# Patient Record
Sex: MALE | Race: WHITE | NOT HISPANIC OR LATINO | Employment: FULL TIME | URBAN - METROPOLITAN AREA
[De-identification: names, ages, dates, MRNs, and addresses within clinical notes are randomized per-mention and may not be internally consistent; named-entity substitution may affect disease eponyms.]

---

## 2017-01-18 LAB
EXT ALBUMIN: 3.7
EXT ALKALINE PHOSPHATASE: 63
EXT ALT: 26
EXT AST: 13
EXT BASOPHIL%: 0.7
EXT BILIRUBIN TOTAL: 0.66
EXT BUN: 21
EXT CALCIUM: 9
EXT CHLORIDE: 104
EXT CHOLESTEROL: 193
EXT CO2: 28
EXT CREATININE UA: 193
EXT CREATININE: 1.36 MG/DL
EXT EOSINOPHIL%: 3.8
EXT GGT: 53
EXT GLUCOSE: 151
EXT HDL: 36
EXT HEMATOCRIT: 44.2
EXT HEMOGLOBIN: 15.2
EXT LDL CHOLESTEROL: 131
EXT LYMPH%: 25.2
EXT MAGNESIUM: 1.5
EXT MONOCYTES%: 9.4
EXT PHOSPHORUS: 2.65
EXT PLATELETS: 153
EXT POTASSIUM: 4.5
EXT PROT/CREAT RATIO UR: 0.3
EXT PROTEIN TOTAL: 7
EXT PROTEIN UA: 66
EXT SEGS%: 60.9
EXT SODIUM: 140 MMOL/L
EXT TACROLIMUS LVL: 2.6
EXT TRIGLYCERIDES: 128
EXT WBC: 4.5

## 2017-01-20 DIAGNOSIS — Z94.4 LIVER REPLACED BY TRANSPLANT: ICD-10-CM

## 2017-01-20 RX ORDER — TACROLIMUS 1 MG/1
CAPSULE ORAL
Qty: 180 CAPSULE | Refills: 11 | Status: SHIPPED | OUTPATIENT
Start: 2017-01-20 | End: 2018-01-03 | Stop reason: SDUPTHER

## 2017-02-03 ENCOUNTER — TELEPHONE (OUTPATIENT)
Dept: TRANSPLANT | Facility: CLINIC | Age: 62
End: 2017-02-03

## 2017-02-03 DIAGNOSIS — Z94.4 LIVER REPLACED BY TRANSPLANT: Primary | ICD-10-CM

## 2017-02-03 NOTE — TELEPHONE ENCOUNTER
Pt called concerned about low prograf level. Pt spouse reports he has not missed any doses. Will repeat on Monday 2/6/17

## 2017-02-03 NOTE — TELEPHONE ENCOUNTER
----- Message from Eusebio Sheldon sent at 2/3/2017  8:26 AM CST -----  Contact: Kristyn(wife)  Have concerns regarding pt's prograpf levels please return call at

## 2017-02-06 LAB — EXT TACROLIMUS LVL: 7.6

## 2017-02-08 ENCOUNTER — TELEPHONE (OUTPATIENT)
Dept: TRANSPLANT | Facility: CLINIC | Age: 62
End: 2017-02-08

## 2017-02-08 NOTE — TELEPHONE ENCOUNTER
----- Message from Shaniqua Matias MD sent at 2/4/2017  5:30 PM CST -----  Repeat pg level - please let patient know.

## 2017-02-09 ENCOUNTER — TELEPHONE (OUTPATIENT)
Dept: TRANSPLANT | Facility: CLINIC | Age: 62
End: 2017-02-09

## 2017-02-09 NOTE — TELEPHONE ENCOUNTER
----- Message from Briana Carbajal sent at 2/9/2017 10:30 AM CST -----  Contact: Kristyn/wife  Returning call from yesterday,vanessa call back  [phone]

## 2017-02-10 ENCOUNTER — PATIENT MESSAGE (OUTPATIENT)
Dept: ADMINISTRATIVE | Facility: OTHER | Age: 62
End: 2017-02-10

## 2017-02-17 ENCOUNTER — PATIENT MESSAGE (OUTPATIENT)
Dept: TRANSPLANT | Facility: CLINIC | Age: 62
End: 2017-02-17

## 2017-02-27 ENCOUNTER — TELEPHONE (OUTPATIENT)
Dept: TRANSPLANT | Facility: CLINIC | Age: 62
End: 2017-02-27

## 2017-02-27 NOTE — TELEPHONE ENCOUNTER
----- Message from Shaniqua Matias MD sent at 2/24/2017  4:20 PM CST -----  The Labs are stable - please let patient know.

## 2017-03-01 ENCOUNTER — PATIENT MESSAGE (OUTPATIENT)
Dept: TRANSPLANT | Facility: CLINIC | Age: 62
End: 2017-03-01

## 2017-03-03 ENCOUNTER — TELEPHONE (OUTPATIENT)
Dept: TRANSPLANT | Facility: CLINIC | Age: 62
End: 2017-03-03

## 2017-03-03 ENCOUNTER — HOSPITAL ENCOUNTER (OUTPATIENT)
Dept: RADIOLOGY | Facility: HOSPITAL | Age: 62
Discharge: HOME | End: 2017-03-03
Attending: INTERNAL MEDICINE
Payer: COMMERCIAL

## 2017-03-03 DIAGNOSIS — Z94.4 LIVER REPLACED BY TRANSPLANT: ICD-10-CM

## 2017-03-03 LAB
CREAT SERPL-MCNC: 1.2 MG/DL (ref 0.5–1.4)
SAMPLE: NORMAL

## 2017-03-03 PROCEDURE — 74177 CT ABD & PELVIS W/CONTRAST: CPT | Mod: TC

## 2017-03-03 PROCEDURE — 74177 CT ABD & PELVIS W/CONTRAST: CPT | Mod: 26,,, | Performed by: RADIOLOGY

## 2017-03-03 PROCEDURE — 25500020 PHARM REV CODE 255: Performed by: INTERNAL MEDICINE

## 2017-03-03 PROCEDURE — 71260 CT THORAX DX C+: CPT | Mod: 26,,, | Performed by: RADIOLOGY

## 2017-03-03 PROCEDURE — 71260 CT THORAX DX C+: CPT | Mod: TC

## 2017-03-03 RX ADMIN — IOHEXOL 100 ML: 350 INJECTION, SOLUTION INTRAVENOUS at 02:03

## 2017-03-03 NOTE — TELEPHONE ENCOUNTER
----- Message from Shaniqua Matias MD sent at 3/3/2017  3:34 PM CST -----  Scan - no obvious HCC. Repeat ct chest with next abdo ct - please let patient know.

## 2017-03-06 ENCOUNTER — TELEPHONE (OUTPATIENT)
Dept: TRANSPLANT | Facility: CLINIC | Age: 62
End: 2017-03-06

## 2017-03-06 NOTE — TELEPHONE ENCOUNTER
----- Message from Ranjith Linton sent at 3/6/2017  1:04 PM CST -----  Contact: pt wife  ..Jordan Lopez Jr. calling to get lab results,   [phone]- wife   [phone]- pt

## 2017-05-23 ENCOUNTER — PATIENT MESSAGE (OUTPATIENT)
Dept: TRANSPLANT | Facility: CLINIC | Age: 62
End: 2017-05-23

## 2017-05-29 LAB
EXT ALBUMIN: 3.6
EXT ALKALINE PHOSPHATASE: 61
EXT ALT: 25
EXT AST: 14
EXT BASOPHIL%: 0.9
EXT BILIRUBIN TOTAL: 0.62
EXT BUN: 19
EXT CALCIUM: 9.1
EXT CHLORIDE: 104
EXT CHOLESTEROL: 182
EXT CO2: 28
EXT CREATININE: 1.24 MG/DL
EXT EOSINOPHIL%: 3.2
EXT GFR MDRD NON AF AMER: 63
EXT GGT: 47
EXT GLUCOSE: 169
EXT HDL: 36
EXT HEMATOCRIT: 42.5
EXT HEMOGLOBIN: 14.5
EXT LDL CHOLESTEROL: 124
EXT LYMPH%: 28.1
EXT MAGNESIUM: 1.9
EXT MONOCYTES%: 8.7
EXT PHOSPHORUS: 2.47
EXT PLATELETS: 135
EXT POTASSIUM: 4.5
EXT PROT/CREAT RATIO UR: 0.2
EXT PROTEIN TOTAL: 6.7
EXT PROTEIN UA: 40
EXT SEGS%: 59.1
EXT SODIUM: 139 MMOL/L
EXT TACROLIMUS LVL: 4.2
EXT TRIGLYCERIDES: 108
EXT WBC: 3.5
HBA1C MFR BLD: 7.4 %

## 2017-05-30 ENCOUNTER — PATIENT MESSAGE (OUTPATIENT)
Dept: TRANSPLANT | Facility: CLINIC | Age: 62
End: 2017-05-30

## 2017-06-06 ENCOUNTER — TELEPHONE (OUTPATIENT)
Dept: TRANSPLANT | Facility: CLINIC | Age: 62
End: 2017-06-06

## 2017-06-06 NOTE — TELEPHONE ENCOUNTER
----- Message from Shaniqua Matias MD sent at 6/5/2017  9:53 PM CDT -----  The Labs are stable - please let patient know.

## 2017-06-16 ENCOUNTER — TELEPHONE (OUTPATIENT)
Dept: TRANSPLANT | Facility: CLINIC | Age: 62
End: 2017-06-16

## 2017-06-16 NOTE — TELEPHONE ENCOUNTER
----- Message from Shaniqua Matias MD sent at 6/13/2017  4:14 PM CDT -----  The Labs are stable - please let patient know.

## 2017-06-29 ENCOUNTER — TELEPHONE (OUTPATIENT)
Dept: TRANSPLANT | Facility: CLINIC | Age: 62
End: 2017-06-29

## 2017-06-29 NOTE — TELEPHONE ENCOUNTER
----- Message from Eusebio Sheldon sent at 6/29/2017  3:08 PM CDT -----  Contact: luis with Dr gabi cosby office   Calling to see if pt can be started on metformin and also needing some meds for cholesterol please call

## 2017-07-08 ENCOUNTER — NURSE TRIAGE (OUTPATIENT)
Dept: ADMINISTRATIVE | Facility: CLINIC | Age: 62
End: 2017-07-08

## 2017-07-09 ENCOUNTER — PATIENT MESSAGE (OUTPATIENT)
Dept: TRANSPLANT | Facility: CLINIC | Age: 62
End: 2017-07-09

## 2017-07-09 NOTE — TELEPHONE ENCOUNTER
Reason for Disposition   Transplant patient (e.g., kidney, liver, lung, heart)    Protocols used: ST FEVER-A-AH    Kidney/Liver Transplant 6/29/2012/ BPA 4    Had a temp of 101.9 and took tylenol. Had nausea yesterday. Patient has been having chills off and on. Advised patient to go to the ED for evaluation, patient verbalized understanding.

## 2017-07-10 ENCOUNTER — PATIENT MESSAGE (OUTPATIENT)
Dept: TRANSPLANT | Facility: CLINIC | Age: 62
End: 2017-07-10

## 2017-07-12 ENCOUNTER — PATIENT MESSAGE (OUTPATIENT)
Dept: TRANSPLANT | Facility: CLINIC | Age: 62
End: 2017-07-12

## 2017-07-13 ENCOUNTER — PATIENT MESSAGE (OUTPATIENT)
Dept: TRANSPLANT | Facility: CLINIC | Age: 62
End: 2017-07-13

## 2017-09-06 ENCOUNTER — PATIENT MESSAGE (OUTPATIENT)
Dept: TRANSPLANT | Facility: CLINIC | Age: 62
End: 2017-09-06

## 2017-09-07 ENCOUNTER — PATIENT MESSAGE (OUTPATIENT)
Dept: TRANSPLANT | Facility: CLINIC | Age: 62
End: 2017-09-07

## 2017-09-07 LAB
EXT AFP: 2.3
EXT ALBUMIN: 3.7
EXT ALKALINE PHOSPHATASE: 64
EXT ALT: 25
EXT AST: 11
EXT BASOPHIL%: 0.7
EXT BILIRUBIN TOTAL: 0.68
EXT BUN: 19
EXT CALCIUM: 9.4
EXT CHLORIDE: 106
EXT CHOLESTEROL: 177
EXT CO2: 25
EXT CREATININE UA: 123
EXT CREATININE: 1.12 MG/DL
EXT EOSINOPHIL%: 3.2
EXT GLUCOSE: 113
EXT HDL: 40
EXT HEMATOCRIT: 45.5
EXT HEMOGLOBIN: 15.2
EXT LDL CHOLESTEROL: 112
EXT LYMPH%: 26.8
EXT MAGNESIUM: 1.9
EXT MONOCYTES%: 7.6
EXT PHOSPHORUS: 2.85
EXT PLATELETS: 144
EXT POTASSIUM: 4.2
EXT PROTEIN TOTAL: 7.1
EXT PROTEIN UA: 37
EXT SEGS%: 61.7
EXT SODIUM: 139 MMOL/L
EXT TACROLIMUS LVL: 5
EXT TRIGLYCERIDES: 123
EXT WBC: 4.3
HBA1C MFR BLD: 7.1 %

## 2017-09-11 DIAGNOSIS — R60.9 EDEMA, UNSPECIFIED TYPE: ICD-10-CM

## 2017-09-11 RX ORDER — FUROSEMIDE 20 MG/1
20 TABLET ORAL DAILY
Qty: 30 TABLET | Refills: 11 | Status: ON HOLD | OUTPATIENT
Start: 2017-09-11 | End: 2022-08-10 | Stop reason: SDUPTHER

## 2017-09-18 ENCOUNTER — TELEPHONE (OUTPATIENT)
Dept: TRANSPLANT | Facility: CLINIC | Age: 62
End: 2017-09-18

## 2017-09-18 NOTE — TELEPHONE ENCOUNTER
----- Message from Shaniqua Matias MD sent at 9/17/2017 11:58 AM CDT -----  The Labs are stable - please let patient know.

## 2017-10-03 ENCOUNTER — OFFICE VISIT (OUTPATIENT)
Dept: TRANSPLANT | Facility: CLINIC | Age: 62
End: 2017-10-03
Payer: COMMERCIAL

## 2017-10-03 VITALS
HEART RATE: 76 BPM | BODY MASS INDEX: 42.66 KG/M2 | WEIGHT: 315 LBS | SYSTOLIC BLOOD PRESSURE: 181 MMHG | TEMPERATURE: 97 F | DIASTOLIC BLOOD PRESSURE: 80 MMHG | HEIGHT: 72 IN | RESPIRATION RATE: 16 BRPM | OXYGEN SATURATION: 97 %

## 2017-10-03 DIAGNOSIS — Z29.89 NEED FOR PROPHYLACTIC IMMUNOTHERAPY: Chronic | ICD-10-CM

## 2017-10-03 DIAGNOSIS — Z94.4 LIVER REPLACED BY TRANSPLANT: Primary | Chronic | ICD-10-CM

## 2017-10-03 PROCEDURE — 99213 OFFICE O/P EST LOW 20 MIN: CPT | Mod: S$GLB,,, | Performed by: NURSE PRACTITIONER

## 2017-10-03 PROCEDURE — 99999 PR PBB SHADOW E&M-EST. PATIENT-LVL III: CPT | Mod: PBBFAC,,, | Performed by: NURSE PRACTITIONER

## 2017-10-03 RX ORDER — LANOLIN ALCOHOL/MO/W.PET/CERES
500 CREAM (GRAM) TOPICAL 2 TIMES DAILY
COMMUNITY
End: 2023-05-30

## 2017-10-03 NOTE — LETTER
October 3, 2017        Stephen Schumacher  [address] [facility] [place] [address]  Phone: [phone]  Fax: [phone]             [facility]  [address]  [facility] [address]  Phone: [phone]   Patient: Jordan Lopez Jr.   MR Number: [id number]   YOB: 1955   Date of Visit: 10/3/2017       Dear Dr. Stephen Schumacher    Thank you for referring Jordan Lopez to me for evaluation. Attached you will find relevant portions of my assessment and plan of care.    If you have questions, please do not hesitate to call me. I look forward to following Jordan Lopez along with you.    Sincerely,    Jeana Ley NP    Enclosure    If you would like to receive this communication electronically, please contact [email or web address] or [phone] to request [employer] access.    [employer] Link is a tool which provides read-only access to select patient information with whom you have a relationship. Its easy to use and provides real time access to review your patients record including encounter summaries, notes, results, and demographic information.    If you feel you have received this communication in error or would no longer like to receive these types of communications, please e-mail [email or web address]

## 2017-10-03 NOTE — PROGRESS NOTES
Transplant Hepatology  Liver Transplant Recipient Follow-up    Transplant Date: 2012 (Kidney), 2012 (Liver)  UNOS Native Liver Dx: Other, Specify - Chronic Kidney Insufficiency, STEVENS w/ HCC    Jordan is here for follow up of his liver transplant.    ORGAN: LIVER  Whole or Partial: whole liver  Donor Type:  - brain death  [facility] High Risk: no  Donor CMV Status: positive  Donor HCV Status: negative  Donor HBcAb: negative  Biliary Anastomosis:   Arterial Anatomy:   IVC reconstruction:   Portal vein status:     He has had the following complications since transplant: chronic renal insufficiency. The noted complications is well controlled.    Subjective:     Interval History: Jordan was last seen on 2016. Currently, he is doing well. Current complaints include none.  He has good allograft function, maintained on tacrolimus 3/3 and Cellcept 750/750. AFP has been normal and imaging is w/o focal mass lesions. No c/o jaundice, dark urine, abdominal distension, edema.     Since his last visit, DM is much better controlled, he is now on medication. A1c has improved from 9 to 7  His PCP would also like to start him on statin    Review of Systems   Constitutional: Negative for activity change, appetite change, chills, diaphoresis, fatigue, fever and unexpected weight change.   HENT: Negative for facial swelling.    Respiratory: Negative for cough, chest tightness and shortness of breath.    Cardiovascular: Negative for chest pain, palpitations and leg swelling.   Gastrointestinal: Negative for abdominal distention, abdominal pain, blood in stool, constipation, diarrhea, nausea and vomiting.   Musculoskeletal: Negative.  Negative for neck pain and neck stiffness.   Skin: Negative for color change, rash and wound.   Neurological: Negative for dizziness, tremors, weakness and light-headedness.   Hematological: Negative for adenopathy. Does not bruise/bleed easily.   Psychiatric/Behavioral: Negative for agitation  and decreased concentration. The patient is not nervous/anxious.        Objective:     Physical Exam   Constitutional: He is oriented to person, place, and time. He appears well-developed and well-nourished. No distress.   HENT:   Head: Normocephalic and atraumatic.   Eyes: Conjunctivae are normal. Pupils are equal, round, and reactive to light. No scleral icterus.   Neck: Normal range of motion. Neck supple.   Cardiovascular: Normal rate.    Pulmonary/Chest: Effort normal.   Abdominal: He exhibits no distension.   Musculoskeletal: Normal range of motion.   Neurological: He is alert and oriented to person, place, and time. No cranial nerve deficit. He exhibits normal muscle tone. Coordination normal.   No asterixis   Skin: Skin is warm and dry. No rash noted. He is not diaphoretic. No erythema.   Psychiatric: He has a normal mood and affect. His behavior is normal. Judgment and thought content normal.     Lab Results   Component Value Date    BILITOT 0.6 07/03/2014    AST 13 07/03/2014    ALT 14 07/03/2014    ALKPHOS 70 07/03/2014    CREATININE 1.3 07/03/2014    ALBUMIN 3.5 07/03/2014     Lab Results   Component Value Date    WBC 3.47 (L) 07/03/2014    HGB 14.0 07/03/2014    HCT 43.2 07/03/2014     (L) 07/03/2014     Lab Results   Component Value Date    TACROLIMUS 9.1 07/03/2014       Assessment/Plan:     1. Liver replaced by transplant    2. Need for prophylactic immunotherapy        S/p OLT 2/2 STEVENS/ HCC : good allograft function  -continue with post liver transplant per protocol  -HCC surveillance  Prophylactic immunotherapy: no changes  Maintenance:  -Instructed to f/u with PCP for regular health maintenance care including cancer screenings and BMD  -Reviewed need to avoid sun exposure with use of sunblock, hats, long sleeves related to increased risk of skin cancers  -Recommend f/u with Dermatology for annual skin checks    RTC 1 year    THAI Robledo Patient Status  Functional  Status: 100% - Normal, no complaints, no evidence of disease  Physical Capacity: No Limitations

## 2017-10-10 RX ORDER — MYCOPHENOLATE MOFETIL 250 MG/1
CAPSULE ORAL
Qty: 180 CAPSULE | Refills: 11 | Status: SHIPPED | OUTPATIENT
Start: 2017-10-10 | End: 2018-10-12 | Stop reason: SDUPTHER

## 2017-11-09 ENCOUNTER — PATIENT MESSAGE (OUTPATIENT)
Dept: TRANSPLANT | Facility: CLINIC | Age: 62
End: 2017-11-09

## 2017-12-11 LAB
EXT AFP: 1.8
EXT ALBUMIN: 3.6
EXT ALKALINE PHOSPHATASE: 75
EXT ALT: 27
EXT AST: 12
EXT BASOPHIL%: 0.8
EXT BILIRUBIN TOTAL: 0.6
EXT BUN: 19
EXT CALCIUM: 9.2
EXT CHLORIDE: 104
EXT CHOLESTEROL: 172
EXT CO2: 25
EXT CREATININE UA: 217
EXT CREATININE: 1.2 MG/DL
EXT EOSINOPHIL%: 3.3
EXT GGT: 84
EXT GLUCOSE: 160
EXT HDL: 36
EXT HEMATOCRIT: 46
EXT HEMOGLOBIN: 15.1
EXT LDL CHOLESTEROL: 117
EXT LYMPH%: 18.8
EXT MAGNESIUM: 1.7
EXT MONOCYTES%: 7.5
EXT PHOSPHORUS: 3.3
EXT PLATELETS: 156
EXT POTASSIUM: 4.3
EXT PROTEIN TOTAL: 7.1
EXT PROTEIN UA: 41
EXT SEGS%: 69.2
EXT SODIUM: 138 MMOL/L
EXT TACROLIMUS LVL: 6.5
EXT TRIGLYCERIDES: 95
EXT WBC: 5.1

## 2017-12-20 ENCOUNTER — TELEPHONE (OUTPATIENT)
Dept: TRANSPLANT | Facility: CLINIC | Age: 62
End: 2017-12-20

## 2017-12-20 NOTE — TELEPHONE ENCOUNTER
----- Message from Shaniqua Matias MD sent at 12/20/2017  3:33 PM CST -----  The Labs are stable - please let patient know.

## 2018-01-03 DIAGNOSIS — Z94.4 LIVER REPLACED BY TRANSPLANT: ICD-10-CM

## 2018-01-03 RX ORDER — TACROLIMUS 1 MG/1
CAPSULE ORAL
Qty: 180 CAPSULE | Refills: 11 | Status: SHIPPED | OUTPATIENT
Start: 2018-01-03 | End: 2019-01-11 | Stop reason: SDUPTHER

## 2018-03-08 LAB
AFP: 2.2
CREATININE,URINE: 126 MG/DL
EXT ALBUMIN: 3.7
EXT ALKALINE PHOSPHATASE: 58
EXT ALT: 27
EXT AST: 14
EXT BASOPHIL%: 0.7
EXT BILIRUBIN TOTAL: 0.67
EXT BUN: 20
EXT CALCIUM: 8.9
EXT CHLORIDE: 103
EXT CHOLESTEROL: 155
EXT CO2: 31
EXT CREATININE: 1.4 MG/DL
EXT EOSINOPHIL%: 2.4
EXT GGT: 67
EXT GLUCOSE: 157
EXT HDL: 37
EXT HEMATOCRIT: 45
EXT HEMOGLOBIN: 14.9
EXT LDL CHOLESTEROL: 97
EXT LYMPH%: 22.5
EXT MAGNESIUM: 1.5
EXT MONOCYTES%: 7.1
EXT PHOSPHORUS: 3.06
EXT PLATELETS: 147
EXT POTASSIUM: 4.6
EXT PROTEIN TOTAL: 6.9
EXT SEGS%: 67.1
EXT SODIUM: 139 MMOL/L
EXT TACROLIMUS LVL: 6.1
EXT TRIGLYCERIDES: 104
EXT WBC: 4.5
URINE PROTEIN: 24

## 2018-03-09 ENCOUNTER — TELEPHONE (OUTPATIENT)
Dept: TRANSPLANT | Facility: CLINIC | Age: 63
End: 2018-03-09

## 2018-03-09 NOTE — TELEPHONE ENCOUNTER
----- Message from Shaniqua Matias MD sent at 3/9/2018  2:37 PM CST -----  The Labs are stable - please let patient know.

## 2018-03-17 ENCOUNTER — TELEPHONE (OUTPATIENT)
Dept: TRANSPLANT | Facility: CLINIC | Age: 63
End: 2018-03-17

## 2018-03-17 NOTE — TELEPHONE ENCOUNTER
Labs have been reviewed and are stable will repeat per protocol. Will send letter and email reminder

## 2018-03-23 ENCOUNTER — TELEPHONE (OUTPATIENT)
Dept: TRANSPLANT | Facility: CLINIC | Age: 63
End: 2018-03-23

## 2018-03-23 NOTE — TELEPHONE ENCOUNTER
----- Message from Shaniqua Matias MD sent at 3/19/2018 12:06 AM CDT -----  The Labs are stable - please let patient know.  Message  sent via portal

## 2018-03-28 ENCOUNTER — TELEPHONE (OUTPATIENT)
Dept: TRANSPLANT | Facility: CLINIC | Age: 63
End: 2018-03-28

## 2018-03-28 DIAGNOSIS — Z94.4 STATUS POST LIVER TRANSPLANT: Primary | ICD-10-CM

## 2018-03-28 NOTE — TELEPHONE ENCOUNTER
----- Message from Opal Norman sent at 3/28/2018 11:22 AM CDT -----  Contact: Ranulfo Ellis Pt received a message from you to give you a call to schedule his annual scans    Pt contact number [phone]  Thanks

## 2018-04-11 ENCOUNTER — HOSPITAL ENCOUNTER (OUTPATIENT)
Dept: RADIOLOGY | Facility: HOSPITAL | Age: 63
Discharge: HOME | End: 2018-04-11
Attending: INTERNAL MEDICINE
Payer: COMMERCIAL

## 2018-04-11 DIAGNOSIS — Z94.4 STATUS POST LIVER TRANSPLANT: ICD-10-CM

## 2018-04-11 LAB
CREAT SERPL-MCNC: 0.9 MG/DL (ref 0.5–1.4)
SAMPLE: NORMAL

## 2018-04-11 PROCEDURE — 74177 CT ABD & PELVIS W/CONTRAST: CPT | Mod: 26,,, | Performed by: RADIOLOGY

## 2018-04-11 PROCEDURE — 74177 CT ABD & PELVIS W/CONTRAST: CPT | Mod: TC

## 2018-04-11 PROCEDURE — 71260 CT THORAX DX C+: CPT | Mod: TC

## 2018-04-11 PROCEDURE — 25500020 PHARM REV CODE 255: Performed by: INTERNAL MEDICINE

## 2018-04-11 PROCEDURE — 71260 CT THORAX DX C+: CPT | Mod: 26,,, | Performed by: RADIOLOGY

## 2018-04-11 RX ADMIN — IOHEXOL 100 ML: 350 INJECTION, SOLUTION INTRAVENOUS at 02:04

## 2018-04-13 ENCOUNTER — TELEPHONE (OUTPATIENT)
Dept: TRANSPLANT | Facility: CLINIC | Age: 63
End: 2018-04-13

## 2018-04-13 NOTE — TELEPHONE ENCOUNTER
----- Message from Shaniqua Matias MD sent at 4/13/2018 11:45 AM CDT -----  Ct stable - please let patient know.

## 2018-04-13 NOTE — TELEPHONE ENCOUNTER
----- Message from Monico Cody RN sent at 4/13/2018  9:39 AM CDT -----  Contact: patient       ----- Message -----  From: Johnathan Mosley  Sent: 4/12/2018   4:26 PM  To: [facility] Post-Kidney Transplant Clinical    Patient calling for his Ct Scan results.         Please call [phone]      Thanks

## 2018-07-05 LAB
EXT AFP: 2.8
EXT ALBUMIN: 3.8
EXT ALKALINE PHOSPHATASE: 58
EXT ALT: 24
EXT AST: 12
EXT BASOPHIL%: 1
EXT BILIRUBIN TOTAL: 0.83
EXT BK VIRUS DNA QUANT, PCR, URINE: NORMAL
EXT BUN: 21
EXT CALCIUM: 9.2
EXT CHLORIDE: 107
EXT CHOLESTEROL: 165
EXT CO2: 28
EXT CREATININE UA: 367
EXT CREATININE: 1.53 MG/DL
EXT EOSINOPHIL%: 2.4
EXT GGT: 50
EXT GLUCOSE: 110
EXT HDL: 40
EXT HEMATOCRIT: 44.1
EXT HEMOGLOBIN: 14.7
EXT LDL CHOLESTEROL: 102
EXT LYMPH%: 26.9
EXT MAGNESIUM: 1.8
EXT MONOCYTES%: 8.9
EXT PHOSPHORUS: 3.3
EXT PLATELETS: 132
EXT POTASSIUM: 4.3
EXT PROT/CREAT RATIO UR: 0.1
EXT PROTEIN TOTAL: 7
EXT PROTEIN UA: 46
EXT SEGS%: 60.6
EXT SODIUM: 140 MMOL/L
EXT TACROLIMUS LVL: 7.6
EXT TRIGLYCERIDES: 114
EXT WBC: 4.2

## 2018-07-13 ENCOUNTER — TELEPHONE (OUTPATIENT)
Dept: TRANSPLANT | Facility: CLINIC | Age: 63
End: 2018-07-13

## 2018-07-13 NOTE — TELEPHONE ENCOUNTER
----- Message from Shaniqua Matias MD sent at 7/13/2018 12:57 PM CDT -----  The Labs are stable - please let patient know.

## 2018-07-27 ENCOUNTER — PATIENT MESSAGE (OUTPATIENT)
Dept: TRANSPLANT | Facility: CLINIC | Age: 63
End: 2018-07-27

## 2018-08-07 ENCOUNTER — PATIENT MESSAGE (OUTPATIENT)
Dept: TRANSPLANT | Facility: CLINIC | Age: 63
End: 2018-08-07

## 2018-10-13 RX ORDER — MYCOPHENOLATE MOFETIL 250 MG/1
CAPSULE ORAL
Qty: 180 CAPSULE | Refills: 11 | Status: SHIPPED | OUTPATIENT
Start: 2018-10-13 | End: 2019-11-01 | Stop reason: SDUPTHER

## 2018-10-29 ENCOUNTER — TELEPHONE (OUTPATIENT)
Dept: TRANSPLANT | Facility: CLINIC | Age: 63
End: 2018-10-29

## 2018-10-29 ENCOUNTER — PATIENT MESSAGE (OUTPATIENT)
Dept: TRANSPLANT | Facility: CLINIC | Age: 63
End: 2018-10-29

## 2018-10-29 LAB
EXT AFP: 2.5
EXT ALBUMIN: 3.5
EXT ALKALINE PHOSPHATASE: 60
EXT ALT: 22
EXT AST: 14
EXT BASOPHIL%: 0.9
EXT BILIRUBIN TOTAL: 0.55
EXT BK VIRUS DNA QUANT, PCR, URINE: NEGATIVE
EXT BUN: 21
EXT CALCIUM: 8.8
EXT CHLORIDE: 107
EXT CHOLESTEROL: 148
EXT CO2: 23
EXT CREATININE UA: 210
EXT CREATININE: 1.25 MG/DL
EXT EOSINOPHIL%: 3.6
EXT GFR MDRD AF AMER: 62
EXT GLUCOSE: 140
EXT HDL: 35
EXT HEMATOCRIT: 42.2
EXT HEMOGLOBIN: 14.3
EXT LDL CHOLESTEROL: 95
EXT LYMPH%: 21.6
EXT MAGNESIUM: 1.83
EXT MONOCYTES%: 7.3
EXT PHOSPHORUS: 2.66
EXT PLATELETS: 139
EXT POTASSIUM: 4.2
EXT PROT/CREAT RATIO UR: 0.1
EXT PROTEIN TOTAL: 6.9
EXT PROTEIN UA: 30
EXT SEGS%: 66.1
EXT SODIUM: 140 MMOL/L
EXT TACROLIMUS LVL: 7.4
EXT TRIGLYCERIDES: 91
EXT WBC: 4.4

## 2018-10-29 NOTE — TELEPHONE ENCOUNTER
----- Message from Shaniqua Matias MD sent at 10/29/2018  3:29 PM CDT -----  The Labs are stable - please let patient know.

## 2018-10-30 ENCOUNTER — PATIENT MESSAGE (OUTPATIENT)
Dept: HEPATOLOGY | Facility: CLINIC | Age: 63
End: 2018-10-30

## 2018-11-21 ENCOUNTER — OFFICE VISIT (OUTPATIENT)
Dept: TRANSPLANT | Facility: CLINIC | Age: 63
End: 2018-11-21
Attending: INTERNAL MEDICINE
Payer: MEDICARE

## 2018-11-21 ENCOUNTER — PATIENT MESSAGE (OUTPATIENT)
Dept: TRANSPLANT | Facility: CLINIC | Age: 63
End: 2018-11-21

## 2018-11-21 VITALS
SYSTOLIC BLOOD PRESSURE: 156 MMHG | BODY MASS INDEX: 42.66 KG/M2 | DIASTOLIC BLOOD PRESSURE: 93 MMHG | TEMPERATURE: 98 F | HEIGHT: 72 IN | OXYGEN SATURATION: 96 % | RESPIRATION RATE: 17 BRPM | HEART RATE: 81 BPM | WEIGHT: 315 LBS

## 2018-11-21 DIAGNOSIS — Z94.0 IMMUNOSUPPRESSIVE MANAGEMENT ENCOUNTER FOLLOWING KIDNEY TRANSPLANT: Chronic | ICD-10-CM

## 2018-11-21 DIAGNOSIS — I10 ESSENTIAL HYPERTENSION: ICD-10-CM

## 2018-11-21 DIAGNOSIS — Z85.05 PERSONAL HISTORY OF MALIGNANT HEPATOMA: ICD-10-CM

## 2018-11-21 DIAGNOSIS — Z94.0 KIDNEY REPLACED BY TRANSPLANT: ICD-10-CM

## 2018-11-21 DIAGNOSIS — Z94.4 LIVER REPLACED BY TRANSPLANT: Primary | Chronic | ICD-10-CM

## 2018-11-21 DIAGNOSIS — Z79.899 IMMUNOSUPPRESSIVE MANAGEMENT ENCOUNTER FOLLOWING KIDNEY TRANSPLANT: Chronic | ICD-10-CM

## 2018-11-21 DIAGNOSIS — I12.9 RENAL HYPERTENSION: ICD-10-CM

## 2018-11-21 DIAGNOSIS — Z29.89 NEED FOR PROPHYLACTIC IMMUNOTHERAPY: Chronic | ICD-10-CM

## 2018-11-21 PROCEDURE — 99215 OFFICE O/P EST HI 40 MIN: CPT | Mod: S$GLB,,, | Performed by: INTERNAL MEDICINE

## 2018-11-21 NOTE — PROGRESS NOTES
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     He has had the following complications since transplant: chronic renal insufficiency.     Subjective:     Interval History: Jordan is now 6 years post combined liver-kidney transplant. He is feeling well and vociing no complaints. He has excellent liver allograft function with transaminases that are less than 30 (normal). His creatinine is 1.25. Last prograf leve was 7.4 on 10/23/18. He is on cellcept 750 mg bid.    HCC screening for recurrence: Ct scan done 4/11/18 for HCC surveillance does not suggest any recurrent HCC.    Bone density 5/15/15:  revealed only slight decrease in bone density in the spine and hip, but improved when compared to previous.    Colorectal ca screening: per local GI    Dermatology annual evaluation: needs annual      Review of Systems   Constitutional: Negative.    HENT: Negative.    Eyes: Negative.    Respiratory: Negative.    Cardiovascular: Negative.    Gastrointestinal: Negative.    Genitourinary: Negative.    Musculoskeletal: Negative.    Skin: Negative.    Neurological: Negative.    Psychiatric/Behavioral: Negative.        Objective:     Physical Exam   Constitutional: He is oriented to person, place, and time. He appears well-developed and well-nourished.   HENT:   Head: Normocephalic and atraumatic.   Eyes: Conjunctivae and EOM are normal. Pupils are equal, round, and reactive to light. No scleral icterus.   Neck: Normal range of motion. Neck supple. No thyromegaly present.   Cardiovascular: Normal rate, regular rhythm and normal heart sounds.   Pulmonary/Chest: Effort normal and breath sounds normal. He has no rales.   Abdominal: Soft. Bowel sounds are normal. He exhibits no distension  and no mass. There is no tenderness.   Musculoskeletal: Normal range of motion. He exhibits edema.   Neurological: He is alert and oriented to person, place, and time.   Skin: Skin is warm and dry. No rash noted.   Psychiatric: He has a normal mood and affect.   Vitals reviewed.    Lab Results   Component Value Date    BILITOT 0.6 07/03/2014    AST 13 07/03/2014    ALT 14 07/03/2014    ALKPHOS 70 07/03/2014    CREATININE 1.3 07/03/2014    ALBUMIN 3.5 07/03/2014     Lab Results   Component Value Date    WBC 3.47 (L) 07/03/2014    HGB 14.0 07/03/2014    HCT 43.2 07/03/2014     (L) 07/03/2014     Lab Results   Component Value Date    TACROLIMUS 9.1 07/03/2014       Assessment/Plan:     1. Complications of OLTx: mild intermittent renal insufficiency. Monitor  2. HTN: continue current medications; monitor since BP is elevated today  3. HCC history: surveillance with CT per protocol  4. Prophylactic immunotherapy: continue current immunosuppression (prograf and cellcept). But lower cellcept to 500 mg bid and do monthly labs x 6.   5. Health maintenance: the patient should see a dermatologist annually to screen for skin cancer, perform regular colonoscopies  6. R/O osteoporosis. I am recommending a repeat bone density now  8. Hx of renal cell ca: s/p nephrectomy: no sign of recurrence on ct scan.    Return 1 year.      Shaniqua Matias MD           [facility] Patient Status  Functional Status: 100% - Normal, no complaints, no evidence of disease  Physical Capacity: No Limitations

## 2018-11-21 NOTE — PATIENT INSTRUCTIONS
1. HCC screening per protocol (next due in April 2019)  2. Monthly labs x 6 months  3. Decrease cellcept to 500 mg twice a day from 750 mg twice daily  4. Bone density locally  5. Continue annual dermatology evaluations  6. Keep up to date with colonoscopy  Return 1 year

## 2018-11-21 NOTE — LETTER
November 25, 2018        Stephen Schumacher  [address] [facility] [place] [address]  Phone: [phone]  Fax: [phone]             [facility] - Liver Transplant  [address] [address]  Phone: [phone]  Fax: [phone]   Patient: Jordan Lopez Jr.   MR Number: [id number]   YOB: 1955   Date of Visit: 11/21/2018       Dear Dr. Stephen Schumacher    Thank you for referring Jordan Lopez to me for evaluation. Attached you will find relevant portions of my assessment and plan of care.    If you have questions, please do not hesitate to call me. I look forward to following Jordan Lopez along with you.    Sincerely,    Shaniqua Matias MD    Enclosure    If you would like to receive this communication electronically, please contact [email or web address] or [phone] to request [email or web address] Link access.    [email or web address] Link is a tool which provides read-only access to select patient information with whom you have a relationship. Its easy to use and provides real time access to review your patients record including encounter summaries, notes, results, and demographic information.    If you feel you have received this communication in error or would no longer like to receive these types of communications, please e-mail [email or web address]

## 2018-12-06 ENCOUNTER — PATIENT MESSAGE (OUTPATIENT)
Dept: TRANSPLANT | Facility: CLINIC | Age: 63
End: 2018-12-06

## 2018-12-21 ENCOUNTER — PATIENT MESSAGE (OUTPATIENT)
Dept: TRANSPLANT | Facility: CLINIC | Age: 63
End: 2018-12-21

## 2018-12-21 LAB
EXT ALBUMIN: 3.6
EXT ALKALINE PHOSPHATASE: 66
EXT ALT: 36
EXT AST: 18
EXT BASOPHIL%: 0.8
EXT BILIRUBIN TOTAL: 0.42
EXT BUN: 21
EXT CALCIUM: 8.8
EXT CHLORIDE: 106
EXT CO2: 25
EXT CREATININE: 1.35 MG/DL
EXT EOSINOPHIL%: 2.9
EXT GLUCOSE: 126
EXT HEMATOCRIT: 44.2
EXT HEMOGLOBIN: 14.9
EXT INR: 0.9
EXT LYMPH%: 26.4
EXT MONOCYTES%: 8.5
EXT PLATELETS: 171
EXT POTASSIUM: 4.1
EXT PROTEIN TOTAL: 6.9
EXT PT: 10.4
EXT SEGS%: 60.6
EXT SODIUM: 139 MMOL/L
EXT TACROLIMUS LVL: 4.9
EXT WBC: 4.8

## 2019-01-03 ENCOUNTER — TELEPHONE (OUTPATIENT)
Dept: TRANSPLANT | Facility: CLINIC | Age: 64
End: 2019-01-03

## 2019-01-04 NOTE — TELEPHONE ENCOUNTER
----- Message from Shaniqua Matias MD sent at 12/29/2018 12:55 PM CST -----  The Labs are stable - please let patient know.

## 2019-01-11 DIAGNOSIS — Z94.4 LIVER REPLACED BY TRANSPLANT: ICD-10-CM

## 2019-01-11 RX ORDER — TACROLIMUS 1 MG/1
CAPSULE ORAL
Qty: 180 CAPSULE | Refills: 11 | Status: SHIPPED | OUTPATIENT
Start: 2019-01-11 | End: 2019-12-04 | Stop reason: SDUPTHER

## 2019-01-25 LAB
EXT ALBUMIN: 3.5
EXT ALKALINE PHOSPHATASE: 64
EXT ALT: 23
EXT AST: 12
EXT BASOPHIL%: 1.1
EXT BILIRUBIN TOTAL: 0.47
EXT BUN: 15
EXT CALCIUM: 8.8
EXT CHLORIDE: 105
EXT CO2: 27
EXT CREATININE: 1.35 MG/DL
EXT EOSINOPHIL%: 5.5
EXT GLUCOSE: 161
EXT HEMATOCRIT: 47
EXT HEMOGLOBIN: 15.5
EXT INR: 0.9
EXT LYMPH%: 26.3
EXT MONOCYTES%: 9
EXT PLATELETS: 148
EXT POTASSIUM: 4.1
EXT PROTEIN TOTAL: 6.8
EXT PT: 10.1
EXT SEGS%: 57.7
EXT SODIUM: 139 MMOL/L
EXT TACROLIMUS LVL: 4.2
EXT WBC: 4.6

## 2019-02-05 DIAGNOSIS — Z94.4 STATUS POST LIVER TRANSPLANT: ICD-10-CM

## 2019-02-05 DIAGNOSIS — Z85.05 HISTORY OF LIVER CANCER: Primary | ICD-10-CM

## 2019-02-06 ENCOUNTER — TELEPHONE (OUTPATIENT)
Dept: TRANSPLANT | Facility: CLINIC | Age: 64
End: 2019-02-06

## 2019-02-06 NOTE — TELEPHONE ENCOUNTER
----- Message from Shaniqua Matias MD sent at 2/6/2019  4:51 PM CST -----  The Labs are stable - please let patient know.

## 2019-02-20 ENCOUNTER — PATIENT MESSAGE (OUTPATIENT)
Dept: TRANSPLANT | Facility: CLINIC | Age: 64
End: 2019-02-20

## 2019-02-20 LAB
EXT ALBUMIN: 3.7
EXT ALKALINE PHOSPHATASE: 61
EXT ALT: 22
EXT AST: 16
EXT BASOPHIL%: 1
EXT BILIRUBIN TOTAL: 0.63
EXT BUN: 19
EXT CALCIUM: 9.4
EXT CHLORIDE: 106
EXT CO2: 25
EXT CREATININE: 1.29 MG/DL
EXT EOSINOPHIL%: 3.8
EXT GLUCOSE: 106
EXT HEMATOCRIT: 45.1
EXT HEMOGLOBIN: 15.2
EXT INR: 1.1
EXT LYMPH%: 30.4
EXT MONOCYTES%: 7.8
EXT PLATELETS: 159
EXT POTASSIUM: 4.4
EXT PROTEIN TOTAL: 7.1
EXT PT: 10.8
EXT SEGS%: 56.6
EXT SODIUM: 139 MMOL/L
EXT TACROLIMUS LVL: 4.8
EXT WBC: 5

## 2019-03-01 ENCOUNTER — TELEPHONE (OUTPATIENT)
Dept: TRANSPLANT | Facility: CLINIC | Age: 64
End: 2019-03-01

## 2019-03-01 NOTE — TELEPHONE ENCOUNTER
----- Message from Shaniqua Matias MD sent at 3/1/2019 10:32 AM CST -----  The Labs are stable - please let patient know.

## 2019-03-04 ENCOUNTER — PATIENT MESSAGE (OUTPATIENT)
Dept: TRANSPLANT | Facility: CLINIC | Age: 64
End: 2019-03-04

## 2019-03-30 LAB
EXT ALBUMIN: 3.4
EXT ALKALINE PHOSPHATASE: 59
EXT ALT: 24
EXT AST: 16
EXT BASOPHIL%: 0.7
EXT BILIRUBIN TOTAL: 0.75
EXT BUN: 15
EXT CALCIUM: 8.5
EXT CHLORIDE: 107
EXT CO2: 28
EXT CREATININE: 1.31 MG/DL
EXT EOSINOPHIL%: 3.6
EXT GLUCOSE: 136
EXT HEMATOCRIT: 42.9
EXT HEMOGLOBIN: 14.5
EXT INR: 1.1
EXT LYMPH%: 28.2
EXT MONOCYTES%: 8.5
EXT PLATELETS: 133
EXT POTASSIUM: 4.4
EXT PROTEIN TOTAL: 6.6
EXT PT: 11.3
EXT SEGS%: 58.8
EXT SODIUM: 140 MMOL/L
EXT TACROLIMUS LVL: 4.7
EXT WBC: 4.1

## 2019-04-02 ENCOUNTER — PATIENT MESSAGE (OUTPATIENT)
Dept: TRANSPLANT | Facility: CLINIC | Age: 64
End: 2019-04-02

## 2019-04-05 ENCOUNTER — TELEPHONE (OUTPATIENT)
Dept: TRANSPLANT | Facility: CLINIC | Age: 64
End: 2019-04-05

## 2019-04-05 NOTE — TELEPHONE ENCOUNTER
----- Message from Shanqiua Matias MD sent at 4/4/2019 10:40 PM CDT -----  The Labs are stable - please let patient know.

## 2019-04-08 ENCOUNTER — PATIENT MESSAGE (OUTPATIENT)
Dept: TRANSPLANT | Facility: CLINIC | Age: 64
End: 2019-04-08

## 2019-04-12 ENCOUNTER — PATIENT MESSAGE (OUTPATIENT)
Dept: TRANSPLANT | Facility: CLINIC | Age: 64
End: 2019-04-12

## 2019-04-15 ENCOUNTER — PATIENT MESSAGE (OUTPATIENT)
Dept: TRANSPLANT | Facility: CLINIC | Age: 64
End: 2019-04-15

## 2019-04-15 ENCOUNTER — HOSPITAL ENCOUNTER (OUTPATIENT)
Dept: RADIOLOGY | Facility: HOSPITAL | Age: 64
Discharge: HOME | End: 2019-04-15
Attending: INTERNAL MEDICINE
Payer: MEDICARE

## 2019-04-15 DIAGNOSIS — Z85.05 HISTORY OF LIVER CANCER: ICD-10-CM

## 2019-04-15 PROCEDURE — 71260 CT CHEST WITH CONTRAST: ICD-10-PCS | Mod: 26,,, | Performed by: INTERNAL MEDICINE

## 2019-04-15 PROCEDURE — 74177 CT ABDOMEN PELVIS WITH CONTRAST: ICD-10-PCS | Mod: 26,,, | Performed by: INTERNAL MEDICINE

## 2019-04-15 PROCEDURE — 25500020 PHARM REV CODE 255: Performed by: INTERNAL MEDICINE

## 2019-04-15 PROCEDURE — 74177 CT ABD & PELVIS W/CONTRAST: CPT | Mod: TC

## 2019-04-15 PROCEDURE — 71260 CT THORAX DX C+: CPT | Mod: 26,,, | Performed by: INTERNAL MEDICINE

## 2019-04-15 PROCEDURE — 74177 CT ABD & PELVIS W/CONTRAST: CPT | Mod: 26,,, | Performed by: INTERNAL MEDICINE

## 2019-04-15 PROCEDURE — 71260 CT THORAX DX C+: CPT | Mod: TC

## 2019-04-15 RX ADMIN — IOHEXOL 100 ML: 350 INJECTION, SOLUTION INTRAVENOUS at 01:04

## 2019-04-16 ENCOUNTER — TELEPHONE (OUTPATIENT)
Dept: TRANSPLANT | Facility: CLINIC | Age: 64
End: 2019-04-16

## 2019-04-16 LAB
CREAT SERPL-MCNC: 1.4 MG/DL (ref 0.5–1.4)
SAMPLE: NORMAL

## 2019-04-16 NOTE — TELEPHONE ENCOUNTER
----- Message from Shaniqua Matias MD sent at 4/15/2019 11:49 PM CDT -----  No obvious hcc; needs repeat ct chest with abdo per protocol - please let patient know.

## 2019-04-18 ENCOUNTER — PATIENT MESSAGE (OUTPATIENT)
Dept: TRANSPLANT | Facility: CLINIC | Age: 64
End: 2019-04-18

## 2019-04-29 ENCOUNTER — PATIENT MESSAGE (OUTPATIENT)
Dept: TRANSPLANT | Facility: CLINIC | Age: 64
End: 2019-04-29

## 2019-05-03 LAB
EXT ALBUMIN: 3.7
EXT ALKALINE PHOSPHATASE: 60
EXT ALT: 22
EXT AST: 15
EXT BASOPHIL%: 0.9
EXT BILIRUBIN TOTAL: 0.87
EXT BUN: 20
EXT CALCIUM: 9
EXT CHLORIDE: 107
EXT CO2: 25
EXT CREATININE: 1.32 MG/DL
EXT EOSINOPHIL%: 3.9
EXT GFR MDRD AF AMER: 58
EXT GFR MDRD NON AF AMER: 58
EXT GLUCOSE: 146
EXT HEMATOCRIT: 45.2
EXT HEMOGLOBIN: 15.5
EXT INR: 1.1
EXT LYMPH%: 26.8
EXT MONOCYTES%: 7.5
EXT PLATELETS: 147
EXT POTASSIUM: 4.3
EXT PROTEIN TOTAL: 7
EXT PT: 11.2
EXT SEGS%: 60.7
EXT SODIUM: 137 MMOL/L
EXT TACROLIMUS LVL: 4.8
EXT WBC: 4.4

## 2019-05-10 ENCOUNTER — TELEPHONE (OUTPATIENT)
Dept: TRANSPLANT | Facility: CLINIC | Age: 64
End: 2019-05-10

## 2019-05-10 NOTE — TELEPHONE ENCOUNTER
----- Message from Shaniqua Matias MD sent at 5/4/2019  9:05 AM CDT -----  The Labs are stable - please let patient know.

## 2019-05-30 ENCOUNTER — TELEPHONE (OUTPATIENT)
Dept: TRANSPLANT | Facility: CLINIC | Age: 64
End: 2019-05-30

## 2019-05-30 LAB
AFP: 2.2
EXT ALBUMIN: 3.7
EXT ALKALINE PHOSPHATASE: 59
EXT ALT: 23
EXT AST: 12
EXT BASOPHIL%: 1.1
EXT BILIRUBIN TOTAL: 0.69
EXT BUN: 19
EXT CALCIUM: 9
EXT CHLORIDE: 109
EXT CHOLESTEROL: 188
EXT CO2: 25
EXT CREATININE: 1.24 MG/DL
EXT EOSINOPHIL%: 3.1
EXT GGT: 49
EXT GLUCOSE: 121
EXT HDL: 41
EXT HEMATOCRIT: 44.4
EXT HEMOGLOBIN: 15.3
EXT LDL CHOLESTEROL: 126
EXT LYMPH%: 21.8
EXT MAGNESIUM: 1.83
EXT MONOCYTES%: 7.5
EXT PHOSPHORUS: 2.9
EXT PLATELETS: 147
EXT POTASSIUM: 4.3
EXT PROTEIN TOTAL: 7
EXT SEGS%: 66.3
EXT SODIUM: 139 MMOL/L
EXT TACROLIMUS LVL: 6.5
EXT TRIGLYCERIDES: 104
EXT WBC: 5.5
Lab: NOT DETECTED

## 2019-05-31 ENCOUNTER — TELEPHONE (OUTPATIENT)
Dept: TRANSPLANT | Facility: CLINIC | Age: 64
End: 2019-05-31

## 2019-05-31 ENCOUNTER — PATIENT MESSAGE (OUTPATIENT)
Dept: TRANSPLANT | Facility: CLINIC | Age: 64
End: 2019-05-31

## 2019-05-31 NOTE — TELEPHONE ENCOUNTER
----- Message from Sue Myers MD sent at 5/31/2019 10:51 AM CDT -----  Liver tests normal, blood counts stable.  Awaiting prograf for adjustment

## 2019-06-07 ENCOUNTER — TELEPHONE (OUTPATIENT)
Dept: TRANSPLANT | Facility: CLINIC | Age: 64
End: 2019-06-07

## 2019-06-07 NOTE — TELEPHONE ENCOUNTER
----- Message from Sue Myers MD sent at 6/6/2019  6:18 PM CDT -----  Liver tests normal, prograf adequate.  No change in dosing and repeat labs per protocol

## 2019-06-10 ENCOUNTER — TELEPHONE (OUTPATIENT)
Dept: TRANSPLANT | Facility: CLINIC | Age: 64
End: 2019-06-10

## 2019-06-10 NOTE — TELEPHONE ENCOUNTER
----- Message from Sue Myers MD sent at 6/9/2019  7:41 AM CDT -----  AFP normal, lipid panel appropriate.  No changes required

## 2019-09-18 ENCOUNTER — PATIENT MESSAGE (OUTPATIENT)
Dept: TRANSPLANT | Facility: CLINIC | Age: 64
End: 2019-09-18

## 2019-09-25 LAB
EXT ALBUMIN: 3.6
EXT ALKALINE PHOSPHATASE: 104
EXT ALT: 24
EXT AST: 9
EXT BASOPHIL%: 0.5
EXT BILIRUBIN TOTAL: 0.76
EXT BUN: 24
EXT CALCIUM: 9.6
EXT CHLORIDE: 103
EXT CO2: 31
EXT CREATININE: 1.38 MG/DL
EXT EOSINOPHIL%: 1.4
EXT GLUCOSE: 120
EXT HEMATOCRIT: 44.5
EXT HEMOGLOBIN: 15
EXT LYMPH%: 15.9
EXT MONOCYTES%: 6.8
EXT PLATELETS: 196
EXT POTASSIUM: 4.7
EXT PROTEIN TOTAL: 7.3
EXT SEGS%: 75.2
EXT SODIUM: 136 MMOL/L
EXT TACROLIMUS LVL: 5.7
EXT WBC: 8.1

## 2019-10-04 ENCOUNTER — PATIENT MESSAGE (OUTPATIENT)
Dept: TRANSPLANT | Facility: CLINIC | Age: 64
End: 2019-10-04

## 2019-10-10 ENCOUNTER — TELEPHONE (OUTPATIENT)
Dept: TRANSPLANT | Facility: CLINIC | Age: 64
End: 2019-10-10

## 2019-10-10 NOTE — TELEPHONE ENCOUNTER
----- Message from Shaniqua Matias MD sent at 10/9/2019  8:19 PM CDT -----  The Labs are stable - please let patient know.

## 2019-10-21 ENCOUNTER — PATIENT MESSAGE (OUTPATIENT)
Dept: TRANSPLANT | Facility: CLINIC | Age: 64
End: 2019-10-21

## 2019-10-29 DIAGNOSIS — M89.9 BONE DISORDER: Primary | ICD-10-CM

## 2019-11-01 RX ORDER — MYCOPHENOLATE MOFETIL 250 MG/1
CAPSULE ORAL
Qty: 180 CAPSULE | Refills: 11 | Status: SHIPPED | OUTPATIENT
Start: 2019-11-01 | End: 2020-11-02

## 2019-11-20 ENCOUNTER — PATIENT MESSAGE (OUTPATIENT)
Dept: HEPATOLOGY | Facility: CLINIC | Age: 64
End: 2019-11-20

## 2019-11-21 ENCOUNTER — PATIENT MESSAGE (OUTPATIENT)
Dept: TRANSPLANT | Facility: CLINIC | Age: 64
End: 2019-11-21

## 2019-11-22 ENCOUNTER — TELEPHONE (OUTPATIENT)
Dept: TRANSPLANT | Facility: CLINIC | Age: 64
End: 2019-11-22

## 2019-11-22 ENCOUNTER — PATIENT MESSAGE (OUTPATIENT)
Dept: TRANSPLANT | Facility: CLINIC | Age: 64
End: 2019-11-22

## 2019-11-22 NOTE — TELEPHONE ENCOUNTER
----- Message from Kassandra Mosley sent at 11/22/2019 12:08 PM CST -----  Message   Received: Today   Message Contents   Kassandra Bonner  Cc: Kassandra Mosley         Did you check the appts, because he does not.   Previous Messages        ----- Message -----   From: Rosmery Bonner   Sent: 11/22/2019  11:52 AM CST   To: Kassandra Mosley     He said he has 2 bone densities scheduled.   ----- Message -----   From: Kassandra Mosley   Sent: 11/22/2019  11:42 AM CST   To: Kassandra Gomez     I don't understand what this message is about . Those appointments were completed at 3:27pm yesterday 11/21/19       ----- Message -----   From: Kasasndra Mosley   Sent: 11/22/2019  11:41 AM CST   To: Kassandra Gomez     Non-Urgent Medical     Rosmery Bonner Louis J Jr. 2 hours ago (8:50 AM)         My  working on the appointments,  I will let her know and she will cancel one.     This Patient Portal message has not been read.     Rosmery Bonner routed conversation to You 2 hours ago (8:50 AM)     Lanette Fraser RN routed conversation to [facility] Post-Liver Transplant Clinical 17 hours ago (5:51 PM)     Jordan Lopez Jr., Natalie H., MD 17 hours ago (5:44 PM)         Rosmery,   I see that there are two rescheduled appoints for Bone Density..........one on 1/2/20 and another bone density on 1/13/20......Not sure why 2 bone density tests.............I only need one and would like it on 1/13/20.............I also do not see an appointment with ..............please advise     Thanks     Ashley MOORE

## 2019-12-04 DIAGNOSIS — Z94.4 LIVER REPLACED BY TRANSPLANT: ICD-10-CM

## 2019-12-05 RX ORDER — TACROLIMUS 1 MG/1
CAPSULE ORAL
Qty: 180 CAPSULE | Refills: 11 | Status: SHIPPED | OUTPATIENT
Start: 2019-12-05 | End: 2020-01-03 | Stop reason: DRUGHIGH

## 2019-12-27 LAB
EXT AFP: 2.5
EXT ALBUMIN: 3.8
EXT ALKALINE PHOSPHATASE: 81
EXT ALT: 23
EXT AST: 12
EXT BILIRUBIN TOTAL: 0.7
EXT BK VIRUS DNA QN PCR: ABNORMAL
EXT BUN: 20
EXT CALCIUM: 9.2
EXT CHLORIDE: 107
EXT CHOLESTEROL: 185
EXT CO2: 30
EXT CREATININE: 1.2 MG/DL
EXT EOSINOPHIL%: 5.9
EXT GFR MDRD NON AF AMER: 62
EXT GLUCOSE: 121
EXT HDL: 45
EXT HEMATOCRIT: 44.3
EXT HEMOGLOBIN: 15.1
EXT LDL CHOLESTEROL: 118
EXT LYMPH%: 28.2
EXT MONOCYTES%: 8.4
EXT PLATELETS: 150
EXT POTASSIUM: 4
EXT PROT/CREAT RATIO UR: 0.2
EXT PROTEIN TOTAL: 7.1
EXT SEGS%: 56.4
EXT SODIUM: 140 MMOL/L
EXT TACROLIMUS LVL: 6.1
EXT TRIGLYCERIDES: 109
EXT WBC: 4.4

## 2020-01-01 ENCOUNTER — PATIENT MESSAGE (OUTPATIENT)
Dept: TRANSPLANT | Facility: CLINIC | Age: 65
End: 2020-01-01

## 2020-01-02 ENCOUNTER — OFFICE VISIT (OUTPATIENT)
Dept: TRANSPLANT | Facility: CLINIC | Age: 65
End: 2020-01-02
Payer: MEDICARE

## 2020-01-02 ENCOUNTER — HOSPITAL ENCOUNTER (OUTPATIENT)
Dept: RADIOLOGY | Facility: CLINIC | Age: 65
Discharge: HOME | End: 2020-01-02
Attending: INTERNAL MEDICINE
Payer: MEDICARE

## 2020-01-02 VITALS
HEART RATE: 90 BPM | RESPIRATION RATE: 16 BRPM | SYSTOLIC BLOOD PRESSURE: 152 MMHG | OXYGEN SATURATION: 98 % | BODY MASS INDEX: 43.12 KG/M2 | WEIGHT: 308 LBS | TEMPERATURE: 99 F | DIASTOLIC BLOOD PRESSURE: 69 MMHG | HEIGHT: 71 IN

## 2020-01-02 DIAGNOSIS — Z94.0 KIDNEY REPLACED BY TRANSPLANT: ICD-10-CM

## 2020-01-02 DIAGNOSIS — Z85.05 PERSONAL HISTORY OF MALIGNANT HEPATOMA: ICD-10-CM

## 2020-01-02 DIAGNOSIS — Z29.89 NEED FOR PROPHYLACTIC IMMUNOTHERAPY: Chronic | ICD-10-CM

## 2020-01-02 DIAGNOSIS — Z94.4 LIVER REPLACED BY TRANSPLANT: Chronic | ICD-10-CM

## 2020-01-02 DIAGNOSIS — Z94.0 IMMUNOSUPPRESSIVE MANAGEMENT ENCOUNTER FOLLOWING KIDNEY TRANSPLANT: Primary | Chronic | ICD-10-CM

## 2020-01-02 DIAGNOSIS — Z79.899 IMMUNOSUPPRESSIVE MANAGEMENT ENCOUNTER FOLLOWING KIDNEY TRANSPLANT: Primary | Chronic | ICD-10-CM

## 2020-01-02 DIAGNOSIS — Z85.528 PERSONAL HISTORY OF RENAL CANCER: Chronic | ICD-10-CM

## 2020-01-02 PROCEDURE — 3078F PR MOST RECENT DIASTOLIC BLOOD PRESSURE < 80 MM HG: ICD-10-PCS | Mod: CPTII,S$GLB,, | Performed by: INTERNAL MEDICINE

## 2020-01-02 PROCEDURE — 99215 OFFICE O/P EST HI 40 MIN: CPT | Mod: S$GLB,,, | Performed by: INTERNAL MEDICINE

## 2020-01-02 PROCEDURE — 3077F PR MOST RECENT SYSTOLIC BLOOD PRESSURE >= 140 MM HG: ICD-10-PCS | Mod: CPTII,S$GLB,, | Performed by: INTERNAL MEDICINE

## 2020-01-02 PROCEDURE — 99215 PR OFFICE/OUTPT VISIT, EST, LEVL V, 40-54 MIN: ICD-10-PCS | Mod: S$GLB,,, | Performed by: INTERNAL MEDICINE

## 2020-01-02 PROCEDURE — 1101F PT FALLS ASSESS-DOCD LE1/YR: CPT | Mod: CPTII,S$GLB,, | Performed by: INTERNAL MEDICINE

## 2020-01-02 PROCEDURE — 1159F MED LIST DOCD IN RCRD: CPT | Mod: S$GLB,,, | Performed by: INTERNAL MEDICINE

## 2020-01-02 PROCEDURE — 3078F DIAST BP <80 MM HG: CPT | Mod: CPTII,S$GLB,, | Performed by: INTERNAL MEDICINE

## 2020-01-02 PROCEDURE — 99999 PR PBB SHADOW E&M-EST. PATIENT-LVL IV: CPT | Mod: PBBFAC,,, | Performed by: INTERNAL MEDICINE

## 2020-01-02 PROCEDURE — 1159F PR MEDICATION LIST DOCUMENTED IN MEDICAL RECORD: ICD-10-PCS | Mod: S$GLB,,, | Performed by: INTERNAL MEDICINE

## 2020-01-02 PROCEDURE — 1101F PR PT FALLS ASSESS DOC 0-1 FALLS W/OUT INJ PAST YR: ICD-10-PCS | Mod: CPTII,S$GLB,, | Performed by: INTERNAL MEDICINE

## 2020-01-02 PROCEDURE — 3077F SYST BP >= 140 MM HG: CPT | Mod: CPTII,S$GLB,, | Performed by: INTERNAL MEDICINE

## 2020-01-02 PROCEDURE — 77080 DEXA BONE DENSITY SPINE HIP: ICD-10-PCS | Mod: 26,,, | Performed by: INTERNAL MEDICINE

## 2020-01-02 PROCEDURE — 77080 DXA BONE DENSITY AXIAL: CPT | Mod: TC

## 2020-01-02 PROCEDURE — 77080 DXA BONE DENSITY AXIAL: CPT | Mod: 26,,, | Performed by: INTERNAL MEDICINE

## 2020-01-02 PROCEDURE — 99999 PR PBB SHADOW E&M-EST. PATIENT-LVL IV: ICD-10-PCS | Mod: PBBFAC,,, | Performed by: INTERNAL MEDICINE

## 2020-01-02 RX ORDER — FAMOTIDINE 20 MG/1
40 TABLET, FILM COATED ORAL DAILY
COMMUNITY

## 2020-01-02 RX ORDER — AMLODIPINE BESYLATE 5 MG/1
5 TABLET ORAL DAILY
COMMUNITY

## 2020-01-02 NOTE — PATIENT INSTRUCTIONS
1. rovustatin ok  2. Labs every 3 months  3. Colonoscopy per local GI  4. AFP with next blood work  5. Scan 04/20  6. Annual dermatology  7. Bone density - repeat in 2022  Return 1 year

## 2020-01-02 NOTE — Clinical Note
1. rovustatin ok2. Labs every 3 months3. Colonoscopy per local GI4. AFP with next blood work5. Scan 04/206. Annual dermatology7. Bone density - repeat in 2022Return 1 year

## 2020-01-02 NOTE — PROGRESS NOTES
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     He has had the following complications since transplant: chronic renal insufficiency.     Subjective:     Interval History: Jordan is now 7 years post combined liver-kidney transplant. He is feeling well and vociing no complaints. He has excellent liver allograft function with transaminases that are less than 23 (normal). His creatinine is 1.2. Last prograf leve was 6.1 on 12/29/19. He is on cellcept 750 mg bid.    HCC screening for recurrence: Ct scan done 4/15/19 for HCC surveillance does not suggest any recurrent HCC.    Bone density 1/2/20:  revealed only slight bone thinning; improved when compared to previous.    Colorectal ca screening: per local GI-due 5 years after the last one    Dermatology annual evaluation: needs annual- no skin cancers      Review of Systems   Constitutional: Negative.    HENT: Negative.    Eyes: Negative.    Respiratory: Negative.    Cardiovascular: Negative.    Gastrointestinal: Negative.    Genitourinary: Negative.    Musculoskeletal: Negative.    Skin: Negative.    Neurological: Negative.    Psychiatric/Behavioral: Negative.        Objective:     Vitals:    01/02/20 1326   BP: (!) 152/69   Pulse: 90   Resp: 16   Temp: 98.5 °F (36.9 °C)       Physical Exam   Constitutional: He is oriented to person, place, and time. He appears well-developed and well-nourished.   HENT:   Head: Normocephalic and atraumatic.   Eyes: Pupils are equal, round, and reactive to light. Conjunctivae and EOM are normal. No scleral icterus.   Neck: Normal range of motion. Neck supple. No thyromegaly present.   Cardiovascular: Normal rate, regular rhythm and normal heart sounds.   Pulmonary/Chest: Effort normal and  breath sounds normal. He has no rales.   Abdominal: Soft. Bowel sounds are normal. He exhibits no distension and no mass. There is no tenderness.   Musculoskeletal: Normal range of motion. He exhibits edema.   Neurological: He is alert and oriented to person, place, and time.   Skin: Skin is warm and dry. No rash noted.   Psychiatric: He has a normal mood and affect.   Vitals reviewed.    Lab Results   Component Value Date    BILITOT 0.6 07/03/2014    AST 13 07/03/2014    ALT 14 07/03/2014    ALKPHOS 70 07/03/2014    CREATININE 1.3 07/03/2014    ALBUMIN 3.5 07/03/2014     Lab Results   Component Value Date    WBC 3.47 (L) 07/03/2014    HGB 14.0 07/03/2014    HCT 43.2 07/03/2014     (L) 07/03/2014     Lab Results   Component Value Date    TACROLIMUS 9.1 07/03/2014       Assessment/Plan:     1. Complications of OLTx: mild intermittent renal insufficiency. Monitor. Lavs every 3 months  2. HTN: continue current medications; monitor since BP is elevated today  3. HCC history: surveillance with CT per protocol- AFP now and CT 04/20  4. Prophylactic immunotherapy: continue current immunosuppression (prograf and cellcept). But lower cellcept to 500 mg bid and do monthly labs x 6.   5. Health maintenance: the patient should see a dermatologist annually to screen for skin cancer, perform regular colonoscopies  6. R/O osteoporosis. I am recommending a repeat bone density in 2022  8. Hx of renal cell ca: s/p nephrectomy: no sign of recurrence on ct scan.  9. Rovustatin ok    Return 1 year.      Shaniqua Matias MD           [facility] Patient Status  Functional Status: 100% - Normal, no complaints, no evidence of disease  Physical Capacity: No Limitations    .

## 2020-01-02 NOTE — LETTER
January 12, 2020        Stephen Schumacher  [address]  [place] [address]  Phone: [phone]  Fax: [phone]             [place] - Liver Transplant  [address] [facility] [address]  Phone: [phone]  Fax: [phone]   Patient: Jordan Lopez Jr.   MR Number: [id number]   YOB: 1955   Date of Visit: 1/2/2020       Dear Dr. Stephen Schumacher    Thank you for referring Jordan Lopez to me for evaluation. Attached you will find relevant portions of my assessment and plan of care.    If you have questions, please do not hesitate to call me. I look forward to following Jordan Lopez along with you.    Sincerely,    Shaniqua Matias MD    Enclosure    If you would like to receive this communication electronically, please contact [email or web address] or [phone] to request [employer] access.    [employer] is a tool which provides read-only access to select patient information with whom you have a relationship. Its easy to use and provides real time access to review your patients record including encounter summaries, notes, results, and demographic information.    If you feel you have received this communication in error or would no longer like to receive these types of communications, please e-mail [email or web address]

## 2020-01-03 ENCOUNTER — PATIENT MESSAGE (OUTPATIENT)
Dept: TRANSPLANT | Facility: CLINIC | Age: 65
End: 2020-01-03

## 2020-01-03 DIAGNOSIS — Z94.4 LIVER REPLACED BY TRANSPLANT: ICD-10-CM

## 2020-01-03 RX ORDER — TACROLIMUS 1 MG/1
CAPSULE ORAL
Qty: 150 CAPSULE | Refills: 11 | Status: SHIPPED | OUTPATIENT
Start: 2020-01-03 | End: 2020-02-24 | Stop reason: DRUGHIGH

## 2020-01-03 NOTE — TELEPHONE ENCOUNTER
Patient notified and instructed via [facility]: Per : please reduce your Prograf dose to 3mg in AM and 2mg in PM; and repeat labs in one month (due 1/27/2020).

## 2020-01-10 ENCOUNTER — TELEPHONE (OUTPATIENT)
Dept: TRANSPLANT | Facility: CLINIC | Age: 65
End: 2020-01-10

## 2020-01-10 NOTE — TELEPHONE ENCOUNTER
Jamin Lopez,    Dr Matias reviewed your bone density scan and it is stable. She recommends you continue to take your Vitamin D/Calcium supplement and recheck bone scan in 2 years.    Thanks,    Estephania Garcia, RN, BSN, CCTC  Post Liver Coordinator    (Rosmery is out of office today)

## 2020-01-10 NOTE — TELEPHONE ENCOUNTER
----- Message from Shaniqua Matias MD sent at 1/10/2020  8:07 AM CST -----  I recommneded repeat bone density in 2 years - please let patient know.

## 2020-01-13 ENCOUNTER — PATIENT MESSAGE (OUTPATIENT)
Dept: TRANSPLANT | Facility: CLINIC | Age: 65
End: 2020-01-13

## 2020-01-27 ENCOUNTER — PATIENT MESSAGE (OUTPATIENT)
Dept: TRANSPLANT | Facility: CLINIC | Age: 65
End: 2020-01-27

## 2020-01-28 LAB
EXT AFP: 2.5
EXT ALBUMIN: 3.9
EXT ALKALINE PHOSPHATASE: 77
EXT ALT: 24
EXT AST: 14
EXT BILIRUBIN TOTAL: 0.7
EXT BUN: 17
EXT CALCIUM: 9.3
EXT CHLORIDE: 107
EXT CO2: 28
EXT CREATININE: 1.3 MG/DL
EXT GFR MDRD NON AF AMER: 61
EXT GLUCOSE: 143
EXT HEMATOCRIT: 44
EXT HEMOGLOBIN: 14.7
EXT PLATELETS: 153
EXT POTASSIUM: 4.5
EXT PROTEIN TOTAL: 7
EXT SODIUM: 139 MMOL/L
EXT TACROLIMUS LVL: 10.9
EXT WBC: 5.3

## 2020-01-29 ENCOUNTER — PATIENT MESSAGE (OUTPATIENT)
Dept: TRANSPLANT | Facility: CLINIC | Age: 65
End: 2020-01-29

## 2020-02-06 ENCOUNTER — PATIENT MESSAGE (OUTPATIENT)
Dept: TRANSPLANT | Facility: CLINIC | Age: 65
End: 2020-02-06

## 2020-02-24 ENCOUNTER — PATIENT MESSAGE (OUTPATIENT)
Dept: TRANSPLANT | Facility: CLINIC | Age: 65
End: 2020-02-24

## 2020-02-24 DIAGNOSIS — Z94.4 LIVER REPLACED BY TRANSPLANT: ICD-10-CM

## 2020-02-24 RX ORDER — TACROLIMUS 1 MG/1
2 CAPSULE ORAL EVERY 12 HOURS
Qty: 120 CAPSULE | Refills: 11 | Status: SHIPPED | OUTPATIENT
Start: 2020-02-24 | End: 2020-03-16 | Stop reason: DRUGHIGH

## 2020-02-24 NOTE — TELEPHONE ENCOUNTER
Patient was notified and instructed via [facility]:    Prograf level of 10, can decrease Prograf dose to 2mg twice daily, repeat in 2 weeks after meds change (due 3/9/20). Thanks.

## 2020-02-24 NOTE — TELEPHONE ENCOUNTER
----- Message from Dalia Delatorre MD sent at 2/23/2020 12:11 PM CST -----  FK 10, can decrease tacro to 2/2, repeat in 2 weeks after meds change

## 2020-03-10 ENCOUNTER — PATIENT MESSAGE (OUTPATIENT)
Dept: TRANSPLANT | Facility: CLINIC | Age: 65
End: 2020-03-10

## 2020-03-13 ENCOUNTER — TELEPHONE (OUTPATIENT)
Dept: TRANSPLANT | Facility: CLINIC | Age: 65
End: 2020-03-13

## 2020-03-13 ENCOUNTER — PATIENT MESSAGE (OUTPATIENT)
Dept: TRANSPLANT | Facility: CLINIC | Age: 65
End: 2020-03-13

## 2020-03-13 LAB
EXT ALBUMIN: 4
EXT ALKALINE PHOSPHATASE: 69
EXT ALT: 25
EXT AST: 14
EXT BILIRUBIN TOTAL: 0.9
EXT BUN: 23
EXT CALCIUM: 9.2
EXT CHLORIDE: 108
EXT CO2: 28
EXT CREATININE: 1.3 MG/DL
EXT GFR MDRD NON AF AMER: 60
EXT GLUCOSE: 113
EXT HEMATOCRIT: 44.5
EXT HEMOGLOBIN: 14.9
EXT PLATELETS: 145
EXT POTASSIUM: 4.4
EXT PROTEIN TOTAL: 7.3
EXT SODIUM: 139 MMOL/L
EXT TACROLIMUS LVL: 2.6
EXT WBC: 4.9

## 2020-03-13 NOTE — TELEPHONE ENCOUNTER
----- Message from Chen Vega MD sent at 3/13/2020 11:23 AM CDT -----  Check compliance with prograf - level too low.  If true trough, raise dose to 3/2.  If not true trough, repeat labs next week.

## 2020-03-13 NOTE — TELEPHONE ENCOUNTER
Patient was notified and instructed via [facility], await his response. :    Labs reviewed by  :          Check compliance with prograf - level too low.  If true trough( wasn't taken before labs were drawn that day), raise dose to 3mg in AM and 2mg in PM(and repeat labs next week) . But  If not true trough(meaning dose was taken before blood was drawn) then keep dose same and, repeat labs next week.

## 2020-03-16 DIAGNOSIS — Z94.4 LIVER REPLACED BY TRANSPLANT: ICD-10-CM

## 2020-03-16 RX ORDER — TACROLIMUS 1 MG/1
CAPSULE ORAL
Qty: 150 CAPSULE | Refills: 11 | Status: SHIPPED | OUTPATIENT
Start: 2020-03-16 | End: 2021-01-19

## 2020-03-16 NOTE — TELEPHONE ENCOUNTER
Message received from patient via portal; he did not take his Prograf dose until after labs were drawn so he will follow medicine change instructions given.

## 2020-03-17 ENCOUNTER — TELEPHONE (OUTPATIENT)
Dept: TRANSPLANT | Facility: CLINIC | Age: 65
End: 2020-03-17

## 2020-03-17 LAB
BILIRUBIN UA: NEGATIVE
EXT AFP: 2.3
EXT ALBUMIN: 3.9
EXT ALKALINE PHOSPHATASE: 67
EXT ALT: 22
EXT AST: 13
EXT BILIRUBIN TOTAL: 0.8
EXT BUN: 25
EXT CALCIUM: 8.9
EXT CHLORIDE: 108
EXT CHOLESTEROL: 118
EXT CO2: 26
EXT CREATININE: 1.3 MG/DL
EXT EOSINOPHIL%: 3.4
EXT GFR MDRD NON AF AMER: 61
EXT GLUCOSE: 129
EXT HDL: 43
EXT HEMATOCRIT: 41.8
EXT HEMOGLOBIN: 14.3
EXT LDL CHOLESTEROL: 56
EXT LYMPH%: 28.9
EXT MONOCYTES%: 7.8
EXT PLATELETS: 156
EXT POTASSIUM: 4
EXT PROT/CREAT RATIO UR: 0.2
EXT PROTEIN TOTAL: 7.2
EXT PROTEIN UA: 30
EXT SEGS%: 58.9
EXT SODIUM: 140 MMOL/L
EXT TACROLIMUS LVL: 5.2
EXT TRIGLYCERIDES: 96
EXT WBC: 4.8
GLUCOSE UR QL: NEGATIVE
KETONES UR QL STRIP: NEGATIVE
LEUKOCYTE ESTERASE UR QL STRIP: NEGATIVE
NITRITE UA: NEGATIVE
RBC # FLD AUTO: NEGATIVE 10*3/UL
UROBILINOGEN UR STRIP-ACNC: NORMAL

## 2020-03-17 NOTE — TELEPHONE ENCOUNTER
----- Message from Alexia Milton sent at 3/17/2020 10:14 AM CDT -----  Contact: Steph with [facility]#[phone]  She states that she needs diagnostic code for today's lab

## 2020-03-18 ENCOUNTER — TELEPHONE (OUTPATIENT)
Dept: HEPATOLOGY | Facility: CLINIC | Age: 65
End: 2020-03-18

## 2020-03-18 NOTE — TELEPHONE ENCOUNTER
----- Message from Chen Vega MD sent at 3/17/2020  6:51 PM CDT -----  Labs are stable with stable liver and renal function.    Pls inform patient.  Thx

## 2020-03-23 ENCOUNTER — PATIENT MESSAGE (OUTPATIENT)
Dept: TRANSPLANT | Facility: CLINIC | Age: 65
End: 2020-03-23

## 2020-04-20 ENCOUNTER — PATIENT MESSAGE (OUTPATIENT)
Dept: TRANSPLANT | Facility: CLINIC | Age: 65
End: 2020-04-20

## 2020-04-26 NOTE — TELEPHONE ENCOUNTER
----- Message from Shaniqua Matias MD sent at 1/3/2020  1:26 PM CST -----  Lower prograf to 3/2 from 3/3 and repeat labs in one month- please let patient know.   9

## 2020-05-12 ENCOUNTER — PATIENT MESSAGE (OUTPATIENT)
Dept: TRANSPLANT | Facility: CLINIC | Age: 65
End: 2020-05-12

## 2020-05-27 ENCOUNTER — PATIENT MESSAGE (OUTPATIENT)
Dept: TRANSPLANT | Facility: CLINIC | Age: 65
End: 2020-05-27

## 2020-06-08 DIAGNOSIS — C22.0 HCC (HEPATOCELLULAR CARCINOMA): Primary | ICD-10-CM

## 2020-06-12 ENCOUNTER — HISTORICAL (OUTPATIENT)
Dept: ADMINISTRATIVE | Facility: HOSPITAL | Age: 65
End: 2020-06-12

## 2020-06-12 LAB
ALBUMIN SERPL BCP-MCNC: 3.8 G/DL (ref 3.5–5)
ALBUMIN/GLOB SERPL ELPH: 1.2 {RATIO} (ref 1.5–2.2)
ALP SERPL-CCNC: 63 U/L (ref 50–136)
ALT SERPL W P-5'-P-CCNC: 23 U/L (ref 16–61)
ANION GAP SERPL CALC-SCNC: 4.2 MEQ/L (ref 10–20)
AST SERPL-CCNC: 11 U/L (ref 15–37)
BASOPHILS NFR BLD: 0 10 (ref 0–0.1)
BASOPHILS NFR BLD: 0.8 % (ref 0–1.5)
BILIRUB SERPL-MCNC: 0.82 MG/DL (ref 0.2–1)
BUN SERPL-MCNC: 23 MG/DL (ref 7–18)
CALCIUM SERPL-MCNC: 9.1 MG/DL (ref 8.5–10.1)
CHLORIDE SERPL-SCNC: 110 MMOL/L (ref 98–107)
CHOLEST SERPL-MSCNC: 101 MG/DL (ref 0–200)
CHOLEST/HDLC SERPL: 2.5 {RATIO}
CO2 SERPL-SCNC: 28 MMOL/L (ref 22–32)
CREAT SERPL-MCNC: 1.25 MG/DL (ref 0.7–1.3)
CREATININE RANDOM URINE: 194 MG/DL
EGFR: 61 ML/MIN/1.73M
EOSINOPHIL NFR BLD: 0.1 10 (ref 0–0.7)
EOSINOPHIL NFR BLD: 3 % (ref 0–7)
ERYTHROCYTE [DISTWIDTH] IN BLOOD BY AUTOMATED COUNT: 13.7 % (ref 11.5–14.5)
ESTIMATED AVG GLUCOSE: 6.5 % (ref 4.2–6.3)
GLOBULIN: 3.3 G/DL (ref 2.3–3.5)
GLUCOSE SERPL-MCNC: 112 MG/DL (ref 70–99)
GRAN #: 2.97 10 (ref 2–7.5)
GRAN%: 0.2 %
GRAN%: 63 % (ref 50–80)
HCT VFR BLD AUTO: 43 % (ref 43.5–53.7)
HDL/CHOLESTEROL RATIO: 40 MG/DL (ref 40–60)
HGB BLD-MCNC: 14.7 G/DL (ref 14.1–18.1)
IMMATURE GRANULOCYTES #: 0.01 10
IPF: 7.2
LDLC SERPL CALC-MCNC: 43 MG/DL (ref 0–130)
LYMPH #: 1.2 10 (ref 1–3.5)
LYMPH%: 25.2 % (ref 12–50)
MCH RBC QN AUTO: 28.1 PG (ref 27–31)
MCHC RBC AUTO-ENTMCNC: 34.2 G% (ref 32–35)
MCV RBC AUTO: 82.2 FL (ref 80–97)
MICROALBUM.,U,RANDOM: 62.3 MG/L (ref 0–30)
MICROALBUMIN/CREATININE RATIO: 32.1 (ref 0–17)
MONO #: 0.4 10 (ref 0–0.8)
MONO%: 7.8 % (ref 0–12)
OSMOC: 280 MOSM/KG (ref 275–295)
PMV BLD AUTO: 11.3 FL (ref 7.4–10.4)
PMV BLD AUTO: 136 10 (ref 142–424)
POTASSIUM SERPL-SCNC: 4.2 MMOL/L (ref 3.5–5.1)
PROT SERPL-MCNC: 7.1 G/DL (ref 6.4–8.2)
RBC # BLD AUTO: 5.23 M/UL (ref 4.69–6.13)
SODIUM BLD-SCNC: 138 MMOL/L (ref 136–145)
TRIGL SERPL-MCNC: 91 MG/ML (ref 30–150)
WBC # BLD AUTO: 4.7 10 (ref 4–10.2)

## 2020-06-13 LAB — AFP, ALPHA FETOPROTEIN: 2.2 NG/ML (ref 0–8.3)

## 2020-06-17 LAB — TACROLIMUS BLD-MCNC: 4.6 NG/ML (ref 2–20)

## 2020-06-18 LAB
BKV DNA # SERPL NAA+PROBE: NORMAL
EXT AFP: 2.2
EXT ALBUMIN: 3.8
EXT ALKALINE PHOSPHATASE: 63
EXT ALT: 23
EXT AST: 11
EXT BASOPHIL%: 0.8
EXT BILIRUBIN TOTAL: 0.82
EXT BUN: 23
EXT CALCIUM: 9.1
EXT CHLORIDE: 110
EXT CHOLESTEROL: 101
EXT CO2: 28
EXT CREATININE UA: 194
EXT CREATININE: 1.25 MG/DL
EXT EOSINOPHIL%: 3
EXT GLUCOSE: 112
EXT HDL: 40
EXT HEMATOCRIT: 43
EXT HEMOGLOBIN A1C: 6.5 %
EXT HEMOGLOBIN: 14.7
EXT LDL CHOLESTEROL: 43
EXT LYMPH%: 25.2
EXT MONOCYTES%: 7.8
EXT PLATELETS: 136
EXT POTASSIUM: 4.2
EXT PROT/CREAT RATIO UR: 32.1
EXT PROTEIN TOTAL: 7.1
EXT SEGS%: 63
EXT SODIUM: 138 MMOL/L
EXT TACROLIMUS LVL: 4.6
EXT TRIGLYCERIDES: 91
EXT WBC: 4.7

## 2020-06-19 ENCOUNTER — TELEPHONE (OUTPATIENT)
Dept: TRANSPLANT | Facility: CLINIC | Age: 65
End: 2020-06-19

## 2020-06-22 ENCOUNTER — TELEPHONE (OUTPATIENT)
Dept: TRANSPLANT | Facility: CLINIC | Age: 65
End: 2020-06-22

## 2020-06-22 NOTE — TELEPHONE ENCOUNTER
Labs reviewed by Dr Vega, Labs are stable with no medication changes repeat labs 9/14 .Message sent

## 2020-06-22 NOTE — TELEPHONE ENCOUNTER
----- Message from Chen Vega MD sent at 6/19/2020 12:59 PM CDT -----  Please inform patient results are OK. Continue routine labs.

## 2020-06-25 ENCOUNTER — HOSPITAL ENCOUNTER (OUTPATIENT)
Dept: RADIOLOGY | Facility: HOSPITAL | Age: 65
Discharge: HOME | End: 2020-06-25
Attending: INTERNAL MEDICINE
Payer: MEDICARE

## 2020-06-25 DIAGNOSIS — C22.0 HCC (HEPATOCELLULAR CARCINOMA): ICD-10-CM

## 2020-06-25 PROCEDURE — 71260 CT CHEST ABDOMEN PELVIS WITH CONTRAST (XPD): ICD-10-PCS | Mod: 26,,, | Performed by: RADIOLOGY

## 2020-06-25 PROCEDURE — 74177 CT ABD & PELVIS W/CONTRAST: CPT | Mod: 26,,, | Performed by: RADIOLOGY

## 2020-06-25 PROCEDURE — 74177 CT CHEST ABDOMEN PELVIS WITH CONTRAST (XPD): ICD-10-PCS | Mod: 26,,, | Performed by: RADIOLOGY

## 2020-06-25 PROCEDURE — 74177 CT ABD & PELVIS W/CONTRAST: CPT | Mod: TC

## 2020-06-25 PROCEDURE — 25500020 PHARM REV CODE 255: Performed by: INTERNAL MEDICINE

## 2020-06-25 PROCEDURE — 71260 CT THORAX DX C+: CPT | Mod: 26,,, | Performed by: RADIOLOGY

## 2020-06-25 RX ADMIN — IOHEXOL 100 ML: 350 INJECTION, SOLUTION INTRAVENOUS at 12:06

## 2020-06-26 ENCOUNTER — TELEPHONE (OUTPATIENT)
Dept: TRANSPLANT | Facility: CLINIC | Age: 65
End: 2020-06-26

## 2020-06-26 LAB
CREAT SERPL-MCNC: 1.1 MG/DL (ref 0.5–1.4)
SAMPLE: NORMAL

## 2020-06-26 NOTE — TELEPHONE ENCOUNTER
Imaging reviewed. Imaging for liver stable. Instructed pt to follow -up with Orthopedics for new findings ----- Message from Chen Vega MD sent at 6/26/2020  5:43 AM CDT -----  Liver fine.  New, age-indeterminate compression fracture at L3 with more than 50% height loss.  Please check if neuro symptoms of incontinence/pain or numbness in lower extremities.   Have patient see orthopedics asap. thanks

## 2020-08-27 LAB — CALCIUM BLD-MCNC: NORMAL MG/DL

## 2020-09-03 ENCOUNTER — LAB VISIT (OUTPATIENT)
Dept: LAB | Facility: HOSPITAL | Age: 65
End: 2020-09-03
Attending: INTERNAL MEDICINE
Payer: MEDICARE

## 2020-09-03 DIAGNOSIS — Z94.4 LIVER REPLACED BY TRANSPLANT: ICD-10-CM

## 2020-09-03 DIAGNOSIS — Z94.4 LIVER REPLACED BY TRANSPLANT: Primary | ICD-10-CM

## 2020-09-03 LAB
AFP SERPL-MCNC: 2.5 NG/ML (ref 0–8.4)
ALBUMIN SERPL BCP-MCNC: 3.8 G/DL (ref 3.5–5.2)
ALP SERPL-CCNC: 73 U/L (ref 55–135)
ALT SERPL W/O P-5'-P-CCNC: 34 U/L (ref 10–44)
ANION GAP SERPL CALC-SCNC: 4 MMOL/L (ref 8–16)
AST SERPL-CCNC: 16 U/L (ref 10–40)
BASOPHILS # BLD AUTO: 0.04 K/UL (ref 0–0.2)
BASOPHILS NFR BLD: 0.9 % (ref 0–1.9)
BILIRUB SERPL-MCNC: 0.8 MG/DL (ref 0.1–1)
BUN SERPL-MCNC: 17 MG/DL (ref 8–23)
CALCIUM SERPL-MCNC: 10.1 MG/DL (ref 8.7–10.5)
CHLORIDE SERPL-SCNC: 104 MMOL/L (ref 95–110)
CHOLEST SERPL-MCNC: 124 MG/DL (ref 120–199)
CHOLEST/HDLC SERPL: 3 {RATIO} (ref 2–5)
CO2 SERPL-SCNC: 32 MMOL/L (ref 23–29)
CREAT SERPL-MCNC: 1.4 MG/DL (ref 0.5–1.4)
DIFFERENTIAL METHOD: ABNORMAL
EOSINOPHIL # BLD AUTO: 0.1 K/UL (ref 0–0.5)
EOSINOPHIL NFR BLD: 2.8 % (ref 0–8)
ERYTHROCYTE [DISTWIDTH] IN BLOOD BY AUTOMATED COUNT: 13.8 % (ref 11.5–14.5)
EST. GFR  (AFRICAN AMERICAN): >60 ML/MIN/1.73 M^2
EST. GFR  (NON AFRICAN AMERICAN): 52 ML/MIN/1.73 M^2
GLUCOSE SERPL-MCNC: 106 MG/DL (ref 70–110)
HCT VFR BLD AUTO: 45.2 % (ref 40–54)
HDLC SERPL-MCNC: 42 MG/DL (ref 40–75)
HDLC SERPL: 33.9 % (ref 20–50)
HGB BLD-MCNC: 14.7 G/DL (ref 14–18)
IMM GRANULOCYTES # BLD AUTO: 0.01 K/UL (ref 0–0.04)
IMM GRANULOCYTES NFR BLD AUTO: 0.2 % (ref 0–0.5)
LDLC SERPL CALC-MCNC: 57.6 MG/DL (ref 63–159)
LYMPHOCYTES # BLD AUTO: 1.3 K/UL (ref 1–4.8)
LYMPHOCYTES NFR BLD: 26.9 % (ref 18–48)
MCH RBC QN AUTO: 27.4 PG (ref 27–31)
MCHC RBC AUTO-ENTMCNC: 32.5 G/DL (ref 32–36)
MCV RBC AUTO: 84 FL (ref 82–98)
MONOCYTES # BLD AUTO: 0.4 K/UL (ref 0.3–1)
MONOCYTES NFR BLD: 8.1 % (ref 4–15)
NEUTROPHILS # BLD AUTO: 2.9 K/UL (ref 1.8–7.7)
NEUTROPHILS NFR BLD: 61.1 % (ref 38–73)
NONHDLC SERPL-MCNC: 82 MG/DL
NRBC BLD-RTO: 0 /100 WBC
PLATELET # BLD AUTO: 147 K/UL (ref 150–350)
PMV BLD AUTO: 10.6 FL (ref 9.2–12.9)
POTASSIUM SERPL-SCNC: 4.7 MMOL/L (ref 3.5–5.1)
PROT SERPL-MCNC: 7.3 G/DL (ref 6–8.4)
RBC # BLD AUTO: 5.36 M/UL (ref 4.6–6.2)
SODIUM SERPL-SCNC: 140 MMOL/L (ref 136–145)
TRIGL SERPL-MCNC: 122 MG/DL (ref 30–150)
WBC # BLD AUTO: 4.68 K/UL (ref 3.9–12.7)

## 2020-09-03 PROCEDURE — 85025 COMPLETE CBC W/AUTO DIFF WBC: CPT

## 2020-09-03 PROCEDURE — 36415 COLL VENOUS BLD VENIPUNCTURE: CPT

## 2020-09-03 PROCEDURE — 80061 LIPID PANEL: CPT

## 2020-09-03 PROCEDURE — 80197 ASSAY OF TACROLIMUS: CPT

## 2020-09-03 PROCEDURE — 82105 ALPHA-FETOPROTEIN SERUM: CPT

## 2020-09-03 PROCEDURE — 80053 COMPREHEN METABOLIC PANEL: CPT

## 2020-09-04 ENCOUNTER — TELEPHONE (OUTPATIENT)
Dept: TRANSPLANT | Facility: CLINIC | Age: 65
End: 2020-09-04

## 2020-09-04 LAB — TACROLIMUS BLD-MCNC: 4.1 NG/ML (ref 5–15)

## 2020-09-04 NOTE — TELEPHONE ENCOUNTER
Labs stable. Repeat labs 11/16----- Message from Chen Vega MD sent at 9/3/2020  5:15 PM CDT -----  Please inform patient results are OK. Continue routine labs.

## 2020-09-08 ENCOUNTER — TELEPHONE (OUTPATIENT)
Dept: TRANSPLANT | Facility: CLINIC | Age: 65
End: 2020-09-08

## 2020-09-08 NOTE — TELEPHONE ENCOUNTER
----- Message from Chen Vega MD sent at 9/3/2020  5:15 PM CDT -----  Please inform patient results are OK. Continue routine labs.

## 2020-10-14 ENCOUNTER — PATIENT MESSAGE (OUTPATIENT)
Dept: TRANSPLANT | Facility: CLINIC | Age: 65
End: 2020-10-14

## 2020-10-26 ENCOUNTER — LAB VISIT (OUTPATIENT)
Dept: LAB | Facility: HOSPITAL | Age: 65
End: 2020-10-26
Attending: INTERNAL MEDICINE
Payer: MEDICARE

## 2020-10-26 ENCOUNTER — PATIENT MESSAGE (OUTPATIENT)
Dept: TRANSPLANT | Facility: CLINIC | Age: 65
End: 2020-10-26

## 2020-10-26 DIAGNOSIS — E11.9 DIABETES MELLITUS WITHOUT COMPLICATION: Primary | ICD-10-CM

## 2020-10-26 LAB
ESTIMATED AVG GLUCOSE: 140 MG/DL (ref 68–131)
HBA1C MFR BLD HPLC: 6.5 % (ref 4–5.6)

## 2020-10-26 PROCEDURE — 83036 HEMOGLOBIN GLYCOSYLATED A1C: CPT

## 2020-10-26 PROCEDURE — 36415 COLL VENOUS BLD VENIPUNCTURE: CPT

## 2020-12-02 ENCOUNTER — PATIENT MESSAGE (OUTPATIENT)
Dept: TRANSPLANT | Facility: CLINIC | Age: 65
End: 2020-12-02

## 2020-12-02 ENCOUNTER — LAB VISIT (OUTPATIENT)
Dept: LAB | Facility: HOSPITAL | Age: 65
End: 2020-12-02
Attending: INTERNAL MEDICINE
Payer: MEDICARE

## 2020-12-02 DIAGNOSIS — Z94.0 KIDNEY REPLACED BY TRANSPLANT: ICD-10-CM

## 2020-12-02 DIAGNOSIS — R77.2 ELEVATED ALPHA FETOPROTEIN: ICD-10-CM

## 2020-12-02 DIAGNOSIS — Z94.4 LIVER REPLACED BY TRANSPLANT: Primary | ICD-10-CM

## 2020-12-02 LAB
ALBUMIN SERPL BCP-MCNC: 3.8 G/DL (ref 3.5–5.2)
ALP SERPL-CCNC: 74 U/L (ref 55–135)
ALT SERPL W/O P-5'-P-CCNC: 28 U/L (ref 10–44)
ANION GAP SERPL CALC-SCNC: 5 MMOL/L (ref 8–16)
AST SERPL-CCNC: 10 U/L (ref 10–40)
BASOPHILS # BLD AUTO: 0.03 K/UL (ref 0–0.2)
BASOPHILS NFR BLD: 0.7 % (ref 0–1.9)
BILIRUB SERPL-MCNC: 0.8 MG/DL (ref 0.1–1)
BILIRUB UR QL STRIP: NEGATIVE
BUN SERPL-MCNC: 18 MG/DL (ref 8–23)
CALCIUM SERPL-MCNC: 9.3 MG/DL (ref 8.7–10.5)
CHLORIDE SERPL-SCNC: 106 MMOL/L (ref 95–110)
CHOLEST SERPL-MCNC: 126 MG/DL (ref 120–199)
CHOLEST/HDLC SERPL: 2.6 {RATIO} (ref 2–5)
CLARITY UR: CLEAR
CO2 SERPL-SCNC: 28 MMOL/L (ref 23–29)
COLOR UR: YELLOW
CREAT SERPL-MCNC: 1.3 MG/DL (ref 0.5–1.4)
CREAT UR-MCNC: 120 MG/DL (ref 23–375)
DIFFERENTIAL METHOD: NORMAL
EOSINOPHIL # BLD AUTO: 0.2 K/UL (ref 0–0.5)
EOSINOPHIL NFR BLD: 3.5 % (ref 0–8)
ERYTHROCYTE [DISTWIDTH] IN BLOOD BY AUTOMATED COUNT: 13.6 % (ref 11.5–14.5)
EST. GFR  (AFRICAN AMERICAN): >60 ML/MIN/1.73 M^2
EST. GFR  (NON AFRICAN AMERICAN): 56.5 ML/MIN/1.73 M^2
GLUCOSE SERPL-MCNC: 142 MG/DL (ref 70–110)
GLUCOSE UR QL STRIP: NEGATIVE
HCT VFR BLD AUTO: 46.1 % (ref 40–54)
HDLC SERPL-MCNC: 48 MG/DL (ref 40–75)
HDLC SERPL: 38.1 % (ref 20–50)
HGB BLD-MCNC: 15.3 G/DL (ref 14–18)
HGB UR QL STRIP: NEGATIVE
IMM GRANULOCYTES # BLD AUTO: 0.01 K/UL (ref 0–0.04)
IMM GRANULOCYTES NFR BLD AUTO: 0.2 % (ref 0–0.5)
KETONES UR QL STRIP: NEGATIVE
LDLC SERPL CALC-MCNC: 55.8 MG/DL (ref 63–159)
LEUKOCYTE ESTERASE UR QL STRIP: NEGATIVE
LYMPHOCYTES # BLD AUTO: 1.2 K/UL (ref 1–4.8)
LYMPHOCYTES NFR BLD: 26.2 % (ref 18–48)
MCH RBC QN AUTO: 27.7 PG (ref 27–31)
MCHC RBC AUTO-ENTMCNC: 33.2 G/DL (ref 32–36)
MCV RBC AUTO: 83 FL (ref 82–98)
MONOCYTES # BLD AUTO: 0.4 K/UL (ref 0.3–1)
MONOCYTES NFR BLD: 8.1 % (ref 4–15)
NEUTROPHILS # BLD AUTO: 2.8 K/UL (ref 1.8–7.7)
NEUTROPHILS NFR BLD: 61.3 % (ref 38–73)
NITRITE UR QL STRIP: NEGATIVE
NONHDLC SERPL-MCNC: 78 MG/DL
NRBC BLD-RTO: 0 /100 WBC
PH UR STRIP: 5 [PH] (ref 5–8)
PLATELET # BLD AUTO: 153 K/UL (ref 150–350)
PMV BLD AUTO: 11.4 FL (ref 9.2–12.9)
POTASSIUM SERPL-SCNC: 4.4 MMOL/L (ref 3.5–5.1)
PROT SERPL-MCNC: 7.3 G/DL (ref 6–8.4)
PROT UR QL STRIP: NEGATIVE
PROT UR-MCNC: 15.8 MG/DL (ref 0–15)
PROT/CREAT UR: 0.13 MG/G{CREAT} (ref 0–0.2)
PTH-INTACT SERPL-MCNC: 55 PG/ML (ref 9–77)
RBC # BLD AUTO: 5.53 M/UL (ref 4.6–6.2)
SODIUM SERPL-SCNC: 139 MMOL/L (ref 136–145)
SP GR UR STRIP: 1.01 (ref 1–1.03)
TRIGL SERPL-MCNC: 111 MG/DL (ref 30–150)
URN SPEC COLLECT METH UR: NORMAL
UROBILINOGEN UR STRIP-ACNC: NEGATIVE EU/DL
WBC # BLD AUTO: 4.55 K/UL (ref 3.9–12.7)

## 2020-12-02 PROCEDURE — 83970 ASSAY OF PARATHORMONE: CPT

## 2020-12-02 PROCEDURE — 80053 COMPREHEN METABOLIC PANEL: CPT

## 2020-12-02 PROCEDURE — 82570 ASSAY OF URINE CREATININE: CPT

## 2020-12-02 PROCEDURE — 85025 COMPLETE CBC W/AUTO DIFF WBC: CPT

## 2020-12-02 PROCEDURE — 80197 ASSAY OF TACROLIMUS: CPT

## 2020-12-02 PROCEDURE — 87798 DETECT AGENT NOS DNA AMP: CPT

## 2020-12-02 PROCEDURE — 36415 COLL VENOUS BLD VENIPUNCTURE: CPT

## 2020-12-02 PROCEDURE — 81003 URINALYSIS AUTO W/O SCOPE: CPT

## 2020-12-02 PROCEDURE — 80061 LIPID PANEL: CPT

## 2020-12-03 LAB — TACROLIMUS BLD-MCNC: 5.6 NG/ML (ref 5–15)

## 2020-12-07 LAB — BKV DNA SERPL QL NAA+PROBE: NOT DETECTED

## 2020-12-11 ENCOUNTER — PATIENT MESSAGE (OUTPATIENT)
Dept: TRANSPLANT | Facility: CLINIC | Age: 65
End: 2020-12-11

## 2020-12-11 ENCOUNTER — TELEPHONE (OUTPATIENT)
Dept: TRANSPLANT | Facility: CLINIC | Age: 65
End: 2020-12-11

## 2020-12-11 NOTE — TELEPHONE ENCOUNTER
Labs reviewed. Please repeat labs 2/21/21----- Message from Shaniqua Matias MD sent at 12/7/2020  9:19 PM CST -----  The Labs are stable - please let patient know.

## 2021-01-04 ENCOUNTER — PATIENT MESSAGE (OUTPATIENT)
Dept: TRANSPLANT | Facility: CLINIC | Age: 66
End: 2021-01-04

## 2021-01-08 ENCOUNTER — PATIENT MESSAGE (OUTPATIENT)
Dept: TRANSPLANT | Facility: CLINIC | Age: 66
End: 2021-01-08

## 2021-01-20 ENCOUNTER — IMMUNIZATION (OUTPATIENT)
Dept: OBSTETRICS AND GYNECOLOGY | Facility: CLINIC | Age: 66
End: 2021-01-20
Payer: MEDICARE

## 2021-01-20 DIAGNOSIS — Z23 NEED FOR VACCINATION: Primary | ICD-10-CM

## 2021-01-20 PROCEDURE — 91300 COVID-19, MRNA, LNP-S, PF, 30 MCG/0.3 ML DOSE VACCINE: CPT | Mod: PBBFAC | Performed by: ANESTHESIOLOGY

## 2021-02-10 ENCOUNTER — IMMUNIZATION (OUTPATIENT)
Dept: OBSTETRICS AND GYNECOLOGY | Facility: CLINIC | Age: 66
End: 2021-02-10
Payer: MEDICARE

## 2021-02-10 DIAGNOSIS — Z23 NEED FOR VACCINATION: Primary | ICD-10-CM

## 2021-02-10 PROCEDURE — 91300 COVID-19, MRNA, LNP-S, PF, 30 MCG/0.3 ML DOSE VACCINE: CPT | Mod: PBBFAC | Performed by: ANESTHESIOLOGY

## 2021-02-10 PROCEDURE — 0002A COVID-19, MRNA, LNP-S, PF, 30 MCG/0.3 ML DOSE VACCINE: CPT | Mod: PBBFAC | Performed by: ANESTHESIOLOGY

## 2021-02-24 ENCOUNTER — TELEPHONE (OUTPATIENT)
Dept: TRANSPLANT | Facility: CLINIC | Age: 66
End: 2021-02-24

## 2021-02-24 DIAGNOSIS — Z94.0 KIDNEY REPLACED BY TRANSPLANT: Primary | ICD-10-CM

## 2021-02-24 DIAGNOSIS — Z94.4 LIVER REPLACED BY TRANSPLANT: ICD-10-CM

## 2021-02-25 DIAGNOSIS — Z94.4 LIVER REPLACED BY TRANSPLANT: Primary | ICD-10-CM

## 2021-02-25 DIAGNOSIS — Z94.0 KIDNEY REPLACED BY TRANSPLANT: ICD-10-CM

## 2021-03-02 ENCOUNTER — PATIENT MESSAGE (OUTPATIENT)
Dept: TRANSPLANT | Facility: CLINIC | Age: 66
End: 2021-03-02

## 2021-03-03 ENCOUNTER — LAB VISIT (OUTPATIENT)
Dept: LAB | Facility: HOSPITAL | Age: 66
End: 2021-03-03
Attending: INTERNAL MEDICINE
Payer: MEDICARE

## 2021-03-03 DIAGNOSIS — Z94.4 LIVER REPLACED BY TRANSPLANT: ICD-10-CM

## 2021-03-03 DIAGNOSIS — Z94.0 KIDNEY REPLACED BY TRANSPLANT: ICD-10-CM

## 2021-03-03 LAB
ALBUMIN SERPL BCP-MCNC: 3.9 G/DL (ref 3.5–5.2)
ALP SERPL-CCNC: 74 U/L (ref 55–135)
ALT SERPL W/O P-5'-P-CCNC: 30 U/L (ref 10–44)
ANION GAP SERPL CALC-SCNC: 4 MMOL/L (ref 8–16)
AST SERPL-CCNC: 16 U/L (ref 10–40)
BACTERIA #/AREA URNS HPF: NEGATIVE /HPF
BASOPHILS # BLD AUTO: 0.03 K/UL (ref 0–0.2)
BASOPHILS NFR BLD: 0.7 % (ref 0–1.9)
BILIRUB SERPL-MCNC: 0.8 MG/DL (ref 0.1–1)
BILIRUB UR QL STRIP: NEGATIVE
BUN SERPL-MCNC: 18 MG/DL (ref 8–23)
CALCIUM SERPL-MCNC: 9.4 MG/DL (ref 8.7–10.5)
CHLORIDE SERPL-SCNC: 108 MMOL/L (ref 95–110)
CLARITY UR: CLEAR
CO2 SERPL-SCNC: 30 MMOL/L (ref 23–29)
COLOR UR: YELLOW
CREAT SERPL-MCNC: 1.3 MG/DL (ref 0.5–1.4)
CREAT UR-MCNC: 196 MG/DL (ref 23–375)
DIFFERENTIAL METHOD: ABNORMAL
EOSINOPHIL # BLD AUTO: 0.1 K/UL (ref 0–0.5)
EOSINOPHIL NFR BLD: 2.9 % (ref 0–8)
ERYTHROCYTE [DISTWIDTH] IN BLOOD BY AUTOMATED COUNT: 13.5 % (ref 11.5–14.5)
EST. GFR  (AFRICAN AMERICAN): >60 ML/MIN/1.73 M^2
EST. GFR  (NON AFRICAN AMERICAN): 56.5 ML/MIN/1.73 M^2
GLUCOSE SERPL-MCNC: 161 MG/DL (ref 70–110)
GLUCOSE UR QL STRIP: NEGATIVE
HCT VFR BLD AUTO: 45 % (ref 40–54)
HGB BLD-MCNC: 14.9 G/DL (ref 14–18)
HGB UR QL STRIP: NEGATIVE
HYALINE CASTS #/AREA URNS LPF: 0 /LPF
IMM GRANULOCYTES # BLD AUTO: 0.01 K/UL (ref 0–0.04)
IMM GRANULOCYTES NFR BLD AUTO: 0.2 % (ref 0–0.5)
KETONES UR QL STRIP: NEGATIVE
LEUKOCYTE ESTERASE UR QL STRIP: NEGATIVE
LYMPHOCYTES # BLD AUTO: 1.2 K/UL (ref 1–4.8)
LYMPHOCYTES NFR BLD: 26.8 % (ref 18–48)
MAGNESIUM SERPL-MCNC: 2 MG/DL (ref 1.6–2.6)
MCH RBC QN AUTO: 27.6 PG (ref 27–31)
MCHC RBC AUTO-ENTMCNC: 33.1 G/DL (ref 32–36)
MCV RBC AUTO: 84 FL (ref 82–98)
MICROSCOPIC COMMENT: NORMAL
MONOCYTES # BLD AUTO: 0.3 K/UL (ref 0.3–1)
MONOCYTES NFR BLD: 6.9 % (ref 4–15)
NEUTROPHILS # BLD AUTO: 2.8 K/UL (ref 1.8–7.7)
NEUTROPHILS NFR BLD: 62.5 % (ref 38–73)
NITRITE UR QL STRIP: NEGATIVE
NRBC BLD-RTO: 0 /100 WBC
PH UR STRIP: 6 [PH] (ref 5–8)
PHOSPHATE SERPL-MCNC: 2.8 MG/DL (ref 2.7–4.5)
PLATELET # BLD AUTO: 141 K/UL (ref 150–350)
PMV BLD AUTO: 11.1 FL (ref 9.2–12.9)
POTASSIUM SERPL-SCNC: 4.6 MMOL/L (ref 3.5–5.1)
PROT SERPL-MCNC: 7.1 G/DL (ref 6–8.4)
PROT UR QL STRIP: ABNORMAL
PROT UR-MCNC: 36.7 MG/DL (ref 0–15)
PROT/CREAT UR: 0.19 MG/G{CREAT} (ref 0–0.2)
RBC # BLD AUTO: 5.39 M/UL (ref 4.6–6.2)
RBC #/AREA URNS HPF: 1 /HPF (ref 0–4)
SODIUM SERPL-SCNC: 142 MMOL/L (ref 136–145)
SP GR UR STRIP: 1.02 (ref 1–1.03)
SQUAMOUS #/AREA URNS HPF: 0 /HPF
URN SPEC COLLECT METH UR: ABNORMAL
UROBILINOGEN UR STRIP-ACNC: 1 EU/DL
WBC # BLD AUTO: 4.52 K/UL (ref 3.9–12.7)
WBC #/AREA URNS HPF: 0 /HPF (ref 0–5)

## 2021-03-03 PROCEDURE — 80053 COMPREHEN METABOLIC PANEL: CPT | Performed by: INTERNAL MEDICINE

## 2021-03-03 PROCEDURE — 84100 ASSAY OF PHOSPHORUS: CPT | Performed by: INTERNAL MEDICINE

## 2021-03-03 PROCEDURE — 85025 COMPLETE CBC W/AUTO DIFF WBC: CPT | Performed by: INTERNAL MEDICINE

## 2021-03-03 PROCEDURE — 81000 URINALYSIS NONAUTO W/SCOPE: CPT | Performed by: INTERNAL MEDICINE

## 2021-03-03 PROCEDURE — 82570 ASSAY OF URINE CREATININE: CPT | Performed by: INTERNAL MEDICINE

## 2021-03-03 PROCEDURE — 36415 COLL VENOUS BLD VENIPUNCTURE: CPT | Performed by: INTERNAL MEDICINE

## 2021-03-03 PROCEDURE — 83735 ASSAY OF MAGNESIUM: CPT | Performed by: INTERNAL MEDICINE

## 2021-03-03 PROCEDURE — 80197 ASSAY OF TACROLIMUS: CPT | Performed by: INTERNAL MEDICINE

## 2021-03-04 ENCOUNTER — TELEPHONE (OUTPATIENT)
Dept: TRANSPLANT | Facility: CLINIC | Age: 66
End: 2021-03-04

## 2021-03-04 LAB — TACROLIMUS BLD-MCNC: 4.7 NG/ML (ref 5–15)

## 2021-03-10 ENCOUNTER — PATIENT MESSAGE (OUTPATIENT)
Dept: TRANSPLANT | Facility: CLINIC | Age: 66
End: 2021-03-10

## 2021-03-16 ENCOUNTER — PATIENT MESSAGE (OUTPATIENT)
Dept: TRANSPLANT | Facility: CLINIC | Age: 66
End: 2021-03-16

## 2021-03-23 ENCOUNTER — PATIENT MESSAGE (OUTPATIENT)
Dept: TRANSPLANT | Facility: CLINIC | Age: 66
End: 2021-03-23

## 2021-03-23 ENCOUNTER — TELEPHONE (OUTPATIENT)
Dept: TRANSPLANT | Facility: CLINIC | Age: 66
End: 2021-03-23

## 2021-03-30 ENCOUNTER — PATIENT MESSAGE (OUTPATIENT)
Dept: TRANSPLANT | Facility: CLINIC | Age: 66
End: 2021-03-30

## 2021-03-30 ENCOUNTER — OFFICE VISIT (OUTPATIENT)
Dept: TRANSPLANT | Facility: CLINIC | Age: 66
End: 2021-03-30
Payer: MEDICARE

## 2021-03-30 ENCOUNTER — TELEPHONE (OUTPATIENT)
Dept: TRANSPLANT | Facility: CLINIC | Age: 66
End: 2021-03-30

## 2021-03-30 VITALS
OXYGEN SATURATION: 97 % | SYSTOLIC BLOOD PRESSURE: 161 MMHG | HEART RATE: 83 BPM | RESPIRATION RATE: 19 BRPM | BODY MASS INDEX: 42.66 KG/M2 | DIASTOLIC BLOOD PRESSURE: 81 MMHG | TEMPERATURE: 98 F | HEIGHT: 72 IN | WEIGHT: 315 LBS

## 2021-03-30 DIAGNOSIS — Z94.4 LIVER REPLACED BY TRANSPLANT: Primary | Chronic | ICD-10-CM

## 2021-03-30 DIAGNOSIS — Z29.89 NEED FOR PROPHYLACTIC IMMUNOTHERAPY: Chronic | ICD-10-CM

## 2021-03-30 DIAGNOSIS — Z94.0 KIDNEY REPLACED BY TRANSPLANT: ICD-10-CM

## 2021-03-30 PROCEDURE — 1101F PR PT FALLS ASSESS DOC 0-1 FALLS W/OUT INJ PAST YR: ICD-10-PCS | Mod: CPTII,S$GLB,, | Performed by: INTERNAL MEDICINE

## 2021-03-30 PROCEDURE — 1101F PT FALLS ASSESS-DOCD LE1/YR: CPT | Mod: CPTII,S$GLB,, | Performed by: INTERNAL MEDICINE

## 2021-03-30 PROCEDURE — 3288F FALL RISK ASSESSMENT DOCD: CPT | Mod: CPTII,S$GLB,, | Performed by: INTERNAL MEDICINE

## 2021-03-30 PROCEDURE — 3079F PR MOST RECENT DIASTOLIC BLOOD PRESSURE 80-89 MM HG: ICD-10-PCS | Mod: CPTII,S$GLB,, | Performed by: INTERNAL MEDICINE

## 2021-03-30 PROCEDURE — 3008F BODY MASS INDEX DOCD: CPT | Mod: CPTII,S$GLB,, | Performed by: INTERNAL MEDICINE

## 2021-03-30 PROCEDURE — 1159F PR MEDICATION LIST DOCUMENTED IN MEDICAL RECORD: ICD-10-PCS | Mod: S$GLB,,, | Performed by: INTERNAL MEDICINE

## 2021-03-30 PROCEDURE — 1159F MED LIST DOCD IN RCRD: CPT | Mod: S$GLB,,, | Performed by: INTERNAL MEDICINE

## 2021-03-30 PROCEDURE — 99215 PR OFFICE/OUTPT VISIT, EST, LEVL V, 40-54 MIN: ICD-10-PCS | Mod: S$GLB,,, | Performed by: INTERNAL MEDICINE

## 2021-03-30 PROCEDURE — 99215 OFFICE O/P EST HI 40 MIN: CPT | Mod: S$GLB,,, | Performed by: INTERNAL MEDICINE

## 2021-03-30 PROCEDURE — 3008F PR BODY MASS INDEX (BMI) DOCUMENTED: ICD-10-PCS | Mod: CPTII,S$GLB,, | Performed by: INTERNAL MEDICINE

## 2021-03-30 PROCEDURE — 3077F PR MOST RECENT SYSTOLIC BLOOD PRESSURE >= 140 MM HG: ICD-10-PCS | Mod: CPTII,S$GLB,, | Performed by: INTERNAL MEDICINE

## 2021-03-30 PROCEDURE — 99999 PR PBB SHADOW E&M-EST. PATIENT-LVL V: ICD-10-PCS | Mod: PBBFAC,,, | Performed by: INTERNAL MEDICINE

## 2021-03-30 PROCEDURE — 1126F PR PAIN SEVERITY QUANTIFIED, NO PAIN PRESENT: ICD-10-PCS | Mod: S$GLB,,, | Performed by: INTERNAL MEDICINE

## 2021-03-30 PROCEDURE — 3079F DIAST BP 80-89 MM HG: CPT | Mod: CPTII,S$GLB,, | Performed by: INTERNAL MEDICINE

## 2021-03-30 PROCEDURE — 99999 PR PBB SHADOW E&M-EST. PATIENT-LVL V: CPT | Mod: PBBFAC,,, | Performed by: INTERNAL MEDICINE

## 2021-03-30 PROCEDURE — 1126F AMNT PAIN NOTED NONE PRSNT: CPT | Mod: S$GLB,,, | Performed by: INTERNAL MEDICINE

## 2021-03-30 PROCEDURE — 3077F SYST BP >= 140 MM HG: CPT | Mod: CPTII,S$GLB,, | Performed by: INTERNAL MEDICINE

## 2021-03-30 PROCEDURE — 3288F PR FALLS RISK ASSESSMENT DOCUMENTED: ICD-10-PCS | Mod: CPTII,S$GLB,, | Performed by: INTERNAL MEDICINE

## 2021-03-30 RX ORDER — ROSUVASTATIN CALCIUM 5 MG/1
5 TABLET, COATED ORAL NIGHTLY
COMMUNITY
Start: 2021-03-17

## 2021-03-30 RX ORDER — TACROLIMUS 1 MG/1
2 CAPSULE ORAL EVERY 12 HOURS
Qty: 120 CAPSULE | Refills: 11 | Status: SHIPPED | OUTPATIENT
Start: 2021-03-30 | End: 2021-03-30

## 2021-03-30 RX ORDER — TACROLIMUS 1 MG/1
CAPSULE ORAL
Qty: 150 CAPSULE | Refills: 11 | Status: SHIPPED | OUTPATIENT
Start: 2021-03-30 | End: 2022-04-20

## 2021-03-30 RX ORDER — SULFAMETHOXAZOLE AND TRIMETHOPRIM 800; 160 MG/1; MG/1
1 TABLET ORAL 2 TIMES DAILY
COMMUNITY
End: 2021-12-08

## 2021-03-30 RX ORDER — POLYETHYLENE GLYCOL 3350, SODIUM SULFATE, SODIUM CHLORIDE, POTASSIUM CHLORIDE, ASCORBIC ACID, SODIUM ASCORBATE 140-9-5.2G
10 KIT ORAL 2 TIMES DAILY
COMMUNITY
Start: 2021-03-01 | End: 2021-12-08

## 2021-03-31 ENCOUNTER — PATIENT MESSAGE (OUTPATIENT)
Dept: TRANSPLANT | Facility: CLINIC | Age: 66
End: 2021-03-31

## 2021-04-21 ENCOUNTER — PATIENT MESSAGE (OUTPATIENT)
Dept: TRANSPLANT | Facility: CLINIC | Age: 66
End: 2021-04-21

## 2021-04-29 ENCOUNTER — PATIENT MESSAGE (OUTPATIENT)
Dept: TRANSPLANT | Facility: CLINIC | Age: 66
End: 2021-04-29

## 2021-04-30 DIAGNOSIS — C61 PROSTATE CANCER: Primary | ICD-10-CM

## 2021-05-03 ENCOUNTER — TELEPHONE (OUTPATIENT)
Dept: UROLOGY | Facility: CLINIC | Age: 66
End: 2021-05-03

## 2021-05-03 ENCOUNTER — OFFICE VISIT (OUTPATIENT)
Dept: UROLOGY | Facility: CLINIC | Age: 66
End: 2021-05-03
Payer: MEDICARE

## 2021-05-03 ENCOUNTER — TELEPHONE (OUTPATIENT)
Dept: TRANSPLANT | Facility: CLINIC | Age: 66
End: 2021-05-03

## 2021-05-03 VITALS
SYSTOLIC BLOOD PRESSURE: 183 MMHG | WEIGHT: 315 LBS | BODY MASS INDEX: 42.66 KG/M2 | HEART RATE: 84 BPM | HEIGHT: 72 IN | DIASTOLIC BLOOD PRESSURE: 87 MMHG

## 2021-05-03 DIAGNOSIS — Z85.528 HISTORY OF RENAL CELL CARCINOMA: ICD-10-CM

## 2021-05-03 DIAGNOSIS — C61 PROSTATE CANCER: Primary | ICD-10-CM

## 2021-05-03 DIAGNOSIS — E66.01 MORBID OBESITY: ICD-10-CM

## 2021-05-03 PROCEDURE — 3288F FALL RISK ASSESSMENT DOCD: CPT | Mod: CPTII,S$GLB,, | Performed by: UROLOGY

## 2021-05-03 PROCEDURE — 99999 PR PBB SHADOW E&M-EST. PATIENT-LVL V: ICD-10-PCS | Mod: PBBFAC,,, | Performed by: UROLOGY

## 2021-05-03 PROCEDURE — 3288F PR FALLS RISK ASSESSMENT DOCUMENTED: ICD-10-PCS | Mod: CPTII,S$GLB,, | Performed by: UROLOGY

## 2021-05-03 PROCEDURE — 99205 OFFICE O/P NEW HI 60 MIN: CPT | Mod: S$GLB,,, | Performed by: UROLOGY

## 2021-05-03 PROCEDURE — 99999 PR PBB SHADOW E&M-EST. PATIENT-LVL V: CPT | Mod: PBBFAC,,, | Performed by: UROLOGY

## 2021-05-03 PROCEDURE — 1101F PT FALLS ASSESS-DOCD LE1/YR: CPT | Mod: CPTII,S$GLB,, | Performed by: UROLOGY

## 2021-05-03 PROCEDURE — 1101F PR PT FALLS ASSESS DOC 0-1 FALLS W/OUT INJ PAST YR: ICD-10-PCS | Mod: CPTII,S$GLB,, | Performed by: UROLOGY

## 2021-05-03 PROCEDURE — 3008F PR BODY MASS INDEX (BMI) DOCUMENTED: ICD-10-PCS | Mod: CPTII,S$GLB,, | Performed by: UROLOGY

## 2021-05-03 PROCEDURE — 1126F PR PAIN SEVERITY QUANTIFIED, NO PAIN PRESENT: ICD-10-PCS | Mod: S$GLB,,, | Performed by: UROLOGY

## 2021-05-03 PROCEDURE — 3008F BODY MASS INDEX DOCD: CPT | Mod: CPTII,S$GLB,, | Performed by: UROLOGY

## 2021-05-03 PROCEDURE — 1159F MED LIST DOCD IN RCRD: CPT | Mod: S$GLB,,, | Performed by: UROLOGY

## 2021-05-03 PROCEDURE — 1159F PR MEDICATION LIST DOCUMENTED IN MEDICAL RECORD: ICD-10-PCS | Mod: S$GLB,,, | Performed by: UROLOGY

## 2021-05-03 PROCEDURE — 1126F AMNT PAIN NOTED NONE PRSNT: CPT | Mod: S$GLB,,, | Performed by: UROLOGY

## 2021-05-03 PROCEDURE — 99205 PR OFFICE/OUTPT VISIT, NEW, LEVL V, 60-74 MIN: ICD-10-PCS | Mod: S$GLB,,, | Performed by: UROLOGY

## 2021-05-06 ENCOUNTER — PATIENT MESSAGE (OUTPATIENT)
Dept: TRANSPLANT | Facility: CLINIC | Age: 66
End: 2021-05-06

## 2021-05-11 ENCOUNTER — HOSPITAL ENCOUNTER (OUTPATIENT)
Dept: RADIOLOGY | Facility: HOSPITAL | Age: 66
Discharge: HOME | End: 2021-05-11
Attending: UROLOGY
Payer: MEDICARE

## 2021-05-11 DIAGNOSIS — C61 PROSTATE CANCER: ICD-10-CM

## 2021-05-11 PROCEDURE — 72197 MRI PROSTATE W W/O CONTRAST: ICD-10-PCS | Mod: 26,,, | Performed by: RADIOLOGY

## 2021-05-11 PROCEDURE — 25500020 PHARM REV CODE 255: Performed by: UROLOGY

## 2021-05-11 PROCEDURE — 72197 MRI PELVIS W/O & W/DYE: CPT | Mod: TC

## 2021-05-11 PROCEDURE — 72197 MRI PELVIS W/O & W/DYE: CPT | Mod: 26,,, | Performed by: RADIOLOGY

## 2021-05-11 PROCEDURE — A9585 GADOBUTROL INJECTION: HCPCS | Performed by: UROLOGY

## 2021-05-11 RX ORDER — GADOBUTROL 604.72 MG/ML
10 INJECTION INTRAVENOUS
Status: COMPLETED | OUTPATIENT
Start: 2021-05-11 | End: 2021-05-11

## 2021-05-11 RX ADMIN — GADOBUTROL 10 ML: 604.72 INJECTION INTRAVENOUS at 03:05

## 2021-05-12 ENCOUNTER — PATIENT MESSAGE (OUTPATIENT)
Dept: UROLOGY | Facility: CLINIC | Age: 66
End: 2021-05-12

## 2021-05-13 ENCOUNTER — PATIENT MESSAGE (OUTPATIENT)
Dept: UROLOGY | Facility: CLINIC | Age: 66
End: 2021-05-13

## 2021-05-14 ENCOUNTER — PATIENT MESSAGE (OUTPATIENT)
Dept: TRANSPLANT | Facility: CLINIC | Age: 66
End: 2021-05-14

## 2021-05-17 ENCOUNTER — OFFICE VISIT (OUTPATIENT)
Dept: RADIATION ONCOLOGY | Facility: CLINIC | Age: 66
End: 2021-05-17
Payer: MEDICARE

## 2021-05-17 VITALS
HEIGHT: 72 IN | SYSTOLIC BLOOD PRESSURE: 157 MMHG | DIASTOLIC BLOOD PRESSURE: 77 MMHG | HEART RATE: 79 BPM | RESPIRATION RATE: 19 BRPM | TEMPERATURE: 98 F | OXYGEN SATURATION: 98 % | BODY MASS INDEX: 42.66 KG/M2 | WEIGHT: 315 LBS

## 2021-05-17 DIAGNOSIS — C61 PROSTATE CANCER: ICD-10-CM

## 2021-05-17 PROCEDURE — 3288F PR FALLS RISK ASSESSMENT DOCUMENTED: ICD-10-PCS | Mod: CPTII,S$GLB,, | Performed by: RADIOLOGY

## 2021-05-17 PROCEDURE — 1126F PR PAIN SEVERITY QUANTIFIED, NO PAIN PRESENT: ICD-10-PCS | Mod: S$GLB,,, | Performed by: RADIOLOGY

## 2021-05-17 PROCEDURE — 1126F AMNT PAIN NOTED NONE PRSNT: CPT | Mod: S$GLB,,, | Performed by: RADIOLOGY

## 2021-05-17 PROCEDURE — 99999 PR PBB SHADOW E&M-EST. PATIENT-LVL V: CPT | Mod: PBBFAC,,, | Performed by: RADIOLOGY

## 2021-05-17 PROCEDURE — 3288F FALL RISK ASSESSMENT DOCD: CPT | Mod: CPTII,S$GLB,, | Performed by: RADIOLOGY

## 2021-05-17 PROCEDURE — 99999 PR PBB SHADOW E&M-EST. PATIENT-LVL V: ICD-10-PCS | Mod: PBBFAC,,, | Performed by: RADIOLOGY

## 2021-05-17 PROCEDURE — 1159F MED LIST DOCD IN RCRD: CPT | Mod: S$GLB,,, | Performed by: RADIOLOGY

## 2021-05-17 PROCEDURE — 3008F BODY MASS INDEX DOCD: CPT | Mod: CPTII,S$GLB,, | Performed by: RADIOLOGY

## 2021-05-17 PROCEDURE — 1101F PR PT FALLS ASSESS DOC 0-1 FALLS W/OUT INJ PAST YR: ICD-10-PCS | Mod: CPTII,S$GLB,, | Performed by: RADIOLOGY

## 2021-05-17 PROCEDURE — 3008F PR BODY MASS INDEX (BMI) DOCUMENTED: ICD-10-PCS | Mod: CPTII,S$GLB,, | Performed by: RADIOLOGY

## 2021-05-17 PROCEDURE — 1101F PT FALLS ASSESS-DOCD LE1/YR: CPT | Mod: CPTII,S$GLB,, | Performed by: RADIOLOGY

## 2021-05-17 PROCEDURE — 1159F PR MEDICATION LIST DOCUMENTED IN MEDICAL RECORD: ICD-10-PCS | Mod: S$GLB,,, | Performed by: RADIOLOGY

## 2021-05-17 PROCEDURE — 99204 PR OFFICE/OUTPT VISIT, NEW, LEVL IV, 45-59 MIN: ICD-10-PCS | Mod: S$GLB,,, | Performed by: RADIOLOGY

## 2021-05-17 PROCEDURE — 99204 OFFICE O/P NEW MOD 45 MIN: CPT | Mod: S$GLB,,, | Performed by: RADIOLOGY

## 2021-05-20 ENCOUNTER — PATIENT MESSAGE (OUTPATIENT)
Dept: RADIATION ONCOLOGY | Facility: CLINIC | Age: 66
End: 2021-05-20

## 2021-05-20 ENCOUNTER — PATIENT MESSAGE (OUTPATIENT)
Dept: TRANSPLANT | Facility: CLINIC | Age: 66
End: 2021-05-20

## 2021-05-20 DIAGNOSIS — C61 PROSTATE CANCER: Primary | ICD-10-CM

## 2021-05-27 ENCOUNTER — CLINICAL SUPPORT (OUTPATIENT)
Dept: RADIATION ONCOLOGY | Facility: CLINIC | Age: 66
End: 2021-05-27
Payer: MEDICARE

## 2021-05-27 ENCOUNTER — PATIENT MESSAGE (OUTPATIENT)
Dept: RADIATION ONCOLOGY | Facility: CLINIC | Age: 66
End: 2021-05-27

## 2021-05-27 PROCEDURE — 96402 PR CHEMOTHER HORMON ANTINEOPL SUB-Q/IM: ICD-10-PCS | Mod: S$GLB,,, | Performed by: RADIOLOGY

## 2021-05-27 PROCEDURE — 96402 CHEMO HORMON ANTINEOPL SQ/IM: CPT | Mod: S$GLB,,, | Performed by: RADIOLOGY

## 2021-05-28 ENCOUNTER — PATIENT MESSAGE (OUTPATIENT)
Dept: TRANSPLANT | Facility: CLINIC | Age: 66
End: 2021-05-28

## 2021-06-01 ENCOUNTER — PATIENT MESSAGE (OUTPATIENT)
Dept: TRANSPLANT | Facility: CLINIC | Age: 66
End: 2021-06-01

## 2021-06-01 DIAGNOSIS — Z94.4 S/P LIVER TRANSPLANT: Primary | ICD-10-CM

## 2021-06-01 RX ORDER — MYCOPHENOLATE MOFETIL 250 MG/1
250 CAPSULE ORAL 2 TIMES DAILY
Qty: 60 CAPSULE | Refills: 11 | Status: ON HOLD | OUTPATIENT
Start: 2021-06-01 | End: 2022-08-10 | Stop reason: SDUPTHER

## 2021-06-04 ENCOUNTER — LAB VISIT (OUTPATIENT)
Dept: LAB | Facility: HOSPITAL | Age: 66
End: 2021-06-04
Attending: INTERNAL MEDICINE
Payer: MEDICARE

## 2021-06-04 DIAGNOSIS — Z94.4 LIVER REPLACED BY TRANSPLANT: ICD-10-CM

## 2021-06-04 LAB
ALBUMIN SERPL BCP-MCNC: 3.8 G/DL (ref 3.5–5.2)
ALP SERPL-CCNC: 64 U/L (ref 55–135)
ALT SERPL W/O P-5'-P-CCNC: 23 U/L (ref 10–44)
ANION GAP SERPL CALC-SCNC: 5 MMOL/L (ref 8–16)
AST SERPL-CCNC: 14 U/L (ref 10–40)
BASOPHILS # BLD AUTO: 0.04 K/UL (ref 0–0.2)
BASOPHILS NFR BLD: 1 % (ref 0–1.9)
BILIRUB SERPL-MCNC: 0.9 MG/DL (ref 0.1–1)
BUN SERPL-MCNC: 18 MG/DL (ref 8–23)
CALCIUM SERPL-MCNC: 9.1 MG/DL (ref 8.7–10.5)
CHLORIDE SERPL-SCNC: 110 MMOL/L (ref 95–110)
CO2 SERPL-SCNC: 28 MMOL/L (ref 23–29)
CREAT SERPL-MCNC: 1.1 MG/DL (ref 0.5–1.4)
DIFFERENTIAL METHOD: ABNORMAL
EOSINOPHIL # BLD AUTO: 0.2 K/UL (ref 0–0.5)
EOSINOPHIL NFR BLD: 4.1 % (ref 0–8)
ERYTHROCYTE [DISTWIDTH] IN BLOOD BY AUTOMATED COUNT: 13.9 % (ref 11.5–14.5)
EST. GFR  (AFRICAN AMERICAN): >60 ML/MIN/1.73 M^2
EST. GFR  (NON AFRICAN AMERICAN): >60 ML/MIN/1.73 M^2
GLUCOSE SERPL-MCNC: 106 MG/DL (ref 70–110)
HCT VFR BLD AUTO: 42 % (ref 40–54)
HGB BLD-MCNC: 13.9 G/DL (ref 14–18)
IMM GRANULOCYTES # BLD AUTO: 0.01 K/UL (ref 0–0.04)
IMM GRANULOCYTES NFR BLD AUTO: 0.2 % (ref 0–0.5)
LYMPHOCYTES # BLD AUTO: 1.4 K/UL (ref 1–4.8)
LYMPHOCYTES NFR BLD: 34.4 % (ref 18–48)
MCH RBC QN AUTO: 27.3 PG (ref 27–31)
MCHC RBC AUTO-ENTMCNC: 33.1 G/DL (ref 32–36)
MCV RBC AUTO: 82 FL (ref 82–98)
MONOCYTES # BLD AUTO: 0.3 K/UL (ref 0.3–1)
MONOCYTES NFR BLD: 7.3 % (ref 4–15)
NEUTROPHILS # BLD AUTO: 2.2 K/UL (ref 1.8–7.7)
NEUTROPHILS NFR BLD: 53 % (ref 38–73)
NRBC BLD-RTO: 0 /100 WBC
PLATELET # BLD AUTO: 143 K/UL (ref 150–450)
PMV BLD AUTO: 10.7 FL (ref 9.2–12.9)
POTASSIUM SERPL-SCNC: 4.5 MMOL/L (ref 3.5–5.1)
PROT SERPL-MCNC: 6.9 G/DL (ref 6–8.4)
RBC # BLD AUTO: 5.1 M/UL (ref 4.6–6.2)
SODIUM SERPL-SCNC: 143 MMOL/L (ref 136–145)
WBC # BLD AUTO: 4.13 K/UL (ref 3.9–12.7)

## 2021-06-04 PROCEDURE — 85025 COMPLETE CBC W/AUTO DIFF WBC: CPT | Performed by: INTERNAL MEDICINE

## 2021-06-04 PROCEDURE — 80197 ASSAY OF TACROLIMUS: CPT | Performed by: INTERNAL MEDICINE

## 2021-06-04 PROCEDURE — 80053 COMPREHEN METABOLIC PANEL: CPT | Performed by: INTERNAL MEDICINE

## 2021-06-05 LAB
TACROLIMUS BLD-MCNC: 5.2 NG/ML (ref 5–15)
TACROLIMUS, NORMALIZED: 4.8 NG/ML (ref 5–15)

## 2021-06-07 ENCOUNTER — TELEPHONE (OUTPATIENT)
Dept: TRANSPLANT | Facility: CLINIC | Age: 66
End: 2021-06-07

## 2021-06-07 ENCOUNTER — PATIENT MESSAGE (OUTPATIENT)
Dept: TRANSPLANT | Facility: CLINIC | Age: 66
End: 2021-06-07

## 2021-06-22 ENCOUNTER — PATIENT MESSAGE (OUTPATIENT)
Dept: TRANSPLANT | Facility: CLINIC | Age: 66
End: 2021-06-22

## 2021-06-23 DIAGNOSIS — Z94.4 S/P LIVER TRANSPLANT: Primary | ICD-10-CM

## 2021-06-28 ENCOUNTER — LAB VISIT (OUTPATIENT)
Dept: LAB | Facility: HOSPITAL | Age: 66
End: 2021-06-28
Attending: INTERNAL MEDICINE
Payer: MEDICARE

## 2021-06-28 DIAGNOSIS — Z94.4 LIVER REPLACED BY TRANSPLANT: ICD-10-CM

## 2021-06-28 LAB
ALBUMIN SERPL BCP-MCNC: 3.7 G/DL (ref 3.5–5.2)
ALP SERPL-CCNC: 65 U/L (ref 55–135)
ALT SERPL W/O P-5'-P-CCNC: 40 U/L (ref 10–44)
ANION GAP SERPL CALC-SCNC: 8 MMOL/L (ref 8–16)
AST SERPL-CCNC: 17 U/L (ref 10–40)
BASOPHILS # BLD AUTO: 0.03 K/UL (ref 0–0.2)
BASOPHILS NFR BLD: 0.9 % (ref 0–1.9)
BILIRUB SERPL-MCNC: 0.7 MG/DL (ref 0.1–1)
BUN SERPL-MCNC: 18 MG/DL (ref 8–23)
CALCIUM SERPL-MCNC: 9.6 MG/DL (ref 8.7–10.5)
CHLORIDE SERPL-SCNC: 109 MMOL/L (ref 95–110)
CO2 SERPL-SCNC: 23 MMOL/L (ref 23–29)
CREAT SERPL-MCNC: 1.1 MG/DL (ref 0.5–1.4)
DIFFERENTIAL METHOD: ABNORMAL
EOSINOPHIL # BLD AUTO: 0.1 K/UL (ref 0–0.5)
EOSINOPHIL NFR BLD: 4 % (ref 0–8)
ERYTHROCYTE [DISTWIDTH] IN BLOOD BY AUTOMATED COUNT: 13.5 % (ref 11.5–14.5)
EST. GFR  (AFRICAN AMERICAN): >60 ML/MIN/1.73 M^2
EST. GFR  (NON AFRICAN AMERICAN): >60 ML/MIN/1.73 M^2
GLUCOSE SERPL-MCNC: 138 MG/DL (ref 70–110)
HCT VFR BLD AUTO: 43.1 % (ref 40–54)
HGB BLD-MCNC: 14.4 G/DL (ref 14–18)
IMM GRANULOCYTES # BLD AUTO: 0 K/UL (ref 0–0.04)
IMM GRANULOCYTES NFR BLD AUTO: 0 % (ref 0–0.5)
LYMPHOCYTES # BLD AUTO: 1.1 K/UL (ref 1–4.8)
LYMPHOCYTES NFR BLD: 33.2 % (ref 18–48)
MCH RBC QN AUTO: 27.4 PG (ref 27–31)
MCHC RBC AUTO-ENTMCNC: 33.4 G/DL (ref 32–36)
MCV RBC AUTO: 82 FL (ref 82–98)
MONOCYTES # BLD AUTO: 0.3 K/UL (ref 0.3–1)
MONOCYTES NFR BLD: 10.1 % (ref 4–15)
NEUTROPHILS # BLD AUTO: 1.7 K/UL (ref 1.8–7.7)
NEUTROPHILS NFR BLD: 51.8 % (ref 38–73)
NRBC BLD-RTO: 0 /100 WBC
PLATELET # BLD AUTO: 132 K/UL (ref 150–450)
PMV BLD AUTO: 11.4 FL (ref 9.2–12.9)
POTASSIUM SERPL-SCNC: 4.2 MMOL/L (ref 3.5–5.1)
PROT SERPL-MCNC: 6.9 G/DL (ref 6–8.4)
RBC # BLD AUTO: 5.25 M/UL (ref 4.6–6.2)
SODIUM SERPL-SCNC: 140 MMOL/L (ref 136–145)
WBC # BLD AUTO: 3.28 K/UL (ref 3.9–12.7)

## 2021-06-28 PROCEDURE — 80053 COMPREHEN METABOLIC PANEL: CPT | Performed by: INTERNAL MEDICINE

## 2021-06-28 PROCEDURE — 85025 COMPLETE CBC W/AUTO DIFF WBC: CPT | Performed by: INTERNAL MEDICINE

## 2021-06-28 PROCEDURE — 80197 ASSAY OF TACROLIMUS: CPT | Performed by: INTERNAL MEDICINE

## 2021-06-28 PROCEDURE — 36415 COLL VENOUS BLD VENIPUNCTURE: CPT | Performed by: INTERNAL MEDICINE

## 2021-06-29 LAB
TACROLIMUS BLD-MCNC: 5.4 NG/ML (ref 5–15)
TACROLIMUS, NORMALIZED: 4.9 NG/ML (ref 5–15)

## 2021-06-30 ENCOUNTER — PATIENT MESSAGE (OUTPATIENT)
Dept: TRANSPLANT | Facility: CLINIC | Age: 66
End: 2021-06-30

## 2021-06-30 ENCOUNTER — TELEPHONE (OUTPATIENT)
Dept: TRANSPLANT | Facility: CLINIC | Age: 66
End: 2021-06-30

## 2021-07-07 ENCOUNTER — PATIENT MESSAGE (OUTPATIENT)
Dept: RADIATION ONCOLOGY | Facility: CLINIC | Age: 66
End: 2021-07-07

## 2021-07-08 ENCOUNTER — HOSPITAL ENCOUNTER (OUTPATIENT)
Dept: RADIOLOGY | Facility: HOSPITAL | Age: 66
Discharge: HOME | End: 2021-07-08
Attending: INTERNAL MEDICINE
Payer: MEDICARE

## 2021-07-08 ENCOUNTER — HOSPITAL ENCOUNTER (OUTPATIENT)
Dept: RADIATION THERAPY | Facility: HOSPITAL | Age: 66
Discharge: HOME | End: 2021-07-08
Attending: RADIOLOGY
Payer: MEDICARE

## 2021-07-08 DIAGNOSIS — Z94.4 S/P LIVER TRANSPLANT: ICD-10-CM

## 2021-07-08 PROCEDURE — 77290 THER RAD SIMULAJ FIELD CPLX: CPT | Mod: TC | Performed by: RADIOLOGY

## 2021-07-08 PROCEDURE — 77334 RADIATION TREATMENT AID(S): CPT | Mod: TC | Performed by: RADIOLOGY

## 2021-07-08 PROCEDURE — 77263 THER RADIOLOGY TX PLNG CPLX: CPT | Mod: ,,, | Performed by: RADIOLOGY

## 2021-07-08 PROCEDURE — 77014 HC CT GUIDANCE RADIATION THERAPY FLDS PLACEMENT: CPT | Mod: TC | Performed by: RADIOLOGY

## 2021-07-08 PROCEDURE — 77334 PR  RADN TREATMENT AID(S) COMPLX: ICD-10-PCS | Mod: 26,,, | Performed by: RADIOLOGY

## 2021-07-08 PROCEDURE — 74177 CT ABD & PELVIS W/CONTRAST: CPT | Mod: 26,,, | Performed by: RADIOLOGY

## 2021-07-08 PROCEDURE — 71260 CT THORAX DX C+: CPT | Mod: 26,,, | Performed by: RADIOLOGY

## 2021-07-08 PROCEDURE — 77263 PR  RADIATION THERAPY PLAN COMPLEX: ICD-10-PCS | Mod: ,,, | Performed by: RADIOLOGY

## 2021-07-08 PROCEDURE — 71260 CT CHEST ABDOMEN PELVIS WITH CONTRAST (XPD): ICD-10-PCS | Mod: 26,,, | Performed by: RADIOLOGY

## 2021-07-08 PROCEDURE — 74177 CT CHEST ABDOMEN PELVIS WITH CONTRAST (XPD): ICD-10-PCS | Mod: 26,,, | Performed by: RADIOLOGY

## 2021-07-08 PROCEDURE — 77334 RADIATION TREATMENT AID(S): CPT | Mod: 26,,, | Performed by: RADIOLOGY

## 2021-07-08 PROCEDURE — 71260 CT THORAX DX C+: CPT | Mod: TC

## 2021-07-08 PROCEDURE — 25500020 PHARM REV CODE 255: Performed by: INTERNAL MEDICINE

## 2021-07-08 PROCEDURE — 74177 CT ABD & PELVIS W/CONTRAST: CPT | Mod: TC

## 2021-07-08 RX ADMIN — IOHEXOL 100 ML: 350 INJECTION, SOLUTION INTRAVENOUS at 02:07

## 2021-07-12 ENCOUNTER — PATIENT MESSAGE (OUTPATIENT)
Dept: TRANSPLANT | Facility: CLINIC | Age: 66
End: 2021-07-12

## 2021-07-13 PROCEDURE — 77301 RADIOTHERAPY DOSE PLAN IMRT: CPT | Mod: TC | Performed by: RADIOLOGY

## 2021-07-13 PROCEDURE — 77301 RADIOTHERAPY DOSE PLAN IMRT: CPT | Mod: 26,,, | Performed by: RADIOLOGY

## 2021-07-13 PROCEDURE — 77301 PR  INTEN MOD RADIOTHER PLAN W/DOSE VOL HIST: ICD-10-PCS | Mod: 26,,, | Performed by: RADIOLOGY

## 2021-07-14 PROCEDURE — 77300 RADIATION THERAPY DOSE PLAN: CPT | Mod: TC | Performed by: RADIOLOGY

## 2021-07-14 PROCEDURE — 77300 PR RADIATION THERAPY,DOSIMETRY PLAN: ICD-10-PCS | Mod: 26,,, | Performed by: RADIOLOGY

## 2021-07-14 PROCEDURE — 77338 PR  MLC IMRT DESIGN & CONSTRUCTION PER IMRT PLAN: ICD-10-PCS | Mod: 26,,, | Performed by: RADIOLOGY

## 2021-07-14 PROCEDURE — 77300 RADIATION THERAPY DOSE PLAN: CPT | Mod: 26,,, | Performed by: RADIOLOGY

## 2021-07-14 PROCEDURE — 77338 DESIGN MLC DEVICE FOR IMRT: CPT | Mod: TC | Performed by: RADIOLOGY

## 2021-07-14 PROCEDURE — 77338 DESIGN MLC DEVICE FOR IMRT: CPT | Mod: 26,,, | Performed by: RADIOLOGY

## 2021-07-15 ENCOUNTER — DOCUMENTATION ONLY (OUTPATIENT)
Dept: RADIATION ONCOLOGY | Facility: CLINIC | Age: 66
End: 2021-07-15

## 2021-07-15 PROCEDURE — 77014 PR  CT GUIDANCE PLACEMENT RAD THERAPY FIELDS: CPT | Mod: 26,,, | Performed by: RADIOLOGY

## 2021-07-15 PROCEDURE — 77014 HC CT GUIDANCE RADIATION THERAPY FLDS PLACEMENT: CPT | Mod: TC | Performed by: RADIOLOGY

## 2021-07-15 PROCEDURE — 77385 HC IMRT, SIMPLE: CPT | Performed by: RADIOLOGY

## 2021-07-15 PROCEDURE — 77014 PR  CT GUIDANCE PLACEMENT RAD THERAPY FIELDS: ICD-10-PCS | Mod: 26,,, | Performed by: RADIOLOGY

## 2021-07-16 PROCEDURE — 77014 PR  CT GUIDANCE PLACEMENT RAD THERAPY FIELDS: CPT | Mod: 26,,, | Performed by: RADIOLOGY

## 2021-07-16 PROCEDURE — 77385 HC IMRT, SIMPLE: CPT | Performed by: RADIOLOGY

## 2021-07-16 PROCEDURE — 77014 HC CT GUIDANCE RADIATION THERAPY FLDS PLACEMENT: CPT | Mod: TC | Performed by: RADIOLOGY

## 2021-07-16 PROCEDURE — 77014 PR  CT GUIDANCE PLACEMENT RAD THERAPY FIELDS: ICD-10-PCS | Mod: 26,,, | Performed by: RADIOLOGY

## 2021-07-19 ENCOUNTER — LAB VISIT (OUTPATIENT)
Dept: LAB | Facility: HOSPITAL | Age: 66
End: 2021-07-19
Attending: INTERNAL MEDICINE
Payer: MEDICARE

## 2021-07-19 DIAGNOSIS — Z94.4 LIVER REPLACED BY TRANSPLANT: ICD-10-CM

## 2021-07-19 LAB
ALBUMIN SERPL BCP-MCNC: 3.6 G/DL (ref 3.5–5.2)
ALP SERPL-CCNC: 65 U/L (ref 55–135)
ALT SERPL W/O P-5'-P-CCNC: 41 U/L (ref 10–44)
ANION GAP SERPL CALC-SCNC: 4 MMOL/L (ref 8–16)
AST SERPL-CCNC: 23 U/L (ref 10–40)
BASOPHILS # BLD AUTO: 0.02 K/UL (ref 0–0.2)
BASOPHILS NFR BLD: 0.5 % (ref 0–1.9)
BILIRUB SERPL-MCNC: 0.8 MG/DL (ref 0.1–1)
BUN SERPL-MCNC: 16 MG/DL (ref 8–23)
CALCIUM SERPL-MCNC: 8.9 MG/DL (ref 8.7–10.5)
CHLORIDE SERPL-SCNC: 108 MMOL/L (ref 95–110)
CO2 SERPL-SCNC: 30 MMOL/L (ref 23–29)
CREAT SERPL-MCNC: 1.1 MG/DL (ref 0.5–1.4)
DIFFERENTIAL METHOD: ABNORMAL
EOSINOPHIL # BLD AUTO: 0.1 K/UL (ref 0–0.5)
EOSINOPHIL NFR BLD: 3.3 % (ref 0–8)
ERYTHROCYTE [DISTWIDTH] IN BLOOD BY AUTOMATED COUNT: 13.3 % (ref 11.5–14.5)
EST. GFR  (AFRICAN AMERICAN): >60 ML/MIN/1.73 M^2
EST. GFR  (NON AFRICAN AMERICAN): >60 ML/MIN/1.73 M^2
GLUCOSE SERPL-MCNC: 138 MG/DL (ref 70–110)
HCT VFR BLD AUTO: 41.6 % (ref 40–54)
HGB BLD-MCNC: 14.1 G/DL (ref 14–18)
IMM GRANULOCYTES # BLD AUTO: 0.01 K/UL (ref 0–0.04)
IMM GRANULOCYTES NFR BLD AUTO: 0.3 % (ref 0–0.5)
LYMPHOCYTES # BLD AUTO: 0.9 K/UL (ref 1–4.8)
LYMPHOCYTES NFR BLD: 25.5 % (ref 18–48)
MCH RBC QN AUTO: 27.8 PG (ref 27–31)
MCHC RBC AUTO-ENTMCNC: 33.9 G/DL (ref 32–36)
MCV RBC AUTO: 82 FL (ref 82–98)
MONOCYTES # BLD AUTO: 0.3 K/UL (ref 0.3–1)
MONOCYTES NFR BLD: 9.3 % (ref 4–15)
NEUTROPHILS # BLD AUTO: 2.2 K/UL (ref 1.8–7.7)
NEUTROPHILS NFR BLD: 61.1 % (ref 38–73)
NRBC BLD-RTO: 0 /100 WBC
PLATELET # BLD AUTO: 127 K/UL (ref 150–450)
PMV BLD AUTO: 10.6 FL (ref 9.2–12.9)
POTASSIUM SERPL-SCNC: 4.4 MMOL/L (ref 3.5–5.1)
PROT SERPL-MCNC: 6.8 G/DL (ref 6–8.4)
RBC # BLD AUTO: 5.08 M/UL (ref 4.6–6.2)
SODIUM SERPL-SCNC: 142 MMOL/L (ref 136–145)
WBC # BLD AUTO: 3.64 K/UL (ref 3.9–12.7)

## 2021-07-19 PROCEDURE — 80197 ASSAY OF TACROLIMUS: CPT | Performed by: INTERNAL MEDICINE

## 2021-07-19 PROCEDURE — 77014 PR  CT GUIDANCE PLACEMENT RAD THERAPY FIELDS: CPT | Mod: 26,,, | Performed by: RADIOLOGY

## 2021-07-19 PROCEDURE — 80053 COMPREHEN METABOLIC PANEL: CPT | Performed by: INTERNAL MEDICINE

## 2021-07-19 PROCEDURE — 77014 PR  CT GUIDANCE PLACEMENT RAD THERAPY FIELDS: ICD-10-PCS | Mod: 26,,, | Performed by: RADIOLOGY

## 2021-07-19 PROCEDURE — 36415 COLL VENOUS BLD VENIPUNCTURE: CPT | Performed by: INTERNAL MEDICINE

## 2021-07-19 PROCEDURE — 85025 COMPLETE CBC W/AUTO DIFF WBC: CPT | Performed by: INTERNAL MEDICINE

## 2021-07-19 PROCEDURE — 77385 HC IMRT, SIMPLE: CPT | Performed by: RADIOLOGY

## 2021-07-19 PROCEDURE — 77014 HC CT GUIDANCE RADIATION THERAPY FLDS PLACEMENT: CPT | Mod: TC | Performed by: RADIOLOGY

## 2021-07-20 LAB
TACROLIMUS BLD-MCNC: 4.3 NG/ML (ref 5–15)
TACROLIMUS, NORMALIZED: 4 NG/ML (ref 5–15)

## 2021-07-20 PROCEDURE — 77385 HC IMRT, SIMPLE: CPT | Performed by: RADIOLOGY

## 2021-07-20 PROCEDURE — 77014 HC CT GUIDANCE RADIATION THERAPY FLDS PLACEMENT: CPT | Mod: TC | Performed by: RADIOLOGY

## 2021-07-20 PROCEDURE — 77014 PR  CT GUIDANCE PLACEMENT RAD THERAPY FIELDS: CPT | Mod: 26,,, | Performed by: RADIOLOGY

## 2021-07-20 PROCEDURE — 77014 PR  CT GUIDANCE PLACEMENT RAD THERAPY FIELDS: ICD-10-PCS | Mod: 26,,, | Performed by: RADIOLOGY

## 2021-07-21 ENCOUNTER — PATIENT MESSAGE (OUTPATIENT)
Dept: TRANSPLANT | Facility: CLINIC | Age: 66
End: 2021-07-21

## 2021-07-21 PROCEDURE — 77014 PR  CT GUIDANCE PLACEMENT RAD THERAPY FIELDS: CPT | Mod: 26,,, | Performed by: RADIOLOGY

## 2021-07-21 PROCEDURE — 77385 HC IMRT, SIMPLE: CPT | Performed by: RADIOLOGY

## 2021-07-21 PROCEDURE — 77014 HC CT GUIDANCE RADIATION THERAPY FLDS PLACEMENT: CPT | Mod: TC | Performed by: RADIOLOGY

## 2021-07-21 PROCEDURE — 77014 PR  CT GUIDANCE PLACEMENT RAD THERAPY FIELDS: ICD-10-PCS | Mod: 26,,, | Performed by: RADIOLOGY

## 2021-07-22 ENCOUNTER — TELEPHONE (OUTPATIENT)
Dept: TRANSPLANT | Facility: CLINIC | Age: 66
End: 2021-07-22

## 2021-07-22 ENCOUNTER — PATIENT MESSAGE (OUTPATIENT)
Dept: TRANSPLANT | Facility: CLINIC | Age: 66
End: 2021-07-22

## 2021-07-22 ENCOUNTER — DOCUMENTATION ONLY (OUTPATIENT)
Dept: RADIATION ONCOLOGY | Facility: CLINIC | Age: 66
End: 2021-07-22

## 2021-07-22 DIAGNOSIS — Z94.4 LIVER REPLACED BY TRANSPLANT: ICD-10-CM

## 2021-07-22 DIAGNOSIS — C22.0 HCC (HEPATOCELLULAR CARCINOMA): Primary | ICD-10-CM

## 2021-07-22 PROCEDURE — 77014 PR  CT GUIDANCE PLACEMENT RAD THERAPY FIELDS: CPT | Mod: 26,,, | Performed by: RADIOLOGY

## 2021-07-22 PROCEDURE — 77014 PR  CT GUIDANCE PLACEMENT RAD THERAPY FIELDS: ICD-10-PCS | Mod: 26,,, | Performed by: RADIOLOGY

## 2021-07-22 PROCEDURE — 77014 HC CT GUIDANCE RADIATION THERAPY FLDS PLACEMENT: CPT | Mod: TC | Performed by: RADIOLOGY

## 2021-07-22 PROCEDURE — 77336 RADIATION PHYSICS CONSULT: CPT | Performed by: RADIOLOGY

## 2021-07-22 PROCEDURE — 77385 HC IMRT, SIMPLE: CPT | Performed by: RADIOLOGY

## 2021-07-23 PROCEDURE — 77385 HC IMRT, SIMPLE: CPT | Performed by: RADIOLOGY

## 2021-07-23 PROCEDURE — 77014 PR  CT GUIDANCE PLACEMENT RAD THERAPY FIELDS: ICD-10-PCS | Mod: 26,,, | Performed by: RADIOLOGY

## 2021-07-23 PROCEDURE — 77014 PR  CT GUIDANCE PLACEMENT RAD THERAPY FIELDS: CPT | Mod: 26,,, | Performed by: RADIOLOGY

## 2021-07-23 PROCEDURE — 77014 HC CT GUIDANCE RADIATION THERAPY FLDS PLACEMENT: CPT | Mod: TC | Performed by: RADIOLOGY

## 2021-07-26 PROCEDURE — 77385 HC IMRT, SIMPLE: CPT | Performed by: RADIOLOGY

## 2021-07-26 PROCEDURE — 77014 PR  CT GUIDANCE PLACEMENT RAD THERAPY FIELDS: CPT | Mod: 26,,, | Performed by: RADIOLOGY

## 2021-07-26 PROCEDURE — 77014 PR  CT GUIDANCE PLACEMENT RAD THERAPY FIELDS: ICD-10-PCS | Mod: 26,,, | Performed by: RADIOLOGY

## 2021-07-26 PROCEDURE — 77014 HC CT GUIDANCE RADIATION THERAPY FLDS PLACEMENT: CPT | Mod: TC | Performed by: RADIOLOGY

## 2021-07-27 ENCOUNTER — CONFERENCE (OUTPATIENT)
Dept: TRANSPLANT | Facility: CLINIC | Age: 66
End: 2021-07-27

## 2021-07-27 PROCEDURE — 77014 HC CT GUIDANCE RADIATION THERAPY FLDS PLACEMENT: CPT | Mod: TC | Performed by: RADIOLOGY

## 2021-07-27 PROCEDURE — 77385 HC IMRT, SIMPLE: CPT | Performed by: RADIOLOGY

## 2021-07-27 PROCEDURE — 77014 PR  CT GUIDANCE PLACEMENT RAD THERAPY FIELDS: CPT | Mod: 26,,, | Performed by: RADIOLOGY

## 2021-07-27 PROCEDURE — 77014 PR  CT GUIDANCE PLACEMENT RAD THERAPY FIELDS: ICD-10-PCS | Mod: 26,,, | Performed by: RADIOLOGY

## 2021-07-28 ENCOUNTER — TELEPHONE (OUTPATIENT)
Dept: HEPATOLOGY | Facility: CLINIC | Age: 66
End: 2021-07-28

## 2021-07-28 ENCOUNTER — PATIENT MESSAGE (OUTPATIENT)
Dept: TRANSPLANT | Facility: CLINIC | Age: 66
End: 2021-07-28

## 2021-07-28 PROCEDURE — 77014 PR  CT GUIDANCE PLACEMENT RAD THERAPY FIELDS: ICD-10-PCS | Mod: 26,,, | Performed by: RADIOLOGY

## 2021-07-28 PROCEDURE — 77385 HC IMRT, SIMPLE: CPT | Performed by: RADIOLOGY

## 2021-07-28 PROCEDURE — 77014 PR  CT GUIDANCE PLACEMENT RAD THERAPY FIELDS: CPT | Mod: 26,,, | Performed by: RADIOLOGY

## 2021-07-28 PROCEDURE — 77014 HC CT GUIDANCE RADIATION THERAPY FLDS PLACEMENT: CPT | Mod: TC | Performed by: RADIOLOGY

## 2021-07-29 ENCOUNTER — DOCUMENTATION ONLY (OUTPATIENT)
Dept: RADIATION ONCOLOGY | Facility: CLINIC | Age: 66
End: 2021-07-29

## 2021-07-29 PROCEDURE — 77014 PR  CT GUIDANCE PLACEMENT RAD THERAPY FIELDS: CPT | Mod: 26,,, | Performed by: RADIOLOGY

## 2021-07-29 PROCEDURE — 77014 HC CT GUIDANCE RADIATION THERAPY FLDS PLACEMENT: CPT | Mod: TC | Performed by: RADIOLOGY

## 2021-07-29 PROCEDURE — 77014 PR  CT GUIDANCE PLACEMENT RAD THERAPY FIELDS: ICD-10-PCS | Mod: 26,,, | Performed by: RADIOLOGY

## 2021-07-29 PROCEDURE — 77385 HC IMRT, SIMPLE: CPT | Performed by: RADIOLOGY

## 2021-07-30 PROCEDURE — 77014 PR  CT GUIDANCE PLACEMENT RAD THERAPY FIELDS: CPT | Mod: 26,,, | Performed by: RADIOLOGY

## 2021-07-30 PROCEDURE — 77014 PR  CT GUIDANCE PLACEMENT RAD THERAPY FIELDS: ICD-10-PCS | Mod: 26,,, | Performed by: RADIOLOGY

## 2021-07-30 PROCEDURE — 77385 HC IMRT, SIMPLE: CPT | Performed by: RADIOLOGY

## 2021-07-30 PROCEDURE — 77336 RADIATION PHYSICS CONSULT: CPT | Performed by: RADIOLOGY

## 2021-07-30 PROCEDURE — 77014 HC CT GUIDANCE RADIATION THERAPY FLDS PLACEMENT: CPT | Mod: TC | Performed by: RADIOLOGY

## 2021-08-02 ENCOUNTER — HOSPITAL ENCOUNTER (OUTPATIENT)
Dept: RADIATION THERAPY | Facility: HOSPITAL | Age: 66
Discharge: HOME | End: 2021-08-02
Attending: RADIOLOGY
Payer: MEDICARE

## 2021-08-02 PROCEDURE — 77014 PR  CT GUIDANCE PLACEMENT RAD THERAPY FIELDS: CPT | Mod: 26,,, | Performed by: RADIOLOGY

## 2021-08-02 PROCEDURE — 77014 HC CT GUIDANCE RADIATION THERAPY FLDS PLACEMENT: CPT | Mod: TC | Performed by: RADIOLOGY

## 2021-08-02 PROCEDURE — 77385 HC IMRT, SIMPLE: CPT | Performed by: RADIOLOGY

## 2021-08-02 PROCEDURE — 77014 PR  CT GUIDANCE PLACEMENT RAD THERAPY FIELDS: ICD-10-PCS | Mod: 26,,, | Performed by: RADIOLOGY

## 2021-08-03 PROCEDURE — 77014 PR  CT GUIDANCE PLACEMENT RAD THERAPY FIELDS: ICD-10-PCS | Mod: 26,,, | Performed by: RADIOLOGY

## 2021-08-03 PROCEDURE — 77014 HC CT GUIDANCE RADIATION THERAPY FLDS PLACEMENT: CPT | Mod: TC | Performed by: RADIOLOGY

## 2021-08-03 PROCEDURE — 77014 PR  CT GUIDANCE PLACEMENT RAD THERAPY FIELDS: CPT | Mod: 26,,, | Performed by: RADIOLOGY

## 2021-08-03 PROCEDURE — 77385 HC IMRT, SIMPLE: CPT | Performed by: RADIOLOGY

## 2021-08-04 PROCEDURE — 77014 HC CT GUIDANCE RADIATION THERAPY FLDS PLACEMENT: CPT | Mod: TC | Performed by: RADIOLOGY

## 2021-08-04 PROCEDURE — 77014 PR  CT GUIDANCE PLACEMENT RAD THERAPY FIELDS: ICD-10-PCS | Mod: 26,,, | Performed by: RADIOLOGY

## 2021-08-04 PROCEDURE — 77014 PR  CT GUIDANCE PLACEMENT RAD THERAPY FIELDS: CPT | Mod: 26,,, | Performed by: RADIOLOGY

## 2021-08-04 PROCEDURE — 77385 HC IMRT, SIMPLE: CPT | Performed by: RADIOLOGY

## 2021-08-05 ENCOUNTER — DOCUMENTATION ONLY (OUTPATIENT)
Dept: RADIATION ONCOLOGY | Facility: CLINIC | Age: 66
End: 2021-08-05

## 2021-08-05 PROCEDURE — 77014 HC CT GUIDANCE RADIATION THERAPY FLDS PLACEMENT: CPT | Mod: TC | Performed by: RADIOLOGY

## 2021-08-05 PROCEDURE — 77014 PR  CT GUIDANCE PLACEMENT RAD THERAPY FIELDS: CPT | Mod: 26,,, | Performed by: RADIOLOGY

## 2021-08-05 PROCEDURE — 77385 HC IMRT, SIMPLE: CPT | Performed by: RADIOLOGY

## 2021-08-05 PROCEDURE — 77014 PR  CT GUIDANCE PLACEMENT RAD THERAPY FIELDS: ICD-10-PCS | Mod: 26,,, | Performed by: RADIOLOGY

## 2021-08-06 PROCEDURE — 77014 HC CT GUIDANCE RADIATION THERAPY FLDS PLACEMENT: CPT | Mod: TC | Performed by: RADIOLOGY

## 2021-08-06 PROCEDURE — 77014 PR  CT GUIDANCE PLACEMENT RAD THERAPY FIELDS: ICD-10-PCS | Mod: 26,,, | Performed by: RADIOLOGY

## 2021-08-06 PROCEDURE — 77014 PR  CT GUIDANCE PLACEMENT RAD THERAPY FIELDS: CPT | Mod: 26,,, | Performed by: RADIOLOGY

## 2021-08-06 PROCEDURE — 77385 HC IMRT, SIMPLE: CPT | Performed by: RADIOLOGY

## 2021-08-06 PROCEDURE — 77336 RADIATION PHYSICS CONSULT: CPT | Performed by: RADIOLOGY

## 2021-08-09 PROCEDURE — 77014 HC CT GUIDANCE RADIATION THERAPY FLDS PLACEMENT: CPT | Mod: TC | Performed by: RADIOLOGY

## 2021-08-09 PROCEDURE — 77014 PR  CT GUIDANCE PLACEMENT RAD THERAPY FIELDS: ICD-10-PCS | Mod: 26,,, | Performed by: RADIOLOGY

## 2021-08-09 PROCEDURE — 77385 HC IMRT, SIMPLE: CPT | Performed by: RADIOLOGY

## 2021-08-09 PROCEDURE — 77014 PR  CT GUIDANCE PLACEMENT RAD THERAPY FIELDS: CPT | Mod: 26,,, | Performed by: RADIOLOGY

## 2021-08-10 PROCEDURE — 77014 PR  CT GUIDANCE PLACEMENT RAD THERAPY FIELDS: ICD-10-PCS | Mod: 26,,, | Performed by: RADIOLOGY

## 2021-08-10 PROCEDURE — 77014 HC CT GUIDANCE RADIATION THERAPY FLDS PLACEMENT: CPT | Mod: TC | Performed by: RADIOLOGY

## 2021-08-10 PROCEDURE — 77385 HC IMRT, SIMPLE: CPT | Performed by: RADIOLOGY

## 2021-08-10 PROCEDURE — 77014 PR  CT GUIDANCE PLACEMENT RAD THERAPY FIELDS: CPT | Mod: 26,,, | Performed by: RADIOLOGY

## 2021-08-11 PROCEDURE — 77014 PR  CT GUIDANCE PLACEMENT RAD THERAPY FIELDS: ICD-10-PCS | Mod: 26,,, | Performed by: RADIOLOGY

## 2021-08-11 PROCEDURE — 77385 HC IMRT, SIMPLE: CPT | Performed by: RADIOLOGY

## 2021-08-11 PROCEDURE — 77014 PR  CT GUIDANCE PLACEMENT RAD THERAPY FIELDS: CPT | Mod: 26,,, | Performed by: RADIOLOGY

## 2021-08-11 PROCEDURE — 77014 HC CT GUIDANCE RADIATION THERAPY FLDS PLACEMENT: CPT | Mod: TC | Performed by: RADIOLOGY

## 2021-08-12 ENCOUNTER — DOCUMENTATION ONLY (OUTPATIENT)
Dept: RADIATION ONCOLOGY | Facility: CLINIC | Age: 66
End: 2021-08-12

## 2021-08-12 ENCOUNTER — PATIENT MESSAGE (OUTPATIENT)
Dept: TRANSPLANT | Facility: CLINIC | Age: 66
End: 2021-08-12

## 2021-08-12 PROCEDURE — 77014 PR  CT GUIDANCE PLACEMENT RAD THERAPY FIELDS: ICD-10-PCS | Mod: 26,,, | Performed by: RADIOLOGY

## 2021-08-12 PROCEDURE — 77385 HC IMRT, SIMPLE: CPT | Performed by: RADIOLOGY

## 2021-08-12 PROCEDURE — 77014 HC CT GUIDANCE RADIATION THERAPY FLDS PLACEMENT: CPT | Mod: TC | Performed by: RADIOLOGY

## 2021-08-12 PROCEDURE — 77014 PR  CT GUIDANCE PLACEMENT RAD THERAPY FIELDS: CPT | Mod: 26,,, | Performed by: RADIOLOGY

## 2021-08-13 PROCEDURE — 77014 HC CT GUIDANCE RADIATION THERAPY FLDS PLACEMENT: CPT | Mod: TC | Performed by: RADIOLOGY

## 2021-08-13 PROCEDURE — 77385 HC IMRT, SIMPLE: CPT | Performed by: RADIOLOGY

## 2021-08-13 PROCEDURE — 77014 PR  CT GUIDANCE PLACEMENT RAD THERAPY FIELDS: ICD-10-PCS | Mod: 26,,, | Performed by: RADIOLOGY

## 2021-08-13 PROCEDURE — 77336 RADIATION PHYSICS CONSULT: CPT | Performed by: RADIOLOGY

## 2021-08-13 PROCEDURE — 77014 PR  CT GUIDANCE PLACEMENT RAD THERAPY FIELDS: CPT | Mod: 26,,, | Performed by: RADIOLOGY

## 2021-08-16 PROCEDURE — 77014 PR  CT GUIDANCE PLACEMENT RAD THERAPY FIELDS: CPT | Mod: 26,,, | Performed by: RADIOLOGY

## 2021-08-16 PROCEDURE — 77014 PR  CT GUIDANCE PLACEMENT RAD THERAPY FIELDS: ICD-10-PCS | Mod: 26,,, | Performed by: RADIOLOGY

## 2021-08-16 PROCEDURE — 77385 HC IMRT, SIMPLE: CPT | Performed by: RADIOLOGY

## 2021-08-16 PROCEDURE — 77014 HC CT GUIDANCE RADIATION THERAPY FLDS PLACEMENT: CPT | Mod: TC | Performed by: RADIOLOGY

## 2021-08-17 PROCEDURE — 77014 PR  CT GUIDANCE PLACEMENT RAD THERAPY FIELDS: ICD-10-PCS | Mod: 26,,, | Performed by: RADIOLOGY

## 2021-08-17 PROCEDURE — 77014 PR  CT GUIDANCE PLACEMENT RAD THERAPY FIELDS: CPT | Mod: 26,,, | Performed by: RADIOLOGY

## 2021-08-17 PROCEDURE — 77385 HC IMRT, SIMPLE: CPT | Performed by: RADIOLOGY

## 2021-08-17 PROCEDURE — 77014 HC CT GUIDANCE RADIATION THERAPY FLDS PLACEMENT: CPT | Mod: TC | Performed by: RADIOLOGY

## 2021-08-18 PROCEDURE — 77014 PR  CT GUIDANCE PLACEMENT RAD THERAPY FIELDS: ICD-10-PCS | Mod: 26,,, | Performed by: RADIOLOGY

## 2021-08-18 PROCEDURE — 77014 HC CT GUIDANCE RADIATION THERAPY FLDS PLACEMENT: CPT | Mod: TC | Performed by: RADIOLOGY

## 2021-08-18 PROCEDURE — 77385 HC IMRT, SIMPLE: CPT | Performed by: RADIOLOGY

## 2021-08-18 PROCEDURE — 77014 PR  CT GUIDANCE PLACEMENT RAD THERAPY FIELDS: CPT | Mod: 26,,, | Performed by: RADIOLOGY

## 2021-08-19 ENCOUNTER — DOCUMENTATION ONLY (OUTPATIENT)
Dept: RADIATION ONCOLOGY | Facility: CLINIC | Age: 66
End: 2021-08-19

## 2021-08-19 PROCEDURE — 77014 PR  CT GUIDANCE PLACEMENT RAD THERAPY FIELDS: CPT | Mod: 26,,, | Performed by: RADIOLOGY

## 2021-08-19 PROCEDURE — 77385 HC IMRT, SIMPLE: CPT | Performed by: RADIOLOGY

## 2021-08-19 PROCEDURE — 77014 HC CT GUIDANCE RADIATION THERAPY FLDS PLACEMENT: CPT | Mod: TC | Performed by: RADIOLOGY

## 2021-08-19 PROCEDURE — 77014 PR  CT GUIDANCE PLACEMENT RAD THERAPY FIELDS: ICD-10-PCS | Mod: 26,,, | Performed by: RADIOLOGY

## 2021-08-20 PROCEDURE — 77014 PR  CT GUIDANCE PLACEMENT RAD THERAPY FIELDS: CPT | Mod: 26,,, | Performed by: RADIOLOGY

## 2021-08-20 PROCEDURE — 77014 HC CT GUIDANCE RADIATION THERAPY FLDS PLACEMENT: CPT | Mod: TC | Performed by: RADIOLOGY

## 2021-08-20 PROCEDURE — 77385 HC IMRT, SIMPLE: CPT | Performed by: RADIOLOGY

## 2021-08-20 PROCEDURE — 77014 PR  CT GUIDANCE PLACEMENT RAD THERAPY FIELDS: ICD-10-PCS | Mod: 26,,, | Performed by: RADIOLOGY

## 2021-08-20 PROCEDURE — 77336 RADIATION PHYSICS CONSULT: CPT | Performed by: RADIOLOGY

## 2021-08-23 ENCOUNTER — DOCUMENTATION ONLY (OUTPATIENT)
Dept: RADIATION ONCOLOGY | Facility: CLINIC | Age: 66
End: 2021-08-23

## 2021-08-23 PROCEDURE — 77014 PR  CT GUIDANCE PLACEMENT RAD THERAPY FIELDS: ICD-10-PCS | Mod: 26,,, | Performed by: RADIOLOGY

## 2021-08-23 PROCEDURE — 77014 PR  CT GUIDANCE PLACEMENT RAD THERAPY FIELDS: CPT | Mod: 26,,, | Performed by: RADIOLOGY

## 2021-08-23 PROCEDURE — 77385 HC IMRT, SIMPLE: CPT | Performed by: RADIOLOGY

## 2021-08-23 PROCEDURE — 77014 HC CT GUIDANCE RADIATION THERAPY FLDS PLACEMENT: CPT | Mod: TC | Performed by: RADIOLOGY

## 2021-08-24 ENCOUNTER — PATIENT MESSAGE (OUTPATIENT)
Dept: RADIATION ONCOLOGY | Facility: CLINIC | Age: 66
End: 2021-08-24

## 2021-08-24 ENCOUNTER — PATIENT MESSAGE (OUTPATIENT)
Dept: TRANSPLANT | Facility: CLINIC | Age: 66
End: 2021-08-24

## 2021-08-26 ENCOUNTER — PATIENT MESSAGE (OUTPATIENT)
Dept: RADIATION THERAPY | Facility: HOSPITAL | Age: 66
End: 2021-08-26

## 2021-08-27 ENCOUNTER — IMMUNIZATION (OUTPATIENT)
Dept: OBSTETRICS AND GYNECOLOGY | Facility: CLINIC | Age: 66
End: 2021-08-27
Payer: MEDICARE

## 2021-08-27 DIAGNOSIS — Z23 NEED FOR VACCINATION: Primary | ICD-10-CM

## 2021-08-27 PROCEDURE — 0001A COVID-19, MRNA, LNP-S, PF, 30 MCG/0.3 ML DOSE VACCINE: CPT | Mod: CV19,,, | Performed by: ANESTHESIOLOGY

## 2021-08-27 PROCEDURE — 91300 COVID-19, MRNA, LNP-S, PF, 30 MCG/0.3 ML DOSE VACCINE: ICD-10-PCS | Mod: ,,, | Performed by: ANESTHESIOLOGY

## 2021-08-27 PROCEDURE — 91300 COVID-19, MRNA, LNP-S, PF, 30 MCG/0.3 ML DOSE VACCINE: CPT | Mod: ,,, | Performed by: ANESTHESIOLOGY

## 2021-08-27 PROCEDURE — 0001A COVID-19, MRNA, LNP-S, PF, 30 MCG/0.3 ML DOSE VACCINE: ICD-10-PCS | Mod: CV19,,, | Performed by: ANESTHESIOLOGY

## 2021-09-07 ENCOUNTER — PATIENT MESSAGE (OUTPATIENT)
Dept: TRANSPLANT | Facility: CLINIC | Age: 66
End: 2021-09-07

## 2021-09-07 ENCOUNTER — PATIENT MESSAGE (OUTPATIENT)
Dept: RADIATION ONCOLOGY | Facility: CLINIC | Age: 66
End: 2021-09-07

## 2021-09-08 DIAGNOSIS — C61 PROSTATE CANCER: Primary | ICD-10-CM

## 2021-09-15 ENCOUNTER — TELEPHONE (OUTPATIENT)
Dept: RADIATION ONCOLOGY | Facility: CLINIC | Age: 66
End: 2021-09-15

## 2021-09-23 ENCOUNTER — PATIENT MESSAGE (OUTPATIENT)
Dept: TRANSPLANT | Facility: CLINIC | Age: 66
End: 2021-09-23

## 2021-09-27 ENCOUNTER — LAB VISIT (OUTPATIENT)
Dept: LAB | Facility: HOSPITAL | Age: 66
End: 2021-09-27
Attending: INTERNAL MEDICINE
Payer: MEDICARE

## 2021-09-27 DIAGNOSIS — Z94.4 LIVER REPLACED BY TRANSPLANT: ICD-10-CM

## 2021-09-27 LAB
ALBUMIN SERPL BCP-MCNC: 3.5 G/DL (ref 3.5–5.2)
ALP SERPL-CCNC: 65 U/L (ref 55–135)
ALT SERPL W/O P-5'-P-CCNC: 32 U/L (ref 10–44)
ANION GAP SERPL CALC-SCNC: 7 MMOL/L (ref 8–16)
AST SERPL-CCNC: 16 U/L (ref 10–40)
BASOPHILS # BLD AUTO: 0.03 K/UL (ref 0–0.2)
BASOPHILS NFR BLD: 0.9 % (ref 0–1.9)
BILIRUB SERPL-MCNC: 0.6 MG/DL (ref 0.1–1)
BUN SERPL-MCNC: 15 MG/DL (ref 8–23)
CALCIUM SERPL-MCNC: 8.7 MG/DL (ref 8.7–10.5)
CHLORIDE SERPL-SCNC: 107 MMOL/L (ref 95–110)
CO2 SERPL-SCNC: 30 MMOL/L (ref 23–29)
CREAT SERPL-MCNC: 1.4 MG/DL (ref 0.5–1.4)
DIFFERENTIAL METHOD: ABNORMAL
EOSINOPHIL # BLD AUTO: 0.2 K/UL (ref 0–0.5)
EOSINOPHIL NFR BLD: 5.7 % (ref 0–8)
ERYTHROCYTE [DISTWIDTH] IN BLOOD BY AUTOMATED COUNT: 13.8 % (ref 11.5–14.5)
EST. GFR  (AFRICAN AMERICAN): 59.7 ML/MIN/1.73 M^2
EST. GFR  (NON AFRICAN AMERICAN): 51.6 ML/MIN/1.73 M^2
GLUCOSE SERPL-MCNC: 163 MG/DL (ref 70–110)
HCT VFR BLD AUTO: 37.4 % (ref 40–54)
HGB BLD-MCNC: 12.6 G/DL (ref 14–18)
IMM GRANULOCYTES # BLD AUTO: 0.01 K/UL (ref 0–0.04)
IMM GRANULOCYTES NFR BLD AUTO: 0.3 % (ref 0–0.5)
LYMPHOCYTES # BLD AUTO: 0.8 K/UL (ref 1–4.8)
LYMPHOCYTES NFR BLD: 24.8 % (ref 18–48)
MCH RBC QN AUTO: 28.8 PG (ref 27–31)
MCHC RBC AUTO-ENTMCNC: 33.7 G/DL (ref 32–36)
MCV RBC AUTO: 86 FL (ref 82–98)
MONOCYTES # BLD AUTO: 0.3 K/UL (ref 0.3–1)
MONOCYTES NFR BLD: 9.3 % (ref 4–15)
NEUTROPHILS # BLD AUTO: 2 K/UL (ref 1.8–7.7)
NEUTROPHILS NFR BLD: 59 % (ref 38–73)
NRBC BLD-RTO: 0 /100 WBC
PLATELET # BLD AUTO: 137 K/UL (ref 150–450)
PMV BLD AUTO: 9.6 FL (ref 9.2–12.9)
POTASSIUM SERPL-SCNC: 4.7 MMOL/L (ref 3.5–5.1)
PROT SERPL-MCNC: 6.6 G/DL (ref 6–8.4)
RBC # BLD AUTO: 4.37 M/UL (ref 4.6–6.2)
SODIUM SERPL-SCNC: 144 MMOL/L (ref 136–145)
WBC # BLD AUTO: 3.35 K/UL (ref 3.9–12.7)

## 2021-09-27 PROCEDURE — 80053 COMPREHEN METABOLIC PANEL: CPT | Performed by: INTERNAL MEDICINE

## 2021-09-27 PROCEDURE — 85025 COMPLETE CBC W/AUTO DIFF WBC: CPT | Performed by: INTERNAL MEDICINE

## 2021-09-27 PROCEDURE — 80197 ASSAY OF TACROLIMUS: CPT | Performed by: INTERNAL MEDICINE

## 2021-09-28 ENCOUNTER — TELEPHONE (OUTPATIENT)
Dept: TRANSPLANT | Facility: CLINIC | Age: 66
End: 2021-09-28

## 2021-09-28 ENCOUNTER — PATIENT MESSAGE (OUTPATIENT)
Dept: TRANSPLANT | Facility: CLINIC | Age: 66
End: 2021-09-28

## 2021-09-28 LAB
TACROLIMUS BLD-MCNC: 4.4 NG/ML (ref 5–15)
TACROLIMUS, NORMALIZED: 4 NG/ML (ref 5–15)

## 2021-11-22 ENCOUNTER — LAB VISIT (OUTPATIENT)
Dept: LAB | Facility: HOSPITAL | Age: 66
End: 2021-11-22
Attending: INTERNAL MEDICINE
Payer: MEDICARE

## 2021-11-22 DIAGNOSIS — I10 ESSENTIAL HYPERTENSION, BENIGN: Primary | ICD-10-CM

## 2021-11-22 DIAGNOSIS — E11.649 UNCONTROLLED TYPE 2 DIABETES MELLITUS WITH HYPOGLYCEMIA, UNSPECIFIED HYPOGLYCEMIA COMA STATUS: ICD-10-CM

## 2021-11-22 LAB
ANION GAP SERPL CALC-SCNC: 5 MMOL/L (ref 8–16)
BUN SERPL-MCNC: 16 MG/DL (ref 8–23)
CALCIUM SERPL-MCNC: 9.8 MG/DL (ref 8.7–10.5)
CHLORIDE SERPL-SCNC: 106 MMOL/L (ref 95–110)
CO2 SERPL-SCNC: 30 MMOL/L (ref 23–29)
CREAT SERPL-MCNC: 1.1 MG/DL (ref 0.5–1.4)
EST. GFR  (AFRICAN AMERICAN): >60 ML/MIN/1.73 M^2
EST. GFR  (NON AFRICAN AMERICAN): >60 ML/MIN/1.73 M^2
GLUCOSE SERPL-MCNC: 133 MG/DL (ref 70–110)
POTASSIUM SERPL-SCNC: 4.4 MMOL/L (ref 3.5–5.1)
SODIUM SERPL-SCNC: 141 MMOL/L (ref 136–145)

## 2021-11-22 PROCEDURE — 80048 BASIC METABOLIC PNL TOTAL CA: CPT | Performed by: INTERNAL MEDICINE

## 2021-11-22 PROCEDURE — 36415 COLL VENOUS BLD VENIPUNCTURE: CPT | Performed by: INTERNAL MEDICINE

## 2021-11-23 ENCOUNTER — LAB VISIT (OUTPATIENT)
Dept: LAB | Facility: HOSPITAL | Age: 66
End: 2021-11-23
Attending: INTERNAL MEDICINE
Payer: MEDICARE

## 2021-11-23 LAB
ESTIMATED AVG GLUCOSE: 140 MG/DL (ref 68–131)
HBA1C MFR BLD: 6.5 % (ref 4–5.6)

## 2021-11-23 PROCEDURE — 83036 HEMOGLOBIN GLYCOSYLATED A1C: CPT | Performed by: INTERNAL MEDICINE

## 2021-11-23 PROCEDURE — 36415 COLL VENOUS BLD VENIPUNCTURE: CPT | Performed by: INTERNAL MEDICINE

## 2021-11-26 ENCOUNTER — PATIENT MESSAGE (OUTPATIENT)
Dept: RADIATION ONCOLOGY | Facility: CLINIC | Age: 66
End: 2021-11-26
Payer: MEDICARE

## 2021-11-29 ENCOUNTER — TELEPHONE (OUTPATIENT)
Dept: RADIATION ONCOLOGY | Facility: CLINIC | Age: 66
End: 2021-11-29
Payer: MEDICARE

## 2021-12-03 ENCOUNTER — LAB VISIT (OUTPATIENT)
Dept: LAB | Facility: HOSPITAL | Age: 66
End: 2021-12-03
Attending: INTERNAL MEDICINE
Payer: MEDICARE

## 2021-12-03 DIAGNOSIS — C61 PROSTATE CANCER: ICD-10-CM

## 2021-12-03 LAB — COMPLEXED PSA SERPL-MCNC: 0.02 NG/ML (ref 0–4)

## 2021-12-03 PROCEDURE — 84153 ASSAY OF PSA TOTAL: CPT | Performed by: RADIOLOGY

## 2021-12-03 PROCEDURE — 36415 COLL VENOUS BLD VENIPUNCTURE: CPT | Performed by: RADIOLOGY

## 2021-12-08 ENCOUNTER — PATIENT MESSAGE (OUTPATIENT)
Dept: TRANSPLANT | Facility: CLINIC | Age: 66
End: 2021-12-08
Payer: MEDICARE

## 2021-12-08 ENCOUNTER — OFFICE VISIT (OUTPATIENT)
Dept: RADIATION ONCOLOGY | Facility: CLINIC | Age: 66
End: 2021-12-08
Payer: MEDICARE

## 2021-12-08 VITALS
RESPIRATION RATE: 16 BRPM | DIASTOLIC BLOOD PRESSURE: 79 MMHG | HEART RATE: 88 BPM | OXYGEN SATURATION: 97 % | SYSTOLIC BLOOD PRESSURE: 184 MMHG | HEIGHT: 72 IN | BODY MASS INDEX: 42.66 KG/M2 | WEIGHT: 315 LBS

## 2021-12-08 DIAGNOSIS — C61 PROSTATE CANCER: Primary | ICD-10-CM

## 2021-12-08 PROCEDURE — 3066F NEPHROPATHY DOC TX: CPT | Mod: CPTII,S$GLB,, | Performed by: RADIOLOGY

## 2021-12-08 PROCEDURE — 4010F ACE/ARB THERAPY RXD/TAKEN: CPT | Mod: CPTII,S$GLB,, | Performed by: RADIOLOGY

## 2021-12-08 PROCEDURE — 4010F PR ACE/ARB THEARPY RXD/TAKEN: ICD-10-PCS | Mod: CPTII,S$GLB,, | Performed by: RADIOLOGY

## 2021-12-08 PROCEDURE — 99499 NO LOS: ICD-10-PCS | Mod: S$GLB,,, | Performed by: RADIOLOGY

## 2021-12-08 PROCEDURE — 99999 PR PBB SHADOW E&M-EST. PATIENT-LVL IV: CPT | Mod: PBBFAC,,, | Performed by: RADIOLOGY

## 2021-12-08 PROCEDURE — 99499 UNLISTED E&M SERVICE: CPT | Mod: S$GLB,,, | Performed by: RADIOLOGY

## 2021-12-08 PROCEDURE — 99999 PR PBB SHADOW E&M-EST. PATIENT-LVL IV: ICD-10-PCS | Mod: PBBFAC,,, | Performed by: RADIOLOGY

## 2021-12-08 PROCEDURE — 3060F PR POS MICROALBUMINURIA RESULT DOCUMENTED/REVIEW: ICD-10-PCS | Mod: CPTII,S$GLB,, | Performed by: RADIOLOGY

## 2021-12-08 PROCEDURE — 3060F POS MICROALBUMINURIA REV: CPT | Mod: CPTII,S$GLB,, | Performed by: RADIOLOGY

## 2021-12-08 PROCEDURE — 96402 CHEMO HORMON ANTINEOPL SQ/IM: CPT | Mod: S$GLB,,, | Performed by: RADIOLOGY

## 2021-12-08 PROCEDURE — 3066F PR DOCUMENTATION OF TREATMENT FOR NEPHROPATHY: ICD-10-PCS | Mod: CPTII,S$GLB,, | Performed by: RADIOLOGY

## 2021-12-08 PROCEDURE — 96402 PR CHEMOTHER HORMON ANTINEOPL SUB-Q/IM: ICD-10-PCS | Mod: S$GLB,,, | Performed by: RADIOLOGY

## 2021-12-08 RX ORDER — LOSARTAN POTASSIUM 100 MG/1
100 TABLET ORAL DAILY
COMMUNITY
Start: 2021-12-03

## 2021-12-10 DIAGNOSIS — Z94.4 S/P LIVER TRANSPLANT: Primary | ICD-10-CM

## 2021-12-13 ENCOUNTER — LAB VISIT (OUTPATIENT)
Dept: LAB | Facility: HOSPITAL | Age: 66
End: 2021-12-13
Attending: INTERNAL MEDICINE
Payer: MEDICARE

## 2021-12-13 DIAGNOSIS — Z94.4 S/P LIVER TRANSPLANT: ICD-10-CM

## 2021-12-13 DIAGNOSIS — Z94.4 LIVER REPLACED BY TRANSPLANT: ICD-10-CM

## 2021-12-13 DIAGNOSIS — C22.0 HCC (HEPATOCELLULAR CARCINOMA): ICD-10-CM

## 2021-12-13 LAB
AFP SERPL-MCNC: 2.3 NG/ML (ref 0–8.4)
ALBUMIN SERPL BCP-MCNC: 3.6 G/DL (ref 3.5–5.2)
ALP SERPL-CCNC: 70 U/L (ref 55–135)
ALT SERPL W/O P-5'-P-CCNC: 33 U/L (ref 10–44)
ANION GAP SERPL CALC-SCNC: 4 MMOL/L (ref 8–16)
AST SERPL-CCNC: 14 U/L (ref 10–40)
BASOPHILS # BLD AUTO: 0.04 K/UL (ref 0–0.2)
BASOPHILS NFR BLD: 0.9 % (ref 0–1.9)
BILIRUB SERPL-MCNC: 0.5 MG/DL (ref 0.1–1)
BUN SERPL-MCNC: 17 MG/DL (ref 8–23)
CALCIUM SERPL-MCNC: 9.4 MG/DL (ref 8.7–10.5)
CHLORIDE SERPL-SCNC: 106 MMOL/L (ref 95–110)
CHOLEST SERPL-MCNC: 143 MG/DL (ref 120–199)
CHOLEST/HDLC SERPL: 3 {RATIO} (ref 2–5)
CO2 SERPL-SCNC: 30 MMOL/L (ref 23–29)
CREAT SERPL-MCNC: 1.2 MG/DL (ref 0.5–1.4)
DIFFERENTIAL METHOD: ABNORMAL
EOSINOPHIL # BLD AUTO: 0.2 K/UL (ref 0–0.5)
EOSINOPHIL NFR BLD: 4.3 % (ref 0–8)
ERYTHROCYTE [DISTWIDTH] IN BLOOD BY AUTOMATED COUNT: 12.5 % (ref 11.5–14.5)
EST. GFR  (AFRICAN AMERICAN): >60 ML/MIN/1.73 M^2
EST. GFR  (NON AFRICAN AMERICAN): >60 ML/MIN/1.73 M^2
GLUCOSE SERPL-MCNC: 175 MG/DL (ref 70–110)
HCT VFR BLD AUTO: 41.2 % (ref 40–54)
HDLC SERPL-MCNC: 47 MG/DL (ref 40–75)
HDLC SERPL: 32.9 % (ref 20–50)
HGB BLD-MCNC: 13.7 G/DL (ref 14–18)
IMM GRANULOCYTES # BLD AUTO: 0.01 K/UL (ref 0–0.04)
IMM GRANULOCYTES NFR BLD AUTO: 0.2 % (ref 0–0.5)
LDLC SERPL CALC-MCNC: 68.4 MG/DL (ref 63–159)
LYMPHOCYTES # BLD AUTO: 1.1 K/UL (ref 1–4.8)
LYMPHOCYTES NFR BLD: 24.3 % (ref 18–48)
MCH RBC QN AUTO: 28.2 PG (ref 27–31)
MCHC RBC AUTO-ENTMCNC: 33.3 G/DL (ref 32–36)
MCV RBC AUTO: 85 FL (ref 82–98)
MONOCYTES # BLD AUTO: 0.3 K/UL (ref 0.3–1)
MONOCYTES NFR BLD: 7.7 % (ref 4–15)
NEUTROPHILS # BLD AUTO: 2.8 K/UL (ref 1.8–7.7)
NEUTROPHILS NFR BLD: 62.6 % (ref 38–73)
NONHDLC SERPL-MCNC: 96 MG/DL
NRBC BLD-RTO: 0 /100 WBC
PLATELET # BLD AUTO: 161 K/UL (ref 150–450)
PMV BLD AUTO: 10.2 FL (ref 9.2–12.9)
POTASSIUM SERPL-SCNC: 4.7 MMOL/L (ref 3.5–5.1)
PROT SERPL-MCNC: 7 G/DL (ref 6–8.4)
RBC # BLD AUTO: 4.85 M/UL (ref 4.6–6.2)
SODIUM SERPL-SCNC: 140 MMOL/L (ref 136–145)
TRIGL SERPL-MCNC: 138 MG/DL (ref 30–150)
WBC # BLD AUTO: 4.4 K/UL (ref 3.9–12.7)

## 2021-12-13 PROCEDURE — 82105 ALPHA-FETOPROTEIN SERUM: CPT | Performed by: INTERNAL MEDICINE

## 2021-12-13 PROCEDURE — 85025 COMPLETE CBC W/AUTO DIFF WBC: CPT | Performed by: INTERNAL MEDICINE

## 2021-12-13 PROCEDURE — 80053 COMPREHEN METABOLIC PANEL: CPT | Performed by: INTERNAL MEDICINE

## 2021-12-13 PROCEDURE — 80197 ASSAY OF TACROLIMUS: CPT | Performed by: INTERNAL MEDICINE

## 2021-12-13 PROCEDURE — 36415 COLL VENOUS BLD VENIPUNCTURE: CPT | Performed by: INTERNAL MEDICINE

## 2021-12-13 PROCEDURE — 80061 LIPID PANEL: CPT | Performed by: INTERNAL MEDICINE

## 2021-12-14 LAB — TACROLIMUS BLD-MCNC: 4.3 NG/ML (ref 5–15)

## 2021-12-15 ENCOUNTER — PATIENT MESSAGE (OUTPATIENT)
Dept: TRANSPLANT | Facility: CLINIC | Age: 66
End: 2021-12-15
Payer: MEDICARE

## 2021-12-15 ENCOUNTER — TELEPHONE (OUTPATIENT)
Dept: TRANSPLANT | Facility: CLINIC | Age: 66
End: 2021-12-15
Payer: MEDICARE

## 2021-12-15 DIAGNOSIS — Z94.4 LIVER REPLACED BY TRANSPLANT: Primary | ICD-10-CM

## 2022-01-05 ENCOUNTER — PATIENT MESSAGE (OUTPATIENT)
Dept: TRANSPLANT | Facility: CLINIC | Age: 67
End: 2022-01-05
Payer: MEDICARE

## 2022-01-06 ENCOUNTER — PATIENT MESSAGE (OUTPATIENT)
Dept: ADMINISTRATIVE | Facility: OTHER | Age: 67
End: 2022-01-06
Payer: MEDICARE

## 2022-02-14 ENCOUNTER — PATIENT MESSAGE (OUTPATIENT)
Dept: TRANSPLANT | Facility: CLINIC | Age: 67
End: 2022-02-14
Payer: MEDICARE

## 2022-03-03 ENCOUNTER — PATIENT MESSAGE (OUTPATIENT)
Dept: TRANSPLANT | Facility: CLINIC | Age: 67
End: 2022-03-03
Payer: MEDICARE

## 2022-03-08 ENCOUNTER — LAB VISIT (OUTPATIENT)
Dept: LAB | Facility: HOSPITAL | Age: 67
End: 2022-03-08
Attending: INTERNAL MEDICINE
Payer: MEDICARE

## 2022-03-08 DIAGNOSIS — Z13.9 SCREENING FOR UNSPECIFIED CONDITION: Primary | ICD-10-CM

## 2022-03-08 PROCEDURE — 86769 SARS-COV-2 COVID-19 ANTIBODY: CPT | Performed by: INTERNAL MEDICINE

## 2022-03-09 LAB
SARS-COV-2 IGG SERPL IA-ACNC: 3895.3 AU/ML
SARS-COV-2 IGG SERPL QL IA: POSITIVE

## 2022-03-11 ENCOUNTER — IMMUNIZATION (OUTPATIENT)
Dept: PRIMARY CARE CLINIC | Facility: CLINIC | Age: 67
End: 2022-03-11
Payer: MEDICARE

## 2022-03-11 DIAGNOSIS — Z23 NEED FOR VACCINATION: Primary | ICD-10-CM

## 2022-03-16 ENCOUNTER — TELEPHONE (OUTPATIENT)
Dept: TRANSPLANT | Facility: CLINIC | Age: 67
End: 2022-03-16
Payer: MEDICARE

## 2022-03-16 ENCOUNTER — PATIENT MESSAGE (OUTPATIENT)
Dept: TRANSPLANT | Facility: CLINIC | Age: 67
End: 2022-03-16
Payer: MEDICARE

## 2022-03-16 DIAGNOSIS — C22.0 HCC (HEPATOCELLULAR CARCINOMA): Primary | ICD-10-CM

## 2022-03-16 NOTE — TELEPHONE ENCOUNTER
Labs stable. No changes. Message sent ----- Message from Shaniqua Matias MD sent at 3/9/2022  1:54 PM CST -----  The Labs are stable - please let patient know.

## 2022-04-18 ENCOUNTER — HOSPITAL ENCOUNTER (OUTPATIENT)
Dept: RADIOLOGY | Facility: HOSPITAL | Age: 67
Discharge: HOME | End: 2022-04-18
Attending: INTERNAL MEDICINE
Payer: MEDICARE

## 2022-04-18 ENCOUNTER — TELEPHONE (OUTPATIENT)
Dept: TRANSPLANT | Facility: CLINIC | Age: 67
End: 2022-04-18
Payer: MEDICARE

## 2022-04-18 ENCOUNTER — OFFICE VISIT (OUTPATIENT)
Dept: TRANSPLANT | Facility: CLINIC | Age: 67
End: 2022-04-18
Payer: MEDICARE

## 2022-04-18 ENCOUNTER — TELEPHONE (OUTPATIENT)
Dept: TRANSPLANT | Facility: CLINIC | Age: 67
End: 2022-04-18

## 2022-04-18 VITALS
HEIGHT: 60 IN | BODY MASS INDEX: 61.84 KG/M2 | WEIGHT: 315 LBS | HEART RATE: 80 BPM | OXYGEN SATURATION: 98 % | SYSTOLIC BLOOD PRESSURE: 163 MMHG | DIASTOLIC BLOOD PRESSURE: 70 MMHG | TEMPERATURE: 99 F | RESPIRATION RATE: 19 BRPM

## 2022-04-18 DIAGNOSIS — M85.80 OSTEOPENIA, UNSPECIFIED LOCATION: Primary | ICD-10-CM

## 2022-04-18 DIAGNOSIS — Z79.818 LONG TERM (CURRENT) USE OF OTHER AGENTS AFFECTING ESTROGEN RECEPTORS AND ESTROGEN LEVELS: ICD-10-CM

## 2022-04-18 DIAGNOSIS — C22.0 HCC (HEPATOCELLULAR CARCINOMA): ICD-10-CM

## 2022-04-18 PROCEDURE — 71260 CT THORAX DX C+: CPT | Mod: 26,,, | Performed by: INTERNAL MEDICINE

## 2022-04-18 PROCEDURE — 71260 CT THORAX DX C+: CPT | Mod: TC

## 2022-04-18 PROCEDURE — 3008F PR BODY MASS INDEX (BMI) DOCUMENTED: ICD-10-PCS | Mod: CPTII,S$GLB,, | Performed by: INTERNAL MEDICINE

## 2022-04-18 PROCEDURE — 3078F PR MOST RECENT DIASTOLIC BLOOD PRESSURE < 80 MM HG: ICD-10-PCS | Mod: CPTII,S$GLB,, | Performed by: INTERNAL MEDICINE

## 2022-04-18 PROCEDURE — 1159F MED LIST DOCD IN RCRD: CPT | Mod: CPTII,S$GLB,, | Performed by: INTERNAL MEDICINE

## 2022-04-18 PROCEDURE — 74177 CT ABD & PELVIS W/CONTRAST: CPT | Mod: 26,,, | Performed by: INTERNAL MEDICINE

## 2022-04-18 PROCEDURE — 25500020 PHARM REV CODE 255: Performed by: INTERNAL MEDICINE

## 2022-04-18 PROCEDURE — 1160F PR REVIEW ALL MEDS BY PRESCRIBER/CLIN PHARMACIST DOCUMENTED: ICD-10-PCS | Mod: CPTII,S$GLB,, | Performed by: INTERNAL MEDICINE

## 2022-04-18 PROCEDURE — 99214 OFFICE O/P EST MOD 30 MIN: CPT | Mod: S$GLB,,, | Performed by: INTERNAL MEDICINE

## 2022-04-18 PROCEDURE — 1159F PR MEDICATION LIST DOCUMENTED IN MEDICAL RECORD: ICD-10-PCS | Mod: CPTII,S$GLB,, | Performed by: INTERNAL MEDICINE

## 2022-04-18 PROCEDURE — 3077F PR MOST RECENT SYSTOLIC BLOOD PRESSURE >= 140 MM HG: ICD-10-PCS | Mod: CPTII,S$GLB,, | Performed by: INTERNAL MEDICINE

## 2022-04-18 PROCEDURE — 71260 CT CHEST ABDOMEN PELVIS WITH CONTRAST (XPD): ICD-10-PCS | Mod: 26,,, | Performed by: INTERNAL MEDICINE

## 2022-04-18 PROCEDURE — 1126F AMNT PAIN NOTED NONE PRSNT: CPT | Mod: CPTII,S$GLB,, | Performed by: INTERNAL MEDICINE

## 2022-04-18 PROCEDURE — 3288F PR FALLS RISK ASSESSMENT DOCUMENTED: ICD-10-PCS | Mod: CPTII,S$GLB,, | Performed by: INTERNAL MEDICINE

## 2022-04-18 PROCEDURE — 1101F PT FALLS ASSESS-DOCD LE1/YR: CPT | Mod: CPTII,S$GLB,, | Performed by: INTERNAL MEDICINE

## 2022-04-18 PROCEDURE — 1160F RVW MEDS BY RX/DR IN RCRD: CPT | Mod: CPTII,S$GLB,, | Performed by: INTERNAL MEDICINE

## 2022-04-18 PROCEDURE — 99999 PR PBB SHADOW E&M-EST. PATIENT-LVL IV: CPT | Mod: PBBFAC,,, | Performed by: INTERNAL MEDICINE

## 2022-04-18 PROCEDURE — 1126F PR PAIN SEVERITY QUANTIFIED, NO PAIN PRESENT: ICD-10-PCS | Mod: CPTII,S$GLB,, | Performed by: INTERNAL MEDICINE

## 2022-04-18 PROCEDURE — 4010F ACE/ARB THERAPY RXD/TAKEN: CPT | Mod: CPTII,S$GLB,, | Performed by: INTERNAL MEDICINE

## 2022-04-18 PROCEDURE — 3008F BODY MASS INDEX DOCD: CPT | Mod: CPTII,S$GLB,, | Performed by: INTERNAL MEDICINE

## 2022-04-18 PROCEDURE — 99999 PR PBB SHADOW E&M-EST. PATIENT-LVL IV: ICD-10-PCS | Mod: PBBFAC,,, | Performed by: INTERNAL MEDICINE

## 2022-04-18 PROCEDURE — 4010F PR ACE/ARB THEARPY RXD/TAKEN: ICD-10-PCS | Mod: CPTII,S$GLB,, | Performed by: INTERNAL MEDICINE

## 2022-04-18 PROCEDURE — 3077F SYST BP >= 140 MM HG: CPT | Mod: CPTII,S$GLB,, | Performed by: INTERNAL MEDICINE

## 2022-04-18 PROCEDURE — 1101F PR PT FALLS ASSESS DOC 0-1 FALLS W/OUT INJ PAST YR: ICD-10-PCS | Mod: CPTII,S$GLB,, | Performed by: INTERNAL MEDICINE

## 2022-04-18 PROCEDURE — 74177 CT ABD & PELVIS W/CONTRAST: CPT | Mod: TC

## 2022-04-18 PROCEDURE — 74177 CT CHEST ABDOMEN PELVIS WITH CONTRAST (XPD): ICD-10-PCS | Mod: 26,,, | Performed by: INTERNAL MEDICINE

## 2022-04-18 PROCEDURE — 3078F DIAST BP <80 MM HG: CPT | Mod: CPTII,S$GLB,, | Performed by: INTERNAL MEDICINE

## 2022-04-18 PROCEDURE — 99214 PR OFFICE/OUTPT VISIT, EST, LEVL IV, 30-39 MIN: ICD-10-PCS | Mod: S$GLB,,, | Performed by: INTERNAL MEDICINE

## 2022-04-18 PROCEDURE — 3288F FALL RISK ASSESSMENT DOCD: CPT | Mod: CPTII,S$GLB,, | Performed by: INTERNAL MEDICINE

## 2022-04-18 RX ORDER — LANCETS 28 GAUGE
EACH MISCELLANEOUS
COMMUNITY
Start: 2022-01-14

## 2022-04-18 RX ADMIN — IOHEXOL 100 ML: 350 INJECTION, SOLUTION INTRAVENOUS at 01:04

## 2022-04-18 NOTE — LETTER
April 18, 2022        Stephen Schumacher  [address]  [place] [address]  Phone: [phone]  Fax: [phone]             [facility] - Liver Transplant  [address] [address]  Phone: [phone]  Fax: [phone]   Patient: Jordan Lopez Jr.   MR Number: [id number]   YOB: 1955   Date of Visit: 4/18/2022       Dear Dr. Stephen Schumacher    Thank you for referring Jordan Lopez to me for evaluation. Attached you will find relevant portions of my assessment and plan of care.    If you have questions, please do not hesitate to call me. I look forward to following Jordan Lopez along with you.    Sincerely,    Shaniqua Matias MD    Enclosure    If you would like to receive this communication electronically, please contact [email or web address] or [phone] to request [facility] access.    [facility] is a tool which provides read-only access to select patient information with whom you have a relationship. Its easy to use and provides real time access to review your patients record including encounter summaries, notes, results, and demographic information.    If you feel you have received this communication in error or would no longer like to receive these types of communications, please e-mail [email or web address]

## 2022-04-18 NOTE — PROGRESS NOTES
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     He has had the following complications since transplant: chronic renal insufficiency.     Subjective:     Interval History: Jordan is now 9 years and 9 months post combined liver-kidney transplant. He is feeling well and vociing no complaints.    He has a history of Stage IIIB, pT3a, N0, M0, P>=10<20, G2 adenocarcinoma of the prostate.  His PMHx is significant for a renal cell carcinoma treated with nephrectomy  in 2010.  He was referred for evaluation of an elevated PSA on 14.8 ng/ml drawn in March of 2021. The patient underwent TRUS and biopsy at [facility] on 4/20/21.  Pathology revealed Christian 7 (3+4) adenocarcinoma involving 2 of 8 cores, unclear of location.   One core was 100% involved.  The patient presented to [facility] for further evaluation.  Bone scan on 5/7/21 was negative for evidence of metastatic disease.  MRI of the prostate on 5/11/21 revealed a 4.9 x 3.8 x 4 cm prostate corresponding to a 36 cc gland.  There was a 1.9 cm T2 hypointense lesion in the Rt. apex, PI-RADS 5.  There was extraprostatic extension.  The neurovascular bundles and seminal vesicles were unremarkable.  There was no lymphadenopathy. We elected to offer radiotherapy combined with hormonal deprivation therapy.  He completed 70 Gy to the prostate, seminal vesicles and lower pelvic nodes on 8/23/21. He was last seen by Urology 12/21 and was deemed stable.    Allograft function 3/8/22: Tbil 0.7, ALT 26, AST 17, ALKP 65, creat 1.1, gfr >60; prograf 3.6  IS:  Prograf and cellcept 250 mg bid.  HCC screening for recurrence: Ct scan 4/18/22: Postoperative changes from liver transplant.  Unchanged 9 mm arterial enhancing lesion in  segment 4B, without washout, pseudocapsule, or threshold growth.  There are other ill-defined areas of arterial enhancement scattered throughout the liver, many of which are peripheral in some of wedge-shaped, for example on images 38, 50, and 62 of series 2.  These become isodense on subsequent phases without washout or pseudo capsule.   BMI: 43    Health maintenance  Bone density 20:  Osteopenia; repeat now  Colorectal ca screenin-not clear when next one is due  Dermatology annual evaluation: needs annual- no skin cancers      Review of Systems   Constitutional: Negative.    HENT: Negative.    Eyes: Negative.    Respiratory: Negative.    Cardiovascular: Negative.    Gastrointestinal: Negative.    Genitourinary: Negative.    Musculoskeletal: Negative.    Skin: Negative.    Neurological: Negative.    Psychiatric/Behavioral: Negative.        Objective:     Vitals:    22 1415   BP: (!) 163/70   Pulse: 80   Resp: 19   Temp: 98.7 °F (37.1 °C)       Physical Exam  Vitals reviewed.   Constitutional:       Appearance: He is well-developed.   HENT:      Head: Normocephalic and atraumatic.   Eyes:      General: No scleral icterus.     Conjunctiva/sclera: Conjunctivae normal.      Pupils: Pupils are equal, round, and reactive to light.   Neck:      Thyroid: No thyromegaly.   Cardiovascular:      Rate and Rhythm: Normal rate and regular rhythm.      Heart sounds: Normal heart sounds.   Pulmonary:      Effort: Pulmonary effort is normal.      Breath sounds: Normal breath sounds. No rales.   Abdominal:      General: Bowel sounds are normal. There is no distension.      Palpations: Abdomen is soft. There is no mass.      Tenderness: There is no abdominal tenderness.   Musculoskeletal:         General: Normal range of motion.      Cervical back: Normal range of motion and neck supple.   Skin:     General: Skin is warm and dry.      Findings: No rash.   Neurological:      Mental Status: He is alert and oriented to  person, place, and time.       Lab Results   Component Value Date    BILITOT 0.7 03/08/2022    AST 17 03/08/2022    ALT 26 03/08/2022    ALKPHOS 65 03/08/2022    CREATININE 1.1 03/08/2022    ALBUMIN 3.4 (L) 03/08/2022     Lab Results   Component Value Date    WBC 3.74 (L) 03/08/2022    HGB 12.7 (L) 03/08/2022    HCT 37.0 (L) 03/08/2022     03/08/2022     Lab Results   Component Value Date    TACROLIMUS 3.6 (L) 03/08/2022       Assessment/Plan:   Pt is now 9 yrs and 9 months post liver transplant. Current recommendations:  1. Complications of OLTx: mild intermittent renal insufficiency. Monitor. Labs every 3 months  2. HTN: continue current medications; monitor since BP is elevated today  3. HCC history: surveillance CT today 4/18/22- indeterminate lesion- will review in radiology conf  4. Prophylactic immunotherapy: continue current immunosuppression (prograf and cellcept). Continue cellcept to 2500 mg bid; prograf target 3-4  5. Health maintenance: the patient should see a dermatologist annually to screen for skin cancer, perform regular colonoscopies (next in 2024?)  6. R/O osteoporosis. I am recommending a repeat bone density in 2022  7. Hx of renal cell ca: s/p nephrectomy: no sign of recurrence on ct scan.    Return 1 year.      MD MANNY Patterson Patient Status  Functional Status: 100% - Normal, no complaints, no evidence of disease  Physical Capacity: No Limitations    .

## 2022-04-18 NOTE — PATIENT INSTRUCTIONS
Continue prograf and low dose cellcept  Bone density   Dermatology- needs to see  Colonoscopy in 2021- f/u not clear-?5 years  Will review ct done today in radiology

## 2022-04-19 NOTE — TELEPHONE ENCOUNTER
Patient: Jordan Lopez Jr.       MRN: [id number]      : 1955     Age: 66 y.o.  [address] [facility] [address]    Providers  Hepatologists: Shaniqua Matias MD  Surgeons: none  Radiologists: none  Advanced Practice providers:     Priority of review: Transplant related    Patient Transplant Status: Other post liver tx    Reason for presentation: Indeterminate lesion    Clinical Summary: Pt is almost 10 yrs post liver-kidney tx; had Stage II HCC on explant- completed surveillance.    He has a history of Stage IIIB, pT3a, N0, M0, P>=10<20, G2 adenocarcinoma of the prostate.  His PMHx is significant for a renal cell carcinoma treated with nephrectomy  in .  He was referred for evaluation of an elevated PSA on 14.8 ng/ml drawn in 2021. The patient underwent TRUS and biopsy at [facility] on 21.  Pathology revealed Christian 7 (3+4) adenocarcinoma involving 2 of 8 cores, unclear of location.   One core was 100% involved.  The patient presented to [facility] for further evaluation.  Bone scan on 21 was negative for evidence of metastatic disease.  MRI of the prostate on 21 revealed a 4.9 x 3.8 x 4 cm prostate corresponding to a 36 cc gland.  There was a 1.9 cm T2 hypointense lesion in the Rt. apex, PI-RADS 5.  There was extraprostatic extension.  The neurovascular bundles and seminal vesicles were unremarkable.  There was no lymphadenopathy. We elected to offer radiotherapy combined with hormonal deprivation therapy.  He completed 70 Gy to the prostate, seminal vesicles and lower pelvic nodes on 21. He was last seen by Urology  and was deemed stable.    Imaging to be reviewed: CT abdo 22- review indeterminate lesion- what f/u needed?    HCC Treatment History: see above    ABO: A POS    Platelets:   Lab Results   Component Value Date/Time     2022 10:16 AM    EXTPLATELETS 136 2020 12:00 AM     Creatinine:   Lab Results   Component Value  Date/Time    CREATININE 1.1 03/08/2022 10:16 AM    EXTCREATININ 1.25 06/12/2020 12:00 AM    EXTCREATININ 194.00 06/12/2020 12:00 AM     Bilirubin:   Lab Results   Component Value Date/Time    BILITOT 0.7 03/08/2022 10:16 AM    EXTBILITOTAL 0.82 06/12/2020 12:00 AM     AFP Last 3 each if available:   Lab Results   Component Value Date/Time    AFP 2.3 12/13/2021 09:48 AM    AFP 2.5 09/03/2020 10:27 AM    AFP 2.2 06/12/2020 09:49 AM    AFP 2.2 05/30/2019 12:00 AM    AFP 2.2 03/08/2018 12:00 AM    EXTAFP 2.2 06/12/2020 12:00 AM    EXTAFP 2.3 03/17/2020 12:00 AM    EXTAFP 2.5 01/28/2020 12:00 AM       MELD: Computed MELD-Na score unavailable. Necessary lab results were not found in the last year.  Computed MELD score unavailable. Necessary lab results were not found in the last year.    Plan:     Follow-up Provider:

## 2022-04-21 ENCOUNTER — HOSPITAL ENCOUNTER (OUTPATIENT)
Dept: RADIOLOGY | Facility: HOSPITAL | Age: 67
Discharge: HOME | End: 2022-04-21
Attending: INTERNAL MEDICINE
Payer: MEDICARE

## 2022-04-21 DIAGNOSIS — Z79.818 LONG TERM (CURRENT) USE OF OTHER AGENTS AFFECTING ESTROGEN RECEPTORS AND ESTROGEN LEVELS: ICD-10-CM

## 2022-04-21 DIAGNOSIS — M85.80 OSTEOPENIA, UNSPECIFIED LOCATION: ICD-10-CM

## 2022-04-21 PROCEDURE — 77080 DXA BONE DENSITY AXIAL: CPT | Mod: TC

## 2022-04-26 ENCOUNTER — CONFERENCE (OUTPATIENT)
Dept: TRANSPLANT | Facility: CLINIC | Age: 67
End: 2022-04-26
Payer: MEDICARE

## 2022-04-27 NOTE — TELEPHONE ENCOUNTER
Patient: Jordan Lopez Jr.       MRN: [id number]      : 1955     Age: 66 y.o.  [address] [facility] [address]    Providers  Hepatologists: Shaniqua Matias MD  Surgeons: none  Radiologists: none  Advanced Practice providers:     Priority of review: Transplant related    Patient Transplant Status: Other post liver tx    Reason for presentation: Indeterminate lesion    Clinical Summary: Pt is almost 10 yrs post liver-kidney tx; had Stage II HCC on explant- completed surveillance.    He has a history of Stage IIIB, pT3a, N0, M0, P>=10<20, G2 adenocarcinoma of the prostate.  His PMHx is significant for a renal cell carcinoma treated with nephrectomy  in .  He was referred for evaluation of an elevated PSA on 14.8 ng/ml drawn in 2021. The patient underwent TRUS and biopsy at [facility] on 21.  Pathology revealed Christian 7 (3+4) adenocarcinoma involving 2 of 8 cores, unclear of location.   One core was 100% involved.  The patient presented to [facility] for further evaluation.  Bone scan on 21 was negative for evidence of metastatic disease.  MRI of the prostate on 21 revealed a 4.9 x 3.8 x 4 cm prostate corresponding to a 36 cc gland.  There was a 1.9 cm T2 hypointense lesion in the Rt. apex, PI-RADS 5.  There was extraprostatic extension.  The neurovascular bundles and seminal vesicles were unremarkable.  There was no lymphadenopathy. We elected to offer radiotherapy combined with hormonal deprivation therapy.  He completed 70 Gy to the prostate, seminal vesicles and lower pelvic nodes on 21. He was last seen by Urology  and was deemed stable.    Imaging to be reviewed: CT abdo 22- review indeterminate lesion- what f/u needed?    HCC Treatment History: see above    ABO: A POS    Platelets:   Lab Results   Component Value Date/Time     2022 10:16 AM    EXTPLATELETS 136 2020 12:00 AM     Creatinine:   Lab Results   Component Value  Date/Time    CREATININE 1.1 03/08/2022 10:16 AM    EXTCREATININ 1.25 06/12/2020 12:00 AM    EXTCREATININ 194.00 06/12/2020 12:00 AM     Bilirubin:   Lab Results   Component Value Date/Time    BILITOT 0.7 03/08/2022 10:16 AM    EXTBILITOTAL 0.82 06/12/2020 12:00 AM     AFP Last 3 each if available:   Lab Results   Component Value Date/Time    AFP 2.3 12/13/2021 09:48 AM    AFP 2.5 09/03/2020 10:27 AM    AFP 2.2 06/12/2020 09:49 AM    AFP 2.2 05/30/2019 12:00 AM    AFP 2.2 03/08/2018 12:00 AM    EXTAFP 2.2 06/12/2020 12:00 AM    EXTAFP 2.3 03/17/2020 12:00 AM    EXTAFP 2.5 01/28/2020 12:00 AM       MELD: Computed MELD-Na score unavailable. Necessary lab results were not found in the last year.  Computed MELD score unavailable. Necessary lab results were not found in the last year.    Plan: No evidence of HCC.       Committee Discussion: Nothing to suggest  Recurrent HCC. Surveillance should be based on Urology. Routine for liver transplant follow-up.     Rec: No new recommendations from a liver transplant standpoint. Follow-up based on Urology.     Note forwarded to Unique Giraldo RN to coordinate follow-up.     Follow-up Provider: Shaniqua Matias MD

## 2022-05-02 ENCOUNTER — TELEPHONE (OUTPATIENT)
Dept: TRANSPLANT | Facility: CLINIC | Age: 67
End: 2022-05-02
Payer: MEDICARE

## 2022-05-02 NOTE — TELEPHONE ENCOUNTER
No lesion. Pt notified. ----- Message from Shaniqua Matias MD sent at 4/27/2022 10:10 PM CDT -----  Reviewed in radiology- no lesion; thought to be a perfusion defect

## 2022-05-04 ENCOUNTER — TELEPHONE (OUTPATIENT)
Dept: TRANSPLANT | Facility: CLINIC | Age: 67
End: 2022-05-04
Payer: MEDICARE

## 2022-05-04 ENCOUNTER — PATIENT MESSAGE (OUTPATIENT)
Dept: TRANSPLANT | Facility: CLINIC | Age: 67
End: 2022-05-04
Payer: MEDICARE

## 2022-05-04 NOTE — TELEPHONE ENCOUNTER
Instructed pt to take OTC calcium with Vit D ----- Message from Shaniqua Matias MD sent at 4/27/2022 10:09 PM CDT -----  Mild bone thinning. Pt to take calcium with vit D

## 2022-05-11 ENCOUNTER — PATIENT MESSAGE (OUTPATIENT)
Dept: RESEARCH | Facility: CLINIC | Age: 67
End: 2022-05-11
Payer: MEDICARE

## 2022-05-20 ENCOUNTER — PATIENT MESSAGE (OUTPATIENT)
Dept: TRANSPLANT | Facility: CLINIC | Age: 67
End: 2022-05-20
Payer: MEDICARE

## 2022-05-20 ENCOUNTER — LAB VISIT (OUTPATIENT)
Dept: LAB | Facility: HOSPITAL | Age: 67
End: 2022-05-20
Attending: INTERNAL MEDICINE
Payer: MEDICARE

## 2022-05-20 DIAGNOSIS — E78.5 HYPERLIPEMIA: ICD-10-CM

## 2022-05-20 DIAGNOSIS — E11.9 DIABETES MELLITUS WITHOUT COMPLICATION: Primary | ICD-10-CM

## 2022-05-20 DIAGNOSIS — Z94.4 LIVER REPLACED BY TRANSPLANT: ICD-10-CM

## 2022-05-20 LAB
ALBUMIN SERPL BCP-MCNC: 3.3 G/DL (ref 3.5–5.2)
ALP SERPL-CCNC: 68 U/L (ref 55–135)
ALT SERPL W/O P-5'-P-CCNC: 23 U/L (ref 10–44)
ANION GAP SERPL CALC-SCNC: 5 MMOL/L (ref 8–16)
AST SERPL-CCNC: 14 U/L (ref 10–40)
BASOPHILS # BLD AUTO: 0.03 K/UL (ref 0–0.2)
BASOPHILS NFR BLD: 0.8 % (ref 0–1.9)
BILIRUB SERPL-MCNC: 0.5 MG/DL (ref 0.1–1)
BILIRUB UR QL STRIP: NEGATIVE
BUN SERPL-MCNC: 16 MG/DL (ref 8–23)
CALCIUM SERPL-MCNC: 8.5 MG/DL (ref 8.7–10.5)
CHLORIDE SERPL-SCNC: 105 MMOL/L (ref 95–110)
CLARITY UR: CLEAR
CO2 SERPL-SCNC: 31 MMOL/L (ref 23–29)
COLOR UR: YELLOW
CREAT SERPL-MCNC: 1.3 MG/DL (ref 0.5–1.4)
DIFFERENTIAL METHOD: ABNORMAL
EOSINOPHIL # BLD AUTO: 0.2 K/UL (ref 0–0.5)
EOSINOPHIL NFR BLD: 4.1 % (ref 0–8)
ERYTHROCYTE [DISTWIDTH] IN BLOOD BY AUTOMATED COUNT: 13.3 % (ref 11.5–14.5)
EST. GFR  (AFRICAN AMERICAN): >60 ML/MIN/1.73 M^2
EST. GFR  (NON AFRICAN AMERICAN): 56.1 ML/MIN/1.73 M^2
ESTIMATED AVG GLUCOSE: 166 MG/DL (ref 68–131)
GLUCOSE SERPL-MCNC: 185 MG/DL (ref 70–110)
GLUCOSE UR QL STRIP: NEGATIVE
HBA1C MFR BLD: 7.4 % (ref 4–5.6)
HCT VFR BLD AUTO: 35.2 % (ref 40–54)
HGB BLD-MCNC: 12.3 G/DL (ref 14–18)
HGB UR QL STRIP: NEGATIVE
IMM GRANULOCYTES # BLD AUTO: 0.01 K/UL (ref 0–0.04)
IMM GRANULOCYTES NFR BLD AUTO: 0.3 % (ref 0–0.5)
KETONES UR QL STRIP: NEGATIVE
LEUKOCYTE ESTERASE UR QL STRIP: NEGATIVE
LYMPHOCYTES # BLD AUTO: 0.9 K/UL (ref 1–4.8)
LYMPHOCYTES NFR BLD: 24.2 % (ref 18–48)
MCH RBC QN AUTO: 28.8 PG (ref 27–31)
MCHC RBC AUTO-ENTMCNC: 34.9 G/DL (ref 32–36)
MCV RBC AUTO: 82 FL (ref 82–98)
MONOCYTES # BLD AUTO: 0.3 K/UL (ref 0.3–1)
MONOCYTES NFR BLD: 9 % (ref 4–15)
NEUTROPHILS # BLD AUTO: 2.3 K/UL (ref 1.8–7.7)
NEUTROPHILS NFR BLD: 61.6 % (ref 38–73)
NITRITE UR QL STRIP: NEGATIVE
NRBC BLD-RTO: 0 /100 WBC
PH UR STRIP: 6 [PH] (ref 5–8)
PLATELET # BLD AUTO: 134 K/UL (ref 150–450)
PMV BLD AUTO: 10.2 FL (ref 9.2–12.9)
POTASSIUM SERPL-SCNC: 3.7 MMOL/L (ref 3.5–5.1)
PROT SERPL-MCNC: 6.3 G/DL (ref 6–8.4)
PROT UR QL STRIP: ABNORMAL
RBC # BLD AUTO: 4.27 M/UL (ref 4.6–6.2)
SODIUM SERPL-SCNC: 141 MMOL/L (ref 136–145)
SP GR UR STRIP: 1.02 (ref 1–1.03)
URN SPEC COLLECT METH UR: ABNORMAL
UROBILINOGEN UR STRIP-ACNC: NEGATIVE EU/DL
WBC # BLD AUTO: 3.68 K/UL (ref 3.9–12.7)

## 2022-05-20 PROCEDURE — 85025 COMPLETE CBC W/AUTO DIFF WBC: CPT | Performed by: INTERNAL MEDICINE

## 2022-05-20 PROCEDURE — 80053 COMPREHEN METABOLIC PANEL: CPT | Performed by: INTERNAL MEDICINE

## 2022-05-20 PROCEDURE — 83036 HEMOGLOBIN GLYCOSYLATED A1C: CPT | Performed by: INTERNAL MEDICINE

## 2022-05-20 PROCEDURE — 80197 ASSAY OF TACROLIMUS: CPT | Performed by: INTERNAL MEDICINE

## 2022-05-20 PROCEDURE — 81003 URINALYSIS AUTO W/O SCOPE: CPT | Performed by: INTERNAL MEDICINE

## 2022-05-21 LAB — TACROLIMUS BLD-MCNC: 4.3 NG/ML (ref 5–15)

## 2022-05-24 ENCOUNTER — TELEPHONE (OUTPATIENT)
Dept: TRANSPLANT | Facility: CLINIC | Age: 67
End: 2022-05-24
Payer: MEDICARE

## 2022-05-24 DIAGNOSIS — Z94.0 KIDNEY REPLACED BY TRANSPLANT: ICD-10-CM

## 2022-05-24 DIAGNOSIS — Z94.4 LIVER REPLACED BY TRANSPLANT: Primary | ICD-10-CM

## 2022-05-24 NOTE — TELEPHONE ENCOUNTER
----- Message from Shaniqua Matias MD sent at 5/24/2022 12:12 PM CDT -----  The Labs are stable - please let patient know.

## 2022-06-08 ENCOUNTER — PATIENT MESSAGE (OUTPATIENT)
Dept: RADIATION ONCOLOGY | Facility: CLINIC | Age: 67
End: 2022-06-08
Payer: MEDICARE

## 2022-06-10 ENCOUNTER — LAB VISIT (OUTPATIENT)
Dept: LAB | Facility: HOSPITAL | Age: 67
End: 2022-06-10
Attending: RADIOLOGY
Payer: MEDICARE

## 2022-06-10 DIAGNOSIS — Z13.9 SCREENING FOR UNSPECIFIED CONDITION: Primary | ICD-10-CM

## 2022-06-10 DIAGNOSIS — C61 PROSTATE CANCER: ICD-10-CM

## 2022-06-10 DIAGNOSIS — Z12.5 SPECIAL SCREENING FOR MALIGNANT NEOPLASM OF PROSTATE: ICD-10-CM

## 2022-06-10 DIAGNOSIS — E11.9 DIABETES MELLITUS WITHOUT COMPLICATION: ICD-10-CM

## 2022-06-10 LAB
ALBUMIN SERPL BCP-MCNC: 3.5 G/DL (ref 3.5–5.2)
ALBUMIN/CREAT UR: 48.8 MG/MG (ref 0–30)
ALP SERPL-CCNC: 71 U/L (ref 55–135)
ALT SERPL W/O P-5'-P-CCNC: 18 U/L (ref 10–44)
ANION GAP SERPL CALC-SCNC: 5 MMOL/L (ref 8–16)
AST SERPL-CCNC: 8 U/L (ref 10–40)
BASOPHILS # BLD AUTO: 0.03 K/UL (ref 0–0.2)
BASOPHILS NFR BLD: 0.6 % (ref 0–1.9)
BILIRUB SERPL-MCNC: 0.6 MG/DL (ref 0.1–1)
BUN SERPL-MCNC: 24 MG/DL (ref 8–23)
CALCIUM SERPL-MCNC: 9.1 MG/DL (ref 8.7–10.5)
CHLORIDE SERPL-SCNC: 106 MMOL/L (ref 95–110)
CHOLEST SERPL-MCNC: 125 MG/DL (ref 120–199)
CHOLEST/HDLC SERPL: 3 {RATIO} (ref 2–5)
CO2 SERPL-SCNC: 29 MMOL/L (ref 23–29)
COMPLEXED PSA SERPL-MCNC: <0.01 NG/ML (ref 0–4)
CREAT SERPL-MCNC: 1.4 MG/DL (ref 0.5–1.4)
CREAT UR-MCNC: 184 MG/DL (ref 23–375)
DIFFERENTIAL METHOD: ABNORMAL
EOSINOPHIL # BLD AUTO: 0.2 K/UL (ref 0–0.5)
EOSINOPHIL NFR BLD: 2.9 % (ref 0–8)
ERYTHROCYTE [DISTWIDTH] IN BLOOD BY AUTOMATED COUNT: 13.3 % (ref 11.5–14.5)
EST. GFR  (AFRICAN AMERICAN): 59.3 ML/MIN/1.73 M^2
EST. GFR  (NON AFRICAN AMERICAN): 51.3 ML/MIN/1.73 M^2
ESTIMATED AVG GLUCOSE: 166 MG/DL (ref 68–131)
GLUCOSE SERPL-MCNC: 169 MG/DL (ref 70–110)
HBA1C MFR BLD: 7.4 % (ref 4–5.6)
HCT VFR BLD AUTO: 38.7 % (ref 40–54)
HDLC SERPL-MCNC: 41 MG/DL (ref 40–75)
HDLC SERPL: 32.8 % (ref 20–50)
HGB BLD-MCNC: 13.1 G/DL (ref 14–18)
IMM GRANULOCYTES # BLD AUTO: 0.02 K/UL (ref 0–0.04)
IMM GRANULOCYTES NFR BLD AUTO: 0.4 % (ref 0–0.5)
LDLC SERPL CALC-MCNC: 55.6 MG/DL (ref 63–159)
LYMPHOCYTES # BLD AUTO: 1 K/UL (ref 1–4.8)
LYMPHOCYTES NFR BLD: 18.9 % (ref 18–48)
MAGNESIUM SERPL-MCNC: 2 MG/DL (ref 1.6–2.6)
MCH RBC QN AUTO: 27.9 PG (ref 27–31)
MCHC RBC AUTO-ENTMCNC: 33.9 G/DL (ref 32–36)
MCV RBC AUTO: 83 FL (ref 82–98)
MICROALBUMIN UR DL<=1MG/L-MCNC: 89.8 MG/L
MONOCYTES # BLD AUTO: 0.4 K/UL (ref 0.3–1)
MONOCYTES NFR BLD: 7 % (ref 4–15)
NEUTROPHILS # BLD AUTO: 3.6 K/UL (ref 1.8–7.7)
NEUTROPHILS NFR BLD: 70.2 % (ref 38–73)
NONHDLC SERPL-MCNC: 84 MG/DL
NRBC BLD-RTO: 0 /100 WBC
PLATELET # BLD AUTO: 162 K/UL (ref 150–450)
PMV BLD AUTO: 9.9 FL (ref 9.2–12.9)
POTASSIUM SERPL-SCNC: 4.9 MMOL/L (ref 3.5–5.1)
PROT SERPL-MCNC: 7.1 G/DL (ref 6–8.4)
RBC # BLD AUTO: 4.69 M/UL (ref 4.6–6.2)
SARS-COV-2 IGG SERPL IA-ACNC: NORMAL AU/ML
SARS-COV-2 IGG SERPL QL IA: POSITIVE
SODIUM SERPL-SCNC: 140 MMOL/L (ref 136–145)
TRIGL SERPL-MCNC: 142 MG/DL (ref 30–150)
TSH SERPL DL<=0.005 MIU/L-ACNC: 2.74 UIU/ML (ref 0.4–4)
WBC # BLD AUTO: 5.13 K/UL (ref 3.9–12.7)

## 2022-06-10 PROCEDURE — 83735 ASSAY OF MAGNESIUM: CPT | Performed by: RADIOLOGY

## 2022-06-10 PROCEDURE — 82570 ASSAY OF URINE CREATININE: CPT | Performed by: RADIOLOGY

## 2022-06-10 PROCEDURE — 84443 ASSAY THYROID STIM HORMONE: CPT | Performed by: RADIOLOGY

## 2022-06-10 PROCEDURE — 80053 COMPREHEN METABOLIC PANEL: CPT | Performed by: RADIOLOGY

## 2022-06-10 PROCEDURE — 80061 LIPID PANEL: CPT | Performed by: RADIOLOGY

## 2022-06-10 PROCEDURE — 84153 ASSAY OF PSA TOTAL: CPT | Performed by: RADIOLOGY

## 2022-06-10 PROCEDURE — 86769 SARS-COV-2 COVID-19 ANTIBODY: CPT | Performed by: RADIOLOGY

## 2022-06-10 PROCEDURE — 82043 UR ALBUMIN QUANTITATIVE: CPT | Performed by: RADIOLOGY

## 2022-06-10 PROCEDURE — 85025 COMPLETE CBC W/AUTO DIFF WBC: CPT | Performed by: RADIOLOGY

## 2022-06-10 PROCEDURE — 36415 COLL VENOUS BLD VENIPUNCTURE: CPT | Performed by: RADIOLOGY

## 2022-06-10 PROCEDURE — 83036 HEMOGLOBIN GLYCOSYLATED A1C: CPT | Performed by: RADIOLOGY

## 2022-07-13 ENCOUNTER — OFFICE VISIT (OUTPATIENT)
Dept: RADIATION ONCOLOGY | Facility: CLINIC | Age: 67
End: 2022-07-13
Payer: MEDICARE

## 2022-07-13 VITALS
HEIGHT: 72 IN | OXYGEN SATURATION: 96 % | RESPIRATION RATE: 18 BRPM | TEMPERATURE: 98 F | HEART RATE: 89 BPM | DIASTOLIC BLOOD PRESSURE: 79 MMHG | BODY MASS INDEX: 42.66 KG/M2 | SYSTOLIC BLOOD PRESSURE: 173 MMHG | WEIGHT: 315 LBS

## 2022-07-13 DIAGNOSIS — C61 PROSTATE CANCER: Primary | ICD-10-CM

## 2022-07-13 PROCEDURE — 99212 PR OFFICE/OUTPT VISIT, EST, LEVL II, 10-19 MIN: ICD-10-PCS | Mod: 25,S$GLB,, | Performed by: RADIOLOGY

## 2022-07-13 PROCEDURE — 1126F AMNT PAIN NOTED NONE PRSNT: CPT | Mod: CPTII,S$GLB,, | Performed by: RADIOLOGY

## 2022-07-13 PROCEDURE — 4010F ACE/ARB THERAPY RXD/TAKEN: CPT | Mod: CPTII,S$GLB,, | Performed by: RADIOLOGY

## 2022-07-13 PROCEDURE — 1160F RVW MEDS BY RX/DR IN RCRD: CPT | Mod: CPTII,S$GLB,, | Performed by: RADIOLOGY

## 2022-07-13 PROCEDURE — 1159F PR MEDICATION LIST DOCUMENTED IN MEDICAL RECORD: ICD-10-PCS | Mod: CPTII,S$GLB,, | Performed by: RADIOLOGY

## 2022-07-13 PROCEDURE — 99999 PR PBB SHADOW E&M-EST. PATIENT-LVL IV: CPT | Mod: PBBFAC,,, | Performed by: RADIOLOGY

## 2022-07-13 PROCEDURE — 3066F NEPHROPATHY DOC TX: CPT | Mod: CPTII,S$GLB,, | Performed by: RADIOLOGY

## 2022-07-13 PROCEDURE — 3008F PR BODY MASS INDEX (BMI) DOCUMENTED: ICD-10-PCS | Mod: CPTII,S$GLB,, | Performed by: RADIOLOGY

## 2022-07-13 PROCEDURE — 96402 CHEMO HORMON ANTINEOPL SQ/IM: CPT | Mod: S$GLB,,, | Performed by: RADIOLOGY

## 2022-07-13 PROCEDURE — 96402 PR CHEMOTHER HORMON ANTINEOPL SUB-Q/IM: ICD-10-PCS | Mod: S$GLB,,, | Performed by: RADIOLOGY

## 2022-07-13 PROCEDURE — 3008F BODY MASS INDEX DOCD: CPT | Mod: CPTII,S$GLB,, | Performed by: RADIOLOGY

## 2022-07-13 PROCEDURE — 3288F PR FALLS RISK ASSESSMENT DOCUMENTED: ICD-10-PCS | Mod: CPTII,S$GLB,, | Performed by: RADIOLOGY

## 2022-07-13 PROCEDURE — 3078F DIAST BP <80 MM HG: CPT | Mod: CPTII,S$GLB,, | Performed by: RADIOLOGY

## 2022-07-13 PROCEDURE — 1101F PT FALLS ASSESS-DOCD LE1/YR: CPT | Mod: CPTII,S$GLB,, | Performed by: RADIOLOGY

## 2022-07-13 PROCEDURE — 1126F PR PAIN SEVERITY QUANTIFIED, NO PAIN PRESENT: ICD-10-PCS | Mod: CPTII,S$GLB,, | Performed by: RADIOLOGY

## 2022-07-13 PROCEDURE — 1101F PR PT FALLS ASSESS DOC 0-1 FALLS W/OUT INJ PAST YR: ICD-10-PCS | Mod: CPTII,S$GLB,, | Performed by: RADIOLOGY

## 2022-07-13 PROCEDURE — 1159F MED LIST DOCD IN RCRD: CPT | Mod: CPTII,S$GLB,, | Performed by: RADIOLOGY

## 2022-07-13 PROCEDURE — 4010F PR ACE/ARB THEARPY RXD/TAKEN: ICD-10-PCS | Mod: CPTII,S$GLB,, | Performed by: RADIOLOGY

## 2022-07-13 PROCEDURE — 3051F PR MOST RECENT HEMOGLOBIN A1C LEVEL 7.0 - < 8.0%: ICD-10-PCS | Mod: CPTII,S$GLB,, | Performed by: RADIOLOGY

## 2022-07-13 PROCEDURE — 3060F PR POS MICROALBUMINURIA RESULT DOCUMENTED/REVIEW: ICD-10-PCS | Mod: CPTII,S$GLB,, | Performed by: RADIOLOGY

## 2022-07-13 PROCEDURE — 99999 PR PBB SHADOW E&M-EST. PATIENT-LVL IV: ICD-10-PCS | Mod: PBBFAC,,, | Performed by: RADIOLOGY

## 2022-07-13 PROCEDURE — 3060F POS MICROALBUMINURIA REV: CPT | Mod: CPTII,S$GLB,, | Performed by: RADIOLOGY

## 2022-07-13 PROCEDURE — 99212 OFFICE O/P EST SF 10 MIN: CPT | Mod: 25,S$GLB,, | Performed by: RADIOLOGY

## 2022-07-13 PROCEDURE — 1160F PR REVIEW ALL MEDS BY PRESCRIBER/CLIN PHARMACIST DOCUMENTED: ICD-10-PCS | Mod: CPTII,S$GLB,, | Performed by: RADIOLOGY

## 2022-07-13 PROCEDURE — 3066F PR DOCUMENTATION OF TREATMENT FOR NEPHROPATHY: ICD-10-PCS | Mod: CPTII,S$GLB,, | Performed by: RADIOLOGY

## 2022-07-13 PROCEDURE — 3078F PR MOST RECENT DIASTOLIC BLOOD PRESSURE < 80 MM HG: ICD-10-PCS | Mod: CPTII,S$GLB,, | Performed by: RADIOLOGY

## 2022-07-13 PROCEDURE — 3051F HG A1C>EQUAL 7.0%<8.0%: CPT | Mod: CPTII,S$GLB,, | Performed by: RADIOLOGY

## 2022-07-13 PROCEDURE — 3077F SYST BP >= 140 MM HG: CPT | Mod: CPTII,S$GLB,, | Performed by: RADIOLOGY

## 2022-07-13 PROCEDURE — 3288F FALL RISK ASSESSMENT DOCD: CPT | Mod: CPTII,S$GLB,, | Performed by: RADIOLOGY

## 2022-07-13 PROCEDURE — 3077F PR MOST RECENT SYSTOLIC BLOOD PRESSURE >= 140 MM HG: ICD-10-PCS | Mod: CPTII,S$GLB,, | Performed by: RADIOLOGY

## 2022-07-13 NOTE — PROGRESS NOTES
Subjective:       Patient ID: Jordan Lopez Jr. is a 66 y.o. male.    Chief Complaint: Follow-up    This patient presents for follow up visit.     Mr. Lopez has a history of Stage IIIB, pT3a, N0, M0, P>=10<20, G2 adenocarcinoma of the prostate.  His PMHx is significant for a renal cell carcinoma treated with nephrectomy  in 2010 and a Liver, kidney transplant in 2012.  He was referred for evaluation of an elevated PSA on 14.8 ng/ml drawn in March of 2021.   Biopsy at [facility] on 4/20/21 revealed Christian 7 (3+4) adenocarcinoma involving 2 of 8 cores, unclear of location.   One core was 100% involved.  The patient presented to [facility] for further evaluation.  Bone scan on 5/7/21 was negative for evidence of metastatic disease.  MRI of the prostate on 5/11/21 revealed a 36 cc gland with a 1.9 cm T2 hypointense lesion in the Rt. apex, PI-RADS 5.  There was extraprostatic extension.  The neurovascular bundles and seminal vesicles were unremarkable.  There was no lymphadenopathy. We elected to offer radiotherapy combined with hormonal deprivation therapy.  He completed 70 Gy to the prostate, seminal vesicles and lower pelvic nodes on 8/23/21.  Today the patient states he feels well.  No complaints.         Review of Systems   Constitutional: Negative for activity change, appetite change, chills and fever.   Respiratory: Negative for cough and shortness of breath.    Gastrointestinal: Negative for abdominal pain, constipation and diarrhea.   Genitourinary: Negative for bladder incontinence, difficulty urinating, dysuria, frequency and hematuria.         Objective:      Physical Exam  Constitutional:       General: He is not in acute distress.     Appearance: Normal appearance.   Abdominal:      General: There is no distension.      Palpations: Abdomen is soft.   Neurological:      Mental Status: He is alert and oriented to person, place, and time.   Psychiatric:         Mood and Affect: Mood  normal.         Judgment: Judgment normal.       PSA < 0.01 ng/ml  Assessment:       Problem List Items Addressed This Visit     Prostate cancer - Primary          Plan:       Doing well  no evidence of tumor progression.  Given his initial high risk disease and immunosuppression, will plan third and final lupron injection today.  Plan follow up in 8 months with PSA.

## 2022-08-01 ENCOUNTER — PATIENT MESSAGE (OUTPATIENT)
Dept: TRANSPLANT | Facility: CLINIC | Age: 67
End: 2022-08-01
Payer: MEDICARE

## 2022-08-02 ENCOUNTER — PATIENT MESSAGE (OUTPATIENT)
Dept: RADIATION ONCOLOGY | Facility: CLINIC | Age: 67
End: 2022-08-02
Payer: MEDICARE

## 2022-08-03 ENCOUNTER — TELEPHONE (OUTPATIENT)
Dept: RADIATION ONCOLOGY | Facility: CLINIC | Age: 67
End: 2022-08-03
Payer: MEDICARE

## 2022-08-03 NOTE — TELEPHONE ENCOUNTER
----- Message from Brenda Cook LPN sent at 8/3/2022  7:25 AM CDT -----  Regarding: Urinary issues  Please see message below:     jaymie Malone is not on Flomax      Ok y'all have always asked about urination frequency and flow and I have never had a problem.  However, in the last couple of days, I seem to have had to urinate quite often and sometimes comes on very quick.  Then a little comes out and not much at all.   Also, there is some burning (I guess), I have never had a bladder infection or experienced burning before, so this is all new to me.  Urine is clear.  Is there something that can be prescribed or is there a blood test that I need to take.   Thanks for your help       Ashley MOORE      Discussed symptoms with patient  He has submitted a urine sample to his PCP awaiting the results.  May consider medrol; or tylenol if negative.

## 2022-08-06 ENCOUNTER — HOSPITAL ENCOUNTER (INPATIENT)
Facility: HOSPITAL | Age: 67
LOS: 4 days | Discharge: HOME | DRG: 699 | End: 2022-08-10
Attending: INTERNAL MEDICINE | Admitting: INTERNAL MEDICINE
Payer: MEDICARE

## 2022-08-06 DIAGNOSIS — R31.0 GROSS HEMATURIA: Primary | ICD-10-CM

## 2022-08-06 DIAGNOSIS — Z94.4 LIVER REPLACED BY TRANSPLANT: Chronic | ICD-10-CM

## 2022-08-06 DIAGNOSIS — E11.9 TYPE 2 DIABETES MELLITUS WITHOUT COMPLICATION, WITHOUT LONG-TERM CURRENT USE OF INSULIN: ICD-10-CM

## 2022-08-06 DIAGNOSIS — R60.9 EDEMA, UNSPECIFIED TYPE: ICD-10-CM

## 2022-08-06 DIAGNOSIS — N30.01 ACUTE CYSTITIS WITH HEMATURIA: ICD-10-CM

## 2022-08-06 DIAGNOSIS — R07.9 CHEST PAIN: ICD-10-CM

## 2022-08-06 DIAGNOSIS — N17.9 AKI (ACUTE KIDNEY INJURY): ICD-10-CM

## 2022-08-06 DIAGNOSIS — Z94.4 S/P LIVER TRANSPLANT: ICD-10-CM

## 2022-08-06 PROBLEM — N13.39 OTHER HYDRONEPHROSIS: Status: ACTIVE | Noted: 2022-08-06

## 2022-08-06 LAB
ALBUMIN SERPL BCP-MCNC: 3.1 G/DL (ref 3.5–5.2)
ALP SERPL-CCNC: 58 U/L (ref 55–135)
ALT SERPL W/O P-5'-P-CCNC: 13 U/L (ref 10–44)
ANION GAP SERPL CALC-SCNC: 10 MMOL/L (ref 8–16)
ANISOCYTOSIS BLD QL SMEAR: SLIGHT
AST SERPL-CCNC: 15 U/L (ref 10–40)
BACTERIA #/AREA URNS AUTO: ABNORMAL /HPF
BASOPHILS # BLD AUTO: 0.01 K/UL (ref 0–0.2)
BASOPHILS NFR BLD: 0.3 % (ref 0–1.9)
BILIRUB SERPL-MCNC: 0.7 MG/DL (ref 0.1–1)
BILIRUB UR QL STRIP: NEGATIVE
BUN SERPL-MCNC: 39 MG/DL (ref 8–23)
BURR CELLS BLD QL SMEAR: ABNORMAL
CALCIUM SERPL-MCNC: 9 MG/DL (ref 8.7–10.5)
CHLORIDE SERPL-SCNC: 100 MMOL/L (ref 95–110)
CLARITY UR REFRACT.AUTO: ABNORMAL
CO2 SERPL-SCNC: 21 MMOL/L (ref 23–29)
COLOR UR AUTO: ABNORMAL
CREAT SERPL-MCNC: 2.3 MG/DL (ref 0.5–1.4)
DIFFERENTIAL METHOD: ABNORMAL
EOSINOPHIL # BLD AUTO: 0 K/UL (ref 0–0.5)
EOSINOPHIL NFR BLD: 0 % (ref 0–8)
ERYTHROCYTE [DISTWIDTH] IN BLOOD BY AUTOMATED COUNT: 14.5 % (ref 11.5–14.5)
EST. GFR  (NO RACE VARIABLE): 30.2 ML/MIN/1.73 M^2
GLUCOSE SERPL-MCNC: 144 MG/DL (ref 70–110)
GLUCOSE UR QL STRIP: ABNORMAL
HCT VFR BLD AUTO: 35.9 % (ref 40–54)
HGB BLD-MCNC: 12.1 G/DL (ref 14–18)
HGB UR QL STRIP: ABNORMAL
HYALINE CASTS UR QL AUTO: 0 /LPF
HYPOCHROMIA BLD QL SMEAR: ABNORMAL
IMM GRANULOCYTES # BLD AUTO: 0.01 K/UL (ref 0–0.04)
IMM GRANULOCYTES NFR BLD AUTO: 0.3 % (ref 0–0.5)
INR PPP: 1.1 (ref 0.8–1.2)
KETONES UR QL STRIP: ABNORMAL
LEUKOCYTE ESTERASE UR QL STRIP: ABNORMAL
LYMPHOCYTES # BLD AUTO: 0.9 K/UL (ref 1–4.8)
LYMPHOCYTES NFR BLD: 25.3 % (ref 18–48)
MCH RBC QN AUTO: 28.1 PG (ref 27–31)
MCHC RBC AUTO-ENTMCNC: 33.7 G/DL (ref 32–36)
MCV RBC AUTO: 84 FL (ref 82–98)
MICROSCOPIC COMMENT: ABNORMAL
MONOCYTES # BLD AUTO: 0.3 K/UL (ref 0.3–1)
MONOCYTES NFR BLD: 8.2 % (ref 4–15)
NEUTROPHILS # BLD AUTO: 2.4 K/UL (ref 1.8–7.7)
NEUTROPHILS NFR BLD: 65.9 % (ref 38–73)
NITRITE UR QL STRIP: NEGATIVE
NRBC BLD-RTO: 0 /100 WBC
OVALOCYTES BLD QL SMEAR: ABNORMAL
PH UR STRIP: 7 [PH] (ref 5–8)
PLATELET # BLD AUTO: 110 K/UL (ref 150–450)
PLATELET BLD QL SMEAR: ABNORMAL
PMV BLD AUTO: 10.8 FL (ref 9.2–12.9)
POIKILOCYTOSIS BLD QL SMEAR: SLIGHT
POLYCHROMASIA BLD QL SMEAR: ABNORMAL
POTASSIUM SERPL-SCNC: 4.5 MMOL/L (ref 3.5–5.1)
PROT SERPL-MCNC: 6.8 G/DL (ref 6–8.4)
PROT UR QL STRIP: ABNORMAL
PROTHROMBIN TIME: 10.9 SEC (ref 9–12.5)
RBC # BLD AUTO: 4.3 M/UL (ref 4.6–6.2)
RBC #/AREA URNS AUTO: >100 /HPF (ref 0–4)
SODIUM SERPL-SCNC: 131 MMOL/L (ref 136–145)
SP GR UR STRIP: 1.01 (ref 1–1.03)
URN SPEC COLLECT METH UR: ABNORMAL
WBC # BLD AUTO: 3.67 K/UL (ref 3.9–12.7)
WBC #/AREA URNS AUTO: 0 /HPF (ref 0–5)

## 2022-08-06 PROCEDURE — 80053 COMPREHEN METABOLIC PANEL: CPT | Performed by: STUDENT IN AN ORGANIZED HEALTH CARE EDUCATION/TRAINING PROGRAM

## 2022-08-06 PROCEDURE — 85610 PROTHROMBIN TIME: CPT | Performed by: STUDENT IN AN ORGANIZED HEALTH CARE EDUCATION/TRAINING PROGRAM

## 2022-08-06 PROCEDURE — 81001 URINALYSIS AUTO W/SCOPE: CPT | Performed by: STUDENT IN AN ORGANIZED HEALTH CARE EDUCATION/TRAINING PROGRAM

## 2022-08-06 PROCEDURE — 86833 HLA CLASS II HIGH DEFIN QUAL: CPT | Performed by: STUDENT IN AN ORGANIZED HEALTH CARE EDUCATION/TRAINING PROGRAM

## 2022-08-06 PROCEDURE — 85025 COMPLETE CBC W/AUTO DIFF WBC: CPT | Performed by: STUDENT IN AN ORGANIZED HEALTH CARE EDUCATION/TRAINING PROGRAM

## 2022-08-06 PROCEDURE — 36415 COLL VENOUS BLD VENIPUNCTURE: CPT | Performed by: STUDENT IN AN ORGANIZED HEALTH CARE EDUCATION/TRAINING PROGRAM

## 2022-08-06 PROCEDURE — 25000003 PHARM REV CODE 250: Performed by: STUDENT IN AN ORGANIZED HEALTH CARE EDUCATION/TRAINING PROGRAM

## 2022-08-06 PROCEDURE — 51798 US URINE CAPACITY MEASURE: CPT

## 2022-08-06 PROCEDURE — 80197 ASSAY OF TACROLIMUS: CPT | Performed by: STUDENT IN AN ORGANIZED HEALTH CARE EDUCATION/TRAINING PROGRAM

## 2022-08-06 PROCEDURE — 87040 BLOOD CULTURE FOR BACTERIA: CPT | Mod: 59 | Performed by: STUDENT IN AN ORGANIZED HEALTH CARE EDUCATION/TRAINING PROGRAM

## 2022-08-06 PROCEDURE — 63600175 PHARM REV CODE 636 W HCPCS: Performed by: STUDENT IN AN ORGANIZED HEALTH CARE EDUCATION/TRAINING PROGRAM

## 2022-08-06 PROCEDURE — 99223 1ST HOSP IP/OBS HIGH 75: CPT | Mod: ,,, | Performed by: STUDENT IN AN ORGANIZED HEALTH CARE EDUCATION/TRAINING PROGRAM

## 2022-08-06 PROCEDURE — 20600001 HC STEP DOWN PRIVATE ROOM

## 2022-08-06 PROCEDURE — 86832 HLA CLASS I HIGH DEFIN QUAL: CPT | Performed by: STUDENT IN AN ORGANIZED HEALTH CARE EDUCATION/TRAINING PROGRAM

## 2022-08-06 PROCEDURE — 86977 RBC SERUM PRETX INCUBJ/INHIB: CPT | Mod: 59 | Performed by: STUDENT IN AN ORGANIZED HEALTH CARE EDUCATION/TRAINING PROGRAM

## 2022-08-06 PROCEDURE — 99223 PR INITIAL HOSPITAL CARE,LEVL III: ICD-10-PCS | Mod: ,,, | Performed by: STUDENT IN AN ORGANIZED HEALTH CARE EDUCATION/TRAINING PROGRAM

## 2022-08-06 RX ORDER — ONDANSETRON 2 MG/ML
4 INJECTION INTRAMUSCULAR; INTRAVENOUS EVERY 8 HOURS PRN
Status: DISCONTINUED | OUTPATIENT
Start: 2022-08-06 | End: 2022-08-10 | Stop reason: HOSPADM

## 2022-08-06 RX ORDER — TACROLIMUS 1 MG/1
3 CAPSULE ORAL EVERY MORNING
Status: DISCONTINUED | OUTPATIENT
Start: 2022-08-07 | End: 2022-08-06

## 2022-08-06 RX ORDER — IBUPROFEN 200 MG
24 TABLET ORAL
Status: DISCONTINUED | OUTPATIENT
Start: 2022-08-06 | End: 2022-08-10 | Stop reason: HOSPADM

## 2022-08-06 RX ORDER — SIMETHICONE 80 MG
1 TABLET,CHEWABLE ORAL 4 TIMES DAILY PRN
Status: DISCONTINUED | OUTPATIENT
Start: 2022-08-06 | End: 2022-08-10 | Stop reason: HOSPADM

## 2022-08-06 RX ORDER — NALOXONE HCL 0.4 MG/ML
0.02 VIAL (ML) INJECTION
Status: DISCONTINUED | OUTPATIENT
Start: 2022-08-06 | End: 2022-08-10 | Stop reason: HOSPADM

## 2022-08-06 RX ORDER — TACROLIMUS 1 MG/1
2 CAPSULE ORAL EVERY MORNING
Status: DISCONTINUED | OUTPATIENT
Start: 2022-08-07 | End: 2022-08-06

## 2022-08-06 RX ORDER — AMOXICILLIN 250 MG
1 CAPSULE ORAL 2 TIMES DAILY
Status: DISCONTINUED | OUTPATIENT
Start: 2022-08-06 | End: 2022-08-10 | Stop reason: HOSPADM

## 2022-08-06 RX ORDER — SODIUM CHLORIDE 0.9 % (FLUSH) 0.9 %
10 SYRINGE (ML) INJECTION EVERY 8 HOURS PRN
Status: DISCONTINUED | OUTPATIENT
Start: 2022-08-06 | End: 2022-08-10 | Stop reason: HOSPADM

## 2022-08-06 RX ORDER — PROCHLORPERAZINE EDISYLATE 5 MG/ML
5 INJECTION INTRAMUSCULAR; INTRAVENOUS EVERY 6 HOURS PRN
Status: DISCONTINUED | OUTPATIENT
Start: 2022-08-06 | End: 2022-08-10 | Stop reason: HOSPADM

## 2022-08-06 RX ORDER — TACROLIMUS 1 MG/1
2 CAPSULE ORAL EVERY EVENING
Status: DISCONTINUED | OUTPATIENT
Start: 2022-08-06 | End: 2022-08-06

## 2022-08-06 RX ORDER — IBUPROFEN 200 MG
16 TABLET ORAL
Status: DISCONTINUED | OUTPATIENT
Start: 2022-08-06 | End: 2022-08-10 | Stop reason: HOSPADM

## 2022-08-06 RX ORDER — IPRATROPIUM BROMIDE AND ALBUTEROL SULFATE 2.5; .5 MG/3ML; MG/3ML
3 SOLUTION RESPIRATORY (INHALATION) EVERY 6 HOURS PRN
Status: DISCONTINUED | OUTPATIENT
Start: 2022-08-06 | End: 2022-08-10 | Stop reason: HOSPADM

## 2022-08-06 RX ORDER — MUPIROCIN 20 MG/G
OINTMENT TOPICAL 2 TIMES DAILY
Status: DISCONTINUED | OUTPATIENT
Start: 2022-08-06 | End: 2022-08-10 | Stop reason: HOSPADM

## 2022-08-06 RX ORDER — CEFEPIME HYDROCHLORIDE 1 G/50ML
2 INJECTION, SOLUTION INTRAVENOUS
Status: DISCONTINUED | OUTPATIENT
Start: 2022-08-06 | End: 2022-08-07

## 2022-08-06 RX ORDER — ENOXAPARIN SODIUM 100 MG/ML
40 INJECTION SUBCUTANEOUS EVERY 12 HOURS
Status: DISCONTINUED | OUTPATIENT
Start: 2022-08-06 | End: 2022-08-09

## 2022-08-06 RX ORDER — GLUCAGON 1 MG
1 KIT INJECTION
Status: DISCONTINUED | OUTPATIENT
Start: 2022-08-06 | End: 2022-08-10 | Stop reason: HOSPADM

## 2022-08-06 RX ORDER — TACROLIMUS 1 MG/1
3 CAPSULE ORAL EVERY MORNING
Status: DISCONTINUED | OUTPATIENT
Start: 2022-08-07 | End: 2022-08-07

## 2022-08-06 RX ORDER — MAG HYDROX/ALUMINUM HYD/SIMETH 200-200-20
30 SUSPENSION, ORAL (FINAL DOSE FORM) ORAL 4 TIMES DAILY PRN
Status: DISCONTINUED | OUTPATIENT
Start: 2022-08-06 | End: 2022-08-10 | Stop reason: HOSPADM

## 2022-08-06 RX ORDER — TALC
6 POWDER (GRAM) TOPICAL NIGHTLY PRN
Status: DISCONTINUED | OUTPATIENT
Start: 2022-08-06 | End: 2022-08-10 | Stop reason: HOSPADM

## 2022-08-06 RX ORDER — ACETAMINOPHEN 325 MG/1
650 TABLET ORAL EVERY 8 HOURS PRN
Status: DISCONTINUED | OUTPATIENT
Start: 2022-08-06 | End: 2022-08-10 | Stop reason: HOSPADM

## 2022-08-06 RX ORDER — TACROLIMUS 1 MG/1
2 CAPSULE ORAL EVERY EVENING
Status: DISCONTINUED | OUTPATIENT
Start: 2022-08-06 | End: 2022-08-07

## 2022-08-06 RX ADMIN — SENNOSIDES AND DOCUSATE SODIUM 1 TABLET: 50; 8.6 TABLET ORAL at 09:08

## 2022-08-06 RX ADMIN — TACROLIMUS 2 MG: 1 CAPSULE ORAL at 05:08

## 2022-08-06 RX ADMIN — ENOXAPARIN SODIUM 40 MG: 100 INJECTION SUBCUTANEOUS at 09:08

## 2022-08-06 RX ADMIN — MUPIROCIN: 20 OINTMENT TOPICAL at 09:08

## 2022-08-06 RX ADMIN — CEFEPIME HYDROCHLORIDE 2 G: 2 INJECTION, SOLUTION INTRAVENOUS at 05:08

## 2022-08-06 NOTE — PROGRESS NOTES
Pharmacist Renal Dose Adjustment Note    Jordan Lopez Jr. is a 66 y.o. male being treated with the medication enoxaparin    Patient Data:    Vital Signs (Most Recent):  Temp: 98.1 °F (36.7 °C) (08/06/22 1400)  Pulse: 87 (08/06/22 1400)  Resp: 18 (08/06/22 1400)  BP: (!) 148/70 (08/06/22 1400)  SpO2: 96 % (08/06/22 1400)   Vital Signs (72h Range):  Temp:  [98.1 °F (36.7 °C)]   Pulse:  [87]   Resp:  [18]   BP: (148)/(70)   SpO2:  [96 %]      Recent Labs   Lab 08/06/22  1548   CREATININE 2.3*     Serum creatinine: 2.3 mg/dL (H) 08/06/22 1548  Estimated creatinine clearance: 44.7 mL/min (A)    Medication:enoxaparin q24 will be changed to medication: enoxaparin q12  Pharmacist's Name: Laura Gambino  Pharmacist's Extension: [phone]

## 2022-08-06 NOTE — ASSESSMENT & PLAN NOTE
Patient with acute kidney injury likely due to post-obstructive d/t bladder inflammation causing hydronephrosis MAYNOR is currently improving. Labs reviewed- Renal function/electrolytes with CrCl cannot be calculated (Patient's most recent lab result is older than the maximum 7 days allowed.). according to latest data. Monitor urine output and serial BMP and adjust therapy as needed. Avoid nephrotoxins and renally dose meds for GFR listed above.   OSH imaging mentioned hydronephrosis of both native and transplanted kidney  - Cr downtrended from 2.8 to 2.4 at OSH after patient received 2L fluids  - Repeat CMP ordered here  - Renal US ordered  - Urology consulted for possible hydro  - UA also pending  - Treating cystitis as above as well   (3) walks occasionally

## 2022-08-06 NOTE — ASSESSMENT & PLAN NOTE
Unclear cause, patient reports increasing dysuria and frequency since 8/2. He reports that he went to his PCP who diagnosed UTI and prescribed cefuroxime. He took it but on Thursday his wife noted he had a fever of 101 and he was feeling more lethargic. He presented to [facility] and was diagnosed with an MAYNOR in addition to cystitis. He received meropenem. He currently states dysuria is improved but still with frequent small voids. Has had hematuria starting today which is new  - Will switch to Cefepime  - Ordered UA here, will call micro lab at [facility] to get type of bacteria causing UTI

## 2022-08-06 NOTE — ASSESSMENT & PLAN NOTE
BL sCr 1.1-1.4 without significant sequelae of CKD at the moment  Was never on dialysis  No additional meds at this time

## 2022-08-06 NOTE — HPI
66-year-old male with a history of prostate cancer (completed radiation therapy August 2021), hepatocellular carcinoma, renal cell carcinoma treated with nephrectomy, liver/kidney transplant in 2012 (on mycophenolate/tacrolimus), hypertension, and diabetes presented to [place] ED on August 5 with fever and dysuria.  He noted urinary urgency, dysuria, and a sensation of urinary retention for 3 days.  He was recently started on oral cephalosporin, but he noted fever at home.  No chest pain or dyspnea noted.  With the evidence of MAYNOR (creatinine 2.8 with baseline 1.1-1.4), UTI, and possible obstruction, he received IV fluids and Rocephin.  He subsequently is receiving meropenem.  He is able to urinate.  AST and ALT were noted to be normal.  With the history of kidney and liver transplant, acute kidney injury, and concern for obstruction, they are requesting transfer for KTM and Urology evaluations.  Case discussed with KTM and Urology at [facility].  Will plan transfer to Hospital Medicine at [facility] for further treatment.     COVID vaccinated x4//COVID negative  August 5:  White blood cells 5.2, hemoglobin 12.2, hematocrit 35.8, platelets 135, sodium 133.7, potassium 4.1, chloride 102, CO2 25.2, BUN 42, creatinine 2.8, glucose 152, calcium 8.5, magnesium 2.1  Urinalysis had 300 protein, 150 glucose, large blood, moderate leukocyte esterase, greater than 182 RBC, greater than 182 WBC     CT abdomen and pelvis noted moderate hydroureteronephrosis of the transplanted kidney in the left side of the pelvis.  Milder hydroureteronephrosis of the native left kidney.  The etiology of the hydronephrosis appears to be significant bladder wall thickening with additional inflammatory changes noted around the bladder suggesting cystitis.  No radiodense ureteral calculi identified.      On arrival:    Patient feeling great. States he has not had a fever since yesterday. Reports improvement in dysuria, but still  having urinary urgency and feels as though he is incompletely voiding. He mentioned his Cr improving from 2.8 to 2.4.

## 2022-08-06 NOTE — SUBJECTIVE & OBJECTIVE
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 Followed by [facility]     Type 2 diabetes mellitus 2007    diet controlled        Past Surgical History:   Procedure Laterality Date    KIDNEY TRANSPLANT  6/29/2012    Combined liver-kidney transplant    LIVER TRANSPLANT  6/29/2012    Combined liver-kidney transplant    NEPHRECTOMY  2010    For stage 1 RCC right kidney (done at [facility])       Review of patient's allergies indicates:   Allergen Reactions    Levaquin [levofloxacin] Rash       No current facility-administered medications on file prior to encounter.     Current Outpatient Medications on File Prior to Encounter   Medication Sig    amLODIPine (NORVASC) 5 MG tablet Take 5 mg by mouth once daily.    CALCIUM CARBONATE/VITAMIN D3 (CALTRATE 600 + D ORAL) Take 1 tablet by mouth 2 (two) times daily.    famotidine (PEPCID) 20 MG tablet Take 20 mg by mouth nightly as needed for Heartburn.    furosemide (LASIX) 20 MG tablet Take 1 tablet (20 mg total) by mouth once daily.    INSULIN GLARGINE,HUM.REC.ANLOG (TOUJEO SOLOSTAR SUBQ) Inject 22 Units into the skin once daily.     losartan (COZAAR) 100 MG tablet Take 100 mg by mouth once daily.    losartan (COZAAR) 25 MG tablet take 1 tablet by mouth once daily (Patient not taking: Reported on 4/18/2022)    magnesium oxide (MAG-OX) 400 mg tablet Take 400 mg by mouth 2 (two) times daily.    MULTIVITAMIN W-MINERALS/LUTEIN (CENTRUM SILVER ORAL) Take 1 tablet by mouth Daily.    mycophenolate (CELLCEPT) 250 mg Cap Take 1 capsule (250 mg total) by mouth 2 (two) times daily.    OZEMPIC 0.25 mg or  "0.5 mg(2 mg/1.5 mL) pen injector Inject into the skin.    rosuvastatin (CRESTOR) 10 MG tablet Take 5 mg by mouth nightly.    tacrolimus (PROGRAF) 1 MG Cap TAKE 3 CAPSULES BY MOUTH EVERY MORNING AND 2 CAPSULES EVERY EVENING.    TRUEPLUS PEN NEEDLE 32 gauge x 5/32" Ndle      Family History       Problem Relation (Age of Onset)    Heart disease Father    Kidney disease Mother          Tobacco Use    Smoking status: Never Smoker    Smokeless tobacco: Never Used   Substance and Sexual Activity    Alcohol use: No     Comment: Perhaps 2-3 drinks per year, never a drinker    Drug use: No    Sexual activity: Not on file     Review of Systems   Constitutional:  Negative for appetite change, chills, diaphoresis, fatigue, fever and unexpected weight change.   HENT:  Negative for congestion, dental problem, ear pain, hearing loss, nosebleeds, rhinorrhea, sneezing, sore throat and trouble swallowing.    Eyes:  Negative for photophobia, pain, discharge and visual disturbance.   Respiratory:  Negative for cough, shortness of breath and wheezing.    Cardiovascular:  Negative for chest pain, palpitations and leg swelling.   Gastrointestinal:  Negative for abdominal distention, abdominal pain, blood in stool, constipation, diarrhea, nausea, rectal pain and vomiting.   Endocrine: Negative for cold intolerance, heat intolerance, polydipsia and polyuria.   Genitourinary:  Positive for difficulty urinating, dysuria, frequency, hematuria and urgency. Negative for decreased urine volume, flank pain, penile discharge, penile pain, scrotal swelling and testicular pain.   Musculoskeletal:  Negative for arthralgias, gait problem, joint swelling and myalgias.   Skin:  Negative for rash and wound.   Allergic/Immunologic: Negative for environmental allergies, food allergies and immunocompromised state.   Neurological:  Negative for dizziness, tremors, seizures, syncope, speech difficulty, weakness, light-headedness, numbness and headaches. "   Hematological:  Negative for adenopathy. Does not bruise/bleed easily.   Psychiatric/Behavioral:  Negative for agitation, confusion, hallucinations, sleep disturbance and suicidal ideas. The patient is not nervous/anxious.    Objective:     Vital Signs (Most Recent):    Vital Signs (24h Range):           There is no height or weight on file to calculate BMI.    Physical Exam  Constitutional:       General: He is not in acute distress.     Appearance: He is well-developed. He is not diaphoretic.   HENT:      Head: Normocephalic and atraumatic.   Eyes:      General: No scleral icterus.        Right eye: No discharge.         Left eye: No discharge.      Conjunctiva/sclera: Conjunctivae normal.      Pupils: Pupils are equal, round, and reactive to light.   Neck:      Thyroid: No thyromegaly.      Vascular: No JVD.      Trachea: No tracheal deviation.   Cardiovascular:      Rate and Rhythm: Normal rate and regular rhythm.      Heart sounds: Normal heart sounds. No murmur heard.    No friction rub. No gallop.   Pulmonary:      Effort: Pulmonary effort is normal. No respiratory distress.      Breath sounds: Normal breath sounds. No stridor. No wheezing or rales.   Abdominal:      General: Bowel sounds are normal. There is no distension.      Palpations: Abdomen is soft. There is no mass.      Tenderness: There is no abdominal tenderness. There is no guarding or rebound.      Hernia: No hernia is present.   Musculoskeletal:         General: No deformity. Normal range of motion.      Cervical back: Normal range of motion and neck supple.   Lymphadenopathy:      Cervical: No cervical adenopathy.   Skin:     General: Skin is warm and dry.      Findings: No rash.   Neurological:      Mental Status: He is alert and oriented to person, place, and time.      Cranial Nerves: No cranial nerve deficit.      Motor: No abnormal muscle tone.      Coordination: Coordination normal.      Deep Tendon Reflexes: Reflexes normal.    Psychiatric:         Behavior: Behavior normal.         Thought Content: Thought content normal.         Judgment: Judgment normal.         CRANIAL NERVES     CN III, IV, VI   Pupils are equal, round, and reactive to light.     Significant Labs: All pertinent labs within the past 24 hours have been reviewed from outside hospital.     [facility] labs pending    Significant Imaging: I have reviewed all pertinent imaging results/findings within the past 24 hours.

## 2022-08-06 NOTE — ASSESSMENT & PLAN NOTE
Baseline Cr 1.4. History of R nephrectomy for RCC and had SLK in 2012.  - See plan for MAYNOR, cr down to 2.4 at OSH before transfer from 2.8

## 2022-08-06 NOTE — ASSESSMENT & PLAN NOTE
On prograf 3mg AM and 2mg PM for history of SLK  - KTM and hepatology consulted  - Holding MMF  - Check daily prograf levels  - Continue 3/2 dosing for now

## 2022-08-06 NOTE — HPI
"Jordan Lopez is a 66 year old male with a history of prostate cancer s/p XRT and ADT completed 8/21, HCC, RCC s/p R nephrectomy, HTN, DM presenting with dysuria. Urology was consulted for hydronephrosis.     Patient states he has had urgency, dysuria, ISIS for 3 days. Subjective fevers at home. Denies nausea, vomiting.     On assessment, patient is AF, HDS. WBC wnl, Cr 2.8 (baseline 1.3). UA LE positive, no microscopy available for review. Per outside report "CT abdomen and pelvis noted moderate hydroureteronephrosis of the transplanted kidney in the left side of the pelvis.  Milder hydroureteronephrosis of the native left kidney...no calculi," these images are not available for review. Renal u/s shows absent R kidney, mild cortical thinning of L native kidney without hydronephrosis, mild hydronephrosis of the transplant kidney. Bladder does not appear distended on u/s.  "

## 2022-08-06 NOTE — ASSESSMENT & PLAN NOTE
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

## 2022-08-06 NOTE — ASSESSMENT & PLAN NOTE
Most likely secondary to bladder outlet obstruction  Symptoms of overflow  CT with hydro and radiation to prostate last year  Complicated by fever and UA consistent with infection  Order BC, repeat UA and UC  Order repeat US kidney  Would like to continue ceftriaxone for now and can tailor based on OSH Cx  Consult urology

## 2022-08-06 NOTE — H&P
[facility] - Transplant [facility] Medicine  History & Physical    Patient Name: Jordan Lopez Jr.  MRN: [id number]  Patient Class: IP- Inpatient  Admission Date: 8/6/2022  Attending Physician: Brian Li MD  Primary Care Provider: Jose Rafael Trujillo MD         Patient information was obtained from patient and ER records.     Subjective:     Principal Problem:Acute cystitis with hematuria    Chief Complaint: No chief complaint on file.       HPI: 66-year-old male with a history of prostate cancer (completed radiation therapy August 2021), hepatocellular carcinoma, renal cell carcinoma treated with nephrectomy, liver/kidney transplant in 2012 (on mycophenolate/tacrolimus), hypertension, and diabetes presented to [place] ED on August 5 with fever and dysuria.  He noted urinary urgency, dysuria, and a sensation of urinary retention for 3 days.  He was recently started on oral cephalosporin, but he noted fever at home.  No chest pain or dyspnea noted.  With the evidence of MAYNOR (creatinine 2.8 with baseline 1.1-1.4), UTI, and possible obstruction, he received IV fluids and Rocephin.  He subsequently is receiving meropenem.  He is able to urinate.  AST and ALT were noted to be normal.  With the history of kidney and liver transplant, acute kidney injury, and concern for obstruction, they are requesting transfer for KTM and Urology evaluations.  Case discussed with KTM and Urology at [facility].  Will plan transfer to Hospital Medicine at [facility] for further treatment.     COVID vaccinated x4//COVID negative  August 5:  White blood cells 5.2, hemoglobin 12.2, hematocrit 35.8, platelets 135, sodium 133.7, potassium 4.1, chloride 102, CO2 25.2, BUN 42, creatinine 2.8, glucose 152, calcium 8.5, magnesium 2.1  Urinalysis had 300 protein, 150 glucose, large blood, moderate leukocyte esterase, greater than 182 RBC, greater than 182 WBC     CT abdomen and pelvis noted moderate hydroureteronephrosis  of the transplanted kidney in the left side of the pelvis.  Milder hydroureteronephrosis of the native left kidney.  The etiology of the hydronephrosis appears to be significant bladder wall thickening with additional inflammatory changes noted around the bladder suggesting cystitis.  No radiodense ureteral calculi identified.      On arrival:    Patient feeling great. States he has not had a fever since yesterday. Reports improvement in dysuria, but still having urinary urgency and feels as though he is incompletely voiding. He mentioned his Cr improving from 2.8 to 2.4.      Past Medical History:   Diagnosis Date    Hypertension age 13    Kidney replaced by transplant 6/29/2012    Combined liver-kidney transplant    Liver cirrhosis secondary to STEVENS     Liver cirrhosis secondary to STEVENS (nonalcoholic steatohepatitis)     Liver replaced by transplant 6/29/2012    Combined liver-kidney transplant    Obesity     Personal history of renal cancer 1/28/2013    S/p nephrectomy over a yr ago. Followed by [facility]     Type 2 diabetes mellitus 2007    diet controlled        Past Surgical History:   Procedure Laterality Date    KIDNEY TRANSPLANT  6/29/2012    Combined liver-kidney transplant    LIVER TRANSPLANT  6/29/2012    Combined liver-kidney transplant    NEPHRECTOMY  2010    For stage 1 RCC right kidney (done at [facility])       Review of patient's allergies indicates:   Allergen Reactions    Levaquin [levofloxacin] Rash       No current facility-administered medications on file prior to encounter.     Current Outpatient Medications on File Prior to Encounter   Medication Sig    amLODIPine (NORVASC) 5 MG tablet Take 5 mg by mouth once daily.    CALCIUM CARBONATE/VITAMIN D3 (CALTRATE 600 + D ORAL) Take 1 tablet by mouth 2 (two) times daily.    famotidine (PEPCID) 20 MG tablet Take 20 mg by mouth nightly as needed for Heartburn.    furosemide (LASIX) 20 MG tablet Take 1 tablet (20 mg  "total) by mouth once daily.    INSULIN GLARGINE,HUM.REC.ANLOG (TOUJEO SOLOSTAR SUBQ) Inject 22 Units into the skin once daily.     losartan (COZAAR) 100 MG tablet Take 100 mg by mouth once daily.    losartan (COZAAR) 25 MG tablet take 1 tablet by mouth once daily (Patient not taking: Reported on 4/18/2022)    magnesium oxide (MAG-OX) 400 mg tablet Take 400 mg by mouth 2 (two) times daily.    MULTIVITAMIN W-MINERALS/LUTEIN (CENTRUM SILVER ORAL) Take 1 tablet by mouth Daily.    mycophenolate (CELLCEPT) 250 mg Cap Take 1 capsule (250 mg total) by mouth 2 (two) times daily.    OZEMPIC 0.25 mg or 0.5 mg(2 mg/1.5 mL) pen injector Inject into the skin.    rosuvastatin (CRESTOR) 10 MG tablet Take 5 mg by mouth nightly.    tacrolimus (PROGRAF) 1 MG Cap TAKE 3 CAPSULES BY MOUTH EVERY MORNING AND 2 CAPSULES EVERY EVENING.    TRUEPLUS PEN NEEDLE 32 gauge x 5/32" Ndle      Family History       Problem Relation (Age of Onset)    Heart disease Father    Kidney disease Mother          Tobacco Use    Smoking status: Never Smoker    Smokeless tobacco: Never Used   Substance and Sexual Activity    Alcohol use: No     Comment: Perhaps 2-3 drinks per year, never a drinker    Drug use: No    Sexual activity: Not on file     Review of Systems   Constitutional:  Negative for appetite change, chills, diaphoresis, fatigue, fever and unexpected weight change.   HENT:  Negative for congestion, dental problem, ear pain, hearing loss, nosebleeds, rhinorrhea, sneezing, sore throat and trouble swallowing.    Eyes:  Negative for photophobia, pain, discharge and visual disturbance.   Respiratory:  Negative for cough, shortness of breath and wheezing.    Cardiovascular:  Negative for chest pain, palpitations and leg swelling.   Gastrointestinal:  Negative for abdominal distention, abdominal pain, blood in stool, constipation, diarrhea, nausea, rectal pain and vomiting.   Endocrine: Negative for cold intolerance, heat intolerance, " polydipsia and polyuria.   Genitourinary:  Positive for difficulty urinating, dysuria, frequency, hematuria and urgency. Negative for decreased urine volume, flank pain, penile discharge, penile pain, scrotal swelling and testicular pain.   Musculoskeletal:  Negative for arthralgias, gait problem, joint swelling and myalgias.   Skin:  Negative for rash and wound.   Allergic/Immunologic: Negative for environmental allergies, food allergies and immunocompromised state.   Neurological:  Negative for dizziness, tremors, seizures, syncope, speech difficulty, weakness, light-headedness, numbness and headaches.   Hematological:  Negative for adenopathy. Does not bruise/bleed easily.   Psychiatric/Behavioral:  Negative for agitation, confusion, hallucinations, sleep disturbance and suicidal ideas. The patient is not nervous/anxious.    Objective:     Vital Signs (Most Recent):    Vital Signs (24h Range):           There is no height or weight on file to calculate BMI.    Physical Exam  Constitutional:       General: He is not in acute distress.     Appearance: He is well-developed. He is not diaphoretic.   HENT:      Head: Normocephalic and atraumatic.   Eyes:      General: No scleral icterus.        Right eye: No discharge.         Left eye: No discharge.      Conjunctiva/sclera: Conjunctivae normal.      Pupils: Pupils are equal, round, and reactive to light.   Neck:      Thyroid: No thyromegaly.      Vascular: No JVD.      Trachea: No tracheal deviation.   Cardiovascular:      Rate and Rhythm: Normal rate and regular rhythm.      Heart sounds: Normal heart sounds. No murmur heard.    No friction rub. No gallop.   Pulmonary:      Effort: Pulmonary effort is normal. No respiratory distress.      Breath sounds: Normal breath sounds. No stridor. No wheezing or rales.   Abdominal:      General: Bowel sounds are normal. There is no distension.      Palpations: Abdomen is soft. There is no mass.      Tenderness: There is no  abdominal tenderness. There is no guarding or rebound.      Hernia: No hernia is present.   Musculoskeletal:         General: No deformity. Normal range of motion.      Cervical back: Normal range of motion and neck supple.   Lymphadenopathy:      Cervical: No cervical adenopathy.   Skin:     General: Skin is warm and dry.      Findings: No rash.   Neurological:      Mental Status: He is alert and oriented to person, place, and time.      Cranial Nerves: No cranial nerve deficit.      Motor: No abnormal muscle tone.      Coordination: Coordination normal.      Deep Tendon Reflexes: Reflexes normal.   Psychiatric:         Behavior: Behavior normal.         Thought Content: Thought content normal.         Judgment: Judgment normal.         CRANIAL NERVES     CN III, IV, VI   Pupils are equal, round, and reactive to light.     Significant Labs: All pertinent labs within the past 24 hours have been reviewed from outside hospital.     [facility] labs pending    Significant Imaging: I have reviewed all pertinent imaging results/findings within the past 24 hours.    Assessment/Plan:     * Acute cystitis with hematuria  Unclear cause, patient reports increasing dysuria and frequency since 8/2. He reports that he went to his PCP who diagnosed UTI and prescribed cefuroxime. He took it but on Thursday his wife noted he had a fever of 101 and he was feeling more lethargic. He presented to [facility] and was diagnosed with an MAYNOR in addition to cystitis. He received meropenem. He currently states dysuria is improved but still with frequent small voids. Has had hematuria starting today which is new  - Will switch to Cefepime  - Ordered UA here, will call micro lab at [facility] to get type of bacteria causing UTI      MAYNOR (acute kidney injury)  Patient with acute kidney injury likely due to post-obstructive d/t bladder inflammation causing hydronephrosis MAYNOR is currently improving. Labs reviewed- Renal function/electrolytes  with CrCl cannot be calculated (Patient's most recent lab result is older than the maximum 7 days allowed.). according to latest data. Monitor urine output and serial BMP and adjust therapy as needed. Avoid nephrotoxins and renally dose meds for GFR listed above.   OSH imaging mentioned hydronephrosis of both native and transplanted kidney  - Cr downtrended from 2.8 to 2.4 at OSH after patient received 2L fluids  - Repeat CMP ordered here  - Renal US ordered  - Urology consulted for possible hydro  - UA also pending  - Treating cystitis as above as well    Other hydronephrosis  See MAYNOR      Prostate cancer  Last received radiation 8/23/21. Currently receiving leuprolide injections.       CKD (chronic kidney disease) stage 3, GFR 30-59 ml/min  Baseline Cr 1.4. History of R nephrectomy for RCC and had SLK in 2012.  - See plan for MAYNOR, cr down to 2.4 at OSH before transfer from .8      Need for prophylactic immunotherapy  On prograf 3mg AM and 2mg PM for history of SLK  - KTM and hepatology consulted  - Holding MMF  - Check daily prograf levels  - Continue 3/2 dosing for now      Hypertension  Holding home amlodipine and losartan while patient is acutely infected        VTE Risk Mitigation (From admission, onward)         Ordered     enoxaparin injection 40 mg  Daily         08/06/22 1510     IP VTE HIGH RISK PATIENT  Once         08/06/22 1510     Place sequential compression device  Until discontinued         08/06/22 1510                   Brian Li MD  Department of Hospital Medicine   [facility] - Transplant Stepdown

## 2022-08-06 NOTE — SUBJECTIVE & OBJECTIVE
Subjective:     History of Present Illness:   66 year old with SLKT 2012 and prostate cancer treated with radiation who [place] [facility] [place] evaluation of fever, elevated creatinine, and decreased urine output. He reports urine leakage starting 3-4 days prior to admission followed by dysuria 1-2 days prior to admission without resolution on cefuroxime, and fever 101 on day prior to admission despite ABX. Has associated gross hematuria on day before admission. Denies syncope, recent illness, chest pain, difficulty breathing, abdominal pain, nausea, vomiting, changes in bowel movements, hematochezia, melena  Kidney transplant medicine consulted for aid in management of a transplanted kidney with MAYNOR    Mr. Lopez is a 66 y.o. year old male who is status post Liver Transplant - 6/29/2012  (#1).        Past Medical and Surgical History: Mr. Lopez has a past medical history of Hypertension (age 13), Kidney replaced by transplant (6/29/2012), Liver cirrhosis secondary to STEVENS, Liver cirrhosis secondary to STEVENS (nonalcoholic steatohepatitis), Liver replaced by transplant (6/29/2012), Obesity, Personal history of renal cancer (1/28/2013), and Type 2 diabetes mellitus (2007).  He has a past surgical history that includes Liver transplant (6/29/2012); Kidney transplant (6/29/2012); and Nephrectomy (2010).    Past Social and Family History: Mr. Lopez reports that he has never smoked. He has never used smokeless tobacco. He reports that he does not drink alcohol and does not use drugs.His family history includes Heart disease in his father; Kidney disease in his mother.    Intake/Output - Last 3 Shifts       None             Review of Systems   Constitutional:  Positive for fever.   HENT: Negative.     Eyes: Negative.    Respiratory: Negative.     Cardiovascular: Negative.    Gastrointestinal: Negative.    Endocrine: Negative.    Genitourinary:  Positive for decreased urine volume, difficulty  urinating, dysuria, frequency and hematuria.   Musculoskeletal: Negative.    Skin: Negative.    Neurological: Negative.    Psychiatric/Behavioral: Negative.     Objective:     Vital Signs (Most Recent):    Vital Signs (24h Range):              There is no height or weight on file to calculate BMI.    Physical Exam  Constitutional:       General: He is not in acute distress.     Appearance: He is obese. He is not ill-appearing.   Eyes:      General: No scleral icterus.  Cardiovascular:      Rate and Rhythm: Normal rate.   Pulmonary:      Effort: Pulmonary effort is normal. No respiratory distress.   Abdominal:      General: There is no distension.      Palpations: Abdomen is soft.      Tenderness: There is no abdominal tenderness.      Comments: Suprapubic fullness, no pain   Musculoskeletal:      Right lower leg: No edema.      Left lower leg: No edema.   Skin:     Coloration: Skin is not jaundiced.   Neurological:      Mental Status: He is alert.       Significant Labs:  Labs within the past 24 hours have been reviewed.    Diagnostics:  None

## 2022-08-06 NOTE — CONSULTS
[facility] - Transplant Stepdown  Kidney Transplant  Consult Note    Inpatient consult to Kidney/Pancreas Transplant Medicine  Consult performed by: Jamal Hoffmann Jr., MD  Consult ordered by: Brian Li MD  Reason for consult: maynor ktx  Assessment/Recommendations: SLK 2012 see note for details            Subjective:     History of Present Illness:   66 year old with SLKT 2012 and prostate cancer treated with radiation who presented to [facility] [place] as transfer for evaluation of fever, elevated creatinine, and decreased urine output. He reports urine leakage starting 3-4 days prior to admission followed by dysuria 1-2 days prior to admission without resolution on cefuroxime, and fever 101 on day prior to admission despite ABX. Has associated gross hematuria on day before admission. Denies syncope, recent illness, chest pain, difficulty breathing, abdominal pain, nausea, vomiting, changes in bowel movements, hematochezia, melena  Kidney transplant medicine consulted for aid in management of a transplanted kidney with MAYNOR    Mr. Lopez is a 66 y.o. year old male who is status post Liver Transplant - 6/29/2012  (#1).        Past Medical and Surgical History: Mr. Lopez has a past medical history of Hypertension (age 13), Kidney replaced by transplant (6/29/2012), Liver cirrhosis secondary to STEVENS, Liver cirrhosis secondary to STEVENS (nonalcoholic steatohepatitis), Liver replaced by transplant (6/29/2012), Obesity, Personal history of renal cancer (1/28/2013), and Type 2 diabetes mellitus (2007).  He has a past surgical history that includes Liver transplant (6/29/2012); Kidney transplant (6/29/2012); and Nephrectomy (2010).    Past Social and Family History: Mr. Lopez reports that he has never smoked. He has never used smokeless tobacco. He reports that he does not drink alcohol and does not use drugs.His family history includes Heart disease in his father; Kidney disease in his mother.    Intake/Output - Last  "3 Shifts       None             Review of Systems   Constitutional:  Positive for fever.   HENT: Negative.     Eyes: Negative.    Respiratory: Negative.     Cardiovascular: Negative.    Gastrointestinal: Negative.    Endocrine: Negative.    Genitourinary:  Positive for decreased urine volume, difficulty urinating, dysuria, frequency and hematuria.   Musculoskeletal: Negative.    Skin: Negative.    Neurological: Negative.    Psychiatric/Behavioral: Negative.     Objective:     Vital Signs (Most Recent):    Vital Signs (24h Range):              There is no height or weight on file to calculate BMI.    Physical Exam  Constitutional:       General: He is not in acute distress.     Appearance: He is obese. He is not ill-appearing.   Eyes:      General: No scleral icterus.  Cardiovascular:      Rate and Rhythm: Normal rate.   Pulmonary:      Effort: Pulmonary effort is normal. No respiratory distress.   Abdominal:      General: There is no distension.      Palpations: Abdomen is soft.      Tenderness: There is no abdominal tenderness.      Comments: Suprapubic fullness, no pain   Musculoskeletal:      Right lower leg: No edema.      Left lower leg: No edema.   Skin:     Coloration: Skin is not jaundiced.   Neurological:      Mental Status: He is alert.       Significant Labs:  Labs within the past 24 hours have been reviewed.    Diagnostics:  None    Assessment/Plan:     * Acute cystitis with hematuria  Likely contributed to by urinary obstruction  See "MAYNOR"      MAYNOR (acute kidney injury)  Most likely secondary to bladder outlet obstruction  Symptoms of overflow  CT with hydro and radiation to prostate last year  Complicated by fever and UA consistent with infection  Order BC, repeat UA and UC  Order repeat US kidney  Would like to continue abx for now and can tailor based on OSH Cx  Consult urology    CKD (chronic kidney disease) stage 3, GFR 30-59 ml/min  BL sCr 1.1-1.4 without significant sequelae of CKD at the " "moment  Was never on dialysis  No additional meds at this time    Prophylactic immunotherapy  Continue immunosuppression as per problem "Long-term use of immunosuppressant medication"      Long-term use of immunosuppressant medication  At home on tacro 3/2, mmf 250/250  Hold mmf  Cont tacro 3/2  Monitor for toxicity       Kidney replaced by transplant  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

## 2022-08-06 NOTE — HPI
66 year old with SLKT 2012 and prostate cancer treated with radiation [place] [facility] [place] of fever, elevated creatinine, and decreased urine output. He reports urine leakage starting 3-4 days prior to admission followed by dysuria 1-2 days prior to admission without resolution on cefuroxime, and fever 101 on day prior to admission despite ABX. Has associated gross hematuria on day before admission. Denies syncope, recent illness, chest pain, difficulty breathing, abdominal pain, nausea, vomiting, changes in bowel movements, hematochezia, melena  Kidney transplant medicine consulted for aid in management of a transplanted kidney with MAYNOR

## 2022-08-06 NOTE — PROVIDER TRANSFER
Outside Transfer Acceptance Note / Regional Referral Center    Referring facility: [facility]   Referring provider: ALEX OSORIO  Accepting facility: [facility]  Accepting provider: LUZ BROWN  Reason for transfer: Established Provider   Transfer diagnosis: MAYNOR/UTI/kidney and liver transplant  Transfer specialty requested: KTM/Urology  Transfer specialty notified: yes  Transfer level: NUMBER 1-5: 2  Bed type requested: stepdown  Isolation status: No active isolations   Admission class or status: IP- Inpatient      Narrative     66-year-old male with a history of prostate cancer (completed radiation therapy August 2021), hepatocellular carcinoma, renal cell carcinoma treated with nephrectomy, liver/kidney transplant in 2012 (on mycophenolate/tacrolimus), hypertension, and diabetes presented to [facility] on August 5 with fever and dysuria.  He noted urinary urgency, dysuria, and a sensation of urinary retention for 3 days.  He was recently started on oral cephalosporin, but he noted fever at home.  No chest pain or dyspnea noted.  With the evidence of MAYNOR (creatinine 2.8 with baseline 1.1-1.4), UTI, and possible obstruction, he received IV fluids and Rocephin.  He subsequently is receiving meropenem.  He is able to urinate.  AST and ALT were noted to be normal.  With the history of kidney and liver transplant, acute kidney injury, and concern for obstruction, they are requesting transfer for KTM and Urology evaluations.  Case discussed with KTM and Urology at [facility].  Will plan transfer to Hospital Medicine at [facility] [address] for further treatment.    COVID vaccinated x4//COVID negative  August 5:  White blood cells 5.2, hemoglobin 12.2, hematocrit 35.8, platelets 135, sodium 133.7, potassium 4.1, chloride 102, CO2 25.2, BUN 42, creatinine 2.8, glucose 152, calcium 8.5, magnesium 2.1  Urinalysis had 300 protein, 150 glucose, large blood,  moderate leukocyte esterase, greater than 182 RBC, greater than 182 WBC    CT abdomen and pelvis noted moderate hydroureteronephrosis of the transplanted kidney in the left side of the pelvis.  Milder hydroureteronephrosis of the native left kidney.  The etiology of the hydronephrosis appears to be significant bladder wall thickening with additional inflammatory changes noted around the bladder suggesting cystitis.  No radiodense ureteral calculi identified.    Objective     Vitals:   Temperature 99.9°, pulse 82, respirations 20, blood pressure 141/64, oxygen saturation 97% on room air    Allergies:   Review of patient's allergies indicates:   Allergen Reactions    Levaquin [levofloxacin] Rash      NPO: No       Instructions    1. Admit to  Hospital Medicine  2. Consult KTM      Upon patient arrival to floor, please send SecureChat to [facility] or call extension [phone] (if no answer, do NOT leave a callback number after the beep, rather please send a SecureChat to [facility]), for Hospital Medicine admit team assignment and for additional admit orders for the patient.  Do not page the attending physician associated with the patient on arrival (this physician may not be on duty at the time of arrival).  Rather, always send a SecureChat to [facility] or call [phone] to reach the triage physician for orders and team assignment.    Upon patient arrival to floor, please contact Hospital Medicine on call.     DEONNA Henderson MD  Hospital Medicine Staff  Cell: [phone]

## 2022-08-06 NOTE — ASSESSMENT & PLAN NOTE
Last received radiation 8/23/21. Currently receiving leuprolide injections.      Ideal body weight was utilized to determine the target body weight for ordered crystalloid fluids.

## 2022-08-07 LAB
ALBUMIN SERPL BCP-MCNC: 2.8 G/DL (ref 3.5–5.2)
ALP SERPL-CCNC: 54 U/L (ref 55–135)
ALT SERPL W/O P-5'-P-CCNC: 16 U/L (ref 10–44)
ANION GAP SERPL CALC-SCNC: 11 MMOL/L (ref 8–16)
ANISOCYTOSIS BLD QL SMEAR: SLIGHT
AST SERPL-CCNC: 18 U/L (ref 10–40)
BASOPHILS # BLD AUTO: 0.06 K/UL (ref 0–0.2)
BASOPHILS NFR BLD: 1.5 % (ref 0–1.9)
BILIRUB SERPL-MCNC: 0.6 MG/DL (ref 0.1–1)
BUN SERPL-MCNC: 41 MG/DL (ref 8–23)
CALCIUM SERPL-MCNC: 8.8 MG/DL (ref 8.7–10.5)
CHLORIDE SERPL-SCNC: 101 MMOL/L (ref 95–110)
CO2 SERPL-SCNC: 18 MMOL/L (ref 23–29)
CREAT SERPL-MCNC: 2.1 MG/DL (ref 0.5–1.4)
CREAT UR-MCNC: 86 MG/DL (ref 23–375)
DIFFERENTIAL METHOD: ABNORMAL
EOSINOPHIL # BLD AUTO: 0 K/UL (ref 0–0.5)
EOSINOPHIL NFR BLD: 0.2 % (ref 0–8)
ERYTHROCYTE [DISTWIDTH] IN BLOOD BY AUTOMATED COUNT: 14.5 % (ref 11.5–14.5)
EST. GFR  (NO RACE VARIABLE): 33.7 ML/MIN/1.73 M^2
GLUCOSE SERPL-MCNC: 120 MG/DL (ref 70–110)
HCT VFR BLD AUTO: 34.1 % (ref 40–54)
HGB BLD-MCNC: 11.8 G/DL (ref 14–18)
IMM GRANULOCYTES # BLD AUTO: 0.14 K/UL (ref 0–0.04)
IMM GRANULOCYTES NFR BLD AUTO: 3.4 % (ref 0–0.5)
LYMPHOCYTES # BLD AUTO: 0.9 K/UL (ref 1–4.8)
LYMPHOCYTES NFR BLD: 22.7 % (ref 18–48)
MAGNESIUM SERPL-MCNC: 2 MG/DL (ref 1.6–2.6)
MCH RBC QN AUTO: 27.8 PG (ref 27–31)
MCHC RBC AUTO-ENTMCNC: 34.6 G/DL (ref 32–36)
MCV RBC AUTO: 80 FL (ref 82–98)
MONOCYTES # BLD AUTO: 0.4 K/UL (ref 0.3–1)
MONOCYTES NFR BLD: 9.3 % (ref 4–15)
NEUTROPHILS # BLD AUTO: 2.6 K/UL (ref 1.8–7.7)
NEUTROPHILS NFR BLD: 62.9 % (ref 38–73)
NRBC BLD-RTO: 0 /100 WBC
PHOSPHATE SERPL-MCNC: 4 MG/DL (ref 2.7–4.5)
PLATELET # BLD AUTO: 99 K/UL (ref 150–450)
PLATELET BLD QL SMEAR: ABNORMAL
PMV BLD AUTO: 11.7 FL (ref 9.2–12.9)
POIKILOCYTOSIS BLD QL SMEAR: SLIGHT
POTASSIUM SERPL-SCNC: 4.6 MMOL/L (ref 3.5–5.1)
PROT SERPL-MCNC: 6.4 G/DL (ref 6–8.4)
PROT UR-MCNC: 299 MG/DL (ref 0–15)
PROT/CREAT UR: 3.48 MG/G{CREAT} (ref 0–0.2)
RBC # BLD AUTO: 4.24 M/UL (ref 4.6–6.2)
SODIUM SERPL-SCNC: 130 MMOL/L (ref 136–145)
TACROLIMUS BLD-MCNC: 3.3 NG/ML (ref 5–15)
TACROLIMUS BLD-MCNC: 8.7 NG/ML (ref 5–15)
WBC # BLD AUTO: 4.09 K/UL (ref 3.9–12.7)

## 2022-08-07 PROCEDURE — 99223 1ST HOSP IP/OBS HIGH 75: CPT | Mod: GC,,, | Performed by: INTERNAL MEDICINE

## 2022-08-07 PROCEDURE — 82570 ASSAY OF URINE CREATININE: CPT | Performed by: STUDENT IN AN ORGANIZED HEALTH CARE EDUCATION/TRAINING PROGRAM

## 2022-08-07 PROCEDURE — 99233 SBSQ HOSP IP/OBS HIGH 50: CPT | Mod: ,,, | Performed by: STUDENT IN AN ORGANIZED HEALTH CARE EDUCATION/TRAINING PROGRAM

## 2022-08-07 PROCEDURE — 85025 COMPLETE CBC W/AUTO DIFF WBC: CPT | Performed by: STUDENT IN AN ORGANIZED HEALTH CARE EDUCATION/TRAINING PROGRAM

## 2022-08-07 PROCEDURE — 20600001 HC STEP DOWN PRIVATE ROOM

## 2022-08-07 PROCEDURE — 83735 ASSAY OF MAGNESIUM: CPT | Performed by: STUDENT IN AN ORGANIZED HEALTH CARE EDUCATION/TRAINING PROGRAM

## 2022-08-07 PROCEDURE — 99223 1ST HOSP IP/OBS HIGH 75: CPT | Mod: GC,,, | Performed by: STUDENT IN AN ORGANIZED HEALTH CARE EDUCATION/TRAINING PROGRAM

## 2022-08-07 PROCEDURE — 63600175 PHARM REV CODE 636 W HCPCS: Performed by: STUDENT IN AN ORGANIZED HEALTH CARE EDUCATION/TRAINING PROGRAM

## 2022-08-07 PROCEDURE — 80053 COMPREHEN METABOLIC PANEL: CPT | Performed by: STUDENT IN AN ORGANIZED HEALTH CARE EDUCATION/TRAINING PROGRAM

## 2022-08-07 PROCEDURE — 25000003 PHARM REV CODE 250: Performed by: STUDENT IN AN ORGANIZED HEALTH CARE EDUCATION/TRAINING PROGRAM

## 2022-08-07 PROCEDURE — 36415 COLL VENOUS BLD VENIPUNCTURE: CPT | Performed by: STUDENT IN AN ORGANIZED HEALTH CARE EDUCATION/TRAINING PROGRAM

## 2022-08-07 PROCEDURE — 99233 PR SUBSEQUENT HOSPITAL CARE,LEVL III: ICD-10-PCS | Mod: ,,, | Performed by: STUDENT IN AN ORGANIZED HEALTH CARE EDUCATION/TRAINING PROGRAM

## 2022-08-07 PROCEDURE — 99223 PR INITIAL HOSPITAL CARE,LEVL III: ICD-10-PCS | Mod: GC,,, | Performed by: STUDENT IN AN ORGANIZED HEALTH CARE EDUCATION/TRAINING PROGRAM

## 2022-08-07 PROCEDURE — 99223 PR INITIAL HOSPITAL CARE,LEVL III: ICD-10-PCS | Mod: GC,,, | Performed by: INTERNAL MEDICINE

## 2022-08-07 PROCEDURE — 80197 ASSAY OF TACROLIMUS: CPT | Performed by: STUDENT IN AN ORGANIZED HEALTH CARE EDUCATION/TRAINING PROGRAM

## 2022-08-07 PROCEDURE — 84100 ASSAY OF PHOSPHORUS: CPT | Performed by: STUDENT IN AN ORGANIZED HEALTH CARE EDUCATION/TRAINING PROGRAM

## 2022-08-07 PROCEDURE — 87799 DETECT AGENT NOS DNA QUANT: CPT | Performed by: STUDENT IN AN ORGANIZED HEALTH CARE EDUCATION/TRAINING PROGRAM

## 2022-08-07 PROCEDURE — 25000003 PHARM REV CODE 250: Performed by: INTERNAL MEDICINE

## 2022-08-07 PROCEDURE — 25000003 PHARM REV CODE 250: Performed by: HOSPITALIST

## 2022-08-07 RX ORDER — OXYBUTYNIN CHLORIDE 5 MG/1
5 TABLET ORAL 3 TIMES DAILY
Status: COMPLETED | OUTPATIENT
Start: 2022-08-07 | End: 2022-08-08

## 2022-08-07 RX ORDER — TACROLIMUS 1 MG/1
3 CAPSULE ORAL EVERY MORNING
Status: DISCONTINUED | OUTPATIENT
Start: 2022-08-08 | End: 2022-08-08

## 2022-08-07 RX ORDER — CEFUROXIME AXETIL 500 MG/1
500 TABLET ORAL 2 TIMES DAILY
Status: ON HOLD | COMMUNITY
Start: 2022-08-04 | End: 2022-08-10 | Stop reason: HOSPADM

## 2022-08-07 RX ORDER — ACETAMINOPHEN 500 MG
1000 TABLET ORAL 2 TIMES DAILY PRN
COMMUNITY
End: 2023-11-16

## 2022-08-07 RX ORDER — SODIUM CHLORIDE 9 MG/ML
INJECTION, SOLUTION INTRAVENOUS CONTINUOUS
Status: ACTIVE | OUTPATIENT
Start: 2022-08-07 | End: 2022-08-08

## 2022-08-07 RX ORDER — AMLODIPINE BESYLATE 10 MG/1
10 TABLET ORAL DAILY
Status: DISCONTINUED | OUTPATIENT
Start: 2022-08-07 | End: 2022-08-10 | Stop reason: HOSPADM

## 2022-08-07 RX ORDER — SODIUM BICARBONATE 650 MG/1
1300 TABLET ORAL 3 TIMES DAILY
Status: DISCONTINUED | OUTPATIENT
Start: 2022-08-07 | End: 2022-08-10 | Stop reason: HOSPADM

## 2022-08-07 RX ORDER — TACROLIMUS 1 MG/1
3 CAPSULE ORAL EVERY EVENING
Status: DISCONTINUED | OUTPATIENT
Start: 2022-08-07 | End: 2022-08-08

## 2022-08-07 RX ADMIN — SODIUM BICARBONATE 650 MG TABLET 1300 MG: at 04:08

## 2022-08-07 RX ADMIN — AMLODIPINE BESYLATE 10 MG: 10 TABLET ORAL at 08:08

## 2022-08-07 RX ADMIN — ENOXAPARIN SODIUM 40 MG: 100 INJECTION SUBCUTANEOUS at 08:08

## 2022-08-07 RX ADMIN — TACROLIMUS 3 MG: 1 CAPSULE ORAL at 08:08

## 2022-08-07 RX ADMIN — TACROLIMUS 3 MG: 1 CAPSULE ORAL at 05:08

## 2022-08-07 RX ADMIN — OXYBUTYNIN CHLORIDE 5 MG: 5 TABLET ORAL at 08:08

## 2022-08-07 RX ADMIN — SODIUM BICARBONATE 650 MG TABLET 1300 MG: at 08:08

## 2022-08-07 RX ADMIN — MUPIROCIN: 20 OINTMENT TOPICAL at 08:08

## 2022-08-07 RX ADMIN — SODIUM CHLORIDE: 0.9 INJECTION, SOLUTION INTRAVENOUS at 04:08

## 2022-08-07 RX ADMIN — SENNOSIDES AND DOCUSATE SODIUM 1 TABLET: 50; 8.6 TABLET ORAL at 08:08

## 2022-08-07 RX ADMIN — CEFTRIAXONE 1 G: 1 INJECTION, SOLUTION INTRAVENOUS at 05:08

## 2022-08-07 NOTE — PHARMACY MED REC
"Admission Medication History     The home medication history was taken by Minda Mcqueen.    You may go to "Admission" then "Reconcile Home Medications" tabs to review and/or act upon these items.      The home medication list has been updated by the Pharmacy department.    Please read ALL comments highlighted in yellow.    Please address this information as you see fit.     Feel free to contact us if you have any questions or require assistance.        Medications listed below were obtained from: Patient/family    PTA Medications   Medication Sig    acetaminophen (TYLENOL) 500 MG tablet   Take 1,000 mg by mouth 2 (two) times daily as needed for Pain.    amLODIPine (NORVASC) 5 MG tablet   Take 5 mg by mouth once daily.    CALCIUM CARBONATE/VITAMIN D3 (CALTRATE 600 + D ORAL)   Take 1 tablet by mouth 2 (two) times daily.    cefUROXime (CEFTIN) 500 MG tablet   Take 500 mg by mouth 2 (two) times daily. For 10 days    famotidine (PEPCID) 20 MG tablet   Take 40 mg by mouth once daily.    furosemide (LASIX) 20 MG tablet   Take 1 tablet (20 mg total) by mouth once daily.    INSULIN GLARGINE,HUM.REC.ANLOG (TOUJEO SOLOSTAR SUBQ)   Inject 28 Units into the skin once daily.    losartan (COZAAR) 100 MG tablet   Take 100 mg by mouth once daily.    magnesium oxide (MAG-OX) 400 mg tablet   Take 800 mg by mouth once daily.    MULTIVITAMIN W-MINERALS/LUTEIN (CENTRUM SILVER ORAL)   Take 1 tablet by mouth Daily.    mycophenolate (CELLCEPT) 250 mg Cap   Take 1 capsule (250 mg total) by mouth 2 (two) times daily.    propylene glycol (SYSTANE BALANCE OPHT)   Apply 1-2 drops to eye daily as needed (dry eyes).    rosuvastatin (CRESTOR) 5 MG tablet   Take 5 mg by mouth nightly.    semaglutide (OZEMPIC) 1 mg/dose (4 mg/3 mL)   Inject 1 mg into the skin every Thursday.    tacrolimus (PROGRAF) 1 MG Cap   TAKE 3 CAPSULES BY MOUTH EVERY MORNING AND 2 CAPSULES EVERY EVENING.    TRUEPLUS PEN NEEDLE 32 gauge x 5/32" Ndle  "         Minda Mcqueen  EXT [phone]                  .

## 2022-08-07 NOTE — SUBJECTIVE & OBJECTIVE
Interval History: NAEON. AFVSS.  Catheter placed yesterday evening. Urine light pink without clots.  Creatinine mildly improved from yesterday now 2.1 from 2.3.      Objective:     Temp:  [98.1 °F (36.7 °C)-99.6 °F (37.6 °C)] 98.5 °F (36.9 °C)  Pulse:  [] 86  Resp:  [16-18] 16  SpO2:  [92 %-97 %] 92 %  BP: (137-161)/(69-75) 137/69     Body mass index is 42.01 kg/m².      Bladder Scan Volume (mL): 64 mL (08/06/22 2200)  Post Void Cath Residual Output (mL): 48 mL (08/06/22 2200)    Drains       Drain  Duration                  Urethral Catheter 08/07/22 0014 <1 day                    Physical Exam  Vitals reviewed.   Constitutional:       General: He is not in acute distress.     Appearance: He is well-developed. He is not diaphoretic.   HENT:      Head: Normocephalic and atraumatic.   Eyes:      General: No scleral icterus.  Cardiovascular:      Rate and Rhythm: Normal rate.   Pulmonary:      Effort: Pulmonary effort is normal. No respiratory distress.      Breath sounds: No stridor.   Abdominal:      General: There is no distension.      Palpations: Abdomen is soft.      Tenderness: There is no abdominal tenderness. There is no right CVA tenderness or left CVA tenderness.   Genitourinary:     Comments: Catheter in place with light pink urine  Musculoskeletal:         General: Normal range of motion.      Cervical back: Normal range of motion.   Skin:     General: Skin is warm and dry.   Neurological:      Mental Status: He is alert.   Psychiatric:         Behavior: Behavior normal.       Significant Labs:    BMP:  Recent Labs   Lab 08/06/22  1548 08/07/22  0737   * 130*   K 4.5 4.6    101   CO2 21* 18*   BUN 39* 41*   CREATININE 2.3* 2.1*   CALCIUM 9.0 8.8       CBC:   Recent Labs   Lab 08/06/22  1548   WBC 3.67*   HGB 12.1*   HCT 35.9*   *       All pertinent labs results from the past 24 hours have been reviewed.    Significant Imaging:  All pertinent imaging results/findings from the past  24 hours have been reviewed.

## 2022-08-07 NOTE — ASSESSMENT & PLAN NOTE
Unclear cause, patient reports increasing dysuria and frequency since 8/2. He reports that he went to his PCP who diagnosed UTI and prescribed cefuroxime. He took it but on Thursday his wife noted he had a fever of 101 and he was feeling more lethargic. He presented to [facility] and was diagnosed with an MAYNOR in addition to cystitis. He received meropenem. He currently states dysuria is improved but still with frequent small voids. Has had hematuria starting today which is new  - Continue Cefepime for now, day 3 of treatment for cystitis.  - UA here without any bacteria. UCx from [facility] not growing any bacteria currently. Will reach out to patient's PCP's office on Monday to assess if UCx was performed there.

## 2022-08-07 NOTE — PROGRESS NOTES
[facility] - Transplant [facility] Medicine  Progress Note    Patient Name: Jordan Lopez Jr.  MRN: [id number]  Patient Class: IP- Inpatient   Admission Date: 8/6/2022  Length of Stay: 1 days  Attending Physician: Brian Li MD  Primary Care Provider: Jose Rafael Trujillo MD        Subjective:     Principal Problem:Acute cystitis with hematuria        HPI:  66-year-old male with a history of prostate cancer (completed radiation therapy August 2021), hepatocellular carcinoma, renal cell carcinoma treated with nephrectomy, liver/kidney transplant in 2012 (on mycophenolate/tacrolimus), hypertension, and diabetes presented to [place] ED on August 5 with fever and dysuria.  He noted urinary urgency, dysuria, and a sensation of urinary retention for 3 days.  He was recently started on oral cephalosporin, but he noted fever at home.  No chest pain or dyspnea noted.  With the evidence of MAYNOR (creatinine 2.8 with baseline 1.1-1.4), UTI, and possible obstruction, he received IV fluids and Rocephin.  He subsequently is receiving meropenem.  He is able to urinate.  AST and ALT were noted to be normal.  With the history of kidney and liver transplant, acute kidney injury, and concern for obstruction, they are requesting transfer for KTM and Urology evaluations.  Case discussed with KTM and Urology at [facility].  Will plan transfer to Hospital Medicine at [facility] for further treatment.     COVID vaccinated x4//COVID negative  August 5:  White blood cells 5.2, hemoglobin 12.2, hematocrit 35.8, platelets 135, sodium 133.7, potassium 4.1, chloride 102, CO2 25.2, BUN 42, creatinine 2.8, glucose 152, calcium 8.5, magnesium 2.1  Urinalysis had 300 protein, 150 glucose, large blood, moderate leukocyte esterase, greater than 182 RBC, greater than 182 WBC     CT abdomen and pelvis noted moderate hydroureteronephrosis of the transplanted kidney in the left side of the pelvis.  Milder hydroureteronephrosis of  the native left kidney.  The etiology of the hydronephrosis appears to be significant bladder wall thickening with additional inflammatory changes noted around the bladder suggesting cystitis.  No radiodense ureteral calculi identified.      On arrival:    Patient feeling great. States he has not had a fever since yesterday. Reports improvement in dysuria, but still having urinary urgency and feels as though he is incompletely voiding. He mentioned his Cr improving from 2.8 to 2.4.      Overview/Hospital Course:  Patient had crowley placed yesterday and had 40cc light pink urine return. Since, he reports continued UOP. Urology not recommending any current interventions for hematuria. [facility] reports no growth on Ucx there.      Interval History: No issues overnight. Denies any pain with current crowley. States that he has continued to produce pink urine since insertion. Denies any fever/chills. Cr downtrending.    Review of Systems   Constitutional:  Negative for chills and fever.   HENT:  Negative for congestion and sore throat.    Eyes:  Negative for photophobia and visual disturbance.   Respiratory:  Negative for cough and shortness of breath.    Cardiovascular:  Negative for chest pain and palpitations.   Gastrointestinal:  Negative for abdominal pain, blood in stool, constipation, diarrhea, nausea and vomiting.   Endocrine: Negative for cold intolerance and heat intolerance.   Genitourinary:  Negative for dysuria and hematuria.   Musculoskeletal:  Negative for arthralgias and myalgias.   Skin:  Negative for rash and wound.   Allergic/Immunologic: Negative for environmental allergies and food allergies.   Neurological:  Negative for seizures and numbness.   Hematological:  Negative for adenopathy. Does not bruise/bleed easily.   Psychiatric/Behavioral:  Negative for hallucinations and suicidal ideas.    Objective:     Vital Signs (Most Recent):  Temp: 98.5 °F (36.9 °C) (08/07/22 0742)  Pulse: 86 (08/07/22  0742)  Resp: 16 (08/07/22 0742)  BP: 137/69 (08/07/22 0742)  SpO2: (!) 92 % (08/07/22 0742)   Vital Signs (24h Range):  Temp:  [98.1 °F (36.7 °C)-99.6 °F (37.6 °C)] 98.5 °F (36.9 °C)  Pulse:  [] 86  Resp:  [16-18] 16  SpO2:  [92 %-97 %] 92 %  BP: (137-161)/(69-75) 137/69     Weight: (!) 140.5 kg (309 lb 11.9 oz)  Body mass index is 42.01 kg/m².    Intake/Output Summary (Last 24 hours) at 8/7/2022 1107  Last data filed at 8/7/2022 0500  Gross per 24 hour   Intake 120 ml   Output 868 ml   Net -748 ml      Physical Exam  Constitutional:       Appearance: He is well-developed.   HENT:      Head: Normocephalic and atraumatic.   Eyes:      Conjunctiva/sclera: Conjunctivae normal.      Pupils: Pupils are equal, round, and reactive to light.   Neck:      Thyroid: No thyromegaly.      Vascular: No JVD.   Cardiovascular:      Rate and Rhythm: Normal rate and regular rhythm.      Heart sounds: Normal heart sounds. No murmur heard.    No friction rub. No gallop.   Pulmonary:      Effort: Pulmonary effort is normal. No respiratory distress.      Breath sounds: Normal breath sounds. No wheezing or rales.   Abdominal:      General: Bowel sounds are normal. There is no distension.      Palpations: Abdomen is soft. There is no mass.      Tenderness: There is no abdominal tenderness. There is no guarding or rebound.      Hernia: No hernia is present.   Musculoskeletal:         General: No deformity. Normal range of motion.      Cervical back: Normal range of motion and neck supple.   Skin:     General: Skin is warm and dry.   Neurological:      Mental Status: He is alert and oriented to person, place, and time.      Cranial Nerves: No cranial nerve deficit.   Psychiatric:         Behavior: Behavior normal.       Significant Labs: All pertinent labs within the past 24 hours have been reviewed.  CBC:   Recent Labs   Lab 08/06/22  1548 08/07/22  0737   WBC 3.67* 4.09   HGB 12.1* 11.8*   HCT 35.9* 34.1*   * 99*     CMP:    Recent Labs   Lab 08/06/22  1548 08/07/22  0737   * 130*   K 4.5 4.6    101   CO2 21* 18*   * 120*   BUN 39* 41*   CREATININE 2.3* 2.1*   CALCIUM 9.0 8.8   PROT 6.8 6.4   ALBUMIN 3.1* 2.8*   BILITOT 0.7 0.6   ALKPHOS 58 54*   AST 15 18   ALT 13 16   ANIONGAP 10 11       Significant Imaging: I have reviewed all pertinent imaging results/findings within the past 24 hours.      Assessment/Plan:      * Acute cystitis with hematuria  Unclear cause, patient reports increasing dysuria and frequency since 8/2. He reports that he went to his PCP who diagnosed UTI and prescribed cefuroxime. He took it but on Thursday his wife noted he had a fever of 101 and he was feeling more lethargic. He presented to [facility] and was diagnosed with an MAYNOR in addition to cystitis. He received meropenem. He currently states dysuria is improved but still with frequent small voids. Has had hematuria starting today which is new  - No growth at [place] UCx, will de-escalate to ceftriaxone today, day 3 of treatment for cystitis.  - UA here without any bacteria. UCx from [place] not growing any bacteria either. Will reach out to patient's PCP's office on Monday to assess if UCx was performed there.       MAYNOR (acute kidney injury)  Patient with acute kidney injury likely due to post-obstructive d/t bladder inflammation causing hydronephrosis MAYNOR is currently improving. Labs reviewed- Renal function/electrolytes with Estimated Creatinine Clearance: 49 mL/min (A) (based on SCr of 2.1 mg/dL (H)). according to latest data. Monitor urine output and serial BMP and adjust therapy as needed. Avoid nephrotoxins and renally dose meds for GFR listed above.   OSH imaging mentioned hydronephrosis of both native and transplanted kidney. Cr downtrended from 2.8 to 2.4 at OSH after patient received 2L fluids  - Cr down to 2.1 today.  - Renal US showing mod hydronephrosis of transplant kidney. Carlos placed 8/6 under recommendation  of urology  - Urology consulted, no surgical plans for hematuria at this time.   - Treating cystitis as above as well    Other hydronephrosis  See MAYNOR      Prostate cancer  Last received radiation 8/23/21. Currently receiving leuprolide injections.       CKD (chronic kidney disease) stage 3, GFR 30-59 ml/min  Baseline Cr 1.4. History of R nephrectomy for RCC and had SLK in 2012.  - See plan for MAYNOR, cr down to 2.4 at OSH before transfer from .      Prophylactic immunotherapy  On prograf 3mg AM and 2mg PM for history of SLK  - KTM and hepatology consulted  - Holding MMF  - Check daily prograf levels  - Continue 3/2 dosing for now      Hypertension  - Restart amlodipine at 10mg today (on 5 at home)  - Continue to hold losartan given MAYNOR      VTE Risk Mitigation (From admission, onward)         Ordered     enoxaparin injection 40 mg  Every 12 hours         08/06/22 1510     IP VTE HIGH RISK PATIENT  Once         08/06/22 1510     Place sequential compression device  Until discontinued         08/06/22 1510                Discharge Planning   SAILAJA: 8/8/2022     Code Status: Full Code   Is the patient medically ready for discharge?: No    Reason for patient still in hospital (select all that apply): Patient trending condition                     Brian Li MD  Department of Hospital Medicine   [facility] - Transplant Stepdown

## 2022-08-07 NOTE — PROGRESS NOTES
"[facility] - Transplant Stepdown  Urology  Progress Note    Patient Name: Jordan Lopez Jr.  MRN: [id number]  Admission Date: 8/6/2022  Hospital Length of Stay: 1 days  Code Status: Full Code   Attending Provider: Brian Li MD   Primary Care Physician: Jose Rafael Trujillo MD    Subjective:     HPI:  Jordan Lopez is a 66 year old male with a history of prostate cancer s/p XRT and ADT completed 8/21, HCC, RCC s/p R nephrectomy, HTN, DM presenting with dysuria. Urology was consulted for hydronephrosis.     Patient states he has had urgency, dysuria, ISIS for 3 days. Subjective fevers at home. Denies nausea, vomiting.     On assessment, patient is AF, HDS. WBC wnl, Cr 2.8 (baseline 1.3). UA LE positive, no microscopy available for review. Per outside report "CT abdomen and pelvis noted moderate hydroureteronephrosis of the transplanted kidney in the left side of the pelvis.  Milder hydroureteronephrosis of the native left kidney...no calculi," these images are not available for review. Renal u/s shows absent R kidney, mild cortical thinning of L native kidney without hydronephrosis, mild hydronephrosis of the transplant kidney. Bladder does not appear distended on u/s.      Interval History: NAEON. AFVSS.  Catheter placed yesterday evening. Urine light pink without clots.  Creatinine mildly improved from yesterday now 2.1 from 2.3.      Objective:     Temp:  [98.1 °F (36.7 °C)-99.6 °F (37.6 °C)] 98.5 °F (36.9 °C)  Pulse:  [] 86  Resp:  [16-18] 16  SpO2:  [92 %-97 %] 92 %  BP: (137-161)/(69-75) 137/69     Body mass index is 42.01 kg/m².      Bladder Scan Volume (mL): 64 mL (08/06/22 2200)  Post Void Cath Residual Output (mL): 48 mL (08/06/22 2200)    Drains       Drain  Duration                  Urethral Catheter 08/07/22 0014 <1 day                    Physical Exam  Vitals reviewed.   Constitutional:       General: He is not in acute distress.     Appearance: He is well-developed. He is not diaphoretic. "   HENT:      Head: Normocephalic and atraumatic.   Eyes:      General: No scleral icterus.  Cardiovascular:      Rate and Rhythm: Normal rate.   Pulmonary:      Effort: Pulmonary effort is normal. No respiratory distress.      Breath sounds: No stridor.   Abdominal:      General: There is no distension.      Palpations: Abdomen is soft.      Tenderness: There is no abdominal tenderness. There is no right CVA tenderness or left CVA tenderness.   Genitourinary:     Comments: Catheter in place with light pink urine  Musculoskeletal:         General: Normal range of motion.      Cervical back: Normal range of motion.   Skin:     General: Skin is warm and dry.   Neurological:      Mental Status: He is alert.   Psychiatric:         Behavior: Behavior normal.       Significant Labs:    BMP:  Recent Labs   Lab 08/06/22  1548 08/07/22  0737   * 130*   K 4.5 4.6    101   CO2 21* 18*   BUN 39* 41*   CREATININE 2.3* 2.1*   CALCIUM 9.0 8.8       CBC:   Recent Labs   Lab 08/06/22  1548   WBC 3.67*   HGB 12.1*   HCT 35.9*   *       All pertinent labs results from the past 24 hours have been reviewed.    Significant Imaging:  All pertinent imaging results/findings from the past 24 hours have been reviewed.      Assessment/Plan:     MAYNOR (acute kidney injury)  Jordan Lopez is a 66 year old male with a history of prostate cancer s/p XRT and ADT completed 8/21, HCC, RCC s/p R nephrectomy, HTN, DM presenting with dysuria. Urology was consulted for hydronephrosis of the transplant kidney.    - mild transplant hydronephrosis with MAYNOR, patient with subjective incomplete bladder emptying  - Catheter placed yesterday with 40 cc immediate output. Urinary retention an unlikely cause of MAYNOR but placed to rule out obstruction with reflux as cause of hydronephrosis  - Catheter per primary team  - trend Cr, avoid nephrotoxins  - no present indication for urologic intervention  - Will need outpatient hematuria workup with  cystoscopy +/- RPG  - remainder of care per primary        VTE Risk Mitigation (From admission, onward)         Ordered     enoxaparin injection 40 mg  Every 12 hours         08/06/22 1510     IP VTE HIGH RISK PATIENT  Once         08/06/22 1510     Place sequential compression device  Until discontinued         08/06/22 1510                Timothy Snow MD  Urology  [facility] - Transplant Stepdown

## 2022-08-07 NOTE — SUBJECTIVE & OBJECTIVE
Interval History: No issues overnight. Denies any pain with current crowley. States that he has continued to produce pink urine since insertion. Denies any fever/chills. Cr downtrending.    Review of Systems   Constitutional:  Negative for chills and fever.   HENT:  Negative for congestion and sore throat.    Eyes:  Negative for photophobia and visual disturbance.   Respiratory:  Negative for cough and shortness of breath.    Cardiovascular:  Negative for chest pain and palpitations.   Gastrointestinal:  Negative for abdominal pain, blood in stool, constipation, diarrhea, nausea and vomiting.   Endocrine: Negative for cold intolerance and heat intolerance.   Genitourinary:  Negative for dysuria and hematuria.   Musculoskeletal:  Negative for arthralgias and myalgias.   Skin:  Negative for rash and wound.   Allergic/Immunologic: Negative for environmental allergies and food allergies.   Neurological:  Negative for seizures and numbness.   Hematological:  Negative for adenopathy. Does not bruise/bleed easily.   Psychiatric/Behavioral:  Negative for hallucinations and suicidal ideas.    Objective:     Vital Signs (Most Recent):  Temp: 98.5 °F (36.9 °C) (08/07/22 0742)  Pulse: 86 (08/07/22 0742)  Resp: 16 (08/07/22 0742)  BP: 137/69 (08/07/22 0742)  SpO2: (!) 92 % (08/07/22 0742)   Vital Signs (24h Range):  Temp:  [98.1 °F (36.7 °C)-99.6 °F (37.6 °C)] 98.5 °F (36.9 °C)  Pulse:  [] 86  Resp:  [16-18] 16  SpO2:  [92 %-97 %] 92 %  BP: (137-161)/(69-75) 137/69     Weight: (!) 140.5 kg (309 lb 11.9 oz)  Body mass index is 42.01 kg/m².    Intake/Output Summary (Last 24 hours) at 8/7/2022 1107  Last data filed at 8/7/2022 0500  Gross per 24 hour   Intake 120 ml   Output 868 ml   Net -748 ml      Physical Exam  Constitutional:       Appearance: He is well-developed.   HENT:      Head: Normocephalic and atraumatic.   Eyes:      Conjunctiva/sclera: Conjunctivae normal.      Pupils: Pupils are equal, round, and reactive to  light.   Neck:      Thyroid: No thyromegaly.      Vascular: No JVD.   Cardiovascular:      Rate and Rhythm: Normal rate and regular rhythm.      Heart sounds: Normal heart sounds. No murmur heard.    No friction rub. No gallop.   Pulmonary:      Effort: Pulmonary effort is normal. No respiratory distress.      Breath sounds: Normal breath sounds. No wheezing or rales.   Abdominal:      General: Bowel sounds are normal. There is no distension.      Palpations: Abdomen is soft. There is no mass.      Tenderness: There is no abdominal tenderness. There is no guarding or rebound.      Hernia: No hernia is present.   Musculoskeletal:         General: No deformity. Normal range of motion.      Cervical back: Normal range of motion and neck supple.   Skin:     General: Skin is warm and dry.   Neurological:      Mental Status: He is alert and oriented to person, place, and time.      Cranial Nerves: No cranial nerve deficit.   Psychiatric:         Behavior: Behavior normal.       Significant Labs: All pertinent labs within the past 24 hours have been reviewed.  CBC:   Recent Labs   Lab 08/06/22  1548 08/07/22  0737   WBC 3.67* 4.09   HGB 12.1* 11.8*   HCT 35.9* 34.1*   * 99*     CMP:   Recent Labs   Lab 08/06/22  1548 08/07/22  0737   * 130*   K 4.5 4.6    101   CO2 21* 18*   * 120*   BUN 39* 41*   CREATININE 2.3* 2.1*   CALCIUM 9.0 8.8   PROT 6.8 6.4   ALBUMIN 3.1* 2.8*   BILITOT 0.7 0.6   ALKPHOS 58 54*   AST 15 18   ALT 13 16   ANIONGAP 10 11       Significant Imaging: I have reviewed all pertinent imaging results/findings within the past 24 hours.

## 2022-08-07 NOTE — PLAN OF CARE
Pt is AAOx4, afebrile, and VSS overnight. Carlos placed overnight after ordered PVR bladder scan from urology w/ red tinged UO overnight. See flow sheet for values. Pt denied pain overnight. Pt is in bed in the lowest position, wheels locked, and call light in reach.

## 2022-08-07 NOTE — CONSULTS
[facility] [address]  Gastroenterology  Consult Note    Patient Name: Jordan Lopez Jr.  MRN: [id number]  Admission Date: 8/6/2022  Hospital Length of Stay: 1 days  Code Status: Full Code   Attending Provider: Brian Li MD   Consulting Provider: Isaiah Morales MD  Primary Care Physician: Jose Rafael Trujillo MD  Principal Problem:Acute cystitis with hematuria    Inpatient consult to Hepatology  Consult performed by: Isaiah Morales MD  Consult ordered by: Brian Li MD        Subjective:     HPI:  Mr Lopez is a 65yo PMHx prostate cancer s/p RT (08/2021), RCC s/p nephrectomy, HCC s/p liver/ kidney transplant (2012) on MMF and tacro, HTN, DM presented to OSH on 08/05 w/ fever and dysuria, urinary retention.    Hospital course notable for MAYNOR (Cr 2.8), IV ABx (meropenem). CT AP w/ moderate hydroureteronephrosis of transplanted kidney and c/f cystitis.  Transfer[place] [facility] [place]rology and KTM eval.    VS notable for elevated temp 99.6. CBC w/ leukopenia 3.7, Hgb stable 12.1, plts 110. BMP w/ Cr 2.8-->2.3. LFTs unremarkable. Receiving cefepime, tacro 3/ 2. Holding MMF.     Past Medical History:   Diagnosis Date    Hypertension age 13    Kidney replaced by transplant 6/29/2012    Combined liver-kidney transplant    Liver cirrhosis secondary to STEVENS     Liver cirrhosis secondary to STEVENS (nonalcoholic steatohepatitis)     Liver replaced by transplant 6/29/2012    Combined liver-kidney transplant    Obesity     Personal history of renal cancer 1/28/2013    S/p nephrectomy over a yr ago. Followed [facility]     Type 2 diabetes mellitus 2007    diet controlled        Past Surgical History:   Procedure Laterality Date    KIDNEY TRANSPLANT  6/29/2012    Combined liver-kidney transplant    LIVER TRANSPLANT  6/29/2012    Combined liver-kidney transplant    NEPHRECTOMY  2010    For stage 1 RCC right kidney (done at [facility])       Family History   Problem Relation Age of  "Onset    Kidney disease Mother         born with one kidney    Heart disease Father        Social History     Socioeconomic History    Marital status:    Occupational History     Employer: [employer]   Tobacco Use    Smoking status: Never Smoker    Smokeless tobacco: Never Used   Substance and Sexual Activity    Alcohol use: No     Comment: Perhaps 2-3 drinks per year, never a drinker    Drug use: No   Social History Narrative    Salesman       No current facility-administered medications on file prior to encounter.     Current Outpatient Medications on File Prior to Encounter   Medication Sig Dispense Refill    amLODIPine (NORVASC) 5 MG tablet Take 5 mg by mouth once daily.      CALCIUM CARBONATE/VITAMIN D3 (CALTRATE 600 + D ORAL) Take 1 tablet by mouth 2 (two) times daily.      famotidine (PEPCID) 20 MG tablet Take 20 mg by mouth nightly as needed for Heartburn.      furosemide (LASIX) 20 MG tablet Take 1 tablet (20 mg total) by mouth once daily. 30 tablet 11    INSULIN GLARGINE,HUM.REC.ANLOG (TOUJEO SOLOSTAR SUBQ) Inject 22 Units into the skin once daily.       losartan (COZAAR) 100 MG tablet Take 100 mg by mouth once daily.      losartan (COZAAR) 25 MG tablet take 1 tablet by mouth once daily (Patient not taking: Reported on 4/18/2022) 30 tablet 0    magnesium oxide (MAG-OX) 400 mg tablet Take 400 mg by mouth 2 (two) times daily.      MULTIVITAMIN W-MINERALS/LUTEIN (CENTRUM SILVER ORAL) Take 1 tablet by mouth Daily.      mycophenolate (CELLCEPT) 250 mg Cap Take 1 capsule (250 mg total) by mouth 2 (two) times daily. 60 capsule 11    OZEMPIC 0.25 mg or 0.5 mg(2 mg/1.5 mL) pen injector Inject into the skin.      rosuvastatin (CRESTOR) 10 MG tablet Take 5 mg by mouth nightly.      tacrolimus (PROGRAF) 1 MG Cap TAKE 3 CAPSULES BY MOUTH EVERY MORNING AND 2 CAPSULES EVERY EVENING. 150 capsule 11    TRUEPLUS PEN NEEDLE 32 gauge x 5/32" Ndle          Review of patient's allergies indicates: "   Allergen Reactions    Levaquin [levofloxacin] Rash       Review of Systems   Constitutional: Negative for chills, fever and weight loss.   HENT: Negative for ear pain, hearing loss and tinnitus.    Eyes: Negative for blurred vision, double vision and photophobia.   Respiratory: Negative for cough, hemoptysis and sputum production.    Cardiovascular: Negative for chest pain, palpitations and orthopnea.   Gastrointestinal: Negative for abdominal pain, blood in stool, constipation, diarrhea, heartburn, melena, nausea and vomiting.   Genitourinary: Negative for dysuria, frequency and urgency.   Musculoskeletal: Negative for back pain, myalgias and neck pain.   Skin: Negative for itching and rash.   Neurological: Negative for dizziness, tingling and headaches.   Endo/Heme/Allergies: Negative for environmental allergies and polydipsia. Does not bruise/bleed easily.        Objective:     Vitals:    08/07/22 0439   BP: (!) 150/72   Pulse: 89   Resp: 18   Temp: 99.3 °F (37.4 °C)       Constitutional:  not in acute distress and well developed  HENT: Head: Normal, normocephalic, atraumatic.  Eyes: conjunctiva clear and sclera nonicteric  Cardiovascular: regular rate and rhythm  Respiratory: normal chest expansion & respiratory effort   and no accessory muscle use  GI: soft, non-tender, without masses or organomegaly  Musculoskeletal: no muscular tenderness noted  Skin: normal color  Neurological: alert, oriented x3  Psychiatric: mood and affect are within normal limits, pt is a good historian; no memory problems were noted    Significant Labs:  Recent Labs   Lab 08/06/22  1548   HGB 12.1*       Lab Results   Component Value Date    WBC 3.67 (L) 08/06/2022    HGB 12.1 (L) 08/06/2022    HCT 35.9 (L) 08/06/2022    MCV 84 08/06/2022     (L) 08/06/2022       Lab Results   Component Value Date     (L) 08/06/2022    K 4.5 08/06/2022     08/06/2022    CO2 21 (L) 08/06/2022    BUN 39 (H) 08/06/2022    CREATININE  2.3 (H) 08/06/2022    CALCIUM 9.0 08/06/2022    ANIONGAP 10 08/06/2022    ESTGFRAFRICA 59.3 (A) 06/10/2022    EGFRNONAA 51.3 (A) 06/10/2022       Lab Results   Component Value Date    ALT 13 08/06/2022    AST 15 08/06/2022    GGT 42 07/03/2014    ALKPHOS 58 08/06/2022    BILITOT 0.7 08/06/2022       Lab Results   Component Value Date    INR 1.1 08/06/2022    INR 1.2 08/06/2012    INR 1.4 (H) 07/04/2012       Significant Imaging:  Reviewed pertinent radiology findings.       Assessment/Plan:     67yo PMHx prostate cancer s/p RT (08/2021), RCC s/p nephrectomy, HCC s/p liver/ kidney transplant (2012) on MMF and tacro, HTN, DM presented to OSH on 08/05 w/ fever and dysuria, urinary retention.    Hepatology consulted for IS recommendations    Problem List:  1. S/p liver transplant on tacro/ MMF    Recommendations:  1. Hold MMF in setting of active infection  2. Continue tacro per renal transplant recommendations    Thank you for involving us in the care of Jordan ALISON Lopez Jr.. Please call with any additional questions, concerns or changes in the patient's clinical status. We will continue to follow.     Isaiah Morales MD  Gastroenterology Fellow PGY V  [facility]

## 2022-08-07 NOTE — SUBJECTIVE & OBJECTIVE
"  Subjective:   History of Present Illness:  66 year old with SLKT 2012 and prostate cancer treated with radiation who presented to [facility] as transfer for evaluation of fever, elevated creatinine, and decreased urine output. He reports urine leakage starting 3-4 days prior to admission followed by dysuria 1-2 days prior to admission without resolution on cefuroxime, and fever 101 on day prior to admission despite ABX. Has associated gross hematuria on day before admission. Denies syncope, recent illness, chest pain, difficulty breathing, abdominal pain, nausea, vomiting, changes in bowel movements, hematochezia, melena  Kidney transplant medicine consulted for aid in management of a transplanted kidney with MAYNOR    Mr. Lopez is a 66 y.o. year old male who is status post Liver Transplant - 6/29/2012  (#1).    His maintenance immunosuppression consists of:   Immunosuppressants (From admission, onward)                Start     Stop Route Frequency Ordered    08/07/22 0800  tacrolimus capsule 3 mg        "And" Linked Group Details    -- Oral Every morning 08/06/22 1519    08/06/22 1800  tacrolimus capsule 2 mg        "And" Linked Group Details    -- Oral Every evening 08/06/22 1519            Hospital Course:  No notes on file    Interval History:  Sitting up at the bedside. Significant hematuria. No new complaints.    Past Medical, Surgical, Family, and Social History:   Unchanged from H&P.    Scheduled Meds:   amLODIPine  10 mg Oral Daily    ceFEPime (MAXIPIME) IVPB  2 g Intravenous Q24H    enoxaparin  40 mg Subcutaneous Q12H    mupirocin   Nasal BID    senna-docusate 8.6-50 mg  1 tablet Oral BID    tacrolimus  2 mg Oral Daily PM    And    tacrolimus  3 mg Oral Daily AM     Continuous Infusions:  PRN Meds:acetaminophen, albuterol-ipratropium, aluminum-magnesium hydroxide-simethicone, dextrose 10%, dextrose 10%, glucagon (human recombinant), glucose, glucose, melatonin, naloxone, ondansetron, prochlorperazine, " simethicone, sodium chloride 0.9%    Intake/Output - Last 3 Shifts         08/05 0700  08/06 0659 08/06 0700  08/07 0659 08/07 0700  08/08 0659    P.O.  120 720    I.V. (mL/kg)  0 (0)     Other  0     Total Intake(mL/kg)  120 (0.9) 720 (5.1)    Urine (mL/kg/hr)  868 550 (0.6)    Emesis/NG output  0     Other  0     Stool  0 0    Blood  0     Total Output  868 550    Net  -748 +170           Urine Occurrence  0 x     Stool Occurrence  0 x 3 x    Emesis Occurrence  0 x              Review of Systems   Constitutional:  Positive for fever.   HENT: Negative.     Eyes: Negative.    Respiratory: Negative.     Cardiovascular: Negative.    Gastrointestinal: Negative.    Endocrine: Negative.    Genitourinary:  Positive for decreased urine volume, difficulty urinating, dysuria, frequency and hematuria.   Musculoskeletal: Negative.    Skin: Negative.    Neurological: Negative.    Psychiatric/Behavioral: Negative.      Objective:     Vital Signs (Most Recent):  Temp: 98.5 °F (36.9 °C) (08/07/22 1222)  Pulse: 87 (08/07/22 1222)  Resp: 16 (08/07/22 0742)  BP: 138/65 (08/07/22 1222)  SpO2: (!) 93 % (08/07/22 1222)   Vital Signs (24h Range):  Temp:  [98.1 °F (36.7 °C)-99.6 °F (37.6 °C)] 98.5 °F (36.9 °C)  Pulse:  [] 87  Resp:  [16-18] 16  SpO2:  [92 %-97 %] 93 %  BP: (137-161)/(65-75) 138/65     Weight: (!) 140.5 kg (309 lb 11.9 oz)  Height: 6' (182.9 cm)  Body mass index is 42.01 kg/m².    Physical Exam  Constitutional:       General: He is not in acute distress.     Appearance: He is obese. He is not ill-appearing.   Eyes:      General: No scleral icterus.  Cardiovascular:      Rate and Rhythm: Normal rate.   Pulmonary:      Effort: Pulmonary effort is normal. No respiratory distress.   Abdominal:      General: There is no distension.      Palpations: Abdomen is soft.      Tenderness: There is no abdominal tenderness.      Comments: Suprapubic fullness, no pain   Musculoskeletal:      Right lower leg: No edema.      Left  lower leg: No edema.   Skin:     Coloration: Skin is not jaundiced.   Neurological:      Mental Status: He is alert.       Laboratory:  Labs within the past 24 hours have been reviewed.    Diagnostic Results:  CT - Abd/Pelvic: No results found for this or any previous visit.  US - Kidney: Results for orders placed during the hospital encounter of 08/06/22    US Transplant Kidney With Doppler    Narrative  EXAMINATION:  US TRANSPLANT KIDNEY WITH DOPPLER    CLINICAL HISTORY:  obstruction and hydro;    TECHNIQUE:  Real time gray scale and doppler ultrasound was performed over the patient's renal allograft.    COMPARISON:  Prior CT dated 07/08/2021 and prior ultrasound dated 07/24/2012    FINDINGS:  Renal allograft in the left lower quadrant.  The allograft measures 13.0 cm. Normal perfusion. There is moderate hydronephrosis.    No fluid collections.    Vasculature:    Resistive indices ranged from 0.82-0.85 (previously 0.75-0.88)    Main renal artery peak systolic velocity: 142cm/sec with normal waveform.    Renal artery/iliac ratio: 0.9.    The main renal vein is patent.    Impression  1. Left lower quadrant renal transplant is present demonstrating moderate hydronephrosis and cortical thinning.  2. Elevated resistive indices within the transplant.  This finding is nonspecific and could be due to the presence of hydronephrosis or alternatively could be related to rejection or drug toxicity.  This report was flagged in Epic as abnormal.      Electronically signed by: Luzamria Shay MD  Date:    08/06/2022  Time:    17:07

## 2022-08-07 NOTE — HOSPITAL COURSE
Patient had crowley placed on admission given concerns for hydronephrosis and had 40cc light pink urine return. Urology not recommending any current interventions for hematuria, only outpatient w/u. [facility] reports no growth on Ucx there. PCP office did not run Ucx unfortunately. Repeat US w/o hydronephrosis. Iberia is cystitis caused hydronephrosis and now that its treated, hydro improved. Renal function returned to baseline as well.   
Dry/Warm

## 2022-08-07 NOTE — PLAN OF CARE
Pt aaox4. From [facility] via ambulance this afternoon.  IM aware of arrival to floor.   Bed in low and locked position. Nonskid footwear in use.  Call bell, phone, food, drink within reach in bed or on bedside table.  Wife at bedside and active in care. Both aware to call if needing assistance.  Denies pain.  Up in chair for most of day.  Walking in halls with wife. Urology, nephrology and hepatology consulted. Cefepime Q 24hrs. Urine sent.  Renal ultrasound complete - results pending. See flowsheet for full assessment and details.

## 2022-08-07 NOTE — SUBJECTIVE & OBJECTIVE
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 Followed by [facility]     Type 2 diabetes mellitus 2007    diet controlled        Past Surgical History:   Procedure Laterality Date    KIDNEY TRANSPLANT  6/29/2012    Combined liver-kidney transplant    LIVER TRANSPLANT  6/29/2012    Combined liver-kidney transplant    NEPHRECTOMY  2010    For stage 1 RCC right kidney (done at [facility])       Review of patient's allergies indicates:   Allergen Reactions    Levaquin [levofloxacin] Rash       Family History       Problem Relation (Age of Onset)    Heart disease Father    Kidney disease Mother            Tobacco Use    Smoking status: Never Smoker    Smokeless tobacco: Never Used   Substance and Sexual Activity    Alcohol use: No     Comment: Perhaps 2-3 drinks per year, never a drinker    Drug use: No    Sexual activity: Not on file       Review of Systems   Constitutional:  Positive for fever. Negative for activity change and chills.   Respiratory:  Negative for shortness of breath.    Cardiovascular:  Negative for chest pain.   Gastrointestinal:  Negative for abdominal pain, diarrhea, nausea and vomiting.   Genitourinary:  Positive for difficulty urinating, dysuria, frequency and hematuria. Negative for flank pain.   Neurological:  Negative for dizziness and weakness.     Objective:     Temp:  [98.1 °F (36.7 °C)-99.6 °F (37.6 °C)] 99.6 °F (37.6 °C)  Pulse:  [] 100  Resp:  [18] 18  SpO2:  [95 %-96 %] 95 %  BP: (148-161)/(70-75) 161/75     Body mass index is 42.01 kg/m².           Drains       None                   Physical  Exam  Vitals reviewed.   Constitutional:       General: He is not in acute distress.     Appearance: He is well-developed. He is not diaphoretic.   HENT:      Head: Normocephalic and atraumatic.   Eyes:      General: No scleral icterus.  Cardiovascular:      Rate and Rhythm: Normal rate.   Pulmonary:      Effort: Pulmonary effort is normal. No respiratory distress.      Breath sounds: No stridor.   Abdominal:      General: There is no distension.      Palpations: Abdomen is soft.      Tenderness: There is no abdominal tenderness. There is no right CVA tenderness or left CVA tenderness.      Comments: Chevron incision, prior Ltx  LLQ transplant without tenderness   Genitourinary:     Comments: Circumcised male with orthotopic meatus  Musculoskeletal:         General: Normal range of motion.      Cervical back: Normal range of motion.   Skin:     General: Skin is warm and dry.   Neurological:      Mental Status: He is alert.   Psychiatric:         Behavior: Behavior normal.       Significant Labs:    BMP:  Recent Labs   Lab 08/06/22  1548   *   K 4.5      CO2 21*   BUN 39*   CREATININE 2.3*   CALCIUM 9.0       CBC:  Recent Labs   Lab 08/06/22  1548   WBC 3.67*   HGB 12.1*   HCT 35.9*   *       Blood Culture: No results for input(s): LABBLOO in the last 168 hours.  Urine Culture: No results for input(s): LABURIN in the last 168 hours.  Urine Studies:   Recent Labs   Lab 08/06/22  1553   COLORU Red*   APPEARANCEUA Cloudy*   PHUR 7.0   SPECGRAV 1.010   PROTEINUA 2+*   GLUCUA 2+*   KETONESU Trace*   BILIRUBINUA Negative   OCCULTUA 2+*   NITRITE Negative   LEUKOCYTESUR Trace*   RBCUA >100*   WBCUA 0   BACTERIA None   HYALINECASTS 0       Significant Imaging:  Findings as above

## 2022-08-07 NOTE — ASSESSMENT & PLAN NOTE
Jordan Lopez is a 66 year old male with a history of prostate cancer s/p XRT and ADT completed 8/21, HCC, RCC s/p R nephrectomy, HTN, DM presenting with dysuria. Urology was consulted for hydronephrosis of the transplant kidney.    - mild transplant hydronephrosis with MAYNOR, patient with subjective incomplete bladder emptying  - Catheter placed yesterday with 40 cc immediate output. Urinary retention an unlikely cause of MAYNOR but placed to rule out obstruction with reflux as cause of hydronephrosis  - Catheter per primary team  - trend Cr, avoid nephrotoxins  - no present indication for urologic intervention  - Will need outpatient hematuria workup with cystoscopy +/- RPG  - remainder of care per primary

## 2022-08-07 NOTE — PROGRESS NOTES
"[facility]plant Stepdown  Kidney Transplant  Progress Note      Reason for Follow-up: Reassessment of Liver Transplant - 6/29/2012  (#1) recipient and management of immunosuppression.    ORGAN:  RIGHT KIDNEY LIVER  Donor Type:  Donation after Brain Death Donation after Brain Death      Subjective:   History of Present Illness:  66 year old with SLKT 2012 and prostate cancer treated with radiation who presented to [facility] as transfer for evaluation of fever, elevated creatinine, and decreased urine output. He reports urine leakage starting 3-4 days prior to admission followed by dysuria 1-2 days prior to admission without resolution on cefuroxime, and fever 101 on day prior to admission despite ABX. Has associated gross hematuria on day before admission. Denies syncope, recent illness, chest pain, difficulty breathing, abdominal pain, nausea, vomiting, changes in bowel movements, hematochezia, melena  Kidney transplant medicine consulted for aid in management of a transplanted kidney with MAYNOR    Mr. Lopez is a 66 y.o. year old male who is status post Liver Transplant - 6/29/2012  (#1).    His maintenance immunosuppression consists of:   Immunosuppressants (From admission, onward)                Start     Stop Route Frequency Ordered    08/07/22 0800  tacrolimus capsule 3 mg        "And" Linked Group Details    -- Oral Every morning 08/06/22 1519    08/06/22 1800  tacrolimus capsule 2 mg        "And" Linked Group Details    -- Oral Every evening 08/06/22 1519            Hospital Course:  No notes on file    Interval History:  Sitting up at the bedside. Significant hematuria. No new complaints.    Past Medical, Surgical, Family, and Social History:   Unchanged from H&P.    Scheduled Meds:   amLODIPine  10 mg Oral Daily    ceFEPime (MAXIPIME) IVPB  2 g Intravenous Q24H    enoxaparin  40 mg Subcutaneous Q12H    mupirocin   Nasal BID    senna-docusate 8.6-50 mg  1 tablet Oral BID    tacrolimus  2 mg Oral " Daily PM    And    tacrolimus  3 mg Oral Daily AM     Continuous Infusions:  PRN Meds:acetaminophen, albuterol-ipratropium, aluminum-magnesium hydroxide-simethicone, dextrose 10%, dextrose 10%, glucagon (human recombinant), glucose, glucose, melatonin, naloxone, ondansetron, prochlorperazine, simethicone, sodium chloride 0.9%    Intake/Output - Last 3 Shifts         08/05 0700 08/06 0659 08/06 0700 08/07 0659 08/07 0700 08/08 0659    P.O.  120 720    I.V. (mL/kg)  0 (0)     Other  0     Total Intake(mL/kg)  120 (0.9) 720 (5.1)    Urine (mL/kg/hr)  868 550 (0.6)    Emesis/NG output  0     Other  0     Stool  0 0    Blood  0     Total Output  868 550    Net  -748 +170           Urine Occurrence  0 x     Stool Occurrence  0 x 3 x    Emesis Occurrence  0 x              Review of Systems   Constitutional:  Positive for fever.   HENT: Negative.     Eyes: Negative.    Respiratory: Negative.     Cardiovascular: Negative.    Gastrointestinal: Negative.    Endocrine: Negative.    Genitourinary:  Positive for decreased urine volume, difficulty urinating, dysuria, frequency and hematuria.   Musculoskeletal: Negative.    Skin: Negative.    Neurological: Negative.    Psychiatric/Behavioral: Negative.      Objective:     Vital Signs (Most Recent):  Temp: 98.5 °F (36.9 °C) (08/07/22 1222)  Pulse: 87 (08/07/22 1222)  Resp: 16 (08/07/22 0742)  BP: 138/65 (08/07/22 1222)  SpO2: (!) 93 % (08/07/22 1222)   Vital Signs (24h Range):  Temp:  [98.1 °F (36.7 °C)-99.6 °F (37.6 °C)] 98.5 °F (36.9 °C)  Pulse:  [] 87  Resp:  [16-18] 16  SpO2:  [92 %-97 %] 93 %  BP: (137-161)/(65-75) 138/65     Weight: (!) 140.5 kg (309 lb 11.9 oz)  Height: 6' (182.9 cm)  Body mass index is 42.01 kg/m².    Physical Exam  Constitutional:       General: He is not in acute distress.     Appearance: He is obese. He is not ill-appearing.   Eyes:      General: No scleral icterus.  Cardiovascular:      Rate and Rhythm: Normal rate.   Pulmonary:      Effort:  Pulmonary effort is normal. No respiratory distress.   Abdominal:      General: There is no distension.      Palpations: Abdomen is soft.      Tenderness: There is no abdominal tenderness.      Comments: Suprapubic fullness, no pain   Musculoskeletal:      Right lower leg: No edema.      Left lower leg: No edema.   Skin:     Coloration: Skin is not jaundiced.   Neurological:      Mental Status: He is alert.       Laboratory:  Labs within the past 24 hours have been reviewed.    Diagnostic Results:  CT - Abd/Pelvic: No results found for this or any previous visit.  US - Kidney: Results for orders placed during the hospital encounter of 08/06/22    US Transplant Kidney With Doppler    Narrative  EXAMINATION:  US TRANSPLANT KIDNEY WITH DOPPLER    CLINICAL HISTORY:  obstruction and hydro;    TECHNIQUE:  Real time gray scale and doppler ultrasound was performed over the patient's renal allograft.    COMPARISON:  Prior CT dated 07/08/2021 and prior ultrasound dated 07/24/2012    FINDINGS:  Renal allograft in the left lower quadrant.  The allograft measures 13.0 cm. Normal perfusion. There is moderate hydronephrosis.    No fluid collections.    Vasculature:    Resistive indices ranged from 0.82-0.85 (previously 0.75-0.88)    Main renal artery peak systolic velocity: 142cm/sec with normal waveform.    Renal artery/iliac ratio: 0.9.    The main renal vein is patent.    Impression  1. Left lower quadrant renal transplant is present demonstrating moderate hydronephrosis and cortical thinning.  2. Elevated resistive indices within the transplant.  This finding is nonspecific and could be due to the presence of hydronephrosis or alternatively could be related to rejection or drug toxicity.  This report was flagged in Epic as abnormal.      Electronically signed by: Luzmaria Shay MD  Date:    08/06/2022  Time:    17:07    Assessment/Plan:     * Acute cystitis with hematuria  Likely contributed to by urinary obstruction  See  ""MAYNOR"      MAYNOR (acute kidney injury)  Most likely secondary to bladder outlet obstruction  Symptoms of overflow  CT with hydro and radiation to prostate last year  Complicated by fever and UA consistent with infection  Order BC, repeat UA and UC  Order repeat US kidney  Would like to continue ceftriaxone for now and can tailor based on OSH Cx  Consult urology    CKD (chronic kidney disease) stage 3, GFR 30-59 ml/min  BL sCr 1.1-1.4 without significant sequelae of CKD at the moment  Was never on dialysis  No additional meds at this time    Prophylactic immunotherapy  Continue immunosuppression as per problem "Long-term use of immunosuppressant medication"      Long-term use of immunosuppressant medication  At home on tacro 3/2, mmf 250/250  Hold mmf  Increased tacro 3/3  Monitor for toxicity       Kidney replaced by transplant  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

## 2022-08-07 NOTE — ASSESSMENT & PLAN NOTE
Jordan Lopez is a 66 year old male with a history of prostate cancer s/p XRT and ADT completed 8/21, HCC, RCC s/p R nephrectomy, HTN, DM presenting with dysuria. Urology was consulted for hydronephrosis.   - mild transplant hydronephrosis with MAYNOR, patient with subjective ISIS  - recommend bladder scan PVR followed by catheter placement  - trend Cr, avoid nephrotoxins  - no present indication for urologic intervention  - remainder of care per primary

## 2022-08-07 NOTE — PLAN OF CARE
Pt aaox4.  Bed in low and locked position. Nonskid footwear in use.  Call bell, phone, food, drink within reach in bed or on bedside table.  Wife at bedside and active in care. Both aware to call if needing assistance.  Denies pain.  Up in chair for most of day.  Walking in halls with wife. Carlos draining blood tinges urine. Home norvasc started this am for elevated BP overnight.  NS @ 50cc/hr started this afternoon.  Cefepime dc and rocephin started.  See flowsheet for full assessment and details.

## 2022-08-07 NOTE — ASSESSMENT & PLAN NOTE
Patient with acute kidney injury likely due to post-obstructive d/t bladder inflammation causing hydronephrosis MAYNOR is currently improving. Labs reviewed- Renal function/electrolytes with Estimated Creatinine Clearance: 49 mL/min (A) (based on SCr of 2.1 mg/dL (H)). according to latest data. Monitor urine output and serial BMP and adjust therapy as needed. Avoid nephrotoxins and renally dose meds for GFR listed above.   OSH imaging mentioned hydronephrosis of both native and transplanted kidney. Cr downtrended from 2.8 to 2.4 at OSH after patient received 2L fluids  - Cr down to 2.1 today.  - Renal US showing mod hydronephrosis of transplant kidney. Carlos placed 8/6 under recommendation of urology  - Urology consulted, no surgical plans for hematuria at this time.   - Treating cystitis as above as well

## 2022-08-08 ENCOUNTER — TELEPHONE (OUTPATIENT)
Dept: UROLOGY | Facility: CLINIC | Age: 67
End: 2022-08-08
Payer: MEDICARE

## 2022-08-08 LAB
ALBUMIN SERPL BCP-MCNC: 2.7 G/DL (ref 3.5–5.2)
ALP SERPL-CCNC: 54 U/L (ref 55–135)
ALT SERPL W/O P-5'-P-CCNC: 18 U/L (ref 10–44)
ANION GAP SERPL CALC-SCNC: 7 MMOL/L (ref 8–16)
ANISOCYTOSIS BLD QL SMEAR: SLIGHT
AST SERPL-CCNC: 16 U/L (ref 10–40)
BACTERIA #/AREA URNS AUTO: ABNORMAL /HPF
BASOPHILS # BLD AUTO: 0.01 K/UL (ref 0–0.2)
BASOPHILS NFR BLD: 0.3 % (ref 0–1.9)
BILIRUB SERPL-MCNC: 0.5 MG/DL (ref 0.1–1)
BILIRUB UR QL STRIP: NEGATIVE
BUN SERPL-MCNC: 38 MG/DL (ref 8–23)
CALCIUM SERPL-MCNC: 8.8 MG/DL (ref 8.7–10.5)
CHLORIDE SERPL-SCNC: 96 MMOL/L (ref 95–110)
CLARITY UR REFRACT.AUTO: ABNORMAL
CO2 SERPL-SCNC: 23 MMOL/L (ref 23–29)
COLOR UR AUTO: ABNORMAL
CREAT SERPL-MCNC: 1.9 MG/DL (ref 0.5–1.4)
DIFFERENTIAL METHOD: ABNORMAL
EOSINOPHIL # BLD AUTO: 0 K/UL (ref 0–0.5)
EOSINOPHIL NFR BLD: 0.5 % (ref 0–8)
ERYTHROCYTE [DISTWIDTH] IN BLOOD BY AUTOMATED COUNT: 14.4 % (ref 11.5–14.5)
EST. GFR  (NO RACE VARIABLE): 37.9 ML/MIN/1.73 M^2
GLUCOSE SERPL-MCNC: 131 MG/DL (ref 70–110)
GLUCOSE UR QL STRIP: ABNORMAL
HCT VFR BLD AUTO: 35.4 % (ref 40–54)
HGB BLD-MCNC: 12 G/DL (ref 14–18)
HGB UR QL STRIP: ABNORMAL
HYALINE CASTS UR QL AUTO: 0 /LPF
HYPOCHROMIA BLD QL SMEAR: ABNORMAL
IMM GRANULOCYTES # BLD AUTO: 0.01 K/UL (ref 0–0.04)
IMM GRANULOCYTES NFR BLD AUTO: 0.3 % (ref 0–0.5)
KETONES UR QL STRIP: NEGATIVE
LEUKOCYTE ESTERASE UR QL STRIP: ABNORMAL
LYMPHOCYTES # BLD AUTO: 0.7 K/UL (ref 1–4.8)
LYMPHOCYTES NFR BLD: 19 % (ref 18–48)
MAGNESIUM SERPL-MCNC: 1.9 MG/DL (ref 1.6–2.6)
MCH RBC QN AUTO: 27.6 PG (ref 27–31)
MCHC RBC AUTO-ENTMCNC: 33.9 G/DL (ref 32–36)
MCV RBC AUTO: 82 FL (ref 82–98)
MICROSCOPIC COMMENT: ABNORMAL
MONOCYTES # BLD AUTO: 0.4 K/UL (ref 0.3–1)
MONOCYTES NFR BLD: 9.6 % (ref 4–15)
NEUTROPHILS # BLD AUTO: 2.6 K/UL (ref 1.8–7.7)
NEUTROPHILS NFR BLD: 70.3 % (ref 38–73)
NITRITE UR QL STRIP: NEGATIVE
NRBC BLD-RTO: 0 /100 WBC
OSMOLALITY UR: 408 MOSM/KG (ref 50–1200)
OVALOCYTES BLD QL SMEAR: ABNORMAL
PH UR STRIP: 7 [PH] (ref 5–8)
PHOSPHATE SERPL-MCNC: 3.5 MG/DL (ref 2.7–4.5)
PLATELET # BLD AUTO: 125 K/UL (ref 150–450)
PMV BLD AUTO: 10.5 FL (ref 9.2–12.9)
POIKILOCYTOSIS BLD QL SMEAR: SLIGHT
POLYCHROMASIA BLD QL SMEAR: ABNORMAL
POTASSIUM SERPL-SCNC: 4.2 MMOL/L (ref 3.5–5.1)
PROT SERPL-MCNC: 6.6 G/DL (ref 6–8.4)
PROT UR QL STRIP: ABNORMAL
RBC # BLD AUTO: 4.34 M/UL (ref 4.6–6.2)
RBC #/AREA URNS AUTO: >100 /HPF (ref 0–4)
SODIUM SERPL-SCNC: 126 MMOL/L (ref 136–145)
SODIUM UR-SCNC: 66 MMOL/L (ref 20–250)
SP GR UR STRIP: 1.01 (ref 1–1.03)
SPHEROCYTES BLD QL SMEAR: ABNORMAL
TACROLIMUS BLD-MCNC: 3.7 NG/ML (ref 5–15)
URN SPEC COLLECT METH UR: ABNORMAL
WBC # BLD AUTO: 3.74 K/UL (ref 3.9–12.7)
WBC #/AREA URNS AUTO: >100 /HPF (ref 0–5)

## 2022-08-08 PROCEDURE — 99233 PR SUBSEQUENT HOSPITAL CARE,LEVL III: ICD-10-PCS | Mod: ,,, | Performed by: STUDENT IN AN ORGANIZED HEALTH CARE EDUCATION/TRAINING PROGRAM

## 2022-08-08 PROCEDURE — 25000003 PHARM REV CODE 250: Performed by: STUDENT IN AN ORGANIZED HEALTH CARE EDUCATION/TRAINING PROGRAM

## 2022-08-08 PROCEDURE — 25000003 PHARM REV CODE 250: Performed by: HOSPITALIST

## 2022-08-08 PROCEDURE — 84100 ASSAY OF PHOSPHORUS: CPT | Performed by: STUDENT IN AN ORGANIZED HEALTH CARE EDUCATION/TRAINING PROGRAM

## 2022-08-08 PROCEDURE — 81001 URINALYSIS AUTO W/SCOPE: CPT | Performed by: STUDENT IN AN ORGANIZED HEALTH CARE EDUCATION/TRAINING PROGRAM

## 2022-08-08 PROCEDURE — 63600175 PHARM REV CODE 636 W HCPCS: Performed by: STUDENT IN AN ORGANIZED HEALTH CARE EDUCATION/TRAINING PROGRAM

## 2022-08-08 PROCEDURE — 80053 COMPREHEN METABOLIC PANEL: CPT | Performed by: STUDENT IN AN ORGANIZED HEALTH CARE EDUCATION/TRAINING PROGRAM

## 2022-08-08 PROCEDURE — 99233 SBSQ HOSP IP/OBS HIGH 50: CPT | Mod: GC,,, | Performed by: INTERNAL MEDICINE

## 2022-08-08 PROCEDURE — 83735 ASSAY OF MAGNESIUM: CPT | Performed by: STUDENT IN AN ORGANIZED HEALTH CARE EDUCATION/TRAINING PROGRAM

## 2022-08-08 PROCEDURE — 99233 SBSQ HOSP IP/OBS HIGH 50: CPT | Mod: ,,, | Performed by: STUDENT IN AN ORGANIZED HEALTH CARE EDUCATION/TRAINING PROGRAM

## 2022-08-08 PROCEDURE — 83935 ASSAY OF URINE OSMOLALITY: CPT | Performed by: STUDENT IN AN ORGANIZED HEALTH CARE EDUCATION/TRAINING PROGRAM

## 2022-08-08 PROCEDURE — 36415 COLL VENOUS BLD VENIPUNCTURE: CPT | Performed by: STUDENT IN AN ORGANIZED HEALTH CARE EDUCATION/TRAINING PROGRAM

## 2022-08-08 PROCEDURE — 85025 COMPLETE CBC W/AUTO DIFF WBC: CPT | Performed by: STUDENT IN AN ORGANIZED HEALTH CARE EDUCATION/TRAINING PROGRAM

## 2022-08-08 PROCEDURE — 99233 PR SUBSEQUENT HOSPITAL CARE,LEVL III: ICD-10-PCS | Mod: GC,,, | Performed by: INTERNAL MEDICINE

## 2022-08-08 PROCEDURE — 84300 ASSAY OF URINE SODIUM: CPT | Performed by: STUDENT IN AN ORGANIZED HEALTH CARE EDUCATION/TRAINING PROGRAM

## 2022-08-08 PROCEDURE — 80197 ASSAY OF TACROLIMUS: CPT | Performed by: STUDENT IN AN ORGANIZED HEALTH CARE EDUCATION/TRAINING PROGRAM

## 2022-08-08 PROCEDURE — 20600001 HC STEP DOWN PRIVATE ROOM

## 2022-08-08 RX ORDER — TACROLIMUS 1 MG/1
4 CAPSULE ORAL EVERY MORNING
Status: DISCONTINUED | OUTPATIENT
Start: 2022-08-09 | End: 2022-08-09

## 2022-08-08 RX ORDER — TACROLIMUS 1 MG/1
4 CAPSULE ORAL EVERY EVENING
Status: DISCONTINUED | OUTPATIENT
Start: 2022-08-09 | End: 2022-08-09

## 2022-08-08 RX ORDER — OXYBUTYNIN CHLORIDE 5 MG/1
5 TABLET ORAL 3 TIMES DAILY PRN
Status: DISCONTINUED | OUTPATIENT
Start: 2022-08-08 | End: 2022-08-10 | Stop reason: HOSPADM

## 2022-08-08 RX ORDER — SODIUM CHLORIDE, SODIUM LACTATE, POTASSIUM CHLORIDE, CALCIUM CHLORIDE 600; 310; 30; 20 MG/100ML; MG/100ML; MG/100ML; MG/100ML
INJECTION, SOLUTION INTRAVENOUS CONTINUOUS
Status: ACTIVE | OUTPATIENT
Start: 2022-08-08 | End: 2022-08-09

## 2022-08-08 RX ORDER — HYDRALAZINE HYDROCHLORIDE 25 MG/1
25 TABLET, FILM COATED ORAL EVERY 8 HOURS
Status: DISCONTINUED | OUTPATIENT
Start: 2022-08-08 | End: 2022-08-10 | Stop reason: HOSPADM

## 2022-08-08 RX ADMIN — TACROLIMUS 3 MG: 1 CAPSULE ORAL at 08:08

## 2022-08-08 RX ADMIN — MUPIROCIN: 20 OINTMENT TOPICAL at 08:08

## 2022-08-08 RX ADMIN — ENOXAPARIN SODIUM 40 MG: 100 INJECTION SUBCUTANEOUS at 09:08

## 2022-08-08 RX ADMIN — SODIUM BICARBONATE 650 MG TABLET 1300 MG: at 01:08

## 2022-08-08 RX ADMIN — SODIUM CHLORIDE, SODIUM LACTATE, POTASSIUM CHLORIDE, AND CALCIUM CHLORIDE: .6; .31; .03; .02 INJECTION, SOLUTION INTRAVENOUS at 11:08

## 2022-08-08 RX ADMIN — SODIUM CHLORIDE, SODIUM LACTATE, POTASSIUM CHLORIDE, AND CALCIUM CHLORIDE: .6; .31; .03; .02 INJECTION, SOLUTION INTRAVENOUS at 09:08

## 2022-08-08 RX ADMIN — SODIUM BICARBONATE 650 MG TABLET 1300 MG: at 08:08

## 2022-08-08 RX ADMIN — MUPIROCIN: 20 OINTMENT TOPICAL at 09:08

## 2022-08-08 RX ADMIN — TACROLIMUS 3 MG: 1 CAPSULE ORAL at 05:08

## 2022-08-08 RX ADMIN — CEFTRIAXONE 1 G: 1 INJECTION, SOLUTION INTRAVENOUS at 05:08

## 2022-08-08 RX ADMIN — HYDRALAZINE HYDROCHLORIDE 25 MG: 25 TABLET, FILM COATED ORAL at 01:08

## 2022-08-08 RX ADMIN — OXYBUTYNIN CHLORIDE 5 MG: 5 TABLET ORAL at 08:08

## 2022-08-08 RX ADMIN — SODIUM BICARBONATE 650 MG TABLET 1300 MG: at 09:08

## 2022-08-08 RX ADMIN — OXYBUTYNIN CHLORIDE 5 MG: 5 TABLET ORAL at 01:08

## 2022-08-08 RX ADMIN — AMLODIPINE BESYLATE 10 MG: 10 TABLET ORAL at 08:08

## 2022-08-08 RX ADMIN — HYDRALAZINE HYDROCHLORIDE 25 MG: 25 TABLET, FILM COATED ORAL at 09:08

## 2022-08-08 NOTE — PROGRESS NOTES
[facility] - Transplant [facility] Medicine  Progress Note    Patient Name: Jordan Lopez Jr.  MRN: [id number]  Patient Class: IP- Inpatient   Admission Date: 8/6/2022  Length of Stay: 2 days  Attending Physician: Brian Li MD  Primary Care Provider: Jose Rafael Trujillo MD        Subjective:     Principal Problem:Acute cystitis with hematuria        HPI:  66-year-old male with a history of prostate cancer (completed radiation therapy August 2021), hepatocellular carcinoma, renal cell carcinoma treated with nephrectomy, liver/kidney transplant in 2012 (on mycophenolate/tacrolimus), hypertension, and diabetes presented to [facility] on August 5 with fever and dysuria.  He noted urinary urgency, dysuria, and a sensation of urinary retention for 3 days.  He was recently started on oral cephalosporin, but he noted fever at home.  No chest pain or dyspnea noted.  With the evidence of MAYNOR (creatinine 2.8 with baseline 1.1-1.4), UTI, and possible obstruction, he received IV fluids and Rocephin.  He subsequently is receiving meropenem.  He is able to urinate.  AST and ALT were noted to be normal.  With the history of kidney and liver transplant, acute kidney injury, and concern for obstruction, they are requesting transfer for KTM and Urology evaluations.  Case discussed with KTM and Urology at [facility].  Will plan transfer to Hospital Medicine at [facility] for further treatment.     COVID vaccinated x4//COVID negative  August 5:  White blood cells 5.2, hemoglobin 12.2, hematocrit 35.8, platelets 135, sodium 133.7, potassium 4.1, chloride 102, CO2 25.2, BUN 42, creatinine 2.8, glucose 152, calcium 8.5, magnesium 2.1  Urinalysis had 300 protein, 150 glucose, large blood, moderate leukocyte esterase, greater than 182 RBC, greater than 182 WBC     CT abdomen and pelvis noted moderate hydroureteronephrosis of the transplanted kidney in the left side of the pelvis.  Milder hydroureteronephrosis of  the native left kidney.  The etiology of the hydronephrosis appears to be significant bladder wall thickening with additional inflammatory changes noted around the bladder suggesting cystitis.  No radiodense ureteral calculi identified.      On arrival:    Patient feeling great. States he has not had a fever since yesterday. Reports improvement in dysuria, but still having urinary urgency and feels as though he is incompletely voiding. He mentioned his Cr improving from 2.8 to 2.4.      Overview/Hospital Course:  Patient had crowley placed yesterday and had 40cc light pink urine return. Since, he reports continued UOP. Urology not recommending any current interventions for hematuria. [facility] reports no growth on Ucx there. PCP office did not run Ucx unfortunately. Plan for possible nephrostomy tube insertion by IR into transplant kidney      Interval History: No issues overnight. Overnight he had bladder spasms and started leaking urine despite having crowley in place. Patient reports improvement in symptoms w/ oxybutynin    Review of Systems   Constitutional:  Negative for chills and fever.   HENT:  Negative for congestion and sore throat.    Eyes:  Negative for photophobia and visual disturbance.   Respiratory:  Negative for cough and shortness of breath.    Cardiovascular:  Negative for chest pain and palpitations.   Gastrointestinal:  Negative for abdominal pain, blood in stool, constipation, diarrhea, nausea and vomiting.   Endocrine: Negative for cold intolerance and heat intolerance.   Genitourinary:  Negative for dysuria and hematuria.   Musculoskeletal:  Negative for arthralgias and myalgias.   Skin:  Negative for rash and wound.   Allergic/Immunologic: Negative for environmental allergies and food allergies.   Neurological:  Negative for seizures and numbness.   Hematological:  Negative for adenopathy. Does not bruise/bleed easily.   Psychiatric/Behavioral:  Negative for hallucinations and suicidal  ideas.    Objective:     Vital Signs (Most Recent):  Temp: 99.5 °F (37.5 °C) (08/08/22 1208)  Pulse: 91 (08/08/22 1208)  Resp: 16 (08/08/22 0803)  BP: (!) 170/79 (08/08/22 1208)  SpO2: (!) 92 % (08/08/22 1208)   Vital Signs (24h Range):  Temp:  [98.7 °F (37.1 °C)-99.7 °F (37.6 °C)] 99.5 °F (37.5 °C)  Pulse:  [90-98] 91  Resp:  [16-20] 16  SpO2:  [92 %-97 %] 92 %  BP: (136-170)/(69-79) 170/79     Weight: (!) 140.8 kg (310 lb 6.5 oz)  Body mass index is 42.1 kg/m².    Intake/Output Summary (Last 24 hours) at 8/8/2022 1423  Last data filed at 8/8/2022 1351  Gross per 24 hour   Intake 1500.23 ml   Output 2500 ml   Net -999.77 ml        Physical Exam  Constitutional:       Appearance: He is well-developed.   HENT:      Head: Normocephalic and atraumatic.   Eyes:      Conjunctiva/sclera: Conjunctivae normal.      Pupils: Pupils are equal, round, and reactive to light.   Neck:      Thyroid: No thyromegaly.      Vascular: No JVD.   Cardiovascular:      Rate and Rhythm: Normal rate and regular rhythm.      Heart sounds: Normal heart sounds. No murmur heard.    No friction rub. No gallop.   Pulmonary:      Effort: Pulmonary effort is normal. No respiratory distress.      Breath sounds: Normal breath sounds. No wheezing or rales.   Abdominal:      General: Bowel sounds are normal. There is no distension.      Palpations: Abdomen is soft. There is no mass.      Tenderness: There is no abdominal tenderness. There is no guarding or rebound.      Hernia: No hernia is present.   Musculoskeletal:         General: No deformity. Normal range of motion.      Cervical back: Normal range of motion and neck supple.   Skin:     General: Skin is warm and dry.   Neurological:      Mental Status: He is alert and oriented to person, place, and time.      Cranial Nerves: No cranial nerve deficit.   Psychiatric:         Behavior: Behavior normal.       Significant Labs: All pertinent labs within the past 24 hours have been reviewed.  CBC:    Recent Labs   Lab 08/06/22  1548 08/07/22  0737 08/08/22  0708   WBC 3.67* 4.09 3.74*   HGB 12.1* 11.8* 12.0*   HCT 35.9* 34.1* 35.4*   * 99* 125*       CMP:   Recent Labs   Lab 08/06/22  1548 08/07/22  0737 08/08/22  0708   * 130* 126*   K 4.5 4.6 4.2    101 96   CO2 21* 18* 23   * 120* 131*   BUN 39* 41* 38*   CREATININE 2.3* 2.1* 1.9*   CALCIUM 9.0 8.8 8.8   PROT 6.8 6.4 6.6   ALBUMIN 3.1* 2.8* 2.7*   BILITOT 0.7 0.6 0.5   ALKPHOS 58 54* 54*   AST 15 18 16   ALT 13 16 18   ANIONGAP 10 11 7*         Significant Imaging: I have reviewed all pertinent imaging results/findings within the past 24 hours.      Assessment/Plan:      * Acute cystitis with hematuria  Unclear cause, patient reports increasing dysuria and frequency since 8/2. He reports that he went to his PCP who diagnosed UTI and prescribed cefuroxime. He took it but on Thursday his wife noted he had a fever of 101 and he was feeling more lethargic. He presented to [facility] and was diagnosed with an MAYNOR in addition to cystitis. He received meropenem. He currently states dysuria is improved but still with frequent small voids. Has had hematuria starting today which is new  - No growth at [place] UCx, will de-escalate to ceftriaxone today, day 4 of treatment for cystitis. Will treat for 7 days  - UA here without any bacteria. UCx from [place] not growing any bacteria either. PCP office did not unfortunately run a UCx on his urine.      MAYNOR (acute kidney injury)  Patient with acute kidney injury likely due to post-obstructive d/t bladder inflammation causing hydronephrosis MAYNOR is currently improving. Labs reviewed- Renal function/electrolytes with Estimated Creatinine Clearance: 54.2 mL/min (A) (based on SCr of 1.9 mg/dL (H)). according to latest data. Monitor urine output and serial BMP and adjust therapy as needed. Avoid nephrotoxins and renally dose meds for GFR listed above.   OSH imaging mentioned hydronephrosis  of both native and transplanted kidney. Cr downtrended from 2.8 to 2.4 at OSH after patient received 2L fluids  - Cr down to 1.9 today.  - Renal US showing mod hydronephrosis of transplant kidney. Plan for possible nephrostomy tube placement by IR today. Patient NPO  - Urology consulted, no surgical plans for hematuria at this time.   - Treating cystitis as above as well    Other hydronephrosis  See MAYNOR      Prostate cancer  Last received radiation 8/23/21. Currently receiving leuprolide injections.       CKD (chronic kidney disease) stage 3, GFR 30-59 ml/min  Baseline Cr 1.4. History of R nephrectomy for RCC and had SLK in 2012.  - See plan for MAYNOR, cr down to 2.4 at OSH before transfer from 2.8      Prophylactic immunotherapy  On prograf 3mg AM and 2mg PM for history of SLK at home.  - KTM and hepatology consulted  - Holding MMF  - Check daily prograf levels  - Continue 3/3 dosing for now      Hypertension  - Restart amlodipine at 10mg today (on 5 at home)  - Continue to hold losartan given MAYNOR        VTE Risk Mitigation (From admission, onward)         Ordered     enoxaparin injection 40 mg  Every 12 hours         08/06/22 1510     IP VTE HIGH RISK PATIENT  Once         08/06/22 1510     Place sequential compression device  Until discontinued         08/06/22 1510                Discharge Planning   SAILAJA: 8/8/2022     Code Status: Full Code   Is the patient medically ready for discharge?: No    Reason for patient still in hospital (select all that apply): Patient trending condition  Discharge Plan A: Home, Home with family                  Brian Li MD  Department of Hospital Medicine   [facility] - Transplant Stepdown

## 2022-08-08 NOTE — ASSESSMENT & PLAN NOTE
On prograf 3mg AM and 2mg PM for history of SLK at home.  - KTM and hepatology consulted  - Holding MMF  - Check daily prograf levels  - Continue 3/3 dosing for now

## 2022-08-08 NOTE — PROGRESS NOTES
"[facility] - Transplant Stepdown  Urology  Progress Note    Patient Name: Jordan Lopez Jr.  MRN: [id number]  Admission Date: 8/6/2022  Hospital Length of Stay: 2 days  Code Status: Full Code   Attending Provider: Brian Li MD   Primary Care Physician: Jose Rafael Trujillo MD    Subjective:     HPI:  Jordan Lopez is a 66 year old male with a history of prostate cancer s/p XRT and ADT completed 8/21, HCC, RCC s/p R nephrectomy, HTN, DM presenting with dysuria. Urology was consulted for hydronephrosis.     Patient states he has had urgency, dysuria, ISIS for 3 days. Subjective fevers at home. Denies nausea, vomiting.     On assessment, patient is AF, HDS. WBC wnl, Cr 2.8 (baseline 1.3). UA LE positive, no microscopy available for review. Per outside report "CT abdomen and pelvis noted moderate hydroureteronephrosis of the transplanted kidney in the left side of the pelvis.  Milder hydroureteronephrosis of the native left kidney...no calculi," these images are not available for review. Renal u/s shows absent R kidney, mild cortical thinning of L native kidney without hydronephrosis, mild hydronephrosis of the transplant kidney. Bladder does not appear distended on u/s.      Interval History: AF. HDS. Bladder spasms overnight. Pain controlled.     Objective:     Temp:  [98.5 °F (36.9 °C)-99.4 °F (37.4 °C)] 98.7 °F (37.1 °C)  Pulse:  [86-98] 98  Resp:  [16-20] 18  SpO2:  [92 %-97 %] 95 %  BP: (136-148)/(65-78) 147/74     Body mass index is 42.1 kg/m².      Bladder Scan Volume (mL): 64 mL (08/06/22 2200)  Post Void Cath Residual Output (mL): 48 mL (08/06/22 2200)    Drains       Drain  Duration                  Urethral Catheter 08/07/22 0014 1 day                    Physical Exam  Vitals reviewed.   Constitutional:       General: He is not in acute distress.     Appearance: He is well-developed. He is not diaphoretic.   HENT:      Head: Normocephalic and atraumatic.   Eyes:      General: No scleral " icterus.  Cardiovascular:      Rate and Rhythm: Normal rate.   Pulmonary:      Effort: Pulmonary effort is normal. No respiratory distress.      Breath sounds: No stridor.   Abdominal:      General: There is no distension.      Palpations: Abdomen is soft.      Tenderness: There is no abdominal tenderness. There is no right CVA tenderness or left CVA tenderness.   Genitourinary:     Comments: Catheter in place with light pink urine  Musculoskeletal:         General: Normal range of motion.      Cervical back: Normal range of motion.   Skin:     General: Skin is warm and dry.   Neurological:      Mental Status: He is alert.   Psychiatric:         Behavior: Behavior normal.       Significant Labs:    BMP:  Recent Labs   Lab 08/06/22  1548 08/07/22  0737   * 130*   K 4.5 4.6    101   CO2 21* 18*   BUN 39* 41*   CREATININE 2.3* 2.1*   CALCIUM 9.0 8.8       CBC:   Recent Labs   Lab 08/06/22  1548 08/07/22  0737   WBC 3.67* 4.09   HGB 12.1* 11.8*   HCT 35.9* 34.1*   * 99*       All pertinent labs results from the past 24 hours have been reviewed.    Significant Imaging:  All pertinent imaging results/findings from the past 24 hours have been reviewed.                    Assessment/Plan:     MAYNOR (acute kidney injury)  Jordan Lopez is a 66 year old male with a history of prostate cancer s/p XRT and ADT completed 8/21, HCC, RCC s/p R nephrectomy, HTN, DM presenting with dysuria. Urology was consulted for hydronephrosis of the transplant kidney.    -f/u labs today  -currently NPO for possible IR neph tube placement  - mild transplant hydronephrosis with MAYNOR, patient with subjective incomplete bladder emptying  - Catheter placed yesterday with 40 cc immediate output. Urinary retention an unlikely cause of MAYNOR but placed to rule out obstruction with reflux as cause of hydronephrosis  - Catheter per primary team  - trend Cr, avoid nephrotoxins  - no present indication for urologic intervention  - Will need  outpatient hematuria workup with cystoscopy +/- RPG  - remainder of care per primary        VTE Risk Mitigation (From admission, onward)         Ordered     enoxaparin injection 40 mg  Every 12 hours         08/06/22 1510     IP VTE HIGH RISK PATIENT  Once         08/06/22 1510     Place sequential compression device  Until discontinued         08/06/22 1510                TIRSO ROSE MD  Urology  [facility] - Transplant Stepdown

## 2022-08-08 NOTE — SUBJECTIVE & OBJECTIVE
"  Subjective:   History of Present Illness:  66 year old with SLKT 2012 and prostate cancer treated with radiation who presented to [facility] [place] for evaluation of fever, elevated creatinine, and decreased urine output. He reports urine leakage starting 3-4 days prior to admission followed by dysuria 1-2 days prior to admission without resolution on cefuroxime, and fever 101 on day prior to admission despite ABX. Has associated gross hematuria on day before admission. Denies syncope, recent illness, chest pain, difficulty breathing, abdominal pain, nausea, vomiting, changes in bowel movements, hematochezia, melena  Kidney transplant medicine consulted for aid in management of a transplanted kidney with MAYNOR    Mr. Lopez is a 66 y.o. year old male who is status post Liver Transplant - 6/29/2012  (#1).    His maintenance immunosuppression consists of:   Immunosuppressants (From admission, onward)                Start     Stop Route Frequency Ordered    08/08/22 0800  tacrolimus capsule 3 mg        "And" Linked Group Details    -- Oral Every morning 08/07/22 1319    08/07/22 1800  tacrolimus capsule 3 mg        "And" Linked Group Details    -- Oral Every evening 08/07/22 1319            Hospital Course:  No notes on file    Interval History:  Mild improvement in sCr. Continues to have hematuria.    Past Medical, Surgical, Family, and Social History:   Unchanged from H&P.    Scheduled Meds:   amLODIPine  10 mg Oral Daily    cefTRIAXone (ROCEPHIN) IVPB  1 g Intravenous Q24H    enoxaparin  40 mg Subcutaneous Q12H    hydrALAZINE  25 mg Oral Q8H    mupirocin   Nasal BID    senna-docusate 8.6-50 mg  1 tablet Oral BID    sodium bicarbonate  1,300 mg Oral TID    tacrolimus  3 mg Oral Daily PM    And    tacrolimus  3 mg Oral Daily AM     Continuous Infusions:   lactated ringers 100 mL/hr at 08/08/22 1102     PRN Meds:acetaminophen, albuterol-ipratropium, aluminum-magnesium hydroxide-simethicone, dextrose 10%, dextrose " 10%, glucagon (human recombinant), glucose, glucose, melatonin, naloxone, ondansetron, oxybutynin, prochlorperazine, simethicone, sodium chloride 0.9%    Intake/Output - Last 3 Shifts         08/06 0700  08/07 0659 08/07 0700 08/08 0659 08/08 0700  08/09 0659    P.O. 120 2160 180    I.V. (mL/kg) 0 (0) 630 (4.5) 280.2 (2)    Other 0 0     IV Piggyback  50     Total Intake(mL/kg) 120 (0.9) 2840 (20.2) 460.2 (3.3)    Urine (mL/kg/hr) 868 2325 (0.7) 725 (0.5)    Emesis/NG output 0 0     Other 0 0     Stool 0 0 0    Blood 0 0     Total Output 868 2325 725    Net -748 +515 -264.8           Urine Occurrence 0 x 0 x     Stool Occurrence 0 x 3 x 3 x    Emesis Occurrence 0 x 0 x              Review of Systems   Constitutional: Negative.    HENT: Negative.     Eyes: Negative.    Respiratory: Negative.     Cardiovascular: Negative.    Gastrointestinal: Negative.    Endocrine: Negative.    Genitourinary:  Positive for hematuria.   Musculoskeletal: Negative.    Skin: Negative.    Neurological: Negative.    Psychiatric/Behavioral: Negative.      Objective:     Vital Signs (Most Recent):  Temp: 99.5 °F (37.5 °C) (08/08/22 1609)  Pulse: 98 (08/08/22 1609)  Resp: 16 (08/08/22 0803)  BP: (!) 162/73 (08/08/22 1609)  SpO2: (!) 91 % (08/08/22 1609)   Vital Signs (24h Range):  Temp:  [98.7 °F (37.1 °C)-99.7 °F (37.6 °C)] 99.5 °F (37.5 °C)  Pulse:  [90-98] 98  Resp:  [16-20] 16  SpO2:  [91 %-97 %] 91 %  BP: (136-170)/(69-79) 162/73     Weight: (!) 140.8 kg (310 lb 6.5 oz)  Height: 6' (182.9 cm)  Body mass index is 42.1 kg/m².    Physical Exam  Constitutional:       General: He is not in acute distress.     Appearance: He is obese. He is not ill-appearing.   Pulmonary:      Effort: Pulmonary effort is normal. No respiratory distress.   Neurological:      Mental Status: He is alert.       Laboratory:  Labs within the past 24 hours have been reviewed.    Diagnostic Results:  US - Kidney: Results for orders placed during the hospital  encounter of 08/06/22    US Transplant Kidney With Doppler    Narrative  EXAMINATION:  US TRANSPLANT KIDNEY WITH DOPPLER    CLINICAL HISTORY:  obstruction and hydro;    TECHNIQUE:  Real time gray scale and doppler ultrasound was performed over the patient's renal allograft.    COMPARISON:  Prior CT dated 07/08/2021 and prior ultrasound dated 07/24/2012    FINDINGS:  Renal allograft in the left lower quadrant.  The allograft measures 13.0 cm. Normal perfusion. There is moderate hydronephrosis.    No fluid collections.    Vasculature:    Resistive indices ranged from 0.82-0.85 (previously 0.75-0.88)    Main renal artery peak systolic velocity: 142cm/sec with normal waveform.    Renal artery/iliac ratio: 0.9.    The main renal vein is patent.    Impression  1. Left lower quadrant renal transplant is present demonstrating moderate hydronephrosis and cortical thinning.  2. Elevated resistive indices within the transplant.  This finding is nonspecific and could be due to the presence of hydronephrosis or alternatively could be related to rejection or drug toxicity.  This report was flagged in Epic as abnormal.      Electronically signed by: Luzmaria Shay MD  Date:    08/06/2022  Time:    17:07

## 2022-08-08 NOTE — ASSESSMENT & PLAN NOTE
Jordan Lopez is a 66 year old male with a history of prostate cancer s/p XRT and ADT completed 8/21, HCC, RCC s/p R nephrectomy, HTN, DM presenting with dysuria. Urology was consulted for hydronephrosis of the transplant kidney.    -f/u labs today  -currently NPO for possible IR neph tube placement  - mild transplant hydronephrosis with MAYNOR, patient with subjective incomplete bladder emptying  - Catheter placed yesterday with 40 cc immediate output. Urinary retention an unlikely cause of MAYNOR but placed to rule out obstruction with reflux as cause of hydronephrosis  - Catheter per primary team  - trend Cr, avoid nephrotoxins  - no present indication for urologic intervention  - Will need outpatient hematuria workup with cystoscopy +/- RPG  - remainder of care per primary

## 2022-08-08 NOTE — SUBJECTIVE & OBJECTIVE
Interval History: No issues overnight. Overnight he had bladder spasms and started leaking urine despite having crowley in place. Patient reports improvement in symptoms w/ oxybutynin    Review of Systems   Constitutional:  Negative for chills and fever.   HENT:  Negative for congestion and sore throat.    Eyes:  Negative for photophobia and visual disturbance.   Respiratory:  Negative for cough and shortness of breath.    Cardiovascular:  Negative for chest pain and palpitations.   Gastrointestinal:  Negative for abdominal pain, blood in stool, constipation, diarrhea, nausea and vomiting.   Endocrine: Negative for cold intolerance and heat intolerance.   Genitourinary:  Negative for dysuria and hematuria.   Musculoskeletal:  Negative for arthralgias and myalgias.   Skin:  Negative for rash and wound.   Allergic/Immunologic: Negative for environmental allergies and food allergies.   Neurological:  Negative for seizures and numbness.   Hematological:  Negative for adenopathy. Does not bruise/bleed easily.   Psychiatric/Behavioral:  Negative for hallucinations and suicidal ideas.    Objective:     Vital Signs (Most Recent):  Temp: 99.5 °F (37.5 °C) (08/08/22 1208)  Pulse: 91 (08/08/22 1208)  Resp: 16 (08/08/22 0803)  BP: (!) 170/79 (08/08/22 1208)  SpO2: (!) 92 % (08/08/22 1208)   Vital Signs (24h Range):  Temp:  [98.7 °F (37.1 °C)-99.7 °F (37.6 °C)] 99.5 °F (37.5 °C)  Pulse:  [90-98] 91  Resp:  [16-20] 16  SpO2:  [92 %-97 %] 92 %  BP: (136-170)/(69-79) 170/79     Weight: (!) 140.8 kg (310 lb 6.5 oz)  Body mass index is 42.1 kg/m².    Intake/Output Summary (Last 24 hours) at 8/8/2022 1423  Last data filed at 8/8/2022 1351  Gross per 24 hour   Intake 1500.23 ml   Output 2500 ml   Net -999.77 ml        Physical Exam  Constitutional:       Appearance: He is well-developed.   HENT:      Head: Normocephalic and atraumatic.   Eyes:      Conjunctiva/sclera: Conjunctivae normal.      Pupils: Pupils are equal, round, and  reactive to light.   Neck:      Thyroid: No thyromegaly.      Vascular: No JVD.   Cardiovascular:      Rate and Rhythm: Normal rate and regular rhythm.      Heart sounds: Normal heart sounds. No murmur heard.    No friction rub. No gallop.   Pulmonary:      Effort: Pulmonary effort is normal. No respiratory distress.      Breath sounds: Normal breath sounds. No wheezing or rales.   Abdominal:      General: Bowel sounds are normal. There is no distension.      Palpations: Abdomen is soft. There is no mass.      Tenderness: There is no abdominal tenderness. There is no guarding or rebound.      Hernia: No hernia is present.   Musculoskeletal:         General: No deformity. Normal range of motion.      Cervical back: Normal range of motion and neck supple.   Skin:     General: Skin is warm and dry.   Neurological:      Mental Status: He is alert and oriented to person, place, and time.      Cranial Nerves: No cranial nerve deficit.   Psychiatric:         Behavior: Behavior normal.       Significant Labs: All pertinent labs within the past 24 hours have been reviewed.  CBC:   Recent Labs   Lab 08/06/22  1548 08/07/22  0737 08/08/22  0708   WBC 3.67* 4.09 3.74*   HGB 12.1* 11.8* 12.0*   HCT 35.9* 34.1* 35.4*   * 99* 125*       CMP:   Recent Labs   Lab 08/06/22  1548 08/07/22  0737 08/08/22  0708   * 130* 126*   K 4.5 4.6 4.2    101 96   CO2 21* 18* 23   * 120* 131*   BUN 39* 41* 38*   CREATININE 2.3* 2.1* 1.9*   CALCIUM 9.0 8.8 8.8   PROT 6.8 6.4 6.6   ALBUMIN 3.1* 2.8* 2.7*   BILITOT 0.7 0.6 0.5   ALKPHOS 58 54* 54*   AST 15 18 16   ALT 13 16 18   ANIONGAP 10 11 7*         Significant Imaging: I have reviewed all pertinent imaging results/findings within the past 24 hours.

## 2022-08-08 NOTE — TELEPHONE ENCOUNTER
----- Message from Timothy Snow MD sent at 8/8/2022  4:00 PM CDT -----  Please schedule this patient for cystoscopy with Dr. Figueroa in 2 weeks for gross hematuria workup. Thanks

## 2022-08-08 NOTE — PROGRESS NOTES
"[facility] - Transplant Stepdown  Kidney Transplant  Progress Note      Reason for Follow-up: Reassessment of Liver Transplant - 6/29/2012  (#1) recipient and management of immunosuppression.    ORGAN:  RIGHT KIDNEY LIVER  Donor Type:  Donation after Brain Death Donation after Brain Death    Subjective:   History of Present Illness:  66 year old with SLKT 2012 and prostate cancer treated with radiation who presented to [facility] [place] as transfer for evaluation of fever, elevated creatinine, and decreased urine output. He reports urine leakage starting 3-4 days prior to admission followed by dysuria 1-2 days prior to admission without resolution on cefuroxime, and fever 101 on day prior to admission despite ABX. Has associated gross hematuria on day before admission. Denies syncope, recent illness, chest pain, difficulty breathing, abdominal pain, nausea, vomiting, changes in bowel movements, hematochezia, melena  Kidney transplant medicine consulted for aid in management of a transplanted kidney with MAYNOR    Mr. Lopez is a 66 y.o. year old male who is status post Liver Transplant - 6/29/2012  (#1).    His maintenance immunosuppression consists of:   Immunosuppressants (From admission, onward)                Start     Stop Route Frequency Ordered    08/08/22 0800  tacrolimus capsule 3 mg        "And" Linked Group Details    -- Oral Every morning 08/07/22 1319    08/07/22 1800  tacrolimus capsule 3 mg        "And" Linked Group Details    -- Oral Every evening 08/07/22 1319            Hospital Course:  No notes on file    Interval History:  Mild improvement in sCr. Continues to have hematuria.    Past Medical, Surgical, Family, and Social History:   Unchanged from H&P.    Scheduled Meds:   amLODIPine  10 mg Oral Daily    cefTRIAXone (ROCEPHIN) IVPB  1 g Intravenous Q24H    enoxaparin  40 mg Subcutaneous Q12H    hydrALAZINE  25 mg Oral Q8H    mupirocin   Nasal BID    senna-docusate 8.6-50 mg  1 tablet Oral BID    sodium " bicarbonate  1,300 mg Oral TID    tacrolimus  3 mg Oral Daily PM    And    tacrolimus  3 mg Oral Daily AM     Continuous Infusions:   lactated ringers 100 mL/hr at 08/08/22 1102     PRN Meds:acetaminophen, albuterol-ipratropium, aluminum-magnesium hydroxide-simethicone, dextrose 10%, dextrose 10%, glucagon (human recombinant), glucose, glucose, melatonin, naloxone, ondansetron, oxybutynin, prochlorperazine, simethicone, sodium chloride 0.9%    Intake/Output - Last 3 Shifts         08/06 0700 08/07 0659 08/07 0700 08/08 0659 08/08 0700 08/09 0659    P.O. 120 2160 180    I.V. (mL/kg) 0 (0) 630 (4.5) 280.2 (2)    Other 0 0     IV Piggyback  50     Total Intake(mL/kg) 120 (0.9) 2840 (20.2) 460.2 (3.3)    Urine (mL/kg/hr) 868 2325 (0.7) 725 (0.5)    Emesis/NG output 0 0     Other 0 0     Stool 0 0 0    Blood 0 0     Total Output 868 2325 725    Net -748 +515 -264.8           Urine Occurrence 0 x 0 x     Stool Occurrence 0 x 3 x 3 x    Emesis Occurrence 0 x 0 x              Review of Systems   Constitutional: Negative.    HENT: Negative.     Eyes: Negative.    Respiratory: Negative.     Cardiovascular: Negative.    Gastrointestinal: Negative.    Endocrine: Negative.    Genitourinary:  Positive for hematuria.   Musculoskeletal: Negative.    Skin: Negative.    Neurological: Negative.    Psychiatric/Behavioral: Negative.      Objective:     Vital Signs (Most Recent):  Temp: 99.5 °F (37.5 °C) (08/08/22 1609)  Pulse: 98 (08/08/22 1609)  Resp: 16 (08/08/22 0803)  BP: (!) 162/73 (08/08/22 1609)  SpO2: (!) 91 % (08/08/22 1609)   Vital Signs (24h Range):  Temp:  [98.7 °F (37.1 °C)-99.7 °F (37.6 °C)] 99.5 °F (37.5 °C)  Pulse:  [90-98] 98  Resp:  [16-20] 16  SpO2:  [91 %-97 %] 91 %  BP: (136-170)/(69-79) 162/73     Weight: (!) 140.8 kg (310 lb 6.5 oz)  Height: 6' (182.9 cm)  Body mass index is 42.1 kg/m².    Physical Exam  Constitutional:       General: He is not in acute distress.     Appearance: He is obese. He is not  "ill-appearing.   Pulmonary:      Effort: Pulmonary effort is normal. No respiratory distress.   Neurological:      Mental Status: He is alert.       Laboratory:  Labs within the past 24 hours have been reviewed.    Diagnostic Results:  US - Kidney: Results for orders placed during the hospital encounter of 08/06/22    US Transplant Kidney With Doppler    Narrative  EXAMINATION:  US TRANSPLANT KIDNEY WITH DOPPLER    CLINICAL HISTORY:  obstruction and hydro;    TECHNIQUE:  Real time gray scale and doppler ultrasound was performed over the patient's renal allograft.    COMPARISON:  Prior CT dated 07/08/2021 and prior ultrasound dated 07/24/2012    FINDINGS:  Renal allograft in the left lower quadrant.  The allograft measures 13.0 cm. Normal perfusion. There is moderate hydronephrosis.    No fluid collections.    Vasculature:    Resistive indices ranged from 0.82-0.85 (previously 0.75-0.88)    Main renal artery peak systolic velocity: 142cm/sec with normal waveform.    Renal artery/iliac ratio: 0.9.    The main renal vein is patent.    Impression  1. Left lower quadrant renal transplant is present demonstrating moderate hydronephrosis and cortical thinning.  2. Elevated resistive indices within the transplant.  This finding is nonspecific and could be due to the presence of hydronephrosis or alternatively could be related to rejection or drug toxicity.  This report was flagged in Epic as abnormal.      Electronically signed by: Luzmaria Shay MD  Date:    08/06/2022  Time:    17:07    Assessment/Plan:     * Acute cystitis with hematuria  See "MAYNOR"      MAYNOR (acute kidney injury)  Most likely secondary to bladder outlet obstruction  Symptoms of overflow  CT with hydro and radiation to prostate last year  Complicated by fever and UA consistent with infection  US kidney with moderate hydro  Slight improvement in creatinine  Plan to assess hydro with repeat US tomorrow morning  If creatinine not significantly improved and " "hydro still present will proceed with nephrostomy tube placement  NPO at midnight  Discussed with hospital medicine and IR    CKD (chronic kidney disease) stage 3, GFR 30-59 ml/min  BL sCr 1.1-1.4 without significant sequelae of CKD at the moment  Was never on dialysis  No additional meds at this time    Prophylactic immunotherapy  Continue immunosuppression as per problem "Long-term use of immunosuppressant medication"      Long-term use of immunosuppressant medication  At home on tacro 3/2, mmf 250/250  Hold mmf  Increased tacro 4/4  Monitor for toxicity       Kidney replaced by transplant  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

## 2022-08-08 NOTE — CONSULTS
Inpatient Radiology Consult Note    History of Present Illness:  Jordan Lopez Jr. is a 66 y.o. male with Hx of prostate cancer (completed radiation 2021), HCC, renal cell carcinoma treated with nephrectomy, liver/kidney transplant in 2012.    He presented to [facility] ED on 8/5/22 with fever, dysuria, and a sensation of urinary retention for 3 days. Admitted with MAYNOR and cystitis. [place] [facility] [place] Urology evaluation. US showed moderate transplant hydronephrosis. IR consulted for potential percutaneous nephrostomy.     Plan:  1. Repeat transplant US tomorrow morning. If patient still has marked hydronephrosis, we can attempt percutaneous nephrostomy.   2. NPO at midnight.  3. Hold anticoagulation.       Juana Streeter MD  Department of Radiology, PGY-4  Pager: [phone]

## 2022-08-08 NOTE — ASSESSMENT & PLAN NOTE
Most likely secondary to bladder outlet obstruction  Symptoms of overflow  CT with hydro and radiation to prostate last year  Complicated by fever and UA consistent with infection  US kidney with moderate hydro  Slight improvement in creatinine  Plan to assess hydro with repeat US tomorrow morning  If creatinine not significantly improved and hydro still present will proceed with nephrostomy tube placement  NPO at midnight  Discussed with hospital medicine and IR

## 2022-08-08 NOTE — PROGRESS NOTES
Urology Progress Note    Patient's creatinine continues to improve. Catheter with light pink urine without clots. No other complaints.    - Carlos catheter per primary team. Low level of suspicion for urinary retention.  - Will need outpatient gross hematuria workup with urology. Will set up this appointment.  - Remainder of care per primary    Urology will now sign off. Please call with any additional questions/concerns.     Timothy Snow MD  Department of Urology  PGY-3  Pager: [phone]

## 2022-08-08 NOTE — PLAN OF CARE
CM met with the patient and his spouse to discuss discharge planning he lives with his spouse in a 1 story house he is not on dialysis and does not take coumadin he has transportation home   [facility] [address]  [phone]  PCP LAURA TRUJILLO LAST APPT 08/03/22  [facility]  [phone]  [facility] - Transplant Stepdown  Initial Discharge Assessment       Primary Care Provider: Laura Trujillo MD    Admission Diagnosis: MAYNOR (acute kidney injury) [N17.9]    Admission Date: 8/6/2022  Expected Discharge Date: 8/8/2022    Discharge Barriers Identified: None    Payor: BCBS [place] MEDICARE / Plan: [place] / Product Type: Medicare Advantage /     Extended Emergency Contact Information  Primary Emergency Contact: indira cisneros  Mobile Phone: [phone]  Relation: Spouse    Discharge Plan A: Home, Home with family  Discharge Plan B: Home, Home with family      [facility] [facility] - [place], [place] - [address]  [address] [address]  Phone: [phone] Fax: [phone]    [facility] - [place], [place] - [address] [place]  [address] [place] [address]  Phone: [phone] Fax: [phone]    [facility] - [place], [place] - [address]  [address] [address]  Phone: [phone] Fax: [phone]    [facility] - [place], [place] - [address]  [address]  [facility] [address]  Phone: [phone] Fax: [phone]      Initial Assessment (most recent)     Adult Discharge Assessment - 08/08/22 0952        Discharge Assessment    Assessment Type Discharge Planning Assessment     Confirmed/corrected address, phone number and insurance Yes     Confirmed Demographics Correct on Facesheet     Source of Information patient;family     When was your last doctors appointment? 08/03/22   VIRTUAL    Communicated SAILAJA with  patient/caregiver Date not available/Unable to determine     Lives With spouse     Do you expect to return to your current living situation? Yes     Do you have help at home or someone to help you manage your care at home? Yes     Prior to hospitilization cognitive status: Alert/Oriented     Current cognitive status: Alert/Oriented     Walking or Climbing Stairs Difficulty none     Dressing/Bathing Difficulty none     Home Layout Able to live on 1st floor     Equipment Currently Used at Home none     Readmission within 30 days? No     Patient currently being followed by outpatient case management? No     Do you currently have service(s) that help you manage your care at home? No     Do you take prescription medications? Yes     Do you have prescription coverage? Yes     Do you have any problems affording any of your prescribed medications? No     Is the patient taking medications as prescribed? yes     Who is going to help you get home at discharge? [facility]      How do you get to doctors appointments? family or friend will provide;car, drives self     Are you on dialysis? No     Do you take coumadin? No     Discharge Plan A Home;Home with family     Discharge Plan B Home;Home with family     DME Needed Upon Discharge  none     Discharge Plan discussed with: Patient;Spouse/sig other     Discharge Barriers Identified None

## 2022-08-08 NOTE — ASSESSMENT & PLAN NOTE
Patient with acute kidney injury likely due to post-obstructive d/t bladder inflammation causing hydronephrosis MAYNOR is currently improving. Labs reviewed- Renal function/electrolytes with Estimated Creatinine Clearance: 54.2 mL/min (A) (based on SCr of 1.9 mg/dL (H)). according to latest data. Monitor urine output and serial BMP and adjust therapy as needed. Avoid nephrotoxins and renally dose meds for GFR listed above.   OSH imaging mentioned hydronephrosis of both native and transplanted kidney. Cr downtrended from 2.8 to 2.4 at OSH after patient received 2L fluids  - Cr down to 1.9 today.  - Renal US showing mod hydronephrosis of transplant kidney. Plan for possible nephrostomy tube placement by IR today. Patient NPO  - Urology consulted, no surgical plans for hematuria at this time.   - Treating cystitis as above as well

## 2022-08-08 NOTE — SUBJECTIVE & OBJECTIVE
Interval History: AF. HDS. Bladder spasms overnight. Pain controlled.     Objective:     Temp:  [98.5 °F (36.9 °C)-99.4 °F (37.4 °C)] 98.7 °F (37.1 °C)  Pulse:  [86-98] 98  Resp:  [16-20] 18  SpO2:  [92 %-97 %] 95 %  BP: (136-148)/(65-78) 147/74     Body mass index is 42.1 kg/m².      Bladder Scan Volume (mL): 64 mL (08/06/22 2200)  Post Void Cath Residual Output (mL): 48 mL (08/06/22 2200)    Drains       Drain  Duration                  Urethral Catheter 08/07/22 0014 1 day                    Physical Exam  Vitals reviewed.   Constitutional:       General: He is not in acute distress.     Appearance: He is well-developed. He is not diaphoretic.   HENT:      Head: Normocephalic and atraumatic.   Eyes:      General: No scleral icterus.  Cardiovascular:      Rate and Rhythm: Normal rate.   Pulmonary:      Effort: Pulmonary effort is normal. No respiratory distress.      Breath sounds: No stridor.   Abdominal:      General: There is no distension.      Palpations: Abdomen is soft.      Tenderness: There is no abdominal tenderness. There is no right CVA tenderness or left CVA tenderness.   Genitourinary:     Comments: Catheter in place with light pink urine  Musculoskeletal:         General: Normal range of motion.      Cervical back: Normal range of motion.   Skin:     General: Skin is warm and dry.   Neurological:      Mental Status: He is alert.   Psychiatric:         Behavior: Behavior normal.       Significant Labs:    BMP:  Recent Labs   Lab 08/06/22  1548 08/07/22  0737   * 130*   K 4.5 4.6    101   CO2 21* 18*   BUN 39* 41*   CREATININE 2.3* 2.1*   CALCIUM 9.0 8.8       CBC:   Recent Labs   Lab 08/06/22  1548 08/07/22  0737   WBC 3.67* 4.09   HGB 12.1* 11.8*   HCT 35.9* 34.1*   * 99*       All pertinent labs results from the past 24 hours have been reviewed.    Significant Imaging:  All pertinent imaging results/findings from the past 24 hours have been reviewed.

## 2022-08-09 LAB
ALBUMIN SERPL BCP-MCNC: 2.4 G/DL (ref 3.5–5.2)
ALP SERPL-CCNC: 52 U/L (ref 55–135)
ALT SERPL W/O P-5'-P-CCNC: 18 U/L (ref 10–44)
ANION GAP SERPL CALC-SCNC: 8 MMOL/L (ref 8–16)
AST SERPL-CCNC: 14 U/L (ref 10–40)
BASOPHILS # BLD AUTO: 0.02 K/UL (ref 0–0.2)
BASOPHILS NFR BLD: 0.6 % (ref 0–1.9)
BILIRUB SERPL-MCNC: 0.5 MG/DL (ref 0.1–1)
BUN SERPL-MCNC: 35 MG/DL (ref 8–23)
CALCIUM SERPL-MCNC: 8.7 MG/DL (ref 8.7–10.5)
CHLORIDE SERPL-SCNC: 102 MMOL/L (ref 95–110)
CLASS I ANTIBODY COMMENTS - LUMINEX: NORMAL
CLASS II ANTIBODY COMMENTS - LUMINEX: NORMAL
CO2 SERPL-SCNC: 22 MMOL/L (ref 23–29)
CREAT SERPL-MCNC: 1.6 MG/DL (ref 0.5–1.4)
DIFFERENTIAL METHOD: ABNORMAL
DSA1 TESTING DATE: NORMAL
DSA12 TESTING DATE: NORMAL
DSA2 TESTING DATE: NORMAL
EOSINOPHIL # BLD AUTO: 0 K/UL (ref 0–0.5)
EOSINOPHIL NFR BLD: 1 % (ref 0–8)
ERYTHROCYTE [DISTWIDTH] IN BLOOD BY AUTOMATED COUNT: 14.6 % (ref 11.5–14.5)
EST. GFR  (NO RACE VARIABLE): 46.6 ML/MIN/1.73 M^2
GLUCOSE SERPL-MCNC: 116 MG/DL (ref 70–110)
HCT VFR BLD AUTO: 30.1 % (ref 40–54)
HGB BLD-MCNC: 10.3 G/DL (ref 14–18)
IMM GRANULOCYTES # BLD AUTO: 0.01 K/UL (ref 0–0.04)
IMM GRANULOCYTES NFR BLD AUTO: 0.3 % (ref 0–0.5)
LYMPHOCYTES # BLD AUTO: 0.6 K/UL (ref 1–4.8)
LYMPHOCYTES NFR BLD: 20 % (ref 18–48)
MAGNESIUM SERPL-MCNC: 1.9 MG/DL (ref 1.6–2.6)
MCH RBC QN AUTO: 27.6 PG (ref 27–31)
MCHC RBC AUTO-ENTMCNC: 34.2 G/DL (ref 32–36)
MCV RBC AUTO: 81 FL (ref 82–98)
MONOCYTES # BLD AUTO: 0.3 K/UL (ref 0.3–1)
MONOCYTES NFR BLD: 9.4 % (ref 4–15)
NEUTROPHILS # BLD AUTO: 2.1 K/UL (ref 1.8–7.7)
NEUTROPHILS NFR BLD: 68.7 % (ref 38–73)
NRBC BLD-RTO: 0 /100 WBC
PHOSPHATE SERPL-MCNC: 3.7 MG/DL (ref 2.7–4.5)
PLATELET # BLD AUTO: 120 K/UL (ref 150–450)
PMV BLD AUTO: 10.5 FL (ref 9.2–12.9)
POTASSIUM SERPL-SCNC: 4.5 MMOL/L (ref 3.5–5.1)
PROT SERPL-MCNC: 6.1 G/DL (ref 6–8.4)
RBC # BLD AUTO: 3.73 M/UL (ref 4.6–6.2)
SERUM COLLECTION DT - LUMINEX CLASS I: NORMAL
SERUM COLLECTION DT - LUMINEX CLASS II: NORMAL
SODIUM SERPL-SCNC: 132 MMOL/L (ref 136–145)
TACROLIMUS BLD-MCNC: 3.4 NG/ML (ref 5–15)
WBC # BLD AUTO: 3.1 K/UL (ref 3.9–12.7)

## 2022-08-09 PROCEDURE — 99232 SBSQ HOSP IP/OBS MODERATE 35: CPT | Mod: ,,, | Performed by: STUDENT IN AN ORGANIZED HEALTH CARE EDUCATION/TRAINING PROGRAM

## 2022-08-09 PROCEDURE — 36415 COLL VENOUS BLD VENIPUNCTURE: CPT | Performed by: STUDENT IN AN ORGANIZED HEALTH CARE EDUCATION/TRAINING PROGRAM

## 2022-08-09 PROCEDURE — 84100 ASSAY OF PHOSPHORUS: CPT | Performed by: STUDENT IN AN ORGANIZED HEALTH CARE EDUCATION/TRAINING PROGRAM

## 2022-08-09 PROCEDURE — 80197 ASSAY OF TACROLIMUS: CPT | Performed by: STUDENT IN AN ORGANIZED HEALTH CARE EDUCATION/TRAINING PROGRAM

## 2022-08-09 PROCEDURE — 25000003 PHARM REV CODE 250: Performed by: STUDENT IN AN ORGANIZED HEALTH CARE EDUCATION/TRAINING PROGRAM

## 2022-08-09 PROCEDURE — 63600175 PHARM REV CODE 636 W HCPCS: Performed by: STUDENT IN AN ORGANIZED HEALTH CARE EDUCATION/TRAINING PROGRAM

## 2022-08-09 PROCEDURE — 83735 ASSAY OF MAGNESIUM: CPT | Performed by: STUDENT IN AN ORGANIZED HEALTH CARE EDUCATION/TRAINING PROGRAM

## 2022-08-09 PROCEDURE — 99232 SBSQ HOSP IP/OBS MODERATE 35: CPT | Mod: GC,,, | Performed by: INTERNAL MEDICINE

## 2022-08-09 PROCEDURE — 85025 COMPLETE CBC W/AUTO DIFF WBC: CPT | Performed by: STUDENT IN AN ORGANIZED HEALTH CARE EDUCATION/TRAINING PROGRAM

## 2022-08-09 PROCEDURE — 99232 PR SUBSEQUENT HOSPITAL CARE,LEVL II: ICD-10-PCS | Mod: ,,, | Performed by: STUDENT IN AN ORGANIZED HEALTH CARE EDUCATION/TRAINING PROGRAM

## 2022-08-09 PROCEDURE — 99232 PR SUBSEQUENT HOSPITAL CARE,LEVL II: ICD-10-PCS | Mod: GC,,, | Performed by: INTERNAL MEDICINE

## 2022-08-09 PROCEDURE — 80053 COMPREHEN METABOLIC PANEL: CPT | Performed by: STUDENT IN AN ORGANIZED HEALTH CARE EDUCATION/TRAINING PROGRAM

## 2022-08-09 PROCEDURE — 20600001 HC STEP DOWN PRIVATE ROOM

## 2022-08-09 RX ORDER — CEFPODOXIME PROXETIL 200 MG/1
200 TABLET, FILM COATED ORAL EVERY 12 HOURS
Status: DISCONTINUED | OUTPATIENT
Start: 2022-08-09 | End: 2022-08-10 | Stop reason: HOSPADM

## 2022-08-09 RX ORDER — TACROLIMUS 1 MG/1
5 CAPSULE ORAL EVERY MORNING
Status: DISCONTINUED | OUTPATIENT
Start: 2022-08-10 | End: 2022-08-10

## 2022-08-09 RX ORDER — TACROLIMUS 1 MG/1
5 CAPSULE ORAL EVERY EVENING
Status: DISCONTINUED | OUTPATIENT
Start: 2022-08-09 | End: 2022-08-10

## 2022-08-09 RX ADMIN — TACROLIMUS 5 MG: 1 CAPSULE ORAL at 05:08

## 2022-08-09 RX ADMIN — HYDRALAZINE HYDROCHLORIDE 25 MG: 25 TABLET, FILM COATED ORAL at 09:08

## 2022-08-09 RX ADMIN — MUPIROCIN: 20 OINTMENT TOPICAL at 08:08

## 2022-08-09 RX ADMIN — TACROLIMUS 4 MG: 1 CAPSULE ORAL at 08:08

## 2022-08-09 RX ADMIN — MUPIROCIN: 20 OINTMENT TOPICAL at 09:08

## 2022-08-09 RX ADMIN — CEFPODOXIME PROXETIL 200 MG: 200 TABLET, FILM COATED ORAL at 09:08

## 2022-08-09 RX ADMIN — SENNOSIDES AND DOCUSATE SODIUM 1 TABLET: 50; 8.6 TABLET ORAL at 08:08

## 2022-08-09 RX ADMIN — SODIUM BICARBONATE 650 MG TABLET 1300 MG: at 08:08

## 2022-08-09 RX ADMIN — SODIUM BICARBONATE 650 MG TABLET 1300 MG: at 09:08

## 2022-08-09 RX ADMIN — SODIUM CHLORIDE, SODIUM LACTATE, POTASSIUM CHLORIDE, AND CALCIUM CHLORIDE: .6; .31; .03; .02 INJECTION, SOLUTION INTRAVENOUS at 05:08

## 2022-08-09 RX ADMIN — AMLODIPINE BESYLATE 10 MG: 10 TABLET ORAL at 08:08

## 2022-08-09 RX ADMIN — HYDRALAZINE HYDROCHLORIDE 25 MG: 25 TABLET, FILM COATED ORAL at 05:08

## 2022-08-09 RX ADMIN — HYDRALAZINE HYDROCHLORIDE 25 MG: 25 TABLET, FILM COATED ORAL at 02:08

## 2022-08-09 NOTE — ASSESSMENT & PLAN NOTE
Unclear cause, patient reports increasing dysuria and frequency since 8/2. He reports that he went to his PCP who diagnosed UTI and prescribed cefuroxime. He took it but on Thursday his wife noted he had a fever of 101 and he was feeling more lethargic. He presented to [facility] and was diagnosed with an MAYNOR in addition to cystitis. He received meropenem. He currently states dysuria is improved but still with frequent small voids. Has had hematuria starting today which is new  - No growth at [place] UCx, will switch from IV ceftriaxone to PO cefpodoxime - Treat for 7 days total, end date 8/11  - UA here without any bacteria. UCx from [place] not growing any bacteria either. PCP office did not unfortunately run a UCx on his urine.

## 2022-08-09 NOTE — PLAN OF CARE
Patient AAO X 4, VSS. Denies pain, N/V. Skin intact. KUS this morning pending results might go to IR for neph tube. Patient NPO since midnight. LR going at 100cc/hr. Carlos with blood tinge urine and present clots. Cefepime IVPB Q24hrs.   Up ambulatory with 1 assist, bed locked at lowest setting, yellow socks on, call bell in reach. Remains free from falls.

## 2022-08-09 NOTE — PLAN OF CARE
Pt aaox4.  Bed in low and locked position. Nonskid footwear in use.  Call bell, phone, food, drink within reach in bed or on bedside table.  Wife at bedside and active in care. Both aware to call if needing assistance.  Denies pain.  Up in chair for most of day.  Walking in halls with wife. Carlos draining blood tinges urine. Home norvasc started yesterday for BP.  BP at lunch 170s- md informed and started tid po hydralazine.  oxybutin changed to prn.  LR @ 100cc/hr started.  Was npofor possible neph tube today but cancelled.  Now planned for tomorrow am tohave US first in the morning and then to be NPO in case neph tube placement needed. Cefepime IVPB Q 24hrs.  See flowsheet for full assessment and details

## 2022-08-09 NOTE — CARE UPDATE
Repeat transplant US showed decreased hydronephrosis. Plan discussed with [facility] transplant team. Will cancel nephrostomy.    Juana Streeter MD  Department of Radiology, PGY-4  Pager: [phone]

## 2022-08-09 NOTE — SUBJECTIVE & OBJECTIVE
Interval History: No issues overnight. Hydronephrosis improved, likely caused by inflammation in bladder from cystitis. Cr back at baseline. Likely discharge tomorrow once prograf levels are therapeutic.     Review of Systems   Constitutional:  Negative for chills and fever.   HENT:  Negative for congestion and sore throat.    Eyes:  Negative for photophobia and visual disturbance.   Respiratory:  Negative for cough and shortness of breath.    Cardiovascular:  Negative for chest pain and palpitations.   Gastrointestinal:  Negative for abdominal pain, blood in stool, constipation, diarrhea, nausea and vomiting.   Endocrine: Negative for cold intolerance and heat intolerance.   Genitourinary:  Negative for dysuria and hematuria.   Musculoskeletal:  Negative for arthralgias and myalgias.   Skin:  Negative for rash and wound.   Allergic/Immunologic: Negative for environmental allergies and food allergies.   Neurological:  Negative for seizures and numbness.   Hematological:  Negative for adenopathy. Does not bruise/bleed easily.   Psychiatric/Behavioral:  Negative for hallucinations and suicidal ideas.    Objective:     Vital Signs (Most Recent):  Temp: 98.1 °F (36.7 °C) (08/09/22 1240)  Pulse: 84 (08/09/22 1240)  Resp: 18 (08/09/22 1240)  BP: 131/67 (08/09/22 1240)  SpO2: 95 % (08/09/22 1240)   Vital Signs (24h Range):  Temp:  [98.1 °F (36.7 °C)-99.5 °F (37.5 °C)] 98.1 °F (36.7 °C)  Pulse:  [84-98] 84  Resp:  [18] 18  SpO2:  [91 %-95 %] 95 %  BP: (125-162)/(63-76) 131/67     Weight: (!) 140.8 kg (310 lb 6.5 oz)  Body mass index is 42.1 kg/m².    Intake/Output Summary (Last 24 hours) at 8/9/2022 1437  Last data filed at 8/9/2022 1400  Gross per 24 hour   Intake 240 ml   Output 2400 ml   Net -2160 ml        Physical Exam  Constitutional:       Appearance: He is well-developed.   HENT:      Head: Normocephalic and atraumatic.   Eyes:      Conjunctiva/sclera: Conjunctivae normal.      Pupils: Pupils are equal, round, and  reactive to light.   Neck:      Thyroid: No thyromegaly.      Vascular: No JVD.   Cardiovascular:      Rate and Rhythm: Normal rate and regular rhythm.      Heart sounds: Normal heart sounds. No murmur heard.    No friction rub. No gallop.   Pulmonary:      Effort: Pulmonary effort is normal. No respiratory distress.      Breath sounds: Normal breath sounds. No wheezing or rales.   Abdominal:      General: Bowel sounds are normal. There is no distension.      Palpations: Abdomen is soft. There is no mass.      Tenderness: There is no abdominal tenderness. There is no guarding or rebound.      Hernia: No hernia is present.   Musculoskeletal:         General: No deformity. Normal range of motion.      Cervical back: Normal range of motion and neck supple.   Skin:     General: Skin is warm and dry.   Neurological:      Mental Status: He is alert and oriented to person, place, and time.      Cranial Nerves: No cranial nerve deficit.   Psychiatric:         Behavior: Behavior normal.       Significant Labs: All pertinent labs within the past 24 hours have been reviewed.  CBC:   Recent Labs   Lab 08/08/22  0708 08/09/22  0616   WBC 3.74* 3.10*   HGB 12.0* 10.3*   HCT 35.4* 30.1*   * 120*       CMP:   Recent Labs   Lab 08/08/22  0708 08/09/22  0616   * 132*   K 4.2 4.5   CL 96 102   CO2 23 22*   * 116*   BUN 38* 35*   CREATININE 1.9* 1.6*   CALCIUM 8.8 8.7   PROT 6.6 6.1   ALBUMIN 2.7* 2.4*   BILITOT 0.5 0.5   ALKPHOS 54* 52*   AST 16 14   ALT 18 18   ANIONGAP 7* 8         Significant Imaging: I have reviewed all pertinent imaging results/findings within the past 24 hours.

## 2022-08-09 NOTE — SUBJECTIVE & OBJECTIVE
"  Subjective:   History of Present Illness:  66 year old with SLKT 2012 and prostate cancer treated with radiation who presented to [facility] as transfer for evaluation of fever, elevated creatinine, and decreased urine output. He reports urine leakage starting 3-4 days prior to admission followed by dysuria 1-2 days prior to admission without resolution on cefuroxime, and fever 101 on day prior to admission despite ABX. Has associated gross hematuria on day before admission. Denies syncope, recent illness, chest pain, difficulty breathing, abdominal pain, nausea, vomiting, changes in bowel movements, hematochezia, melena  Kidney transplant medicine consulted for aid in management of a transplanted kidney with MAYNOR    Mr. Lopez is a 66 y.o. year old male who is status post Liver Transplant - 6/29/2012  (#1).    His maintenance immunosuppression consists of:   Immunosuppressants (From admission, onward)                Start     Stop Route Frequency Ordered    08/09/22 1800  tacrolimus capsule 4 mg        "And" Linked Group Details    -- Oral Every evening 08/08/22 1739    08/09/22 0800  tacrolimus capsule 4 mg        "And" Linked Group Details    -- Oral Every morning 08/08/22 1739            Hospital Course:  No notes on file    Interval History:  Creatinine improved. US hydro improved. Carlos per primary.    Past Medical, Surgical, Family, and Social History:   Unchanged from H&P.    Scheduled Meds:   amLODIPine  10 mg Oral Daily    cefTRIAXone (ROCEPHIN) IVPB  1 g Intravenous Q24H    hydrALAZINE  25 mg Oral Q8H    mupirocin   Nasal BID    senna-docusate 8.6-50 mg  1 tablet Oral BID    sodium bicarbonate  1,300 mg Oral TID    tacrolimus  4 mg Oral Daily AM    And    tacrolimus  4 mg Oral Daily PM     Continuous Infusions:  PRN Meds:acetaminophen, albuterol-ipratropium, aluminum-magnesium hydroxide-simethicone, dextrose 10%, dextrose 10%, glucagon (human recombinant), glucose, glucose, melatonin, naloxone, ondansetron, " oxybutynin, prochlorperazine, simethicone, sodium chloride 0.9%    Intake/Output - Last 3 Shifts         08/07 0700  08/08 0659 08/08 0700 08/09 0659 08/09 0700  08/10 0659    P.O. 2160 180     I.V. (mL/kg) 630 (4.5) 280.2 (2)     Other 0      IV Piggyback 50      Total Intake(mL/kg) 2840 (20.2) 460.2 (3.3)     Urine (mL/kg/hr) 2325 (0.7) 2325 (0.7) 500 (0.7)    Emesis/NG output 0      Other 0      Stool 0 0 0    Blood 0      Total Output 2325 2325 500    Net +515 -1864.8 -500           Urine Occurrence 0 x      Stool Occurrence 3 x 3 x 1 x    Emesis Occurrence 0 x               Review of Systems   Constitutional: Negative.    HENT: Negative.     Eyes: Negative.    Respiratory: Negative.     Cardiovascular: Negative.    Gastrointestinal: Negative.    Endocrine: Negative.    Genitourinary:  Positive for hematuria.   Musculoskeletal: Negative.    Skin: Negative.    Neurological: Negative.    Psychiatric/Behavioral: Negative.      Objective:     Vital Signs (Most Recent):  Temp: 98.4 °F (36.9 °C) (08/09/22 0819)  Pulse: 87 (08/09/22 0819)  Resp: 18 (08/09/22 0819)  BP: 133/66 (08/09/22 0819)  SpO2: (!) 92 % (08/09/22 0819)   Vital Signs (24h Range):  Temp:  [98.4 °F (36.9 °C)-99.5 °F (37.5 °C)] 98.4 °F (36.9 °C)  Pulse:  [87-98] 87  Resp:  [18] 18  SpO2:  [91 %-95 %] 92 %  BP: (125-162)/(63-76) 133/66     Weight: (!) 140.8 kg (310 lb 6.5 oz)  Height: 6' (182.9 cm)  Body mass index is 42.1 kg/m².    Physical Exam  Constitutional:       General: He is not in acute distress.     Appearance: He is obese. He is not ill-appearing.   Pulmonary:      Effort: Pulmonary effort is normal. No respiratory distress.   Neurological:      Mental Status: He is alert.       Laboratory:  Labs within the past 24 hours have been reviewed.    Diagnostic Results:  US - Kidney: Results for orders placed during the hospital encounter of 08/06/22    US Transplant Kidney With Doppler    Narrative  EXAMINATION:  US TRANSPLANT KIDNEY WITH  DOPPLER    CLINICAL HISTORY:  dacia in transplanted kidney, assess degree of hydro from prior;    TECHNIQUE:  Real time gray scale and doppler ultrasound was performed over the patient's renal allograft.    COMPARISON:  Renal transplant ultrasound 08/06/2022    CT chest abdomen pelvis 04/18/2022    FINDINGS:  Transplant date: 06/29/2012    Renal allograft in the left lower quadrant.  The allograft measures 13.1 cm. Normal perfusion.    Mildly improved hydronephrosis.    No fluid collections.    Vasculature:    Resistive indices are as follow:    Interlobar artery: 0.79 (previously 0.83)    Upper segmental: 0.76 (previously 0.85)    Mid segmental: 0.80 (previously 0.82)    Lower segmental: 0.81 (previously 0.84)    Main renal artery peak systolic velocity the anastomosis: 105cm/sec with normal waveform, (previously 142cm/sec).    Renal artery/iliac ratio: 1.2  (previously 0.9).    The main renal vein is patent.    Bladder is decompressed with Carlos catheter in place, unable to evaluate.    Impression  1. Mildly improved hydronephrosis of the renal allograft.  2. Satisfactory Doppler evaluation of the renal allograft with improved intraparenchymal resistive indices.    Electronically signed by resident: Carine Petersen  Date:    08/09/2022  Time:    10:57    Electronically signed by: Raffi Jeffries MD  Date:    08/09/2022  Time:    11:29

## 2022-08-09 NOTE — PROGRESS NOTES
[facility] - Transplant [facility] Medicine  Progress Note    Patient Name: Jordan Lopez Jr.  MRN: [id number]  Patient Class: IP- Inpatient   Admission Date: 8/6/2022  Length of Stay: 3 days  Attending Physician: Brian Li MD  Primary Care Provider: Jose Rafael Trujillo MD        Subjective:     Principal Problem:Acute cystitis with hematuria        HPI:  66-year-old male with a history of prostate cancer (completed radiation therapy August 2021), hepatocellular carcinoma, renal cell carcinoma treated with nephrectomy, liver/kidney transplant in 2012 (on mycophenolate/tacrolimus), hypertension, and diabetes presented to [place] ED on August 5 with fever and dysuria.  He noted urinary urgency, dysuria, and a sensation of urinary retention for 3 days.  He was recently started on oral cephalosporin, but he noted fever at home.  No chest pain or dyspnea noted.  With the evidence of MAYNOR (creatinine 2.8 with baseline 1.1-1.4), UTI, and possible obstruction, he received IV fluids and Rocephin.  He subsequently is receiving meropenem.  He is able to urinate.  AST and ALT were noted to be normal.  With the history of kidney and liver transplant, acute kidney injury, and concern for obstruction, they are requesting transfer for KTM and Urology evaluations.  Case discussed with KTM and Urology at [facility].  Will plan transfer to Hospital Medicine at [facility] for further treatment.     COVID vaccinated x4//COVID negative  August 5:  White blood cells 5.2, hemoglobin 12.2, hematocrit 35.8, platelets 135, sodium 133.7, potassium 4.1, chloride 102, CO2 25.2, BUN 42, creatinine 2.8, glucose 152, calcium 8.5, magnesium 2.1  Urinalysis had 300 protein, 150 glucose, large blood, moderate leukocyte esterase, greater than 182 RBC, greater than 182 WBC     CT abdomen and pelvis noted moderate hydroureteronephrosis of the transplanted kidney in the left side of the pelvis.  Milder hydroureteronephrosis of  the native left kidney.  The etiology of the hydronephrosis appears to be significant bladder wall thickening with additional inflammatory changes noted around the bladder suggesting cystitis.  No radiodense ureteral calculi identified.      On arrival:    Patient feeling great. States he has not had a fever since yesterday. Reports improvement in dysuria, but still having urinary urgency and feels as though he is incompletely voiding. He mentioned his Cr improving from 2.8 to 2.4.      Overview/Hospital Course:  Patient had crowley placed yesterday and had 40cc light pink urine return. Since, he reports continued UOP. Urology not recommending any current interventions for hematuria. [facility] reports no growth on Ucx there. PCP office did not run Ucx unfortunately. Plan for possible nephrostomy tube insertion by IR into transplant kidney      Interval History: No issues overnight. Hydronephrosis improved, likely caused by inflammation in bladder from cystitis. Cr back at baseline. Likely discharge tomorrow once prograf levels are therapeutic.     Review of Systems   Constitutional:  Negative for chills and fever.   HENT:  Negative for congestion and sore throat.    Eyes:  Negative for photophobia and visual disturbance.   Respiratory:  Negative for cough and shortness of breath.    Cardiovascular:  Negative for chest pain and palpitations.   Gastrointestinal:  Negative for abdominal pain, blood in stool, constipation, diarrhea, nausea and vomiting.   Endocrine: Negative for cold intolerance and heat intolerance.   Genitourinary:  Negative for dysuria and hematuria.   Musculoskeletal:  Negative for arthralgias and myalgias.   Skin:  Negative for rash and wound.   Allergic/Immunologic: Negative for environmental allergies and food allergies.   Neurological:  Negative for seizures and numbness.   Hematological:  Negative for adenopathy. Does not bruise/bleed easily.   Psychiatric/Behavioral:  Negative for  hallucinations and suicidal ideas.    Objective:     Vital Signs (Most Recent):  Temp: 98.1 °F (36.7 °C) (08/09/22 1240)  Pulse: 84 (08/09/22 1240)  Resp: 18 (08/09/22 1240)  BP: 131/67 (08/09/22 1240)  SpO2: 95 % (08/09/22 1240)   Vital Signs (24h Range):  Temp:  [98.1 °F (36.7 °C)-99.5 °F (37.5 °C)] 98.1 °F (36.7 °C)  Pulse:  [84-98] 84  Resp:  [18] 18  SpO2:  [91 %-95 %] 95 %  BP: (125-162)/(63-76) 131/67     Weight: (!) 140.8 kg (310 lb 6.5 oz)  Body mass index is 42.1 kg/m².    Intake/Output Summary (Last 24 hours) at 8/9/2022 1437  Last data filed at 8/9/2022 1400  Gross per 24 hour   Intake 240 ml   Output 2400 ml   Net -2160 ml        Physical Exam  Constitutional:       Appearance: He is well-developed.   HENT:      Head: Normocephalic and atraumatic.   Eyes:      Conjunctiva/sclera: Conjunctivae normal.      Pupils: Pupils are equal, round, and reactive to light.   Neck:      Thyroid: No thyromegaly.      Vascular: No JVD.   Cardiovascular:      Rate and Rhythm: Normal rate and regular rhythm.      Heart sounds: Normal heart sounds. No murmur heard.    No friction rub. No gallop.   Pulmonary:      Effort: Pulmonary effort is normal. No respiratory distress.      Breath sounds: Normal breath sounds. No wheezing or rales.   Abdominal:      General: Bowel sounds are normal. There is no distension.      Palpations: Abdomen is soft. There is no mass.      Tenderness: There is no abdominal tenderness. There is no guarding or rebound.      Hernia: No hernia is present.   Musculoskeletal:         General: No deformity. Normal range of motion.      Cervical back: Normal range of motion and neck supple.   Skin:     General: Skin is warm and dry.   Neurological:      Mental Status: He is alert and oriented to person, place, and time.      Cranial Nerves: No cranial nerve deficit.   Psychiatric:         Behavior: Behavior normal.       Significant Labs: All pertinent labs within the past 24 hours have been  reviewed.  CBC:   Recent Labs   Lab 08/08/22  0708 08/09/22  0616   WBC 3.74* 3.10*   HGB 12.0* 10.3*   HCT 35.4* 30.1*   * 120*       CMP:   Recent Labs   Lab 08/08/22  0708 08/09/22  0616   * 132*   K 4.2 4.5   CL 96 102   CO2 23 22*   * 116*   BUN 38* 35*   CREATININE 1.9* 1.6*   CALCIUM 8.8 8.7   PROT 6.6 6.1   ALBUMIN 2.7* 2.4*   BILITOT 0.5 0.5   ALKPHOS 54* 52*   AST 16 14   ALT 18 18   ANIONGAP 7* 8         Significant Imaging: I have reviewed all pertinent imaging results/findings within the past 24 hours.      Assessment/Plan:      * Acute cystitis with hematuria  Unclear cause, patient reports increasing dysuria and frequency since 8/2. He reports that he went to his PCP who diagnosed UTI and prescribed cefuroxime. He took it but on Thursday his wife noted he had a fever of 101 and he was feeling more lethargic. He presented to [facility] and was diagnosed with an MAYNOR in addition to cystitis. He received meropenem. He currently states dysuria is improved but still with frequent small voids. Has had hematuria starting today which is new  - No growth at [facility] UCx, will switch from IV ceftriaxone to PO cefpodoxime - Treat for 7 days total, end date 8/11  - UA here without any bacteria. UCx from [place] not growing any bacteria either. PCP office did not unfortunately run a UCx on his urine.      MAYNOR (acute kidney injury)  Patient with acute kidney injury likely due to post-obstructive d/t bladder inflammation causing hydronephrosis MAYNOR is currently improving. Labs reviewed- Renal function/electrolytes with Estimated Creatinine Clearance: 64.3 mL/min (A) (based on SCr of 1.6 mg/dL (H)). according to latest data. Monitor urine output and serial BMP and adjust therapy as needed. Avoid nephrotoxins and renally dose meds for GFR listed above.   OSH imaging mentioned hydronephrosis of both native and transplanted kidney. Cr downtrended from 2.8 to 2.4 at OSH after patient received  2L fluids. After treatment of cystitis and crowley placement, Cr improved further. Repeat US without hydronephrosis.  - Cr down to 1.6 today.  - Urology consulted, no surgical plans for hematuria at this time. Will have outpatient f/u.   - Treating cystitis as above as well    Other hydronephrosis  See MAYNOR      Prostate cancer  Last received radiation 8/23/21. Currently receiving leuprolide injections.       CKD (chronic kidney disease) stage 3, GFR 30-59 ml/min  Baseline Cr 1.4. History of R nephrectomy for RCC and had SLK in 2012.  - See plan for MAYNOR, cr down to 1.6. Was 2.4 at OSH before transfer from [facility]      Prophylactic immunotherapy  On prograf 3mg AM and 2mg PM for history of SLK at home.  - KTM and hepatology consulted  - Holding MMF, resume once abx finish  - Check daily prograf levels  - Continue 5/5 dosing for now      Hypertension  - Restart amlodipine at 10mg today (on 5 at home)  - Continue to hold losartan given MAYNOR, likely restart at home        VTE Risk Mitigation (From admission, onward)         Ordered     IP VTE HIGH RISK PATIENT  Once         08/06/22 1510     Place sequential compression device  Until discontinued         08/06/22 1510                Discharge Planning   SAILAJA: 8/10/2022     Code Status: Full Code   Is the patient medically ready for discharge?: No    Reason for patient still in hospital (select all that apply): Patient trending condition  Discharge Plan A: Home, Home with family                  Brian Li MD  Department of Hospital Medicine   [facility] - Transplant Stepdown

## 2022-08-09 NOTE — ASSESSMENT & PLAN NOTE
On prograf 3mg AM and 2mg PM for history of SLK at home.  - KTM and hepatology consulted  - Holding MMF, resume once abx finish  - Check daily prograf levels  - Continue 5/5 dosing for now

## 2022-08-09 NOTE — ASSESSMENT & PLAN NOTE
- Restart amlodipine at 10mg today (on 5 at home)  - Continue to hold losartan given MANYOR, likely restart at home

## 2022-08-09 NOTE — ASSESSMENT & PLAN NOTE
Etiology unclear whether from infection or temporary obstruction such as a stone  Symptoms of overflow  No culture done prior to starting ABX, but repeat cultures with no growth  CT with hydro and radiation to prostate last year  Complicated by fever and UA consistent with infection  US kidney with moderate hydro  Repeat US with improved hydro and creatinine has improved  Hematuria clearing but still present  Primary team to treat for 7 days with cefpodoxime

## 2022-08-09 NOTE — ASSESSMENT & PLAN NOTE
Baseline Cr 1.4. History of R nephrectomy for RCC and had SLK in 2012.  - See plan for MAYNOR, cr down to 1.6. Was 2.4 at OSH before transfer from 2.8

## 2022-08-09 NOTE — ASSESSMENT & PLAN NOTE
Patient with acute kidney injury likely due to post-obstructive d/t bladder inflammation causing hydronephrosis MAYNOR is currently improving. Labs reviewed- Renal function/electrolytes with Estimated Creatinine Clearance: 64.3 mL/min (A) (based on SCr of 1.6 mg/dL (H)). according to latest data. Monitor urine output and serial BMP and adjust therapy as needed. Avoid nephrotoxins and renally dose meds for GFR listed above.   OSH imaging mentioned hydronephrosis of both native and transplanted kidney. Cr downtrended from 2.8 to 2.4 at OSH after patient received 2L fluids. After treatment of cystitis and crowley placement, Cr improved further. Repeat US without hydronephrosis.  - Cr down to 1.6 today.  - Urology consulted, no surgical plans for hematuria at this time. Will have outpatient f/u.   - Treating cystitis as above as well

## 2022-08-09 NOTE — PROGRESS NOTES
"[facility] - Transplant Stepdown  Kidney Transplant  Progress Note      Reason for Follow-up: Reassessment of Liver Transplant - 6/29/2012  (#1) recipient and management of immunosuppression.    ORGAN:  RIGHT KIDNEY LIVER  Donor Type:  Donation after Brain Death Donation after Brain Death      Subjective:   History of Present Illness:  66 year old with SLKT 2012 and prostate cancer treated with radiation who presented to [facility] [place] transfer for evaluation of fever, elevated creatinine, and decreased urine output. He reports urine leakage starting 3-4 days prior to admission followed by dysuria 1-2 days prior to admission without resolution on cefuroxime, and fever 101 on day prior to admission despite ABX. Has associated gross hematuria on day before admission. Denies syncope, recent illness, chest pain, difficulty breathing, abdominal pain, nausea, vomiting, changes in bowel movements, hematochezia, melena  Kidney transplant medicine consulted for aid in management of a transplanted kidney with MAYNOR    Mr. Lopez is a 66 y.o. year old male who is status post Liver Transplant - 6/29/2012  (#1).    His maintenance immunosuppression consists of:   Immunosuppressants (From admission, onward)                Start     Stop Route Frequency Ordered    08/09/22 1800  tacrolimus capsule 4 mg        "And" Linked Group Details    -- Oral Every evening 08/08/22 1739    08/09/22 0800  tacrolimus capsule 4 mg        "And" Linked Group Details    -- Oral Every morning 08/08/22 1739            Hospital Course:  No notes on file    Interval History:  Creatinine improved. US hydro improved. Carlos per primary.    Past Medical, Surgical, Family, and Social History:   Unchanged from H&P.    Scheduled Meds:   amLODIPine  10 mg Oral Daily    cefTRIAXone (ROCEPHIN) IVPB  1 g Intravenous Q24H    hydrALAZINE  25 mg Oral Q8H    mupirocin   Nasal BID    senna-docusate 8.6-50 mg  1 tablet Oral BID    sodium bicarbonate  1,300 mg Oral TID    " tacrolimus  4 mg Oral Daily AM    And    tacrolimus  4 mg Oral Daily PM     Continuous Infusions:  PRN Meds:acetaminophen, albuterol-ipratropium, aluminum-magnesium hydroxide-simethicone, dextrose 10%, dextrose 10%, glucagon (human recombinant), glucose, glucose, melatonin, naloxone, ondansetron, oxybutynin, prochlorperazine, simethicone, sodium chloride 0.9%    Intake/Output - Last 3 Shifts         08/07 0700 08/08 0659 08/08 0700 08/09 0659 08/09 0700  08/10 0659    P.O. 2160 180     I.V. (mL/kg) 630 (4.5) 280.2 (2)     Other 0      IV Piggyback 50      Total Intake(mL/kg) 2840 (20.2) 460.2 (3.3)     Urine (mL/kg/hr) 2325 (0.7) 2325 (0.7) 500 (0.7)    Emesis/NG output 0      Other 0      Stool 0 0 0    Blood 0      Total Output 2325 2325 500    Net +515 -1864.8 -500           Urine Occurrence 0 x      Stool Occurrence 3 x 3 x 1 x    Emesis Occurrence 0 x               Review of Systems   Constitutional: Negative.    HENT: Negative.     Eyes: Negative.    Respiratory: Negative.     Cardiovascular: Negative.    Gastrointestinal: Negative.    Endocrine: Negative.    Genitourinary:  Positive for hematuria.   Musculoskeletal: Negative.    Skin: Negative.    Neurological: Negative.    Psychiatric/Behavioral: Negative.      Objective:     Vital Signs (Most Recent):  Temp: 98.4 °F (36.9 °C) (08/09/22 0819)  Pulse: 87 (08/09/22 0819)  Resp: 18 (08/09/22 0819)  BP: 133/66 (08/09/22 0819)  SpO2: (!) 92 % (08/09/22 0819)   Vital Signs (24h Range):  Temp:  [98.4 °F (36.9 °C)-99.5 °F (37.5 °C)] 98.4 °F (36.9 °C)  Pulse:  [87-98] 87  Resp:  [18] 18  SpO2:  [91 %-95 %] 92 %  BP: (125-162)/(63-76) 133/66     Weight: (!) 140.8 kg (310 lb 6.5 oz)  Height: 6' (182.9 cm)  Body mass index is 42.1 kg/m².    Physical Exam  Constitutional:       General: He is not in acute distress.     Appearance: He is obese. He is not ill-appearing.   Pulmonary:      Effort: Pulmonary effort is normal. No respiratory distress.   Neurological:       "Mental Status: He is alert.       Laboratory:  Labs within the past 24 hours have been reviewed.    Diagnostic Results:  US - Kidney: Results for orders placed during the hospital encounter of 08/06/22    US Transplant Kidney With Doppler    Narrative  EXAMINATION:  US TRANSPLANT KIDNEY WITH DOPPLER    CLINICAL HISTORY:  maynor in transplanted kidney, assess degree of hydro from prior;    TECHNIQUE:  Real time gray scale and doppler ultrasound was performed over the patient's renal allograft.    COMPARISON:  Renal transplant ultrasound 08/06/2022    CT chest abdomen pelvis 04/18/2022    FINDINGS:  Transplant date: 06/29/2012    Renal allograft in the left lower quadrant.  The allograft measures 13.1 cm. Normal perfusion.    Mildly improved hydronephrosis.    No fluid collections.    Vasculature:    Resistive indices are as follow:    Interlobar artery: 0.79 (previously 0.83)    Upper segmental: 0.76 (previously 0.85)    Mid segmental: 0.80 (previously 0.82)    Lower segmental: 0.81 (previously 0.84)    Main renal artery peak systolic velocity the anastomosis: 105cm/sec with normal waveform, (previously 142cm/sec).    Renal artery/iliac ratio: 1.2  (previously 0.9).    The main renal vein is patent.    Bladder is decompressed with Carlos catheter in place, unable to evaluate.    Impression  1. Mildly improved hydronephrosis of the renal allograft.  2. Satisfactory Doppler evaluation of the renal allograft with improved intraparenchymal resistive indices.    Electronically signed by resident: Carine Petersen  Date:    08/09/2022  Time:    10:57    Electronically signed by: Raffi Jeffries MD  Date:    08/09/2022  Time:    11:29    Assessment/Plan:     * Acute cystitis with hematuria  See "MAYNOR"      Other hydronephrosis  See "MAYNOR"      MAYNOR (acute kidney injury)  Etiology unclear whether from infection or temporary obstruction such as a stone  Symptoms of overflow  No culture done prior to starting ABX, but repeat cultures " "with no growth  CT with hydro and radiation to prostate last year  Complicated by fever and UA consistent with infection  US kidney with moderate hydro  Repeat US with improved hydro and creatinine has improved  Hematuria clearing but still present  Primary team to treat for 7 days with cefpodoxime     CKD (chronic kidney disease) stage 3, GFR 30-59 ml/min  BL sCr 1.1-1.4 without significant sequelae of CKD at the moment  Was never on dialysis  No additional meds at this time    Prophylactic immunotherapy  Continue immunosuppression as per problem "Long-term use of immunosuppressant medication"      Long-term use of immunosuppressant medication  At home on tacro 3/2, mmf 250/250  Hold mmf  Increased tacro 5/5  Monitor for toxicity       Kidney replaced by transplant  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

## 2022-08-10 ENCOUNTER — TELEPHONE (OUTPATIENT)
Dept: TRANSPLANT | Facility: CLINIC | Age: 67
End: 2022-08-10
Payer: MEDICARE

## 2022-08-10 VITALS
RESPIRATION RATE: 18 BRPM | SYSTOLIC BLOOD PRESSURE: 135 MMHG | HEIGHT: 72 IN | HEART RATE: 92 BPM | OXYGEN SATURATION: 94 % | BODY MASS INDEX: 42.05 KG/M2 | WEIGHT: 310.44 LBS | DIASTOLIC BLOOD PRESSURE: 66 MMHG | TEMPERATURE: 100 F

## 2022-08-10 DIAGNOSIS — C22.0 HCC (HEPATOCELLULAR CARCINOMA): Primary | ICD-10-CM

## 2022-08-10 DIAGNOSIS — Z94.4 LIVER REPLACED BY TRANSPLANT: ICD-10-CM

## 2022-08-10 LAB
ALBUMIN SERPL BCP-MCNC: 2.5 G/DL (ref 3.5–5.2)
ALP SERPL-CCNC: 52 U/L (ref 55–135)
ALT SERPL W/O P-5'-P-CCNC: 20 U/L (ref 10–44)
ANION GAP SERPL CALC-SCNC: 9 MMOL/L (ref 8–16)
AST SERPL-CCNC: 14 U/L (ref 10–40)
BILIRUB SERPL-MCNC: 0.4 MG/DL (ref 0.1–1)
BKV DNA SERPL NAA+PROBE-ACNC: <125 COPIES/ML
BKV DNA SERPL NAA+PROBE-LOG#: <2.1 LOG (10) COPIES/ML
BKV DNA SERPL QL NAA+PROBE: NOT DETECTED
BUN SERPL-MCNC: 38 MG/DL (ref 8–23)
CALCIUM SERPL-MCNC: 8.8 MG/DL (ref 8.7–10.5)
CHLORIDE SERPL-SCNC: 102 MMOL/L (ref 95–110)
CO2 SERPL-SCNC: 22 MMOL/L (ref 23–29)
CREAT SERPL-MCNC: 1.7 MG/DL (ref 0.5–1.4)
ERYTHROCYTE [DISTWIDTH] IN BLOOD BY AUTOMATED COUNT: 14.6 % (ref 11.5–14.5)
EST. GFR  (NO RACE VARIABLE): 43.4 ML/MIN/1.73 M^2
GLUCOSE SERPL-MCNC: 128 MG/DL (ref 70–110)
HCT VFR BLD AUTO: 30 % (ref 40–54)
HGB BLD-MCNC: 10.2 G/DL (ref 14–18)
MAGNESIUM SERPL-MCNC: 2 MG/DL (ref 1.6–2.6)
MCH RBC QN AUTO: 27.3 PG (ref 27–31)
MCHC RBC AUTO-ENTMCNC: 34 G/DL (ref 32–36)
MCV RBC AUTO: 80 FL (ref 82–98)
PHOSPHATE SERPL-MCNC: 4 MG/DL (ref 2.7–4.5)
PLATELET # BLD AUTO: 132 K/UL (ref 150–450)
PMV BLD AUTO: 10.3 FL (ref 9.2–12.9)
POTASSIUM SERPL-SCNC: 4 MMOL/L (ref 3.5–5.1)
PROT SERPL-MCNC: 6.2 G/DL (ref 6–8.4)
RBC # BLD AUTO: 3.73 M/UL (ref 4.6–6.2)
SODIUM SERPL-SCNC: 133 MMOL/L (ref 136–145)
TACROLIMUS BLD-MCNC: 6 NG/ML (ref 5–15)
WBC # BLD AUTO: 2.74 K/UL (ref 3.9–12.7)

## 2022-08-10 PROCEDURE — 84100 ASSAY OF PHOSPHORUS: CPT | Performed by: STUDENT IN AN ORGANIZED HEALTH CARE EDUCATION/TRAINING PROGRAM

## 2022-08-10 PROCEDURE — 25000003 PHARM REV CODE 250: Performed by: STUDENT IN AN ORGANIZED HEALTH CARE EDUCATION/TRAINING PROGRAM

## 2022-08-10 PROCEDURE — 99239 PR HOSPITAL DISCHARGE DAY,>30 MIN: ICD-10-PCS | Mod: ,,, | Performed by: STUDENT IN AN ORGANIZED HEALTH CARE EDUCATION/TRAINING PROGRAM

## 2022-08-10 PROCEDURE — 85027 COMPLETE CBC AUTOMATED: CPT | Performed by: STUDENT IN AN ORGANIZED HEALTH CARE EDUCATION/TRAINING PROGRAM

## 2022-08-10 PROCEDURE — 36415 COLL VENOUS BLD VENIPUNCTURE: CPT | Performed by: STUDENT IN AN ORGANIZED HEALTH CARE EDUCATION/TRAINING PROGRAM

## 2022-08-10 PROCEDURE — 80053 COMPREHEN METABOLIC PANEL: CPT | Performed by: STUDENT IN AN ORGANIZED HEALTH CARE EDUCATION/TRAINING PROGRAM

## 2022-08-10 PROCEDURE — 63600175 PHARM REV CODE 636 W HCPCS: Performed by: STUDENT IN AN ORGANIZED HEALTH CARE EDUCATION/TRAINING PROGRAM

## 2022-08-10 PROCEDURE — 94761 N-INVAS EAR/PLS OXIMETRY MLT: CPT

## 2022-08-10 PROCEDURE — 83735 ASSAY OF MAGNESIUM: CPT | Performed by: STUDENT IN AN ORGANIZED HEALTH CARE EDUCATION/TRAINING PROGRAM

## 2022-08-10 PROCEDURE — 99239 HOSP IP/OBS DSCHRG MGMT >30: CPT | Mod: ,,, | Performed by: STUDENT IN AN ORGANIZED HEALTH CARE EDUCATION/TRAINING PROGRAM

## 2022-08-10 PROCEDURE — 80197 ASSAY OF TACROLIMUS: CPT | Performed by: STUDENT IN AN ORGANIZED HEALTH CARE EDUCATION/TRAINING PROGRAM

## 2022-08-10 RX ORDER — FUROSEMIDE 20 MG/1
20 TABLET ORAL DAILY
Qty: 30 TABLET | Refills: 11
Start: 2022-08-10 | End: 2023-05-30

## 2022-08-10 RX ORDER — CEFPODOXIME PROXETIL 200 MG/1
200 TABLET, FILM COATED ORAL EVERY 12 HOURS
Qty: 4 TABLET | Refills: 0 | Status: SHIPPED | OUTPATIENT
Start: 2022-08-10 | End: 2023-05-30

## 2022-08-10 RX ORDER — TAMSULOSIN HYDROCHLORIDE 0.4 MG/1
0.4 CAPSULE ORAL DAILY
Qty: 30 CAPSULE | Refills: 11 | Status: SHIPPED | OUTPATIENT
Start: 2022-08-10 | End: 2023-08-10

## 2022-08-10 RX ORDER — TACROLIMUS 1 MG/1
4 CAPSULE ORAL EVERY MORNING
Status: DISCONTINUED | OUTPATIENT
Start: 2022-08-11 | End: 2022-08-10 | Stop reason: HOSPADM

## 2022-08-10 RX ORDER — CEFPODOXIME PROXETIL 200 MG/1
200 TABLET, FILM COATED ORAL EVERY 12 HOURS
Qty: 3 TABLET | Refills: 0 | Status: CANCELLED | OUTPATIENT
Start: 2022-08-10

## 2022-08-10 RX ORDER — TACROLIMUS 1 MG/1
4 CAPSULE ORAL EVERY EVENING
Status: DISCONTINUED | OUTPATIENT
Start: 2022-08-10 | End: 2022-08-10 | Stop reason: HOSPADM

## 2022-08-10 RX ORDER — TACROLIMUS 1 MG/1
4 CAPSULE ORAL EVERY 12 HOURS
Qty: 150 CAPSULE | Refills: 11
Start: 2022-08-10 | End: 2022-08-12 | Stop reason: SDUPTHER

## 2022-08-10 RX ORDER — MYCOPHENOLATE MOFETIL 250 MG/1
250 CAPSULE ORAL 2 TIMES DAILY
Qty: 60 CAPSULE | Refills: 11
Start: 2022-08-10 | End: 2022-09-07

## 2022-08-10 RX ADMIN — MUPIROCIN: 20 OINTMENT TOPICAL at 08:08

## 2022-08-10 RX ADMIN — AMLODIPINE BESYLATE 10 MG: 10 TABLET ORAL at 08:08

## 2022-08-10 RX ADMIN — HYDRALAZINE HYDROCHLORIDE 25 MG: 25 TABLET, FILM COATED ORAL at 05:08

## 2022-08-10 RX ADMIN — CEFPODOXIME PROXETIL 200 MG: 200 TABLET, FILM COATED ORAL at 08:08

## 2022-08-10 RX ADMIN — SODIUM BICARBONATE 650 MG TABLET 1300 MG: at 08:08

## 2022-08-10 RX ADMIN — SENNOSIDES AND DOCUSATE SODIUM 1 TABLET: 50; 8.6 TABLET ORAL at 08:08

## 2022-08-10 RX ADMIN — TACROLIMUS 5 MG: 1 CAPSULE ORAL at 08:08

## 2022-08-10 NOTE — PLAN OF CARE
[facility] - Transplant Stepdown  Discharge Final Note    Primary Care Provider: Jose Rafael Trujillo MD    Expected Discharge Date: 8/10/2022    Final Discharge Note (most recent)     Final Note - 08/10/22 1121        Final Note    Assessment Type Final Discharge Note     Anticipated Discharge Disposition Home or Self Care     What phone number can be called within the next 1-3 days to see how you are doing after discharge? [phone]        Post-Acute Status    Post-Acute Authorization Other     Other Status No Post-Acute Service Needs     Discharge Delays None known at this time                 Important Message from Medicare             Contact Info     Jose Rafael Trujillo MD   Specialty: Internal Medicine   Relationship: PCP - General    [address] [place] [id number]   Phone: [phone]       Next Steps: Follow up on 8/16/2022    Instructions: F/U appointment 10:15 AM          Patient medically ready for discharge to home. Any necessary transport setup by . This SW scheduled or requested necessary follow-up appointments. Family/patient aware of discharge.    Future Appointments   Date Time Provider Department Center   8/15/2022 12:00 AM LAB[facility]   8/22/2022 12:00 AM LAB, [facility]   8/22/2022 12:20 AM LAB, [facility]   8/23/2022  2:15 PM PROCEDURE, [place] UROLOGY ROOM 1 [facility] UROPRO Jin Ibarra LMSW  PRN - [facility]  [id number]

## 2022-08-10 NOTE — RESPIRATORY THERAPY
RAPID RESPONSE RESPIRATORY CHART CHECK       Chart check completed. Pt on room air. Oxygen DC.  Please call [phone] for further concerns or assistance.

## 2022-08-10 NOTE — ASSESSMENT & PLAN NOTE
On prograf 3mg AM and 2mg PM for history of SLK at home.  - KTM and hepatology consulted  - Holding MMF, resume once abx finish  - Check daily prograf levels  - Increased taco dose on discharge to 4mg BID for now pending KTM f/u.

## 2022-08-10 NOTE — TELEPHONE ENCOUNTER
----- Message from Jamal Hoffmann Jr., MD sent at 8/10/2022 10:26 AM CDT -----  Regarding: hospital discharge f/u  Hi,  Can you help arrange follow up labs including tacro trough early next week?  This patient was admitted for evaluation MAYNOR and findings of hydro on CT and US. He had sig hematuria and UTI as well. Given his recent radiation for prostate cancer we had urology eval. Fortunately he had only 43cc PVR. Etiology was never discovered, DSA negative, BK pending, but his creatinine and hydro improved. He has follow up with urology for cysto in 2 weeks. During his stay we increased his tacro to 4/4 that will likely need adjustment later.   Thanks,  T

## 2022-08-10 NOTE — DISCHARGE SUMMARY
[facility] - [facility]plant [facility] Medicine  Discharge Summary      Patient Name: Jordan Lopez Jr.  MRN: [id number]  Patient Class: IP- Inpatient  Admission Date: 8/6/2022  Hospital Length of Stay: 4 days  Discharge Date and Time:  08/10/2022 10:52 AM  Attending Physician: Brian Li MD   Discharging Provider: Brian Li MD  Primary Care Provider: Jose Rafael Trujillo MD  Hospital Medicine Team: [facility] MED  Brian Li MD    HPI:   66-year-old male with a history of prostate cancer (completed radiation therapy August 2021), hepatocellular carcinoma, renal cell carcinoma treated with nephrectomy, liver/kidney transplant in 2012 (on mycophenolate/tacrolimus), hypertension, and diabetes presented to [facility] ED on August 5 with fever and dysuria.  He noted urinary urgency, dysuria, and a sensation of urinary retention for 3 days.  He was recently started on oral cephalosporin, but he noted fever at home.  No chest pain or dyspnea noted.  With the evidence of MAYNOR (creatinine 2.8 with baseline 1.1-1.4), UTI, and possible obstruction, he received IV fluids and Rocephin.  He subsequently is receiving meropenem.  He is able to urinate.  AST and ALT were noted to be normal.  With the history of kidney and liver transplant, acute kidney injury, and concern for obstruction, they are requesting transfer for KTM and Urology evaluations.  Case discussed with KTM and Urology at [facility].  Will plan transfer to Hospital Medicine at [facility] for further treatment.     COVID vaccinated x4//COVID negative  August 5:  White blood cells 5.2, hemoglobin 12.2, hematocrit 35.8, platelets 135, sodium 133.7, potassium 4.1, chloride 102, CO2 25.2, BUN 42, creatinine 2.8, glucose 152, calcium 8.5, magnesium 2.1  Urinalysis had 300 protein, 150 glucose, large blood, moderate leukocyte esterase, greater than 182 RBC, greater than 182 WBC     CT abdomen and pelvis noted moderate hydroureteronephrosis of  the transplanted kidney in the left side of the pelvis.  Milder hydroureteronephrosis of the native left kidney.  The etiology of the hydronephrosis appears to be significant bladder wall thickening with additional inflammatory changes noted around the bladder suggesting cystitis.  No radiodense ureteral calculi identified.      On arrival:    Patient feeling great. States he has not had a fever since yesterday. Reports improvement in dysuria, but still having urinary urgency and feels as though he is incompletely voiding. He mentioned his Cr improving from 2.8 to 2.4.      * No surgery found *      Hospital Course:   Patient had crowley placed on admission given concerns for hydronephrosis and had 40cc light pink urine return. Urology not recommending any current interventions for hematuria, only outpatient w/u. [facility] reports no growth on Ucx there. PCP office did not run Ucx unfortunately. Repeat US w/o hydronephrosis. Orange is cystitis caused hydronephrosis and now that its treated, hydro improved. Renal function returned to baseline as well.        Goals of Care Treatment Preferences:  Code Status: Full Code      Consults:   Consults (From admission, onward)        Status Ordering Provider     Inpatient consult to Interventional Radiology  Once        Provider:  (Not yet assigned)    Completed GEE LONGO.     Inpatient consult to Urology  Once        Provider:  (Not yet assigned)    Completed GEE LONGO.     Inpatient consult to Hepatology  Once        Provider:  (Not yet assigned)    Completed GEE LONGO.     Inpatient consult to Kidney/Pancreas Transplant Medicine  Once        Provider:  (Not yet assigned)    Completed GEE LONGO.          * Acute cystitis with hematuria  Unclear cause, patient reports increasing dysuria and frequency since 8/2. He reports that he went to his PCP who diagnosed UTI and prescribed cefuroxime. He took it but on Thursday his wife noted he had a  fever of 101 and he was feeling more lethargic. He presented to [facility] and was diagnosed with an MAYNOR in addition to cystitis. He received meropenem. He currently states dysuria is improved but still with frequent small voids. Has had hematuria during admission which remains. Will have outpatient f/u with urology  - No growth at [facility] UCx, will switch from IV ceftriaxone to PO cefpodoxime - Treat for 7 days total, end date 8/11  - UA here without any bacteria. UCx from [facility] not growing any bacteria either. PCP office did not unfortunately run a UCx on his urine.      MAYNOR (acute kidney injury)  Patient with acute kidney injury likely due to post-obstructive d/t bladder inflammation causing hydronephrosis MAYNOR is currently improving. Labs reviewed- Renal function/electrolytes with Estimated Creatinine Clearance: 60.5 mL/min (A) (based on SCr of 1.7 mg/dL (H)). according to latest data. Monitor urine output and serial BMP and adjust therapy as needed. Avoid nephrotoxins and renally dose meds for GFR listed above.   OSH imaging mentioned hydronephrosis of both native and transplanted kidney. Cr downtrended from 2.8 to 2.4 at OSH after patient received 2L fluids. After treatment of cystitis and crowley placement, Cr improved further. Repeat US without hydronephrosis.  - Cr down to 1.6 today.  - Urology consulted, no surgical plans for hematuria at this time. Will have outpatient f/u.   - Treating cystitis as above as well    Prophylactic immunotherapy  On prograf 3mg AM and 2mg PM for history of SLK at home.  - KTM and hepatology consulted  - Holding MMF, resume once abx finish  - Check daily prograf levels  - Increased taco dose on discharge to 4mg BID for now pending KTM f/u.      Hypertension  - Resume home doses of losartan and amlodipine        Final Active Diagnoses:    Diagnosis Date Noted POA    PRINCIPAL PROBLEM:  Acute cystitis with hematuria [N30.01] 08/06/2022 Yes    MAYNOR (acute kidney  injury) [N17.9] 08/06/2022 Yes    Other hydronephrosis [N13.39] 08/06/2022 Yes    Prostate cancer [C61] 05/17/2021 Yes    right renal cancer [Z85.528] 01/28/2013 Not Applicable     Chronic    Personal history of malignant hepatoma [Z85.05] 01/28/2013 Yes    Prophylactic immunotherapy [Z29.8] 10/02/2012 Not Applicable    Long-term use of immunosuppressant medication [Z79.899] 10/02/2012 Not Applicable    CKD (chronic kidney disease) stage 3, GFR 30-59 ml/min [N18.30]  Yes     Chronic    Hypertension [I10]  Yes    Obesity [E66.9]  Yes    Kidney replaced by transplant [Z94.0] 06/29/2012 Not Applicable    Liver replaced by transplant [Z94.4] 06/29/2012 Not Applicable     Chronic      Problems Resolved During this Admission:       Discharged Condition: good    Disposition: Home or Self Care    Follow Up:   Follow-up Information     Jose Rafael Trujillo MD Follow up on 8/16/2022.    Specialty: Internal Medicine  Why: F/U appointment 10:15 AM  Contact information:  [facility] [address] [place] [id number]  [phone]                       Patient Instructions:      Cystoscopy   Standing Status: Future Standing Exp. Date: 08/08/23     Comprehensive metabolic panel   Standing Status: Future Standing Exp. Date: 10/09/23     Magnesium   Standing Status: Future Standing Exp. Date: 10/09/23     Phosphorus   Standing Status: Future Standing Exp. Date: 10/09/23     Diet diabetic     Notify your health care provider if you experience any of the following:  temperature >100.4     Notify your health care provider if you experience any of the following:  persistent dizziness, light-headedness, or visual disturbances     Notify your health care provider if you experience any of the following:  increased confusion or weakness     Activity as tolerated       Significant Diagnostic Studies: Labs:   CMP   Recent Labs   Lab 08/09/22  0616 08/10/22  0613   * 133*   K 4.5 4.0    102   CO2 22* 22*   * 128*   BUN  35* 38*   CREATININE 1.6* 1.7*   CALCIUM 8.7 8.8   PROT 6.1 6.2   ALBUMIN 2.4* 2.5*   BILITOT 0.5 0.4   ALKPHOS 52* 52*   AST 14 14   ALT 18 20   ANIONGAP 8 9    and CBC   Recent Labs   Lab 08/09/22  0616 08/10/22  0613   WBC 3.10* 2.74*   HGB 10.3* 10.2*   HCT 30.1* 30.0*   * 132*     Renal US 8/9  Impression:     1. Mildly improved hydronephrosis of the renal allograft.  2. Satisfactory Doppler evaluation of the renal allograft with improved intraparenchymal resistive indices.    Renal US 8/6  Impression:    1. Left lower quadrant renal transplant is present demonstrating moderate hydronephrosis and cortical thinning.  2. Elevated resistive indices within the transplant.  This finding is nonspecific and could be due to the presence of hydronephrosis or alternatively could be related to rejection or drug toxicity.    Pending Diagnostic Studies:     Procedure Component Value Units Date/Time    BK virus, DNA, quantitative [facility]; Plasma [[id number]] Collected: 08/07/22 0922    Order Status: Sent Lab Status: In process Updated: 08/07/22 0927    Specimen: Blood     IR Nephrostomy Tube Placement [[id number]]     Order Status: Sent Lab Status: No result          Medications:  Reconciled Home Medications:      Medication List      START taking these medications    cefpodoxime 200 MG tablet  Commonly known as: VANTIN  Take 1 tablet (200 mg total) by mouth every 12 (twelve) hours.     tamsulosin 0.4 mg Cap  Commonly known as: FLOMAX  Take 1 capsule (0.4 mg total) by mouth once daily.        CHANGE how you take these medications    furosemide 20 MG tablet  Commonly known as: LASIX  Take 1 tablet (20 mg total) by mouth once daily. RESUME 8/15  What changed: additional instructions     mycophenolate 250 mg Cap  Commonly known as: CELLCEPT  Take 1 capsule (250 mg total) by mouth 2 (two) times daily. RESUME ONCE ANTIBIOTICS ARE FINISHED  What changed: additional instructions     tacrolimus 1 MG Cap  Commonly known as:  "PROGRAF  Take 4 capsules (4 mg total) by mouth every 12 (twelve) hours.  What changed: See the new instructions.        CONTINUE taking these medications    acetaminophen 500 MG tablet  Commonly known as: TYLENOL  Take 1,000 mg by mouth 2 (two) times daily as needed for Pain.     amLODIPine 5 MG tablet  Commonly known as: NORVASC  Take 5 mg by mouth once daily.     CALTRATE 600 + D ORAL  Take 1 tablet by mouth 2 (two) times daily.     CENTRUM SILVER ORAL  Take 1 tablet by mouth Daily.     famotidine 20 MG tablet  Commonly known as: PEPCID  Take 40 mg by mouth once daily.     losartan 100 MG tablet  Commonly known as: COZAAR  Take 100 mg by mouth once daily.     magnesium oxide 400 mg (241.3 mg magnesium) tablet  Commonly known as: MAG-OX  Take 800 mg by mouth once daily.     rosuvastatin 5 MG tablet  Commonly known as: CRESTOR  Take 5 mg by mouth nightly.     semaglutide 1 mg/dose (4 mg/3 mL)  Commonly known as: OZEMPIC  Inject 1 mg into the skin every Thursday.     SYSTANE BALANCE OPHT  Apply 1-2 drops to eye daily as needed (dry eyes).     TOUJEO SOLOSTAR SUBQ  Inject 28 Units into the skin once daily.     TRUEPLUS PEN NEEDLE 32 gauge x 5/32" Ndle  Generic drug: pen needle, diabetic        STOP taking these medications    cefUROXime 500 MG tablet  Commonly known as: CEFTIN            Indwelling Lines/Drains at time of discharge:   Lines/Drains/Airways     None                 Time spent on the discharge of patient: 45 minutes         Brian Li MD  Department of Hospital Medicine  [facility] - Transplant Stepdown  "

## 2022-08-10 NOTE — TELEPHONE ENCOUNTER
Received report from inpatient MD (KTM/nephrology) . Will fax lab order to patient's local lab for Monday 8/15/22.

## 2022-08-10 NOTE — PLAN OF CARE
08/10/22 0853   Post-Acute Status   Post-Acute Authorization Other   Other Status No Post-Acute Service Needs      The patient is being d/c today with no social service needs identified at this time.       Doe Schmitt LMSW  Case Management [facility]  Ext: [phone]

## 2022-08-10 NOTE — PLAN OF CARE
Patient AAO X 4, VSS. Denies pain, N/V. Skin intact. KUS yesterday morning showed improvement of hydronephrosis so no interventions needed. Carlos D/C, patient urine still blood tinge. PO cefpodoxime started. Possible discharge pending if prograf level therapeutic.   Up ambulatory, bed locked at lowest setting, yellow socks on, call bell in reach. Remains free from falls.

## 2022-08-10 NOTE — ASSESSMENT & PLAN NOTE
Patient with acute kidney injury likely due to post-obstructive d/t bladder inflammation causing hydronephrosis MAYNOR is currently improving. Labs reviewed- Renal function/electrolytes with Estimated Creatinine Clearance: 60.5 mL/min (A) (based on SCr of 1.7 mg/dL (H)). according to latest data. Monitor urine output and serial BMP and adjust therapy as needed. Avoid nephrotoxins and renally dose meds for GFR listed above.   OSH imaging mentioned hydronephrosis of both native and transplanted kidney. Cr downtrended from 2.8 to 2.4 at OSH after patient received 2L fluids. After treatment of cystitis and crowley placement, Cr improved further. Repeat US without hydronephrosis.  - Cr down to 1.6 today.  - Urology consulted, no surgical plans for hematuria at this time. Will have outpatient f/u.   - Treating cystitis as above as well

## 2022-08-10 NOTE — PLAN OF CARE
IMM done at 10:38 AM. Pt. States he need nothing at this time.  Reassured pt if he vania his CN/SW they are available.    Varsha Quinn [facility]  [phone]

## 2022-08-10 NOTE — ASSESSMENT & PLAN NOTE
Unclear cause, patient reports increasing dysuria and frequency since 8/2. He reports that he went to his PCP who diagnosed UTI and prescribed cefuroxime. He took it but on Thursday his wife noted he had a fever of 101 and he was feeling more lethargic. He presented to [facility] and was diagnosed with an MAYNOR in addition to cystitis. He received meropenem. He currently states dysuria is improved but still with frequent small voids. Has had hematuria during admission which remains. Will have outpatient f/u with urology  - No growth at [place] UC[facility], will switch from IV ceftriaxone to PO cefpodoxime - Treat for 7 days total, end date 8/11  - UA here without any bacteria. UCx from [place] not growing any bacteria either. PCP office did not unfortunately run a UCx on his urine.

## 2022-08-11 LAB
BACTERIA BLD CULT: NORMAL
BACTERIA BLD CULT: NORMAL

## 2022-08-12 ENCOUNTER — TELEPHONE (OUTPATIENT)
Dept: ADMINISTRATIVE | Facility: CLINIC | Age: 67
End: 2022-08-12
Payer: MEDICARE

## 2022-08-12 DIAGNOSIS — Z94.4 LIVER REPLACED BY TRANSPLANT: Chronic | ICD-10-CM

## 2022-08-12 RX ORDER — TACROLIMUS 1 MG/1
4 CAPSULE ORAL EVERY 12 HOURS
Qty: 240 CAPSULE | Refills: 11 | Status: SHIPPED | OUTPATIENT
Start: 2022-08-12 | End: 2022-08-24 | Stop reason: DRUGHIGH

## 2022-08-12 NOTE — PROGRESS NOTES
Patient did not receive Flomax at discharge. He requests that RX be transferred to [facility] in [place]. I phoned [facility] at [facility] and was told that the patient had already left by the time the medication was brought to bedside and they would transfer RX to [place]. Called patient back and apologized on Pharmacist's behalf for not getting that medication to his bedside in time. I also informed him that RX was being transferred to [place]. Patient verbalized understanding and thanked us for our help.

## 2022-08-15 ENCOUNTER — LAB VISIT (OUTPATIENT)
Dept: LAB | Facility: HOSPITAL | Age: 67
End: 2022-08-15
Attending: INTERNAL MEDICINE
Payer: MEDICARE

## 2022-08-15 DIAGNOSIS — C22.0 HCC (HEPATOCELLULAR CARCINOMA): ICD-10-CM

## 2022-08-15 DIAGNOSIS — Z94.4 LIVER REPLACED BY TRANSPLANT: ICD-10-CM

## 2022-08-15 LAB
AFP SERPL-MCNC: <2 NG/ML (ref 0–8.4)
ALBUMIN SERPL BCP-MCNC: 2.6 G/DL (ref 3.5–5.2)
ALP SERPL-CCNC: 56 U/L (ref 55–135)
ALT SERPL W/O P-5'-P-CCNC: 21 U/L (ref 10–44)
ANION GAP SERPL CALC-SCNC: 3 MMOL/L (ref 8–16)
AST SERPL-CCNC: 12 U/L (ref 10–40)
BASOPHILS # BLD AUTO: 0.03 K/UL (ref 0–0.2)
BASOPHILS NFR BLD: 0.7 % (ref 0–1.9)
BILIRUB SERPL-MCNC: 0.7 MG/DL (ref 0.1–1)
BUN SERPL-MCNC: 22 MG/DL (ref 8–23)
CALCIUM SERPL-MCNC: 9.1 MG/DL (ref 8.7–10.5)
CHLORIDE SERPL-SCNC: 106 MMOL/L (ref 95–110)
CO2 SERPL-SCNC: 28 MMOL/L (ref 23–29)
CREAT SERPL-MCNC: 1.5 MG/DL (ref 0.5–1.4)
DIFFERENTIAL METHOD: ABNORMAL
EOSINOPHIL # BLD AUTO: 0.1 K/UL (ref 0–0.5)
EOSINOPHIL NFR BLD: 1.3 % (ref 0–8)
ERYTHROCYTE [DISTWIDTH] IN BLOOD BY AUTOMATED COUNT: 14.1 % (ref 11.5–14.5)
EST. GFR  (NO RACE VARIABLE): 50.4 ML/MIN/1.73 M^2
GLUCOSE SERPL-MCNC: 174 MG/DL (ref 70–110)
HCT VFR BLD AUTO: 33 % (ref 40–54)
HGB BLD-MCNC: 10.7 G/DL (ref 14–18)
IMM GRANULOCYTES # BLD AUTO: 0.02 K/UL (ref 0–0.04)
IMM GRANULOCYTES NFR BLD AUTO: 0.4 % (ref 0–0.5)
LYMPHOCYTES # BLD AUTO: 0.7 K/UL (ref 1–4.8)
LYMPHOCYTES NFR BLD: 15 % (ref 18–48)
MCH RBC QN AUTO: 27.2 PG (ref 27–31)
MCHC RBC AUTO-ENTMCNC: 32.4 G/DL (ref 32–36)
MCV RBC AUTO: 84 FL (ref 82–98)
MONOCYTES # BLD AUTO: 0.4 K/UL (ref 0.3–1)
MONOCYTES NFR BLD: 9.7 % (ref 4–15)
NEUTROPHILS # BLD AUTO: 3.3 K/UL (ref 1.8–7.7)
NEUTROPHILS NFR BLD: 72.9 % (ref 38–73)
NRBC BLD-RTO: 0 /100 WBC
PLATELET # BLD AUTO: 203 K/UL (ref 150–450)
PMV BLD AUTO: 10.1 FL (ref 9.2–12.9)
POTASSIUM SERPL-SCNC: 4.7 MMOL/L (ref 3.5–5.1)
PROT SERPL-MCNC: 7.1 G/DL (ref 6–8.4)
RBC # BLD AUTO: 3.93 M/UL (ref 4.6–6.2)
SODIUM SERPL-SCNC: 137 MMOL/L (ref 136–145)
WBC # BLD AUTO: 4.54 K/UL (ref 3.9–12.7)

## 2022-08-15 PROCEDURE — 85025 COMPLETE CBC W/AUTO DIFF WBC: CPT | Performed by: INTERNAL MEDICINE

## 2022-08-15 PROCEDURE — 80053 COMPREHEN METABOLIC PANEL: CPT | Performed by: INTERNAL MEDICINE

## 2022-08-15 PROCEDURE — 80197 ASSAY OF TACROLIMUS: CPT | Performed by: INTERNAL MEDICINE

## 2022-08-15 PROCEDURE — 82105 ALPHA-FETOPROTEIN SERUM: CPT | Performed by: INTERNAL MEDICINE

## 2022-08-15 PROCEDURE — 36415 COLL VENOUS BLD VENIPUNCTURE: CPT | Performed by: INTERNAL MEDICINE

## 2022-08-16 LAB — TACROLIMUS BLD-MCNC: 5.7 NG/ML (ref 5–15)

## 2022-08-17 ENCOUNTER — PATIENT MESSAGE (OUTPATIENT)
Dept: TRANSPLANT | Facility: CLINIC | Age: 67
End: 2022-08-17
Payer: MEDICARE

## 2022-08-17 ENCOUNTER — TELEPHONE (OUTPATIENT)
Dept: TRANSPLANT | Facility: CLINIC | Age: 67
End: 2022-08-17
Payer: MEDICARE

## 2022-08-17 DIAGNOSIS — Z94.4 LIVER REPLACED BY TRANSPLANT: Primary | ICD-10-CM

## 2022-08-17 NOTE — TELEPHONE ENCOUNTER
----- Message from Shaniqua Matias MD sent at 8/16/2022 11:20 AM CDT -----  The Labs are stable; lower prograf to 4/3 from 4/4 and repeat labs in 2 weeks- please let patient know.

## 2022-08-17 NOTE — TELEPHONE ENCOUNTER
Patient notified and instructed via [facility]:    Per : The Labs are stable; lower prograf to 4mg in AM and 3mg in PM and repeat labs in 2 weeks- (due 8/29/22), thanks.

## 2022-08-22 ENCOUNTER — LAB VISIT (OUTPATIENT)
Dept: LAB | Facility: HOSPITAL | Age: 67
End: 2022-08-22
Attending: INTERNAL MEDICINE
Payer: MEDICARE

## 2022-08-22 DIAGNOSIS — Z94.4 LIVER REPLACED BY TRANSPLANT: ICD-10-CM

## 2022-08-22 DIAGNOSIS — Z94.0 KIDNEY REPLACED BY TRANSPLANT: ICD-10-CM

## 2022-08-22 LAB
ALBUMIN SERPL BCP-MCNC: 2.6 G/DL (ref 3.5–5.2)
ALP SERPL-CCNC: 84 U/L (ref 55–135)
ALT SERPL W/O P-5'-P-CCNC: 24 U/L (ref 10–44)
ANION GAP SERPL CALC-SCNC: 8 MMOL/L (ref 8–16)
AST SERPL-CCNC: 18 U/L (ref 10–40)
BASOPHILS # BLD AUTO: 0.03 K/UL (ref 0–0.2)
BASOPHILS NFR BLD: 0.8 % (ref 0–1.9)
BILIRUB SERPL-MCNC: 0.8 MG/DL (ref 0.1–1)
BUN SERPL-MCNC: 17 MG/DL (ref 8–23)
CALCIUM SERPL-MCNC: 8.9 MG/DL (ref 8.7–10.5)
CHLORIDE SERPL-SCNC: 102 MMOL/L (ref 95–110)
CO2 SERPL-SCNC: 28 MMOL/L (ref 23–29)
CREAT SERPL-MCNC: 1.6 MG/DL (ref 0.5–1.4)
DIFFERENTIAL METHOD: ABNORMAL
EOSINOPHIL # BLD AUTO: 0.1 K/UL (ref 0–0.5)
EOSINOPHIL NFR BLD: 1.9 % (ref 0–8)
ERYTHROCYTE [DISTWIDTH] IN BLOOD BY AUTOMATED COUNT: 14.1 % (ref 11.5–14.5)
EST. GFR  (NO RACE VARIABLE): 46.6 ML/MIN/1.73 M^2
GLUCOSE SERPL-MCNC: 143 MG/DL (ref 70–110)
HCT VFR BLD AUTO: 29.4 % (ref 40–54)
HGB BLD-MCNC: 9.8 G/DL (ref 14–18)
IMM GRANULOCYTES # BLD AUTO: 0.01 K/UL (ref 0–0.04)
IMM GRANULOCYTES NFR BLD AUTO: 0.3 % (ref 0–0.5)
LYMPHOCYTES # BLD AUTO: 0.7 K/UL (ref 1–4.8)
LYMPHOCYTES NFR BLD: 19.9 % (ref 18–48)
MAGNESIUM SERPL-MCNC: 1.6 MG/DL (ref 1.6–2.6)
MCH RBC QN AUTO: 27.3 PG (ref 27–31)
MCHC RBC AUTO-ENTMCNC: 33.3 G/DL (ref 32–36)
MCV RBC AUTO: 82 FL (ref 82–98)
MONOCYTES # BLD AUTO: 0.3 K/UL (ref 0.3–1)
MONOCYTES NFR BLD: 9 % (ref 4–15)
NEUTROPHILS # BLD AUTO: 2.5 K/UL (ref 1.8–7.7)
NEUTROPHILS NFR BLD: 68.1 % (ref 38–73)
NRBC BLD-RTO: 0 /100 WBC
PHOSPHATE SERPL-MCNC: 3.7 MG/DL (ref 2.7–4.5)
PLATELET # BLD AUTO: 168 K/UL (ref 150–450)
PMV BLD AUTO: 10.4 FL (ref 9.2–12.9)
POTASSIUM SERPL-SCNC: 4.7 MMOL/L (ref 3.5–5.1)
PROT SERPL-MCNC: 7.5 G/DL (ref 6–8.4)
RBC # BLD AUTO: 3.59 M/UL (ref 4.6–6.2)
SODIUM SERPL-SCNC: 138 MMOL/L (ref 136–145)
WBC # BLD AUTO: 3.67 K/UL (ref 3.9–12.7)

## 2022-08-22 PROCEDURE — 80053 COMPREHEN METABOLIC PANEL: CPT | Performed by: INTERNAL MEDICINE

## 2022-08-22 PROCEDURE — 36415 COLL VENOUS BLD VENIPUNCTURE: CPT | Performed by: INTERNAL MEDICINE

## 2022-08-22 PROCEDURE — 84100 ASSAY OF PHOSPHORUS: CPT | Performed by: INTERNAL MEDICINE

## 2022-08-22 PROCEDURE — 80197 ASSAY OF TACROLIMUS: CPT | Performed by: INTERNAL MEDICINE

## 2022-08-22 PROCEDURE — 87799 DETECT AGENT NOS DNA QUANT: CPT | Performed by: INTERNAL MEDICINE

## 2022-08-22 PROCEDURE — 85025 COMPLETE CBC W/AUTO DIFF WBC: CPT | Performed by: INTERNAL MEDICINE

## 2022-08-22 PROCEDURE — 83735 ASSAY OF MAGNESIUM: CPT | Performed by: INTERNAL MEDICINE

## 2022-08-23 ENCOUNTER — PROCEDURE VISIT (OUTPATIENT)
Dept: UROLOGY | Facility: CLINIC | Age: 67
End: 2022-08-23
Payer: MEDICARE

## 2022-08-23 VITALS
RESPIRATION RATE: 16 BRPM | HEIGHT: 72 IN | BODY MASS INDEX: 40.59 KG/M2 | HEART RATE: 97 BPM | WEIGHT: 299.69 LBS | DIASTOLIC BLOOD PRESSURE: 82 MMHG | SYSTOLIC BLOOD PRESSURE: 172 MMHG | TEMPERATURE: 97 F

## 2022-08-23 DIAGNOSIS — R31.0 GROSS HEMATURIA: Primary | ICD-10-CM

## 2022-08-23 LAB — TACROLIMUS BLD-MCNC: 5.7 NG/ML (ref 5–15)

## 2022-08-23 PROCEDURE — 52000 CYSTOSCOPY: ICD-10-PCS | Mod: S$GLB,,, | Performed by: UROLOGY

## 2022-08-23 PROCEDURE — 52000 CYSTOURETHROSCOPY: CPT | Mod: S$GLB,,, | Performed by: UROLOGY

## 2022-08-23 RX ORDER — LIDOCAINE HYDROCHLORIDE 20 MG/ML
JELLY TOPICAL
Status: COMPLETED | OUTPATIENT
Start: 2022-08-23 | End: 2022-08-23

## 2022-08-23 RX ADMIN — LIDOCAINE HYDROCHLORIDE: 20 JELLY TOPICAL at 02:08

## 2022-08-23 NOTE — PATIENT INSTRUCTIONS
What to Expect After a Cystoscopy  For the next 24-48 hours, you may feel a mild burning when you urinate. This burning is normal and expected. Drink plenty of water to dilute the urine to help relieve the burning sensation. You may also see a small amount of blood in your urine after the procedure.    Unless you are already taking antibiotics, you may be given an antibiotic after the test to prevent infection.    Signs and Symptoms to Report  Call the [facility] at [phone] if you develop any of the following:  Fever of 101 degrees or higher  Chills or persistent bleeding  Inability to urinate

## 2022-08-23 NOTE — PROCEDURES
Cystoscopy    Date/Time: 8/23/2022 2:15 PM  Performed by: Toño Figueroa Jr., MD  Authorized by: Timothy Snow MD     Consent Done?:  Yes (Written)  Prep: patient was prepped and draped in usual sterile fashion    Local anesthesia used?: Yes    Anesthesia:  Lidocaine jelly  Indications: recurrent UTIs and hematuria    Anesthesia:  Lidocaine jelly  Patient sedated?: No    Preparation: Patient was prepped and draped in usual sterile fashion    Scope type:  Flexible cystoscope  External exam normal: No    Circumcised: Yes    Urethral Meatus Normal: Yes    Meatal stenosis: No    Hypospadius: No    Condyloma: No    Digital exam performed: No    Urethra normal: Yes    Prostate normal: Yes (small)    Bladder neck normal: Yes    Bladder normal: No (recent uti vs xrt cystitis  No tumors)     patient tolerated the procedure well with no immediate complications  Comments:      RTC 2 mos for ua

## 2022-08-24 ENCOUNTER — PATIENT MESSAGE (OUTPATIENT)
Dept: TRANSPLANT | Facility: CLINIC | Age: 67
End: 2022-08-24
Payer: MEDICARE

## 2022-08-24 DIAGNOSIS — Z94.4 LIVER REPLACED BY TRANSPLANT: Chronic | ICD-10-CM

## 2022-08-24 RX ORDER — TACROLIMUS 1 MG/1
CAPSULE ORAL
Qty: 210 CAPSULE | Refills: 11 | Status: SHIPPED | OUTPATIENT
Start: 2022-08-24 | End: 2022-08-25 | Stop reason: DRUGHIGH

## 2022-08-24 NOTE — TELEPHONE ENCOUNTER
Patient notified and instructed via [facility]:    Per : The Labs are stable; lower prograf to 4mg in AM and 3mg in PM  and repeat labs in 2 weeks(due 9/6/22) I have rescheduled the lab appointment from 8/29. thanks

## 2022-08-24 NOTE — TELEPHONE ENCOUNTER
----- Message from Shaniqua Matias MD sent at 8/23/2022 10:14 AM CDT -----  The Labs are stable; lower prograf to 4/3 from 4/4 and repeat labs in 2 weeks- please let patient know.

## 2022-08-25 ENCOUNTER — PATIENT MESSAGE (OUTPATIENT)
Dept: TRANSPLANT | Facility: CLINIC | Age: 67
End: 2022-08-25
Payer: MEDICARE

## 2022-08-25 DIAGNOSIS — Z94.4 LIVER REPLACED BY TRANSPLANT: Chronic | ICD-10-CM

## 2022-08-25 LAB
BKV DNA SERPL NAA+PROBE-ACNC: <125 COPIES/ML
BKV DNA SERPL NAA+PROBE-LOG#: <2.1 LOG (10) COPIES/ML
BKV DNA SERPL QL NAA+PROBE: NOT DETECTED

## 2022-08-25 RX ORDER — TACROLIMUS 1 MG/1
3 CAPSULE ORAL EVERY 12 HOURS
Qty: 180 CAPSULE | Refills: 11 | Status: SHIPPED | OUTPATIENT
Start: 2022-08-25 | End: 2022-12-12

## 2022-08-25 NOTE — TELEPHONE ENCOUNTER
----- Message from Shaniqua Matias MD sent at 8/25/2022  9:19 AM CDT -----  Then lower to 3/3  ----- Message -----  From: Rosmery Bonner  Sent: 8/25/2022   9:01 AM CDT  To: Shaniqua Matias MD    Patient reported he is already on 4/3.  ----- Message -----  From: Shaniqua Matias MD  Sent: 8/23/2022  10:14 AM CDT  To: [facility] Post-Liver Transplant Clinical    The Labs are stable; lower prograf to 4/3 from 4/4 and repeat labs in 2 weeks- please let patient know.

## 2022-08-25 NOTE — TELEPHONE ENCOUNTER
Patient notified and instructed via [facility]:    Per : lower Progaf dose to 3mg twice daily , thanks.

## 2022-09-06 ENCOUNTER — LAB VISIT (OUTPATIENT)
Dept: LAB | Facility: HOSPITAL | Age: 67
End: 2022-09-06
Attending: INTERNAL MEDICINE
Payer: MEDICARE

## 2022-09-06 DIAGNOSIS — E78.5 HYPERLIPEMIA: ICD-10-CM

## 2022-09-06 DIAGNOSIS — E11.9 DIABETES MELLITUS WITHOUT COMPLICATION: ICD-10-CM

## 2022-09-06 DIAGNOSIS — Z94.4 LIVER REPLACED BY TRANSPLANT: ICD-10-CM

## 2022-09-06 LAB
ALBUMIN SERPL BCP-MCNC: 3.2 G/DL (ref 3.5–5.2)
ALP SERPL-CCNC: 64 U/L (ref 55–135)
ALT SERPL W/O P-5'-P-CCNC: 18 U/L (ref 10–44)
ANION GAP SERPL CALC-SCNC: 4 MMOL/L (ref 8–16)
AST SERPL-CCNC: 13 U/L (ref 10–40)
BASOPHILS # BLD AUTO: 0.04 K/UL (ref 0–0.2)
BASOPHILS NFR BLD: 0.9 % (ref 0–1.9)
BILIRUB SERPL-MCNC: 0.5 MG/DL (ref 0.1–1)
BUN SERPL-MCNC: 24 MG/DL (ref 8–23)
CALCIUM SERPL-MCNC: 9 MG/DL (ref 8.7–10.5)
CHLORIDE SERPL-SCNC: 109 MMOL/L (ref 95–110)
CO2 SERPL-SCNC: 26 MMOL/L (ref 23–29)
CREAT SERPL-MCNC: 1.2 MG/DL (ref 0.5–1.4)
DIFFERENTIAL METHOD: ABNORMAL
EOSINOPHIL # BLD AUTO: 0.3 K/UL (ref 0–0.5)
EOSINOPHIL NFR BLD: 5.9 % (ref 0–8)
ERYTHROCYTE [DISTWIDTH] IN BLOOD BY AUTOMATED COUNT: 14.9 % (ref 11.5–14.5)
EST. GFR  (NO RACE VARIABLE): >60 ML/MIN/1.73 M^2
GLUCOSE SERPL-MCNC: 156 MG/DL (ref 70–110)
HCT VFR BLD AUTO: 29.3 % (ref 40–54)
HGB BLD-MCNC: 9.7 G/DL (ref 14–18)
IMM GRANULOCYTES # BLD AUTO: 0.02 K/UL (ref 0–0.04)
IMM GRANULOCYTES NFR BLD AUTO: 0.5 % (ref 0–0.5)
LYMPHOCYTES # BLD AUTO: 0.9 K/UL (ref 1–4.8)
LYMPHOCYTES NFR BLD: 20.3 % (ref 18–48)
MCH RBC QN AUTO: 27.3 PG (ref 27–31)
MCHC RBC AUTO-ENTMCNC: 33.1 G/DL (ref 32–36)
MCV RBC AUTO: 83 FL (ref 82–98)
MONOCYTES # BLD AUTO: 0.4 K/UL (ref 0.3–1)
MONOCYTES NFR BLD: 10.1 % (ref 4–15)
NEUTROPHILS # BLD AUTO: 2.6 K/UL (ref 1.8–7.7)
NEUTROPHILS NFR BLD: 62.3 % (ref 38–73)
NRBC BLD-RTO: 0 /100 WBC
PLATELET # BLD AUTO: 179 K/UL (ref 150–450)
PMV BLD AUTO: 10.7 FL (ref 9.2–12.9)
POTASSIUM SERPL-SCNC: 3.9 MMOL/L (ref 3.5–5.1)
PROT SERPL-MCNC: 7.1 G/DL (ref 6–8.4)
RBC # BLD AUTO: 3.55 M/UL (ref 4.6–6.2)
SODIUM SERPL-SCNC: 139 MMOL/L (ref 136–145)
WBC # BLD AUTO: 4.24 K/UL (ref 3.9–12.7)

## 2022-09-06 PROCEDURE — 80197 ASSAY OF TACROLIMUS: CPT | Performed by: INTERNAL MEDICINE

## 2022-09-06 PROCEDURE — 36415 COLL VENOUS BLD VENIPUNCTURE: CPT | Performed by: INTERNAL MEDICINE

## 2022-09-06 PROCEDURE — 80053 COMPREHEN METABOLIC PANEL: CPT | Performed by: INTERNAL MEDICINE

## 2022-09-06 PROCEDURE — 85025 COMPLETE CBC W/AUTO DIFF WBC: CPT | Performed by: INTERNAL MEDICINE

## 2022-09-07 LAB — TACROLIMUS BLD-MCNC: 4.3 NG/ML (ref 5–15)

## 2022-09-08 ENCOUNTER — TELEPHONE (OUTPATIENT)
Dept: TRANSPLANT | Facility: CLINIC | Age: 67
End: 2022-09-08
Payer: MEDICARE

## 2022-09-08 DIAGNOSIS — C22.0 HCC (HEPATOCELLULAR CARCINOMA): Primary | ICD-10-CM

## 2022-09-08 DIAGNOSIS — Z94.4 LIVER REPLACED BY TRANSPLANT: ICD-10-CM

## 2022-09-20 ENCOUNTER — LAB VISIT (OUTPATIENT)
Dept: LAB | Facility: HOSPITAL | Age: 67
End: 2022-09-20
Attending: INTERNAL MEDICINE
Payer: MEDICARE

## 2022-09-20 DIAGNOSIS — E11.9 DIABETES MELLITUS WITHOUT COMPLICATION: Primary | ICD-10-CM

## 2022-09-20 DIAGNOSIS — I10 ESSENTIAL HYPERTENSION, MALIGNANT: ICD-10-CM

## 2022-09-20 DIAGNOSIS — E78.5 HYPERLIPEMIA: ICD-10-CM

## 2022-09-20 LAB
ALBUMIN/CREAT UR: 40.8 MG/MG (ref 0–30)
ANION GAP SERPL CALC-SCNC: 3 MMOL/L (ref 8–16)
BUN SERPL-MCNC: 14 MG/DL (ref 8–23)
CALCIUM SERPL-MCNC: 9.4 MG/DL (ref 8.7–10.5)
CHLORIDE SERPL-SCNC: 106 MMOL/L (ref 95–110)
CO2 SERPL-SCNC: 30 MMOL/L (ref 23–29)
CREAT SERPL-MCNC: 1.2 MG/DL (ref 0.5–1.4)
CREAT UR-MCNC: 144 MG/DL (ref 23–375)
EST. GFR  (NO RACE VARIABLE): >60 ML/MIN/1.73 M^2
ESTIMATED AVG GLUCOSE: 151 MG/DL (ref 68–131)
GLUCOSE SERPL-MCNC: 169 MG/DL (ref 70–110)
HBA1C MFR BLD: 6.9 % (ref 4–5.6)
MICROALBUMIN UR DL<=1MG/L-MCNC: 58.7 MG/L
POTASSIUM SERPL-SCNC: 4.3 MMOL/L (ref 3.5–5.1)
SODIUM SERPL-SCNC: 139 MMOL/L (ref 136–145)

## 2022-09-20 PROCEDURE — 36415 COLL VENOUS BLD VENIPUNCTURE: CPT | Performed by: INTERNAL MEDICINE

## 2022-09-20 PROCEDURE — 80048 BASIC METABOLIC PNL TOTAL CA: CPT | Performed by: INTERNAL MEDICINE

## 2022-09-20 PROCEDURE — 82043 UR ALBUMIN QUANTITATIVE: CPT | Performed by: INTERNAL MEDICINE

## 2022-09-20 PROCEDURE — 82570 ASSAY OF URINE CREATININE: CPT | Performed by: INTERNAL MEDICINE

## 2022-09-20 PROCEDURE — 83036 HEMOGLOBIN GLYCOSYLATED A1C: CPT | Performed by: INTERNAL MEDICINE

## 2022-10-07 ENCOUNTER — IMMUNIZATION (OUTPATIENT)
Dept: PRIMARY CARE CLINIC | Facility: CLINIC | Age: 67
End: 2022-10-07
Payer: MEDICARE

## 2022-10-07 DIAGNOSIS — Z23 NEED FOR VACCINATION: Primary | ICD-10-CM

## 2022-10-07 PROCEDURE — 91312 COVID-19, MRNA, LNP-S, BIVALENT BOOSTER, PF, 30 MCG/0.3 ML DOSE: CPT | Mod: S$GLB,,, | Performed by: ANESTHESIOLOGY

## 2022-10-07 PROCEDURE — 91312 COVID-19, MRNA, LNP-S, BIVALENT BOOSTER, PF, 30 MCG/0.3 ML DOSE: ICD-10-PCS | Mod: S$GLB,,, | Performed by: ANESTHESIOLOGY

## 2022-10-07 PROCEDURE — 0124A COVID-19, MRNA, LNP-S, BIVALENT BOOSTER, PF, 30 MCG/0.3 ML DOSE: CPT | Mod: PBBFAC | Performed by: ANESTHESIOLOGY

## 2022-10-12 ENCOUNTER — TELEPHONE (OUTPATIENT)
Dept: TRANSPLANT | Facility: CLINIC | Age: 67
End: 2022-10-12
Payer: MEDICARE

## 2022-10-12 ENCOUNTER — PATIENT MESSAGE (OUTPATIENT)
Dept: TRANSPLANT | Facility: CLINIC | Age: 67
End: 2022-10-12
Payer: MEDICARE

## 2022-10-12 NOTE — TELEPHONE ENCOUNTER
Update received from patient via [facility]:    Just wanted to let you or Rosmery know that I tested POSITIVE for COVID last night.........NEGATIVE for FLU though.   I was having some sinus / cold issues and a low grade fever ..... so I went to Urgent Care last night, and yes I tested positive for COVID.  Really did not feel bad at all ...... 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  They said that they no longer give the antibody infusions for this strain of COVID.  I really am feeling fine, except for a slight sinus drip and small cough.   Lungs were clear last night also. Please let me know if I should be doing anything else  Thanks

## 2022-10-26 ENCOUNTER — OFFICE VISIT (OUTPATIENT)
Dept: UROLOGY | Facility: CLINIC | Age: 67
End: 2022-10-26
Payer: MEDICARE

## 2022-10-26 VITALS
WEIGHT: 304 LBS | HEART RATE: 84 BPM | BODY MASS INDEX: 41.17 KG/M2 | HEIGHT: 72 IN | SYSTOLIC BLOOD PRESSURE: 150 MMHG | DIASTOLIC BLOOD PRESSURE: 80 MMHG

## 2022-10-26 DIAGNOSIS — R31.29 HEMATURIA, MICROSCOPIC: Primary | ICD-10-CM

## 2022-10-26 PROCEDURE — 3060F PR POS MICROALBUMINURIA RESULT DOCUMENTED/REVIEW: ICD-10-PCS | Mod: CPTII,S$GLB,, | Performed by: UROLOGY

## 2022-10-26 PROCEDURE — 99213 PR OFFICE/OUTPT VISIT, EST, LEVL III, 20-29 MIN: ICD-10-PCS | Mod: S$GLB,,, | Performed by: UROLOGY

## 2022-10-26 PROCEDURE — 1101F PT FALLS ASSESS-DOCD LE1/YR: CPT | Mod: CPTII,S$GLB,, | Performed by: UROLOGY

## 2022-10-26 PROCEDURE — 3060F POS MICROALBUMINURIA REV: CPT | Mod: CPTII,S$GLB,, | Performed by: UROLOGY

## 2022-10-26 PROCEDURE — 3079F DIAST BP 80-89 MM HG: CPT | Mod: CPTII,S$GLB,, | Performed by: UROLOGY

## 2022-10-26 PROCEDURE — 3066F NEPHROPATHY DOC TX: CPT | Mod: CPTII,S$GLB,, | Performed by: UROLOGY

## 2022-10-26 PROCEDURE — 3288F FALL RISK ASSESSMENT DOCD: CPT | Mod: CPTII,S$GLB,, | Performed by: UROLOGY

## 2022-10-26 PROCEDURE — 3077F PR MOST RECENT SYSTOLIC BLOOD PRESSURE >= 140 MM HG: ICD-10-PCS | Mod: CPTII,S$GLB,, | Performed by: UROLOGY

## 2022-10-26 PROCEDURE — 1159F PR MEDICATION LIST DOCUMENTED IN MEDICAL RECORD: ICD-10-PCS | Mod: CPTII,S$GLB,, | Performed by: UROLOGY

## 2022-10-26 PROCEDURE — 3079F PR MOST RECENT DIASTOLIC BLOOD PRESSURE 80-89 MM HG: ICD-10-PCS | Mod: CPTII,S$GLB,, | Performed by: UROLOGY

## 2022-10-26 PROCEDURE — 3044F PR MOST RECENT HEMOGLOBIN A1C LEVEL <7.0%: ICD-10-PCS | Mod: CPTII,S$GLB,, | Performed by: UROLOGY

## 2022-10-26 PROCEDURE — 1101F PR PT FALLS ASSESS DOC 0-1 FALLS W/OUT INJ PAST YR: ICD-10-PCS | Mod: CPTII,S$GLB,, | Performed by: UROLOGY

## 2022-10-26 PROCEDURE — 1159F MED LIST DOCD IN RCRD: CPT | Mod: CPTII,S$GLB,, | Performed by: UROLOGY

## 2022-10-26 PROCEDURE — 4010F ACE/ARB THERAPY RXD/TAKEN: CPT | Mod: CPTII,S$GLB,, | Performed by: UROLOGY

## 2022-10-26 PROCEDURE — 99213 OFFICE O/P EST LOW 20 MIN: CPT | Mod: S$GLB,,, | Performed by: UROLOGY

## 2022-10-26 PROCEDURE — 1126F AMNT PAIN NOTED NONE PRSNT: CPT | Mod: CPTII,S$GLB,, | Performed by: UROLOGY

## 2022-10-26 PROCEDURE — 1126F PR PAIN SEVERITY QUANTIFIED, NO PAIN PRESENT: ICD-10-PCS | Mod: CPTII,S$GLB,, | Performed by: UROLOGY

## 2022-10-26 PROCEDURE — 3077F SYST BP >= 140 MM HG: CPT | Mod: CPTII,S$GLB,, | Performed by: UROLOGY

## 2022-10-26 PROCEDURE — 3044F HG A1C LEVEL LT 7.0%: CPT | Mod: CPTII,S$GLB,, | Performed by: UROLOGY

## 2022-10-26 PROCEDURE — 4010F PR ACE/ARB THEARPY RXD/TAKEN: ICD-10-PCS | Mod: CPTII,S$GLB,, | Performed by: UROLOGY

## 2022-10-26 PROCEDURE — 3288F PR FALLS RISK ASSESSMENT DOCUMENTED: ICD-10-PCS | Mod: CPTII,S$GLB,, | Performed by: UROLOGY

## 2022-10-26 PROCEDURE — 99999 PR PBB SHADOW E&M-EST. PATIENT-LVL IV: CPT | Mod: PBBFAC,,, | Performed by: UROLOGY

## 2022-10-26 PROCEDURE — 99999 PR PBB SHADOW E&M-EST. PATIENT-LVL IV: ICD-10-PCS | Mod: PBBFAC,,, | Performed by: UROLOGY

## 2022-10-26 PROCEDURE — 3066F PR DOCUMENTATION OF TREATMENT FOR NEPHROPATHY: ICD-10-PCS | Mod: CPTII,S$GLB,, | Performed by: UROLOGY

## 2022-10-26 NOTE — PROGRESS NOTES
Subjective:       Patient ID: Jordan Lopez Jr. is a 67 y.o. male.    Chief Complaint: Follow-up    HPI patient is status post XRT for prostate cancer and is seen Dr. laura wilkinson.  He had a UTI with a negative UTI workup.  His urine is clear and he is voiding well no complaints.    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 Followed by [facility]     Type 2 diabetes mellitus 2007    diet controlled        Past Surgical History:   Procedure Laterality Date    CYSTOSCOPY      KIDNEY TRANSPLANT  6/29/2012    Combined liver-kidney transplant    LIVER TRANSPLANT  6/29/2012    Combined liver-kidney transplant    NEPHRECTOMY  2010    For stage 1 RCC right kidney (done at [facility])       Family History   Problem Relation Age of Onset    Kidney disease Mother         born with one kidney    Heart disease Father        Social History     Socioeconomic History    Marital status:    Occupational History     Employer: [employer]   Tobacco Use    Smoking status: Never    Smokeless tobacco: Never   Substance and Sexual Activity    Alcohol use: No     Comment: Perhaps 2-3 drinks per year, never a drinker    Drug use: No   Social History Narrative    Salesman       Allergies:  Levaquin [levofloxacin]    Medications:    Current Outpatient Medications:     acetaminophen (TYLENOL) 500 MG tablet, Take 1,000 mg by mouth 2 (two) times daily as needed for Pain., Disp: , Rfl:     amLODIPine (NORVASC) 5 MG tablet, Take 5 mg by mouth once daily., Disp: , Rfl:     CALCIUM CARBONATE/VITAMIN D3 (CALTRATE 600 + D ORAL), Take 1 tablet by mouth 2  "(two) times daily., Disp: , Rfl:     cefpodoxime (VANTIN) 200 MG tablet, Take 1 tablet (200 mg total) by mouth every 12 (twelve) hours., Disp: 4 tablet, Rfl: 0    famotidine (PEPCID) 20 MG tablet, Take 40 mg by mouth once daily., Disp: , Rfl:     furosemide (LASIX) 20 MG tablet, Take 1 tablet (20 mg total) by mouth once daily. RESUME 8/15, Disp: 30 tablet, Rfl: 11    INSULIN GLARGINE,HUM.REC.ANLOG (TOUJEO SOLOSTAR SUBQ), Inject 28 Units into the skin once daily., Disp: , Rfl:     losartan (COZAAR) 100 MG tablet, Take 100 mg by mouth once daily., Disp: , Rfl:     magnesium oxide (MAG-OX) 400 mg tablet, Take 800 mg by mouth once daily., Disp: , Rfl:     MULTIVITAMIN W-MINERALS/LUTEIN (CENTRUM SILVER ORAL), Take 1 tablet by mouth Daily., Disp: , Rfl:     mycophenolate (CELLCEPT) 250 mg Cap, TAKE 1 CAPSULE BY MOUTH 2 TIMES DAILY, Disp: 60 capsule, Rfl: 11    propylene glycol (SYSTANE BALANCE OPHT), Apply 1-2 drops to eye daily as needed (dry eyes)., Disp: , Rfl:     rosuvastatin (CRESTOR) 5 MG tablet, Take 5 mg by mouth nightly., Disp: , Rfl:     semaglutide (OZEMPIC) 1 mg/dose (4 mg/3 mL), Inject 1 mg into the skin every Thursday., Disp: , Rfl:     tacrolimus (PROGRAF) 1 MG Cap, Take 3 capsules (3 mg total) by mouth every 12 (twelve) hours., Disp: 180 capsule, Rfl: 11    tamsulosin (FLOMAX) 0.4 mg Cap, Take 1 capsule (0.4 mg total) by mouth once daily., Disp: 30 capsule, Rfl: 11    TRUEPLUS PEN NEEDLE 32 gauge x 5/32" Ndle, , Disp: , Rfl:     Review of Systems   Constitutional:  Negative for activity change, appetite change, chills, diaphoresis, fatigue, fever and unexpected weight change.   HENT:  Negative for congestion, dental problem, hearing loss, mouth sores, postnasal drip, rhinorrhea, sinus pressure and trouble swallowing.    Eyes:  Negative for pain, discharge and itching.   Respiratory:  Negative for apnea, cough, choking, chest tightness, shortness of breath and wheezing.    Cardiovascular:  Negative for " chest pain, palpitations and leg swelling.   Gastrointestinal:  Negative for abdominal distention, abdominal pain, anal bleeding, blood in stool, constipation, diarrhea, nausea, rectal pain and vomiting.   Endocrine: Negative for polydipsia and polyuria.   Genitourinary:  Negative for decreased urine volume, difficulty urinating, dysuria, enuresis, flank pain, frequency, genital sores, hematuria, penile discharge, penile pain, penile swelling, scrotal swelling, testicular pain and urgency.   Musculoskeletal:  Negative for arthralgias, back pain and myalgias.   Skin:  Negative for color change, rash and wound.   Neurological:  Negative for dizziness, syncope, speech difficulty, light-headedness and headaches.   Hematological:  Negative for adenopathy. Does not bruise/bleed easily.   Psychiatric/Behavioral:  Negative for behavioral problems, confusion, hallucinations and sleep disturbance.      Objective:      Physical Exam  Constitutional:       Appearance: He is well-developed.   HENT:      Head: Normocephalic.   Cardiovascular:      Rate and Rhythm: Normal rate.   Pulmonary:      Effort: Pulmonary effort is normal.   Abdominal:      Palpations: Abdomen is soft.   Skin:     General: Skin is warm.   Neurological:      Mental Status: He is alert.       Assessment:       1. Hematuria, microscopic          Plan:       Jordan was seen today for follow-up.    Diagnoses and all orders for this visit:    Hematuria, microscopic        He return to clinic 6 months for urinalysis  Last PSA 4 months ago was 0

## 2022-12-05 ENCOUNTER — LAB VISIT (OUTPATIENT)
Dept: LAB | Facility: HOSPITAL | Age: 67
End: 2022-12-05
Attending: INTERNAL MEDICINE
Payer: MEDICARE

## 2022-12-05 DIAGNOSIS — C22.0 HCC (HEPATOCELLULAR CARCINOMA): ICD-10-CM

## 2022-12-05 DIAGNOSIS — Z94.4 LIVER REPLACED BY TRANSPLANT: ICD-10-CM

## 2022-12-05 LAB
AFP SERPL-MCNC: 2.2 NG/ML (ref 0–8.4)
ALBUMIN SERPL BCP-MCNC: 3.5 G/DL (ref 3.5–5.2)
ALP SERPL-CCNC: 59 U/L (ref 55–135)
ALT SERPL W/O P-5'-P-CCNC: 23 U/L (ref 10–44)
ANION GAP SERPL CALC-SCNC: 5 MMOL/L (ref 8–16)
AST SERPL-CCNC: 14 U/L (ref 10–40)
BASOPHILS # BLD AUTO: 0.03 K/UL (ref 0–0.2)
BASOPHILS NFR BLD: 0.8 % (ref 0–1.9)
BILIRUB SERPL-MCNC: 0.5 MG/DL (ref 0.1–1)
BUN SERPL-MCNC: 21 MG/DL (ref 8–23)
CALCIUM SERPL-MCNC: 9.2 MG/DL (ref 8.7–10.5)
CHLORIDE SERPL-SCNC: 108 MMOL/L (ref 95–110)
CO2 SERPL-SCNC: 27 MMOL/L (ref 23–29)
CREAT SERPL-MCNC: 1.2 MG/DL (ref 0.5–1.4)
DIFFERENTIAL METHOD: ABNORMAL
EOSINOPHIL # BLD AUTO: 0.2 K/UL (ref 0–0.5)
EOSINOPHIL NFR BLD: 5.4 % (ref 0–8)
ERYTHROCYTE [DISTWIDTH] IN BLOOD BY AUTOMATED COUNT: 13.6 % (ref 11.5–14.5)
EST. GFR  (NO RACE VARIABLE): >60 ML/MIN/1.73 M^2
GLUCOSE SERPL-MCNC: 156 MG/DL (ref 70–110)
HCT VFR BLD AUTO: 36.1 % (ref 40–54)
HGB BLD-MCNC: 12.4 G/DL (ref 14–18)
IMM GRANULOCYTES # BLD AUTO: 0.01 K/UL (ref 0–0.04)
IMM GRANULOCYTES NFR BLD AUTO: 0.3 % (ref 0–0.5)
LYMPHOCYTES # BLD AUTO: 1.2 K/UL (ref 1–4.8)
LYMPHOCYTES NFR BLD: 32 % (ref 18–48)
MCH RBC QN AUTO: 28.1 PG (ref 27–31)
MCHC RBC AUTO-ENTMCNC: 34.3 G/DL (ref 32–36)
MCV RBC AUTO: 82 FL (ref 82–98)
MONOCYTES # BLD AUTO: 0.3 K/UL (ref 0.3–1)
MONOCYTES NFR BLD: 8.7 % (ref 4–15)
NEUTROPHILS # BLD AUTO: 2 K/UL (ref 1.8–7.7)
NEUTROPHILS NFR BLD: 52.8 % (ref 38–73)
NRBC BLD-RTO: 0 /100 WBC
PLATELET # BLD AUTO: 153 K/UL (ref 150–450)
PMV BLD AUTO: 10.1 FL (ref 9.2–12.9)
POTASSIUM SERPL-SCNC: 3.9 MMOL/L (ref 3.5–5.1)
PROT SERPL-MCNC: 7 G/DL (ref 6–8.4)
RBC # BLD AUTO: 4.41 M/UL (ref 4.6–6.2)
SODIUM SERPL-SCNC: 140 MMOL/L (ref 136–145)
WBC # BLD AUTO: 3.69 K/UL (ref 3.9–12.7)

## 2022-12-05 PROCEDURE — 80197 ASSAY OF TACROLIMUS: CPT | Performed by: INTERNAL MEDICINE

## 2022-12-05 PROCEDURE — 82105 ALPHA-FETOPROTEIN SERUM: CPT | Performed by: INTERNAL MEDICINE

## 2022-12-05 PROCEDURE — 85025 COMPLETE CBC W/AUTO DIFF WBC: CPT | Performed by: INTERNAL MEDICINE

## 2022-12-05 PROCEDURE — 80053 COMPREHEN METABOLIC PANEL: CPT | Performed by: INTERNAL MEDICINE

## 2022-12-05 PROCEDURE — 36415 COLL VENOUS BLD VENIPUNCTURE: CPT | Performed by: INTERNAL MEDICINE

## 2022-12-06 LAB — TACROLIMUS BLD-MCNC: 5.2 NG/ML (ref 5–15)

## 2022-12-07 ENCOUNTER — LAB VISIT (OUTPATIENT)
Dept: LAB | Facility: HOSPITAL | Age: 67
End: 2022-12-07
Attending: INTERNAL MEDICINE
Payer: MEDICARE

## 2022-12-07 DIAGNOSIS — E78.2 MIXED HYPERLIPIDEMIA: ICD-10-CM

## 2022-12-07 DIAGNOSIS — E11.8 DIABETIC COMPLICATION: Primary | ICD-10-CM

## 2022-12-07 DIAGNOSIS — Z13.29 SCREENING FOR THYROID DISORDER: ICD-10-CM

## 2022-12-07 LAB
ALBUMIN SERPL BCP-MCNC: 3.3 G/DL (ref 3.5–5.2)
ALP SERPL-CCNC: 59 U/L (ref 55–135)
ALT SERPL W/O P-5'-P-CCNC: 21 U/L (ref 10–44)
ANION GAP SERPL CALC-SCNC: 8 MMOL/L (ref 8–16)
AST SERPL-CCNC: 13 U/L (ref 10–40)
BASOPHILS # BLD AUTO: 0.02 K/UL (ref 0–0.2)
BASOPHILS NFR BLD: 0.5 % (ref 0–1.9)
BILIRUB SERPL-MCNC: 0.5 MG/DL (ref 0.1–1)
BUN SERPL-MCNC: 21 MG/DL (ref 8–23)
CALCIUM SERPL-MCNC: 8.8 MG/DL (ref 8.7–10.5)
CHLORIDE SERPL-SCNC: 107 MMOL/L (ref 95–110)
CHOLEST SERPL-MCNC: 119 MG/DL (ref 120–199)
CHOLEST/HDLC SERPL: 2.9 {RATIO} (ref 2–5)
CO2 SERPL-SCNC: 27 MMOL/L (ref 23–29)
CREAT SERPL-MCNC: 1.3 MG/DL (ref 0.5–1.4)
DIFFERENTIAL METHOD: ABNORMAL
EOSINOPHIL # BLD AUTO: 0.2 K/UL (ref 0–0.5)
EOSINOPHIL NFR BLD: 6.1 % (ref 0–8)
ERYTHROCYTE [DISTWIDTH] IN BLOOD BY AUTOMATED COUNT: 13.7 % (ref 11.5–14.5)
EST. GFR  (NO RACE VARIABLE): 59.5 ML/MIN/1.73 M^2
ESTIMATED AVG GLUCOSE: 148 MG/DL (ref 68–131)
GLUCOSE SERPL-MCNC: 165 MG/DL (ref 70–110)
HBA1C MFR BLD: 6.8 % (ref 4–5.6)
HCT VFR BLD AUTO: 36.1 % (ref 40–54)
HDLC SERPL-MCNC: 41 MG/DL (ref 40–75)
HDLC SERPL: 34.5 % (ref 20–50)
HGB BLD-MCNC: 12.4 G/DL (ref 14–18)
IMM GRANULOCYTES # BLD AUTO: 0.01 K/UL (ref 0–0.04)
IMM GRANULOCYTES NFR BLD AUTO: 0.3 % (ref 0–0.5)
LDLC SERPL CALC-MCNC: 35.6 MG/DL (ref 63–159)
LYMPHOCYTES # BLD AUTO: 1.1 K/UL (ref 1–4.8)
LYMPHOCYTES NFR BLD: 29.4 % (ref 18–48)
MCH RBC QN AUTO: 28.2 PG (ref 27–31)
MCHC RBC AUTO-ENTMCNC: 34.3 G/DL (ref 32–36)
MCV RBC AUTO: 82 FL (ref 82–98)
MONOCYTES # BLD AUTO: 0.3 K/UL (ref 0.3–1)
MONOCYTES NFR BLD: 7.2 % (ref 4–15)
NEUTROPHILS # BLD AUTO: 2.1 K/UL (ref 1.8–7.7)
NEUTROPHILS NFR BLD: 56.5 % (ref 38–73)
NONHDLC SERPL-MCNC: 78 MG/DL
NRBC BLD-RTO: 0 /100 WBC
PLATELET # BLD AUTO: 157 K/UL (ref 150–450)
PMV BLD AUTO: 10.4 FL (ref 9.2–12.9)
POTASSIUM SERPL-SCNC: 4.2 MMOL/L (ref 3.5–5.1)
PROT SERPL-MCNC: 6.6 G/DL (ref 6–8.4)
RBC # BLD AUTO: 4.39 M/UL (ref 4.6–6.2)
SODIUM SERPL-SCNC: 142 MMOL/L (ref 136–145)
TRIGL SERPL-MCNC: 212 MG/DL (ref 30–150)
TSH SERPL DL<=0.005 MIU/L-ACNC: 3.24 UIU/ML (ref 0.4–4)
WBC # BLD AUTO: 3.74 K/UL (ref 3.9–12.7)

## 2022-12-07 PROCEDURE — 36415 COLL VENOUS BLD VENIPUNCTURE: CPT | Performed by: INTERNAL MEDICINE

## 2022-12-07 PROCEDURE — 80061 LIPID PANEL: CPT | Performed by: INTERNAL MEDICINE

## 2022-12-07 PROCEDURE — 83036 HEMOGLOBIN GLYCOSYLATED A1C: CPT | Performed by: INTERNAL MEDICINE

## 2022-12-07 PROCEDURE — 80053 COMPREHEN METABOLIC PANEL: CPT | Performed by: INTERNAL MEDICINE

## 2022-12-07 PROCEDURE — 85025 COMPLETE CBC W/AUTO DIFF WBC: CPT | Performed by: INTERNAL MEDICINE

## 2022-12-07 PROCEDURE — 84443 ASSAY THYROID STIM HORMONE: CPT | Performed by: INTERNAL MEDICINE

## 2022-12-12 ENCOUNTER — PATIENT MESSAGE (OUTPATIENT)
Dept: TRANSPLANT | Facility: CLINIC | Age: 67
End: 2022-12-12
Payer: MEDICARE

## 2022-12-12 DIAGNOSIS — Z94.4 LIVER REPLACED BY TRANSPLANT: Chronic | ICD-10-CM

## 2022-12-12 RX ORDER — TACROLIMUS 1 MG/1
CAPSULE ORAL
Qty: 150 CAPSULE | Refills: 11 | Status: SHIPPED | OUTPATIENT
Start: 2022-12-12 | End: 2023-12-18

## 2022-12-12 NOTE — TELEPHONE ENCOUNTER
Portal message sent, repeat labs 1/9/23----- Message from Shaniqua Matias MD sent at 12/12/2022 10:07 AM CST -----  The Labs are stable; lower prograf to  3/2 from 3/3 and repeat labs in one month- please let patient know.

## 2023-01-09 ENCOUNTER — LAB VISIT (OUTPATIENT)
Dept: LAB | Facility: HOSPITAL | Age: 68
End: 2023-01-09
Attending: INTERNAL MEDICINE
Payer: MEDICARE

## 2023-01-09 DIAGNOSIS — Z94.4 LIVER REPLACED BY TRANSPLANT: ICD-10-CM

## 2023-01-09 LAB
ALBUMIN SERPL BCP-MCNC: 3.4 G/DL (ref 3.5–5.2)
ALP SERPL-CCNC: 68 U/L (ref 55–135)
ALT SERPL W/O P-5'-P-CCNC: 26 U/L (ref 10–44)
ANION GAP SERPL CALC-SCNC: 5 MMOL/L (ref 8–16)
AST SERPL-CCNC: 17 U/L (ref 10–40)
BASOPHILS # BLD AUTO: 0.02 K/UL (ref 0–0.2)
BASOPHILS NFR BLD: 0.5 % (ref 0–1.9)
BILIRUB SERPL-MCNC: 0.6 MG/DL (ref 0.1–1)
BUN SERPL-MCNC: 18 MG/DL (ref 8–23)
CALCIUM SERPL-MCNC: 8.7 MG/DL (ref 8.7–10.5)
CHLORIDE SERPL-SCNC: 107 MMOL/L (ref 95–110)
CO2 SERPL-SCNC: 28 MMOL/L (ref 23–29)
CREAT SERPL-MCNC: 1.2 MG/DL (ref 0.5–1.4)
DIFFERENTIAL METHOD: ABNORMAL
EOSINOPHIL # BLD AUTO: 0.2 K/UL (ref 0–0.5)
EOSINOPHIL NFR BLD: 5.3 % (ref 0–8)
ERYTHROCYTE [DISTWIDTH] IN BLOOD BY AUTOMATED COUNT: 13.4 % (ref 11.5–14.5)
EST. GFR  (NO RACE VARIABLE): >60 ML/MIN/1.73 M^2
GLUCOSE SERPL-MCNC: 168 MG/DL (ref 70–110)
HCT VFR BLD AUTO: 37.2 % (ref 40–54)
HGB BLD-MCNC: 12.7 G/DL (ref 14–18)
IMM GRANULOCYTES # BLD AUTO: 0.01 K/UL (ref 0–0.04)
IMM GRANULOCYTES NFR BLD AUTO: 0.3 % (ref 0–0.5)
LYMPHOCYTES # BLD AUTO: 1.3 K/UL (ref 1–4.8)
LYMPHOCYTES NFR BLD: 32 % (ref 18–48)
MCH RBC QN AUTO: 28.5 PG (ref 27–31)
MCHC RBC AUTO-ENTMCNC: 34.1 G/DL (ref 32–36)
MCV RBC AUTO: 83 FL (ref 82–98)
MONOCYTES # BLD AUTO: 0.3 K/UL (ref 0.3–1)
MONOCYTES NFR BLD: 6.5 % (ref 4–15)
NEUTROPHILS # BLD AUTO: 2.2 K/UL (ref 1.8–7.7)
NEUTROPHILS NFR BLD: 55.4 % (ref 38–73)
NRBC BLD-RTO: 0 /100 WBC
PLATELET # BLD AUTO: 137 K/UL (ref 150–450)
PMV BLD AUTO: 10.1 FL (ref 9.2–12.9)
POTASSIUM SERPL-SCNC: 4.6 MMOL/L (ref 3.5–5.1)
PROT SERPL-MCNC: 6.7 G/DL (ref 6–8.4)
RBC # BLD AUTO: 4.46 M/UL (ref 4.6–6.2)
SODIUM SERPL-SCNC: 140 MMOL/L (ref 136–145)
WBC # BLD AUTO: 3.97 K/UL (ref 3.9–12.7)

## 2023-01-09 PROCEDURE — 80197 ASSAY OF TACROLIMUS: CPT | Performed by: INTERNAL MEDICINE

## 2023-01-09 PROCEDURE — 85025 COMPLETE CBC W/AUTO DIFF WBC: CPT | Performed by: INTERNAL MEDICINE

## 2023-01-09 PROCEDURE — 36415 COLL VENOUS BLD VENIPUNCTURE: CPT | Performed by: INTERNAL MEDICINE

## 2023-01-09 PROCEDURE — 80053 COMPREHEN METABOLIC PANEL: CPT | Performed by: INTERNAL MEDICINE

## 2023-01-10 LAB — TACROLIMUS BLD-MCNC: 6.1 NG/ML (ref 5–15)

## 2023-01-12 ENCOUNTER — PATIENT MESSAGE (OUTPATIENT)
Dept: TRANSPLANT | Facility: CLINIC | Age: 68
End: 2023-01-12
Payer: MEDICARE

## 2023-01-12 ENCOUNTER — TELEPHONE (OUTPATIENT)
Dept: TRANSPLANT | Facility: CLINIC | Age: 68
End: 2023-01-12
Payer: MEDICARE

## 2023-01-12 DIAGNOSIS — Z94.4 LIVER REPLACED BY TRANSPLANT: Primary | ICD-10-CM

## 2023-01-12 NOTE — TELEPHONE ENCOUNTER
Patient notified and instructed via [facility]:    Per : The Labs are stable; decrease prograf to 2mg twice daily and repeat labs in 1 month (due 2/13/23). Thanks,

## 2023-01-12 NOTE — TELEPHONE ENCOUNTER
----- Message from Shaniqua Matias MD sent at 1/12/2023  1:00 PM CST -----  The Labs are stable; decrease prograf to 2/2 from 3/2 and repeat labs in 1 month - please let patient know.

## 2023-02-13 ENCOUNTER — LAB VISIT (OUTPATIENT)
Dept: LAB | Facility: HOSPITAL | Age: 68
End: 2023-02-13
Attending: INTERNAL MEDICINE
Payer: MEDICARE

## 2023-02-13 DIAGNOSIS — Z94.4 LIVER REPLACED BY TRANSPLANT: ICD-10-CM

## 2023-02-13 LAB
ALBUMIN SERPL BCP-MCNC: 3.4 G/DL (ref 3.5–5.2)
ALP SERPL-CCNC: 69 U/L (ref 55–135)
ALT SERPL W/O P-5'-P-CCNC: 28 U/L (ref 10–44)
ANION GAP SERPL CALC-SCNC: 1 MMOL/L (ref 8–16)
AST SERPL-CCNC: 20 U/L (ref 10–40)
BASOPHILS # BLD AUTO: 0.02 K/UL (ref 0–0.2)
BASOPHILS NFR BLD: 0.5 % (ref 0–1.9)
BILIRUB SERPL-MCNC: 0.5 MG/DL (ref 0.1–1)
BUN SERPL-MCNC: 17 MG/DL (ref 8–23)
CALCIUM SERPL-MCNC: 8.7 MG/DL (ref 8.7–10.5)
CHLORIDE SERPL-SCNC: 109 MMOL/L (ref 95–110)
CO2 SERPL-SCNC: 29 MMOL/L (ref 23–29)
CREAT SERPL-MCNC: 1.4 MG/DL (ref 0.5–1.4)
DIFFERENTIAL METHOD: ABNORMAL
EOSINOPHIL # BLD AUTO: 0.2 K/UL (ref 0–0.5)
EOSINOPHIL NFR BLD: 4.6 % (ref 0–8)
ERYTHROCYTE [DISTWIDTH] IN BLOOD BY AUTOMATED COUNT: 13.4 % (ref 11.5–14.5)
EST. GFR  (NO RACE VARIABLE): 54.4 ML/MIN/1.73 M^2
GLUCOSE SERPL-MCNC: 230 MG/DL (ref 70–110)
HCT VFR BLD AUTO: 36.9 % (ref 40–54)
HGB BLD-MCNC: 12.4 G/DL (ref 14–18)
IMM GRANULOCYTES # BLD AUTO: 0 K/UL (ref 0–0.04)
IMM GRANULOCYTES NFR BLD AUTO: 0 % (ref 0–0.5)
LYMPHOCYTES # BLD AUTO: 1.1 K/UL (ref 1–4.8)
LYMPHOCYTES NFR BLD: 27.1 % (ref 18–48)
MCH RBC QN AUTO: 28.2 PG (ref 27–31)
MCHC RBC AUTO-ENTMCNC: 33.6 G/DL (ref 32–36)
MCV RBC AUTO: 84 FL (ref 82–98)
MONOCYTES # BLD AUTO: 0.3 K/UL (ref 0.3–1)
MONOCYTES NFR BLD: 8.6 % (ref 4–15)
NEUTROPHILS # BLD AUTO: 2.3 K/UL (ref 1.8–7.7)
NEUTROPHILS NFR BLD: 59.2 % (ref 38–73)
NRBC BLD-RTO: 0 /100 WBC
PLATELET # BLD AUTO: 144 K/UL (ref 150–450)
PMV BLD AUTO: 9.7 FL (ref 9.2–12.9)
POTASSIUM SERPL-SCNC: 4.4 MMOL/L (ref 3.5–5.1)
PROT SERPL-MCNC: 6.7 G/DL (ref 6–8.4)
RBC # BLD AUTO: 4.4 M/UL (ref 4.6–6.2)
SODIUM SERPL-SCNC: 139 MMOL/L (ref 136–145)
WBC # BLD AUTO: 3.95 K/UL (ref 3.9–12.7)

## 2023-02-13 PROCEDURE — 36415 COLL VENOUS BLD VENIPUNCTURE: CPT | Performed by: INTERNAL MEDICINE

## 2023-02-13 PROCEDURE — 80053 COMPREHEN METABOLIC PANEL: CPT | Performed by: INTERNAL MEDICINE

## 2023-02-13 PROCEDURE — 80197 ASSAY OF TACROLIMUS: CPT | Performed by: INTERNAL MEDICINE

## 2023-02-13 PROCEDURE — 85025 COMPLETE CBC W/AUTO DIFF WBC: CPT | Performed by: INTERNAL MEDICINE

## 2023-02-14 ENCOUNTER — TELEPHONE (OUTPATIENT)
Dept: TRANSPLANT | Facility: CLINIC | Age: 68
End: 2023-02-14
Payer: MEDICARE

## 2023-02-14 DIAGNOSIS — Z94.4 LIVER REPLACED BY TRANSPLANT: Primary | ICD-10-CM

## 2023-02-14 DIAGNOSIS — Z94.0 KIDNEY REPLACED BY TRANSPLANT: ICD-10-CM

## 2023-02-14 LAB — TACROLIMUS BLD-MCNC: 4.6 NG/ML (ref 5–15)

## 2023-02-14 NOTE — TELEPHONE ENCOUNTER
----- Message from Shaniqua Matias MD sent at 2/14/2023  9:35 AM CST -----  The Labs are stable - please let patient know.

## 2023-03-06 ENCOUNTER — TELEPHONE (OUTPATIENT)
Dept: TRANSPLANT | Facility: CLINIC | Age: 68
End: 2023-03-06
Payer: MEDICARE

## 2023-03-08 ENCOUNTER — PATIENT MESSAGE (OUTPATIENT)
Dept: TRANSPLANT | Facility: CLINIC | Age: 68
End: 2023-03-08
Payer: MEDICARE

## 2023-03-15 ENCOUNTER — LAB VISIT (OUTPATIENT)
Dept: LAB | Facility: HOSPITAL | Age: 68
End: 2023-03-15
Attending: RADIOLOGY
Payer: MEDICARE

## 2023-03-15 DIAGNOSIS — I10 ESSENTIAL HYPERTENSION, MALIGNANT: ICD-10-CM

## 2023-03-15 DIAGNOSIS — E11.9 DIABETES MELLITUS WITHOUT COMPLICATION: Primary | ICD-10-CM

## 2023-03-15 DIAGNOSIS — C61 PROSTATE CANCER: ICD-10-CM

## 2023-03-15 LAB
ALBUMIN/CREAT UR: 45.6 MG/MG (ref 0–30)
ANION GAP SERPL CALC-SCNC: 10 MMOL/L (ref 8–16)
BUN SERPL-MCNC: 19 MG/DL (ref 8–23)
CALCIUM SERPL-MCNC: 9.2 MG/DL (ref 8.7–10.5)
CHLORIDE SERPL-SCNC: 104 MMOL/L (ref 95–110)
CO2 SERPL-SCNC: 27 MMOL/L (ref 23–29)
COMPLEXED PSA SERPL-MCNC: <0.01 NG/ML (ref 0–4)
CREAT SERPL-MCNC: 1.3 MG/DL (ref 0.5–1.4)
CREAT UR-MCNC: 133 MG/DL (ref 23–375)
EST. GFR  (NO RACE VARIABLE): 59.5 ML/MIN/1.73 M^2
ESTIMATED AVG GLUCOSE: 137 MG/DL (ref 68–131)
GLUCOSE SERPL-MCNC: 139 MG/DL (ref 70–110)
HBA1C MFR BLD: 6.4 % (ref 4–5.6)
MICROALBUMIN UR DL<=1MG/L-MCNC: 60.7 MG/L
POTASSIUM SERPL-SCNC: 4.2 MMOL/L (ref 3.5–5.1)
SODIUM SERPL-SCNC: 141 MMOL/L (ref 136–145)

## 2023-03-15 PROCEDURE — 80048 BASIC METABOLIC PNL TOTAL CA: CPT | Performed by: RADIOLOGY

## 2023-03-15 PROCEDURE — 82570 ASSAY OF URINE CREATININE: CPT | Performed by: RADIOLOGY

## 2023-03-15 PROCEDURE — 84153 ASSAY OF PSA TOTAL: CPT | Performed by: RADIOLOGY

## 2023-03-15 PROCEDURE — 83036 HEMOGLOBIN GLYCOSYLATED A1C: CPT | Performed by: RADIOLOGY

## 2023-03-15 PROCEDURE — 36415 COLL VENOUS BLD VENIPUNCTURE: CPT | Performed by: RADIOLOGY

## 2023-03-16 ENCOUNTER — PATIENT MESSAGE (OUTPATIENT)
Dept: RADIATION ONCOLOGY | Facility: CLINIC | Age: 68
End: 2023-03-16
Payer: MEDICARE

## 2023-03-28 ENCOUNTER — PATIENT MESSAGE (OUTPATIENT)
Dept: TRANSPLANT | Facility: CLINIC | Age: 68
End: 2023-03-28
Payer: MEDICARE

## 2023-04-24 ENCOUNTER — TELEPHONE (OUTPATIENT)
Dept: TRANSPLANT | Facility: CLINIC | Age: 68
End: 2023-04-24
Payer: MEDICARE

## 2023-04-24 ENCOUNTER — OFFICE VISIT (OUTPATIENT)
Dept: TRANSPLANT | Facility: CLINIC | Age: 68
End: 2023-04-24
Payer: MEDICARE

## 2023-04-24 VITALS
BODY MASS INDEX: 42.48 KG/M2 | HEART RATE: 76 BPM | DIASTOLIC BLOOD PRESSURE: 63 MMHG | TEMPERATURE: 99 F | HEIGHT: 72 IN | OXYGEN SATURATION: 97 % | WEIGHT: 313.63 LBS | RESPIRATION RATE: 19 BRPM | SYSTOLIC BLOOD PRESSURE: 142 MMHG

## 2023-04-24 DIAGNOSIS — Z94.0 KIDNEY REPLACED BY TRANSPLANT: ICD-10-CM

## 2023-04-24 DIAGNOSIS — Z94.0 S/P KIDNEY TRANSPLANT: ICD-10-CM

## 2023-04-24 DIAGNOSIS — Z29.89 PROPHYLACTIC IMMUNOTHERAPY: Primary | ICD-10-CM

## 2023-04-24 DIAGNOSIS — Z79.60 LONG-TERM USE OF IMMUNOSUPPRESSANT MEDICATION: ICD-10-CM

## 2023-04-24 DIAGNOSIS — Z94.4 LIVER REPLACED BY TRANSPLANT: Chronic | ICD-10-CM

## 2023-04-24 DIAGNOSIS — C61 PROSTATE CANCER: ICD-10-CM

## 2023-04-24 DIAGNOSIS — Z85.05 PERSONAL HISTORY OF MALIGNANT HEPATOMA: ICD-10-CM

## 2023-04-24 DIAGNOSIS — Z85.528 PERSONAL HISTORY OF RENAL CANCER: Chronic | ICD-10-CM

## 2023-04-24 PROCEDURE — 99999 PR PBB SHADOW E&M-EST. PATIENT-LVL V: CPT | Mod: PBBFAC,,, | Performed by: INTERNAL MEDICINE

## 2023-04-24 PROCEDURE — 3008F PR BODY MASS INDEX (BMI) DOCUMENTED: ICD-10-PCS | Mod: CPTII,S$GLB,, | Performed by: INTERNAL MEDICINE

## 2023-04-24 PROCEDURE — 1126F PR PAIN SEVERITY QUANTIFIED, NO PAIN PRESENT: ICD-10-PCS | Mod: CPTII,S$GLB,, | Performed by: INTERNAL MEDICINE

## 2023-04-24 PROCEDURE — 4010F ACE/ARB THERAPY RXD/TAKEN: CPT | Mod: CPTII,S$GLB,, | Performed by: INTERNAL MEDICINE

## 2023-04-24 PROCEDURE — 99214 OFFICE O/P EST MOD 30 MIN: CPT | Mod: S$GLB,,, | Performed by: INTERNAL MEDICINE

## 2023-04-24 PROCEDURE — 3066F NEPHROPATHY DOC TX: CPT | Mod: CPTII,S$GLB,, | Performed by: INTERNAL MEDICINE

## 2023-04-24 PROCEDURE — 3077F SYST BP >= 140 MM HG: CPT | Mod: CPTII,S$GLB,, | Performed by: INTERNAL MEDICINE

## 2023-04-24 PROCEDURE — 3044F PR MOST RECENT HEMOGLOBIN A1C LEVEL <7.0%: ICD-10-PCS | Mod: CPTII,S$GLB,, | Performed by: INTERNAL MEDICINE

## 2023-04-24 PROCEDURE — 1126F AMNT PAIN NOTED NONE PRSNT: CPT | Mod: CPTII,S$GLB,, | Performed by: INTERNAL MEDICINE

## 2023-04-24 PROCEDURE — 3060F POS MICROALBUMINURIA REV: CPT | Mod: CPTII,S$GLB,, | Performed by: INTERNAL MEDICINE

## 2023-04-24 PROCEDURE — 99999 PR PBB SHADOW E&M-EST. PATIENT-LVL V: ICD-10-PCS | Mod: PBBFAC,,, | Performed by: INTERNAL MEDICINE

## 2023-04-24 PROCEDURE — 3008F BODY MASS INDEX DOCD: CPT | Mod: CPTII,S$GLB,, | Performed by: INTERNAL MEDICINE

## 2023-04-24 PROCEDURE — 3044F HG A1C LEVEL LT 7.0%: CPT | Mod: CPTII,S$GLB,, | Performed by: INTERNAL MEDICINE

## 2023-04-24 PROCEDURE — 3060F PR POS MICROALBUMINURIA RESULT DOCUMENTED/REVIEW: ICD-10-PCS | Mod: CPTII,S$GLB,, | Performed by: INTERNAL MEDICINE

## 2023-04-24 PROCEDURE — 3288F PR FALLS RISK ASSESSMENT DOCUMENTED: ICD-10-PCS | Mod: CPTII,S$GLB,, | Performed by: INTERNAL MEDICINE

## 2023-04-24 PROCEDURE — 1159F MED LIST DOCD IN RCRD: CPT | Mod: CPTII,S$GLB,, | Performed by: INTERNAL MEDICINE

## 2023-04-24 PROCEDURE — 3078F PR MOST RECENT DIASTOLIC BLOOD PRESSURE < 80 MM HG: ICD-10-PCS | Mod: CPTII,S$GLB,, | Performed by: INTERNAL MEDICINE

## 2023-04-24 PROCEDURE — 1101F PR PT FALLS ASSESS DOC 0-1 FALLS W/OUT INJ PAST YR: ICD-10-PCS | Mod: CPTII,S$GLB,, | Performed by: INTERNAL MEDICINE

## 2023-04-24 PROCEDURE — 3288F FALL RISK ASSESSMENT DOCD: CPT | Mod: CPTII,S$GLB,, | Performed by: INTERNAL MEDICINE

## 2023-04-24 PROCEDURE — 3066F PR DOCUMENTATION OF TREATMENT FOR NEPHROPATHY: ICD-10-PCS | Mod: CPTII,S$GLB,, | Performed by: INTERNAL MEDICINE

## 2023-04-24 PROCEDURE — 99214 PR OFFICE/OUTPT VISIT, EST, LEVL IV, 30-39 MIN: ICD-10-PCS | Mod: S$GLB,,, | Performed by: INTERNAL MEDICINE

## 2023-04-24 PROCEDURE — 3078F DIAST BP <80 MM HG: CPT | Mod: CPTII,S$GLB,, | Performed by: INTERNAL MEDICINE

## 2023-04-24 PROCEDURE — 1101F PT FALLS ASSESS-DOCD LE1/YR: CPT | Mod: CPTII,S$GLB,, | Performed by: INTERNAL MEDICINE

## 2023-04-24 PROCEDURE — 1160F RVW MEDS BY RX/DR IN RCRD: CPT | Mod: CPTII,S$GLB,, | Performed by: INTERNAL MEDICINE

## 2023-04-24 PROCEDURE — 4010F PR ACE/ARB THEARPY RXD/TAKEN: ICD-10-PCS | Mod: CPTII,S$GLB,, | Performed by: INTERNAL MEDICINE

## 2023-04-24 PROCEDURE — 1159F PR MEDICATION LIST DOCUMENTED IN MEDICAL RECORD: ICD-10-PCS | Mod: CPTII,S$GLB,, | Performed by: INTERNAL MEDICINE

## 2023-04-24 PROCEDURE — 1160F PR REVIEW ALL MEDS BY PRESCRIBER/CLIN PHARMACIST DOCUMENTED: ICD-10-PCS | Mod: CPTII,S$GLB,, | Performed by: INTERNAL MEDICINE

## 2023-04-24 PROCEDURE — 3077F PR MOST RECENT SYSTOLIC BLOOD PRESSURE >= 140 MM HG: ICD-10-PCS | Mod: CPTII,S$GLB,, | Performed by: INTERNAL MEDICINE

## 2023-04-24 RX ORDER — DAPAGLIFLOZIN 10 MG/1
10 TABLET, FILM COATED ORAL
COMMUNITY
Start: 2023-04-10

## 2023-04-24 NOTE — LETTER
April 24, 2023        Stephen Schumacher  [address]  [place] [address]  Phone: [phone]  Fax: [phone]             [facility] - Liver Transplant  [address] [address]  Phone: [phone]  Fax: [phone]   Patient: Jordan Lopez Jr.   MR Number: [id number]   YOB: 1955   Date of Visit: 4/24/2023       Dear Dr. Stephen Schumacher    Thank you for referring Jordan Lopez to me for evaluation. Attached you will find relevant portions of my assessment and plan of care.    If you have questions, please do not hesitate to call me. I look forward to following Jordan Lopez along with you.    Sincerely,    Shaniqua Matias MD    Enclosure    If you would like to receive this communication electronically, please contact [email or web address] or [phone] to request [employer] access.    [employer] is a tool which provides read-only access to select patient information with whom you have a relationship. Its easy to use and provides real time access to review your patients record including encounter summaries, notes, results, and demographic information.    If you feel you have received this communication in error or would no longer like to receive these types of communications, please e-mail [email or web address]

## 2023-04-24 NOTE — PROGRESS NOTES
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     He has had the following complications since transplant:  chronic renal insufficiency .     Subjective:     Interval History: Jordan is now 10 years and 9 months post combined liver-kidney transplant. He is feeling well and vociing no complaints. Did have  UTI 8/22 and was hospitalized.    He has a history of Stage IIIB, pT3a, N0, M0, P>=10<20, G2 adenocarcinoma of the prostate.  His PMHx is significant for a renal cell carcinoma treated with nephrectomy  in 2010.  He was referred for evaluation of an elevated PSA on 14.8 ng/ml drawn in March of 2021. The patient underwent TRUS and biopsy at [facility] on 4/20/21.  Pathology revealed Stephenson 7 (3+4) adenocarcinoma involving 2 of 8 cores, unclear of location.   One core was 100% involved.  The patient presented to [facility] for further evaluation.  Bone scan on 5/7/21 was negative for evidence of metastatic disease.  MRI of the prostate on 5/11/21 revealed a 4.9 x 3.8 x 4 cm prostate corresponding to a 36 cc gland.  There was a 1.9 cm T2 hypointense lesion in the Rt. apex, PI-RADS 5.  There was extraprostatic extension.  The neurovascular bundles and seminal vesicles were unremarkable.  There was no lymphadenopathy. We elected to offer radiotherapy combined with hormonal deprivation therapy.  He completed 70 Gy to the prostate, seminal vesicles and lower pelvic nodes on 8/23/21. He was last seen by Urology 10/22 and was deemed stable.    Allograft function 2/13/23: Tbil 0.7, ALT 28, AST 20, ALKP 69, creat 1.4, gfr 54.4; prograf 4.6  IS:  Prograf and cellcept 250 mg bid.  HCC screening for recurrence: Ct scan 4/18/22: Postoperative changes from liver transplant.   Unchanged 9 mm arterial enhancing lesion in segment 4B, without washout, pseudocapsule, or threshold growth.  There are other ill-defined areas of arterial enhancement scattered throughout the liver, many of which are peripheral in some of wedge-shaped, for example on images 38, 50, and 62 of series 2.  These become isodense on subsequent phases without washout or pseudo capsule.   BMI: 42.5    Health maintenance  Bone density 22:  Osteopenia; Ca with Vit D  Colorectal ca screenin-not clear when next one is due  Dermatology annual evaluation: no skin cancers      Review of Systems   Constitutional: Negative.    HENT: Negative.     Eyes: Negative.    Respiratory: Negative.     Cardiovascular: Negative.    Gastrointestinal: Negative.    Genitourinary: Negative.    Musculoskeletal: Negative.    Skin: Negative.    Neurological: Negative.    Psychiatric/Behavioral: Negative.       Objective:     Vitals:    23 1501   BP: (!) 142/63   Pulse: 76   Resp: 19   Temp: 98.6 °F (37 °C)          Physical Exam  Vitals reviewed.   Constitutional:       Appearance: He is well-developed.   HENT:      Head: Normocephalic and atraumatic.   Eyes:      General: No scleral icterus.     Conjunctiva/sclera: Conjunctivae normal.      Pupils: Pupils are equal, round, and reactive to light.   Neck:      Thyroid: No thyromegaly.   Cardiovascular:      Rate and Rhythm: Normal rate and regular rhythm.      Heart sounds: Normal heart sounds.   Pulmonary:      Effort: Pulmonary effort is normal.      Breath sounds: Normal breath sounds. No rales.   Abdominal:      General: Bowel sounds are normal. There is no distension.      Palpations: Abdomen is soft. There is no mass.      Tenderness: There is no abdominal tenderness.   Musculoskeletal:         General: Normal range of motion.      Cervical back: Normal range of motion and neck supple.   Skin:     General: Skin is warm and dry.      Findings: No rash.   Neurological:       Mental Status: He is alert and oriented to person, place, and time.     Lab Results   Component Value Date    BILITOT 0.5 02/13/2023    AST 20 02/13/2023    ALT 28 02/13/2023    ALKPHOS 69 02/13/2023    CREATININE 1.3 03/15/2023    ALBUMIN 3.4 (L) 02/13/2023     Lab Results   Component Value Date    WBC 3.95 02/13/2023    HGB 12.4 (L) 02/13/2023    HCT 36.9 (L) 02/13/2023     (L) 02/13/2023     Lab Results   Component Value Date    TACROLIMUS 4.6 (L) 02/13/2023       Assessment/Plan:   Pt is now 10 yrs and 9 months post liver transplant. Current recommendations:  1. Complications of OLTx: mild intermittent renal insufficiency. Will refer to nephrology. Monitor. Labs every 3 months  2. HTN: continue current medications; monitor since BP is elevated today  3. HCC history: surveillance CT today 4/18/22- indeterminate lesion- thought to represent perfusion abn; no further surveillance needed   4. Prophylactic immunotherapy: continue current immunosuppression (prograf and cellcept). Continue cellcept to 250 mg bid; prograf target 3-4  5. Health maintenance: the patient should see a dermatologist annually to screen for skin cancer, perform regular colonoscopies (next in 2024?)  6. R/O osteoporosis. I am recommending a repeat bone density in 2024  7. Hx of renal cell ca: s/p nephrectomy    Return 1 year.      Shaniqua Matias MD           [facility] Patient Status  Functional Status: 100% - Normal, no complaints, no evidence of disease  Physical Capacity: No Limitations    .

## 2023-04-24 NOTE — PATIENT INSTRUCTIONS
Add magnesium to transplant labs  Referral to nephrologist  Add lipid panel to next labs  Colonoscopy per local GI  Annual dermatology  Bone density 2024  Return 1 year

## 2023-05-13 ENCOUNTER — LAB VISIT (OUTPATIENT)
Dept: LAB | Facility: HOSPITAL | Age: 68
End: 2023-05-13
Attending: INTERNAL MEDICINE
Payer: MEDICARE

## 2023-05-13 DIAGNOSIS — Z94.4 LIVER REPLACED BY TRANSPLANT: ICD-10-CM

## 2023-05-13 LAB
ALBUMIN SERPL BCP-MCNC: 3.4 G/DL (ref 3.5–5.2)
ALP SERPL-CCNC: 68 U/L (ref 55–135)
ALT SERPL W/O P-5'-P-CCNC: 27 U/L (ref 10–44)
ANION GAP SERPL CALC-SCNC: 2 MMOL/L (ref 8–16)
AST SERPL-CCNC: 17 U/L (ref 10–40)
BASOPHILS # BLD AUTO: 0.03 K/UL (ref 0–0.2)
BASOPHILS NFR BLD: 1 % (ref 0–1.9)
BILIRUB SERPL-MCNC: 0.6 MG/DL (ref 0.1–1)
BUN SERPL-MCNC: 19 MG/DL (ref 8–23)
CALCIUM SERPL-MCNC: 9.2 MG/DL (ref 8.7–10.5)
CHLORIDE SERPL-SCNC: 110 MMOL/L (ref 95–110)
CO2 SERPL-SCNC: 27 MMOL/L (ref 23–29)
CREAT SERPL-MCNC: 1.3 MG/DL (ref 0.5–1.4)
DIFFERENTIAL METHOD: ABNORMAL
EOSINOPHIL # BLD AUTO: 0.2 K/UL (ref 0–0.5)
EOSINOPHIL NFR BLD: 6.2 % (ref 0–8)
ERYTHROCYTE [DISTWIDTH] IN BLOOD BY AUTOMATED COUNT: 13.5 % (ref 11.5–14.5)
EST. GFR  (NO RACE VARIABLE): 59.5 ML/MIN/1.73 M^2
GLUCOSE SERPL-MCNC: 149 MG/DL (ref 70–110)
HCT VFR BLD AUTO: 38.2 % (ref 40–54)
HGB BLD-MCNC: 12.8 G/DL (ref 14–18)
IMM GRANULOCYTES # BLD AUTO: 0.01 K/UL (ref 0–0.04)
IMM GRANULOCYTES NFR BLD AUTO: 0.3 % (ref 0–0.5)
LYMPHOCYTES # BLD AUTO: 0.9 K/UL (ref 1–4.8)
LYMPHOCYTES NFR BLD: 28.5 % (ref 18–48)
MCH RBC QN AUTO: 28.4 PG (ref 27–31)
MCHC RBC AUTO-ENTMCNC: 33.5 G/DL (ref 32–36)
MCV RBC AUTO: 85 FL (ref 82–98)
MONOCYTES # BLD AUTO: 0.3 K/UL (ref 0.3–1)
MONOCYTES NFR BLD: 9.2 % (ref 4–15)
NEUTROPHILS # BLD AUTO: 1.7 K/UL (ref 1.8–7.7)
NEUTROPHILS NFR BLD: 54.8 % (ref 38–73)
NRBC BLD-RTO: 0 /100 WBC
PLATELET # BLD AUTO: 153 K/UL (ref 150–450)
PMV BLD AUTO: 10 FL (ref 9.2–12.9)
POTASSIUM SERPL-SCNC: 4.4 MMOL/L (ref 3.5–5.1)
PROT SERPL-MCNC: 7 G/DL (ref 6–8.4)
RBC # BLD AUTO: 4.5 M/UL (ref 4.6–6.2)
SODIUM SERPL-SCNC: 139 MMOL/L (ref 136–145)
WBC # BLD AUTO: 3.05 K/UL (ref 3.9–12.7)

## 2023-05-13 PROCEDURE — 80053 COMPREHEN METABOLIC PANEL: CPT | Performed by: INTERNAL MEDICINE

## 2023-05-13 PROCEDURE — 85025 COMPLETE CBC W/AUTO DIFF WBC: CPT | Performed by: INTERNAL MEDICINE

## 2023-05-13 PROCEDURE — 36415 COLL VENOUS BLD VENIPUNCTURE: CPT | Performed by: INTERNAL MEDICINE

## 2023-05-13 PROCEDURE — 80197 ASSAY OF TACROLIMUS: CPT | Performed by: INTERNAL MEDICINE

## 2023-05-14 LAB — TACROLIMUS BLD-MCNC: 4.1 NG/ML (ref 5–15)

## 2023-05-16 ENCOUNTER — PATIENT MESSAGE (OUTPATIENT)
Dept: TRANSPLANT | Facility: CLINIC | Age: 68
End: 2023-05-16
Payer: MEDICARE

## 2023-05-16 ENCOUNTER — TELEPHONE (OUTPATIENT)
Dept: TRANSPLANT | Facility: CLINIC | Age: 68
End: 2023-05-16
Payer: MEDICARE

## 2023-05-16 DIAGNOSIS — Z94.0 S/P KIDNEY TRANSPLANT: Primary | ICD-10-CM

## 2023-05-16 DIAGNOSIS — Z94.4 LIVER REPLACED BY TRANSPLANT: ICD-10-CM

## 2023-05-16 NOTE — TELEPHONE ENCOUNTER
Labs reviewed via portal and are stable.  Patient will repeat labs on 8/7/23 per protocol.      ----- Message from Shaniqua Matias MD sent at 5/14/2023 11:27 AM CDT -----  The Labs are stable - please let patient know.

## 2023-05-25 ENCOUNTER — PATIENT MESSAGE (OUTPATIENT)
Dept: RADIATION ONCOLOGY | Facility: CLINIC | Age: 68
End: 2023-05-25
Payer: MEDICARE

## 2023-05-30 ENCOUNTER — OFFICE VISIT (OUTPATIENT)
Dept: RADIATION ONCOLOGY | Facility: CLINIC | Age: 68
End: 2023-05-30
Payer: MEDICARE

## 2023-05-30 VITALS
HEIGHT: 71 IN | SYSTOLIC BLOOD PRESSURE: 158 MMHG | RESPIRATION RATE: 16 BRPM | HEART RATE: 81 BPM | WEIGHT: 315 LBS | DIASTOLIC BLOOD PRESSURE: 83 MMHG | BODY MASS INDEX: 44.1 KG/M2

## 2023-05-30 DIAGNOSIS — C61 PROSTATE CANCER: Primary | ICD-10-CM

## 2023-05-30 PROCEDURE — 99999 PR PBB SHADOW E&M-EST. PATIENT-LVL IV: CPT | Mod: PBBFAC,,, | Performed by: RADIOLOGY

## 2023-05-30 PROCEDURE — 1159F MED LIST DOCD IN RCRD: CPT | Mod: CPTII,S$GLB,, | Performed by: RADIOLOGY

## 2023-05-30 PROCEDURE — 1160F RVW MEDS BY RX/DR IN RCRD: CPT | Mod: CPTII,S$GLB,, | Performed by: RADIOLOGY

## 2023-05-30 PROCEDURE — 1126F AMNT PAIN NOTED NONE PRSNT: CPT | Mod: CPTII,S$GLB,, | Performed by: RADIOLOGY

## 2023-05-30 PROCEDURE — 3066F NEPHROPATHY DOC TX: CPT | Mod: CPTII,S$GLB,, | Performed by: RADIOLOGY

## 2023-05-30 PROCEDURE — 3008F PR BODY MASS INDEX (BMI) DOCUMENTED: ICD-10-PCS | Mod: CPTII,S$GLB,, | Performed by: RADIOLOGY

## 2023-05-30 PROCEDURE — 3288F PR FALLS RISK ASSESSMENT DOCUMENTED: ICD-10-PCS | Mod: CPTII,S$GLB,, | Performed by: RADIOLOGY

## 2023-05-30 PROCEDURE — 1126F PR PAIN SEVERITY QUANTIFIED, NO PAIN PRESENT: ICD-10-PCS | Mod: CPTII,S$GLB,, | Performed by: RADIOLOGY

## 2023-05-30 PROCEDURE — 3044F HG A1C LEVEL LT 7.0%: CPT | Mod: CPTII,S$GLB,, | Performed by: RADIOLOGY

## 2023-05-30 PROCEDURE — 1159F PR MEDICATION LIST DOCUMENTED IN MEDICAL RECORD: ICD-10-PCS | Mod: CPTII,S$GLB,, | Performed by: RADIOLOGY

## 2023-05-30 PROCEDURE — 3079F PR MOST RECENT DIASTOLIC BLOOD PRESSURE 80-89 MM HG: ICD-10-PCS | Mod: CPTII,S$GLB,, | Performed by: RADIOLOGY

## 2023-05-30 PROCEDURE — 3079F DIAST BP 80-89 MM HG: CPT | Mod: CPTII,S$GLB,, | Performed by: RADIOLOGY

## 2023-05-30 PROCEDURE — 4010F PR ACE/ARB THEARPY RXD/TAKEN: ICD-10-PCS | Mod: CPTII,S$GLB,, | Performed by: RADIOLOGY

## 2023-05-30 PROCEDURE — 3077F PR MOST RECENT SYSTOLIC BLOOD PRESSURE >= 140 MM HG: ICD-10-PCS | Mod: CPTII,S$GLB,, | Performed by: RADIOLOGY

## 2023-05-30 PROCEDURE — 3288F FALL RISK ASSESSMENT DOCD: CPT | Mod: CPTII,S$GLB,, | Performed by: RADIOLOGY

## 2023-05-30 PROCEDURE — 1160F PR REVIEW ALL MEDS BY PRESCRIBER/CLIN PHARMACIST DOCUMENTED: ICD-10-PCS | Mod: CPTII,S$GLB,, | Performed by: RADIOLOGY

## 2023-05-30 PROCEDURE — 3060F POS MICROALBUMINURIA REV: CPT | Mod: CPTII,S$GLB,, | Performed by: RADIOLOGY

## 2023-05-30 PROCEDURE — 3066F PR DOCUMENTATION OF TREATMENT FOR NEPHROPATHY: ICD-10-PCS | Mod: CPTII,S$GLB,, | Performed by: RADIOLOGY

## 2023-05-30 PROCEDURE — 4010F ACE/ARB THERAPY RXD/TAKEN: CPT | Mod: CPTII,S$GLB,, | Performed by: RADIOLOGY

## 2023-05-30 PROCEDURE — 99212 PR OFFICE/OUTPT VISIT, EST, LEVL II, 10-19 MIN: ICD-10-PCS | Mod: S$GLB,,, | Performed by: RADIOLOGY

## 2023-05-30 PROCEDURE — 99212 OFFICE O/P EST SF 10 MIN: CPT | Mod: S$GLB,,, | Performed by: RADIOLOGY

## 2023-05-30 PROCEDURE — 99999 PR PBB SHADOW E&M-EST. PATIENT-LVL IV: ICD-10-PCS | Mod: PBBFAC,,, | Performed by: RADIOLOGY

## 2023-05-30 PROCEDURE — 3008F BODY MASS INDEX DOCD: CPT | Mod: CPTII,S$GLB,, | Performed by: RADIOLOGY

## 2023-05-30 PROCEDURE — 3044F PR MOST RECENT HEMOGLOBIN A1C LEVEL <7.0%: ICD-10-PCS | Mod: CPTII,S$GLB,, | Performed by: RADIOLOGY

## 2023-05-30 PROCEDURE — 1101F PT FALLS ASSESS-DOCD LE1/YR: CPT | Mod: CPTII,S$GLB,, | Performed by: RADIOLOGY

## 2023-05-30 PROCEDURE — 3060F PR POS MICROALBUMINURIA RESULT DOCUMENTED/REVIEW: ICD-10-PCS | Mod: CPTII,S$GLB,, | Performed by: RADIOLOGY

## 2023-05-30 PROCEDURE — 3077F SYST BP >= 140 MM HG: CPT | Mod: CPTII,S$GLB,, | Performed by: RADIOLOGY

## 2023-05-30 PROCEDURE — 1101F PR PT FALLS ASSESS DOC 0-1 FALLS W/OUT INJ PAST YR: ICD-10-PCS | Mod: CPTII,S$GLB,, | Performed by: RADIOLOGY

## 2023-05-30 RX ORDER — INSULIN GLARGINE 300 U/ML
INJECTION, SOLUTION SUBCUTANEOUS
COMMUNITY
Start: 2023-04-29

## 2023-05-30 RX ORDER — TALC
500 POWDER (GRAM) TOPICAL DAILY
COMMUNITY

## 2023-05-30 RX ORDER — SEMAGLUTIDE 2.68 MG/ML
INJECTION, SOLUTION SUBCUTANEOUS
COMMUNITY
Start: 2023-05-15 | End: 2023-11-16

## 2023-05-30 NOTE — PROGRESS NOTES
Patient ID: Jordan Lopez Jr. is a 67 y.o. male.    Chief Complaint: Prostate Cancer (Rev psa)    This patient presents for follow up visit.     Mr. Lopez has a history of Stage IIIB, pT3a, N0, M0, P>=10<20, G2 adenocarcinoma of the prostate.  His PMHx is significant for a renal cell carcinoma treated with nephrectomy  in 2010 and a Liver, kidney transplant in 2012.  He was referred for evaluation of an elevated PSA on 14.8 ng/ml drawn in March of 2021.   Biopsy at [facility] on 4/20/21 revealed Christian 7 (3+4) adenocarcinoma involving 2 of 8 cores, unclear of location.   One core was 100% involved.  The patient presented to [facility] for further evaluation.  Bone scan on 5/7/21 was negative for evidence of metastatic disease.  MRI of the prostate on 5/11/21 revealed a 36 cc gland with a 1.9 cm T2 hypointense lesion in the Rt. apex, PI-RADS 5.  There was extraprostatic extension.  The neurovascular bundles and seminal vesicles were unremarkable.  There was no lymphadenopathy. We elected to offer radiotherapy combined with hormonal deprivation therapy.  He completed 70 Gy to the prostate, seminal vesicles and lower pelvic nodes on 8/23/21.  Today the patient states he feels well.  No complaints.      Review of Systems   Constitutional:  Negative for activity change, appetite change, chills and fatigue.   Gastrointestinal:  Negative for abdominal pain, constipation, diarrhea and fecal incontinence.   Genitourinary:  Negative for bladder incontinence, difficulty urinating, dysuria and hematuria.     Physical Exam  Constitutional:       General: He is not in acute distress.     Appearance: Normal appearance.   Abdominal:      General: There is no distension.      Palpations: Abdomen is soft.   Neurological:      Mental Status: He is alert and oriented to person, place, and time.   Psychiatric:         Mood and Affect: Mood normal.         Judgment: Judgment normal.     PSA < 0.01 ng/ml      1.  Prostate cancer      Doing well, no evidence of tumor progression.   Plan follow up in 6 months with PSA and testosterone.

## 2023-07-06 ENCOUNTER — LAB VISIT (OUTPATIENT)
Dept: LAB | Facility: HOSPITAL | Age: 68
End: 2023-07-06
Attending: INTERNAL MEDICINE
Payer: MEDICARE

## 2023-07-06 DIAGNOSIS — E11.9 TYPE 2 DIABETES MELLITUS: Primary | ICD-10-CM

## 2023-07-06 DIAGNOSIS — I10 ESSENTIAL HYPERTENSION, MALIGNANT: ICD-10-CM

## 2023-07-06 DIAGNOSIS — E11.8 DIABETIC COMPLICATION: ICD-10-CM

## 2023-07-06 LAB
ALBUMIN SERPL BCP-MCNC: 3.6 G/DL (ref 3.5–5.2)
ALBUMIN/CREAT UR: 38.4 UG/MG (ref 0–30)
ALP SERPL-CCNC: 65 U/L (ref 55–135)
ALT SERPL W/O P-5'-P-CCNC: 23 U/L (ref 10–44)
AMYLASE SERPL-CCNC: 32 U/L (ref 20–110)
ANION GAP SERPL CALC-SCNC: 4 MMOL/L (ref 8–16)
AST SERPL-CCNC: 13 U/L (ref 10–40)
BACTERIA #/AREA URNS HPF: NEGATIVE /HPF
BASOPHILS # BLD AUTO: 0.03 K/UL (ref 0–0.2)
BASOPHILS NFR BLD: 0.8 % (ref 0–1.9)
BILIRUB SERPL-MCNC: 0.5 MG/DL (ref 0.1–1)
BILIRUB UR QL STRIP: NEGATIVE
BUN SERPL-MCNC: 15 MG/DL (ref 8–23)
CALCIUM SERPL-MCNC: 8.7 MG/DL (ref 8.7–10.5)
CHLORIDE SERPL-SCNC: 109 MMOL/L (ref 95–110)
CHOLEST SERPL-MCNC: 128 MG/DL (ref 120–199)
CHOLEST/HDLC SERPL: 2.9 {RATIO} (ref 2–5)
CLARITY UR: ABNORMAL
CO2 SERPL-SCNC: 26 MMOL/L (ref 23–29)
COLOR UR: YELLOW
CREAT SERPL-MCNC: 1.1 MG/DL (ref 0.5–1.4)
CREAT UR-MCNC: 123 MG/DL (ref 23–375)
DIFFERENTIAL METHOD: ABNORMAL
EOSINOPHIL # BLD AUTO: 0.2 K/UL (ref 0–0.5)
EOSINOPHIL NFR BLD: 4.8 % (ref 0–8)
ERYTHROCYTE [DISTWIDTH] IN BLOOD BY AUTOMATED COUNT: 14 % (ref 11.5–14.5)
EST. GFR  (NO RACE VARIABLE): >60 ML/MIN/1.73 M^2
ESTIMATED AVG GLUCOSE: 134 MG/DL (ref 68–131)
GLUCOSE SERPL-MCNC: 140 MG/DL (ref 70–110)
GLUCOSE UR QL STRIP: ABNORMAL
HBA1C MFR BLD: 6.3 % (ref 4–5.6)
HCT VFR BLD AUTO: 40.6 % (ref 40–54)
HDLC SERPL-MCNC: 44 MG/DL (ref 40–75)
HDLC SERPL: 34.4 % (ref 20–50)
HGB BLD-MCNC: 13.4 G/DL (ref 14–18)
HGB UR QL STRIP: NEGATIVE
HYALINE CASTS #/AREA URNS LPF: 0 /LPF
IMM GRANULOCYTES # BLD AUTO: 0 K/UL (ref 0–0.04)
IMM GRANULOCYTES NFR BLD AUTO: 0 % (ref 0–0.5)
KETONES UR QL STRIP: NEGATIVE
LDLC SERPL CALC-MCNC: 36.2 MG/DL (ref 63–159)
LEUKOCYTE ESTERASE UR QL STRIP: NEGATIVE
LYMPHOCYTES # BLD AUTO: 1.1 K/UL (ref 1–4.8)
LYMPHOCYTES NFR BLD: 28 % (ref 18–48)
MAGNESIUM SERPL-MCNC: 2 MG/DL (ref 1.6–2.6)
MCH RBC QN AUTO: 28 PG (ref 27–31)
MCHC RBC AUTO-ENTMCNC: 33 G/DL (ref 32–36)
MCV RBC AUTO: 85 FL (ref 82–98)
MICROALBUMIN UR DL<=1MG/L-MCNC: 47.2 MG/L
MICROSCOPIC COMMENT: ABNORMAL
MONOCYTES # BLD AUTO: 0.4 K/UL (ref 0.3–1)
MONOCYTES NFR BLD: 8.9 % (ref 4–15)
NEUTROPHILS # BLD AUTO: 2.3 K/UL (ref 1.8–7.7)
NEUTROPHILS NFR BLD: 57.5 % (ref 38–73)
NITRITE UR QL STRIP: NEGATIVE
NONHDLC SERPL-MCNC: 84 MG/DL
NRBC BLD-RTO: 0 /100 WBC
PH UR STRIP: 6 [PH] (ref 5–8)
PLATELET # BLD AUTO: 158 K/UL (ref 150–450)
PMV BLD AUTO: 9.6 FL (ref 9.2–12.9)
POTASSIUM SERPL-SCNC: 4.3 MMOL/L (ref 3.5–5.1)
PROT SERPL-MCNC: 6.9 G/DL (ref 6–8.4)
PROT UR QL STRIP: NEGATIVE
RBC # BLD AUTO: 4.79 M/UL (ref 4.6–6.2)
RBC #/AREA URNS HPF: 0 /HPF (ref 0–4)
SODIUM SERPL-SCNC: 139 MMOL/L (ref 136–145)
SP GR UR STRIP: 1.02 (ref 1–1.03)
SQUAMOUS #/AREA URNS HPF: 0 /HPF
TRIGL SERPL-MCNC: 239 MG/DL (ref 30–150)
TSH SERPL DL<=0.005 MIU/L-ACNC: 2.88 UIU/ML (ref 0.4–4)
URN SPEC COLLECT METH UR: ABNORMAL
UROBILINOGEN UR STRIP-ACNC: NEGATIVE EU/DL
WBC # BLD AUTO: 3.93 K/UL (ref 3.9–12.7)
WBC #/AREA URNS HPF: 1 /HPF (ref 0–5)
YEAST URNS QL MICRO: ABNORMAL

## 2023-07-06 PROCEDURE — 82150 ASSAY OF AMYLASE: CPT | Performed by: INTERNAL MEDICINE

## 2023-07-06 PROCEDURE — 84443 ASSAY THYROID STIM HORMONE: CPT | Performed by: INTERNAL MEDICINE

## 2023-07-06 PROCEDURE — 81000 URINALYSIS NONAUTO W/SCOPE: CPT | Performed by: INTERNAL MEDICINE

## 2023-07-06 PROCEDURE — 80053 COMPREHEN METABOLIC PANEL: CPT | Performed by: INTERNAL MEDICINE

## 2023-07-06 PROCEDURE — 80061 LIPID PANEL: CPT | Performed by: INTERNAL MEDICINE

## 2023-07-06 PROCEDURE — 83036 HEMOGLOBIN GLYCOSYLATED A1C: CPT | Performed by: INTERNAL MEDICINE

## 2023-07-06 PROCEDURE — 82570 ASSAY OF URINE CREATININE: CPT | Performed by: INTERNAL MEDICINE

## 2023-07-06 PROCEDURE — 36415 COLL VENOUS BLD VENIPUNCTURE: CPT | Performed by: INTERNAL MEDICINE

## 2023-07-06 PROCEDURE — 83735 ASSAY OF MAGNESIUM: CPT | Performed by: INTERNAL MEDICINE

## 2023-07-06 PROCEDURE — 85025 COMPLETE CBC W/AUTO DIFF WBC: CPT | Performed by: INTERNAL MEDICINE

## 2023-07-14 ENCOUNTER — HOSPITAL ENCOUNTER (OUTPATIENT)
Dept: RADIOLOGY | Facility: HOSPITAL | Age: 68
Discharge: HOME | End: 2023-07-14
Attending: INTERNAL MEDICINE
Payer: MEDICARE

## 2023-07-14 DIAGNOSIS — R05.9 COUGH: Primary | ICD-10-CM

## 2023-07-14 DIAGNOSIS — R05.9 COUGH: ICD-10-CM

## 2023-07-14 PROCEDURE — 71046 X-RAY EXAM CHEST 2 VIEWS: CPT | Mod: TC

## 2023-08-05 ENCOUNTER — LAB VISIT (OUTPATIENT)
Dept: LAB | Facility: HOSPITAL | Age: 68
End: 2023-08-05
Attending: INTERNAL MEDICINE
Payer: MEDICARE

## 2023-08-05 DIAGNOSIS — Z94.0 S/P KIDNEY TRANSPLANT: ICD-10-CM

## 2023-08-05 DIAGNOSIS — Z94.4 LIVER REPLACED BY TRANSPLANT: ICD-10-CM

## 2023-08-05 LAB
ALBUMIN SERPL BCP-MCNC: 3.7 G/DL (ref 3.5–5.2)
ALP SERPL-CCNC: 70 U/L (ref 55–135)
ALT SERPL W/O P-5'-P-CCNC: 21 U/L (ref 10–44)
ANION GAP SERPL CALC-SCNC: 5 MMOL/L (ref 8–16)
AST SERPL-CCNC: 12 U/L (ref 10–40)
BASOPHILS # BLD AUTO: 0.04 K/UL (ref 0–0.2)
BASOPHILS NFR BLD: 1 % (ref 0–1.9)
BILIRUB SERPL-MCNC: 0.6 MG/DL (ref 0.1–1)
BUN SERPL-MCNC: 23 MG/DL (ref 8–23)
CALCIUM SERPL-MCNC: 8.9 MG/DL (ref 8.7–10.5)
CHLORIDE SERPL-SCNC: 108 MMOL/L (ref 95–110)
CO2 SERPL-SCNC: 27 MMOL/L (ref 23–29)
CREAT SERPL-MCNC: 1.3 MG/DL (ref 0.5–1.4)
DIFFERENTIAL METHOD: ABNORMAL
EOSINOPHIL # BLD AUTO: 0.2 K/UL (ref 0–0.5)
EOSINOPHIL NFR BLD: 5.1 % (ref 0–8)
ERYTHROCYTE [DISTWIDTH] IN BLOOD BY AUTOMATED COUNT: 13.9 % (ref 11.5–14.5)
EST. GFR  (NO RACE VARIABLE): 59.5 ML/MIN/1.73 M^2
GLUCOSE SERPL-MCNC: 156 MG/DL (ref 70–110)
HCT VFR BLD AUTO: 40.7 % (ref 40–54)
HGB BLD-MCNC: 13.7 G/DL (ref 14–18)
IMM GRANULOCYTES # BLD AUTO: 0.01 K/UL (ref 0–0.04)
IMM GRANULOCYTES NFR BLD AUTO: 0.3 % (ref 0–0.5)
LYMPHOCYTES # BLD AUTO: 1.1 K/UL (ref 1–4.8)
LYMPHOCYTES NFR BLD: 26.5 % (ref 18–48)
MCH RBC QN AUTO: 28.5 PG (ref 27–31)
MCHC RBC AUTO-ENTMCNC: 33.7 G/DL (ref 32–36)
MCV RBC AUTO: 85 FL (ref 82–98)
MONOCYTES # BLD AUTO: 0.3 K/UL (ref 0.3–1)
MONOCYTES NFR BLD: 7.8 % (ref 4–15)
NEUTROPHILS # BLD AUTO: 2.4 K/UL (ref 1.8–7.7)
NEUTROPHILS NFR BLD: 59.3 % (ref 38–73)
NRBC BLD-RTO: 0 /100 WBC
PLATELET # BLD AUTO: 161 K/UL (ref 150–450)
PMV BLD AUTO: 10.6 FL (ref 9.2–12.9)
POTASSIUM SERPL-SCNC: 4.3 MMOL/L (ref 3.5–5.1)
PROT SERPL-MCNC: 7 G/DL (ref 6–8.4)
RBC # BLD AUTO: 4.81 M/UL (ref 4.6–6.2)
SODIUM SERPL-SCNC: 140 MMOL/L (ref 136–145)
WBC # BLD AUTO: 3.96 K/UL (ref 3.9–12.7)

## 2023-08-05 PROCEDURE — 36415 COLL VENOUS BLD VENIPUNCTURE: CPT | Performed by: INTERNAL MEDICINE

## 2023-08-05 PROCEDURE — 85025 COMPLETE CBC W/AUTO DIFF WBC: CPT | Performed by: INTERNAL MEDICINE

## 2023-08-05 PROCEDURE — 80197 ASSAY OF TACROLIMUS: CPT | Performed by: INTERNAL MEDICINE

## 2023-08-05 PROCEDURE — 80053 COMPREHEN METABOLIC PANEL: CPT | Performed by: INTERNAL MEDICINE

## 2023-08-06 LAB — TACROLIMUS BLD-MCNC: 6.4 NG/ML (ref 5–15)

## 2023-08-08 ENCOUNTER — TELEPHONE (OUTPATIENT)
Dept: TRANSPLANT | Facility: CLINIC | Age: 68
End: 2023-08-08
Payer: MEDICARE

## 2023-08-08 NOTE — TELEPHONE ENCOUNTER
Labs reviewed via telephone and portal.  Pt states current dose of prograf is 2/2.  Instructed to lower dose to 2/1.  Pt will repeat labs 9/4/23.      ----- Message from Shaniqua Matias MD sent at 8/7/2023 11:26 PM CDT -----  The Labs are stable; lower prograf to 2/2 from 3/2 and repeat labs in one month - please let patient know.

## 2023-09-04 ENCOUNTER — LAB VISIT (OUTPATIENT)
Dept: LAB | Facility: HOSPITAL | Age: 68
End: 2023-09-04
Attending: INTERNAL MEDICINE
Payer: MEDICARE

## 2023-09-04 DIAGNOSIS — Z94.0 S/P KIDNEY TRANSPLANT: ICD-10-CM

## 2023-09-04 DIAGNOSIS — Z94.4 LIVER REPLACED BY TRANSPLANT: ICD-10-CM

## 2023-09-04 LAB
ALBUMIN SERPL BCP-MCNC: 3.8 G/DL (ref 3.5–5.2)
ALP SERPL-CCNC: 68 U/L (ref 55–135)
ALT SERPL W/O P-5'-P-CCNC: 22 U/L (ref 10–44)
ANION GAP SERPL CALC-SCNC: 3 MMOL/L (ref 8–16)
AST SERPL-CCNC: 13 U/L (ref 10–40)
BASOPHILS # BLD AUTO: 0.03 K/UL (ref 0–0.2)
BASOPHILS NFR BLD: 0.8 % (ref 0–1.9)
BILIRUB SERPL-MCNC: 0.5 MG/DL (ref 0.1–1)
BUN SERPL-MCNC: 25 MG/DL (ref 8–23)
CALCIUM SERPL-MCNC: 9.1 MG/DL (ref 8.7–10.5)
CHLORIDE SERPL-SCNC: 109 MMOL/L (ref 95–110)
CO2 SERPL-SCNC: 27 MMOL/L (ref 23–29)
CREAT SERPL-MCNC: 1.3 MG/DL (ref 0.5–1.4)
DIFFERENTIAL METHOD: ABNORMAL
EOSINOPHIL # BLD AUTO: 0.2 K/UL (ref 0–0.5)
EOSINOPHIL NFR BLD: 6.2 % (ref 0–8)
ERYTHROCYTE [DISTWIDTH] IN BLOOD BY AUTOMATED COUNT: 14.2 % (ref 11.5–14.5)
EST. GFR  (NO RACE VARIABLE): 59.5 ML/MIN/1.73 M^2
GLUCOSE SERPL-MCNC: 142 MG/DL (ref 70–110)
HCT VFR BLD AUTO: 42.3 % (ref 40–54)
HGB BLD-MCNC: 13.9 G/DL (ref 14–18)
IMM GRANULOCYTES # BLD AUTO: 0.01 K/UL (ref 0–0.04)
IMM GRANULOCYTES NFR BLD AUTO: 0.3 % (ref 0–0.5)
LYMPHOCYTES # BLD AUTO: 1 K/UL (ref 1–4.8)
LYMPHOCYTES NFR BLD: 26.8 % (ref 18–48)
MCH RBC QN AUTO: 27.4 PG (ref 27–31)
MCHC RBC AUTO-ENTMCNC: 32.9 G/DL (ref 32–36)
MCV RBC AUTO: 83 FL (ref 82–98)
MONOCYTES # BLD AUTO: 0.4 K/UL (ref 0.3–1)
MONOCYTES NFR BLD: 9.7 % (ref 4–15)
NEUTROPHILS # BLD AUTO: 2.1 K/UL (ref 1.8–7.7)
NEUTROPHILS NFR BLD: 56.2 % (ref 38–73)
NRBC BLD-RTO: 0 /100 WBC
PLATELET # BLD AUTO: 155 K/UL (ref 150–450)
PMV BLD AUTO: 9.3 FL (ref 9.2–12.9)
POTASSIUM SERPL-SCNC: 4.4 MMOL/L (ref 3.5–5.1)
PROT SERPL-MCNC: 6.8 G/DL (ref 6–8.4)
RBC # BLD AUTO: 5.07 M/UL (ref 4.6–6.2)
SODIUM SERPL-SCNC: 139 MMOL/L (ref 136–145)
WBC # BLD AUTO: 3.73 K/UL (ref 3.9–12.7)

## 2023-09-04 PROCEDURE — 36415 COLL VENOUS BLD VENIPUNCTURE: CPT | Performed by: INTERNAL MEDICINE

## 2023-09-04 PROCEDURE — 80053 COMPREHEN METABOLIC PANEL: CPT | Performed by: INTERNAL MEDICINE

## 2023-09-04 PROCEDURE — 80197 ASSAY OF TACROLIMUS: CPT | Performed by: INTERNAL MEDICINE

## 2023-09-04 PROCEDURE — 85025 COMPLETE CBC W/AUTO DIFF WBC: CPT | Performed by: INTERNAL MEDICINE

## 2023-09-05 ENCOUNTER — TELEPHONE (OUTPATIENT)
Dept: TRANSPLANT | Facility: CLINIC | Age: 68
End: 2023-09-05
Payer: MEDICARE

## 2023-09-05 ENCOUNTER — PATIENT MESSAGE (OUTPATIENT)
Dept: TRANSPLANT | Facility: CLINIC | Age: 68
End: 2023-09-05
Payer: MEDICARE

## 2023-09-05 LAB — TACROLIMUS BLD-MCNC: 2.5 NG/ML (ref 5–15)

## 2023-09-05 NOTE — TELEPHONE ENCOUNTER
.Labs reviewed via portal and are stable.  Patient will repeat labs on 9/11/23 per protocol.      ----- Message from Shaniqua Matias MD sent at 9/5/2023  8:26 AM CDT -----  The Labs are stable; porgraf low. Repeat in one month - please let patient know.

## 2023-09-11 ENCOUNTER — LAB VISIT (OUTPATIENT)
Dept: LAB | Facility: HOSPITAL | Age: 68
End: 2023-09-11
Attending: INTERNAL MEDICINE
Payer: MEDICARE

## 2023-09-11 DIAGNOSIS — Z94.0 KIDNEY REPLACED BY TRANSPLANT: ICD-10-CM

## 2023-09-11 DIAGNOSIS — Z94.0 S/P KIDNEY TRANSPLANT: ICD-10-CM

## 2023-09-11 DIAGNOSIS — Z94.4 LIVER REPLACED BY TRANSPLANT: ICD-10-CM

## 2023-09-11 LAB
ALBUMIN SERPL BCP-MCNC: 3.6 G/DL (ref 3.5–5.2)
ALP SERPL-CCNC: 74 U/L (ref 55–135)
ALT SERPL W/O P-5'-P-CCNC: 22 U/L (ref 10–44)
ANION GAP SERPL CALC-SCNC: 0 MMOL/L (ref 8–16)
AST SERPL-CCNC: 9 U/L (ref 10–40)
BASOPHILS # BLD AUTO: 0.03 K/UL (ref 0–0.2)
BASOPHILS NFR BLD: 0.8 % (ref 0–1.9)
BILIRUB SERPL-MCNC: 0.5 MG/DL (ref 0.1–1)
BUN SERPL-MCNC: 14 MG/DL (ref 8–23)
CALCIUM SERPL-MCNC: 8.9 MG/DL (ref 8.7–10.5)
CHLORIDE SERPL-SCNC: 109 MMOL/L (ref 95–110)
CO2 SERPL-SCNC: 32 MMOL/L (ref 23–29)
CREAT SERPL-MCNC: 1.4 MG/DL (ref 0.5–1.4)
DIFFERENTIAL METHOD: ABNORMAL
EOSINOPHIL # BLD AUTO: 0.2 K/UL (ref 0–0.5)
EOSINOPHIL NFR BLD: 5.5 % (ref 0–8)
ERYTHROCYTE [DISTWIDTH] IN BLOOD BY AUTOMATED COUNT: 14.3 % (ref 11.5–14.5)
EST. GFR  (NO RACE VARIABLE): 54.4 ML/MIN/1.73 M^2
GLUCOSE SERPL-MCNC: 146 MG/DL (ref 70–110)
HCT VFR BLD AUTO: 42.6 % (ref 40–54)
HGB BLD-MCNC: 14 G/DL (ref 14–18)
IMM GRANULOCYTES # BLD AUTO: 0.01 K/UL (ref 0–0.04)
IMM GRANULOCYTES NFR BLD AUTO: 0.3 % (ref 0–0.5)
LYMPHOCYTES # BLD AUTO: 1.1 K/UL (ref 1–4.8)
LYMPHOCYTES NFR BLD: 28.2 % (ref 18–48)
MCH RBC QN AUTO: 27.8 PG (ref 27–31)
MCHC RBC AUTO-ENTMCNC: 32.9 G/DL (ref 32–36)
MCV RBC AUTO: 85 FL (ref 82–98)
MONOCYTES # BLD AUTO: 0.3 K/UL (ref 0.3–1)
MONOCYTES NFR BLD: 9 % (ref 4–15)
NEUTROPHILS # BLD AUTO: 2.1 K/UL (ref 1.8–7.7)
NEUTROPHILS NFR BLD: 56.2 % (ref 38–73)
NRBC BLD-RTO: 0 /100 WBC
PLATELET # BLD AUTO: 151 K/UL (ref 150–450)
PMV BLD AUTO: 9.7 FL (ref 9.2–12.9)
POTASSIUM SERPL-SCNC: 4.2 MMOL/L (ref 3.5–5.1)
PROT SERPL-MCNC: 6.6 G/DL (ref 6–8.4)
RBC # BLD AUTO: 5.04 M/UL (ref 4.6–6.2)
SODIUM SERPL-SCNC: 141 MMOL/L (ref 136–145)
WBC # BLD AUTO: 3.79 K/UL (ref 3.9–12.7)

## 2023-09-11 PROCEDURE — 36415 COLL VENOUS BLD VENIPUNCTURE: CPT | Performed by: INTERNAL MEDICINE

## 2023-09-11 PROCEDURE — 85025 COMPLETE CBC W/AUTO DIFF WBC: CPT | Performed by: INTERNAL MEDICINE

## 2023-09-11 PROCEDURE — 80197 ASSAY OF TACROLIMUS: CPT | Performed by: INTERNAL MEDICINE

## 2023-09-11 PROCEDURE — 87799 DETECT AGENT NOS DNA QUANT: CPT | Performed by: INTERNAL MEDICINE

## 2023-09-11 PROCEDURE — 80053 COMPREHEN METABOLIC PANEL: CPT | Performed by: INTERNAL MEDICINE

## 2023-09-12 DIAGNOSIS — Z94.4 S/P LIVER TRANSPLANT: ICD-10-CM

## 2023-09-12 LAB — TACROLIMUS BLD-MCNC: 3.3 NG/ML (ref 5–15)

## 2023-09-12 RX ORDER — MYCOPHENOLATE MOFETIL 250 MG/1
CAPSULE ORAL
Qty: 60 CAPSULE | Refills: 11 | Status: SHIPPED | OUTPATIENT
Start: 2023-09-12

## 2023-09-15 ENCOUNTER — TELEPHONE (OUTPATIENT)
Dept: TRANSPLANT | Facility: CLINIC | Age: 68
End: 2023-09-15
Payer: MEDICARE

## 2023-09-15 ENCOUNTER — PATIENT MESSAGE (OUTPATIENT)
Dept: TRANSPLANT | Facility: CLINIC | Age: 68
End: 2023-09-15
Payer: MEDICARE

## 2023-09-15 NOTE — TELEPHONE ENCOUNTER
Reviewed via portal as per below.    ----- Message from Shaniqua Matias MD sent at 9/11/2023 10:18 PM CDT -----  The Labs are stable except creat is up a bit. Await prograf level - please let patient know.

## 2023-09-18 ENCOUNTER — PATIENT MESSAGE (OUTPATIENT)
Dept: TRANSPLANT | Facility: CLINIC | Age: 68
End: 2023-09-18
Payer: MEDICARE

## 2023-09-18 ENCOUNTER — TELEPHONE (OUTPATIENT)
Dept: TRANSPLANT | Facility: CLINIC | Age: 68
End: 2023-09-18
Payer: MEDICARE

## 2023-09-19 ENCOUNTER — LAB VISIT (OUTPATIENT)
Dept: LAB | Facility: HOSPITAL | Age: 68
End: 2023-09-19
Attending: INTERNAL MEDICINE
Payer: MEDICARE

## 2023-09-19 DIAGNOSIS — E11.9 DIABETES MELLITUS WITHOUT COMPLICATION: Primary | ICD-10-CM

## 2023-09-19 LAB
ANION GAP SERPL CALC-SCNC: 2 MMOL/L (ref 3–11)
BUN SERPL-MCNC: 15 MG/DL (ref 8–23)
CALCIUM SERPL-MCNC: 8.8 MG/DL (ref 8.7–10.5)
CHLORIDE SERPL-SCNC: 106 MMOL/L (ref 95–110)
CO2 SERPL-SCNC: 31 MMOL/L (ref 23–29)
CREAT SERPL-MCNC: 1.4 MG/DL (ref 0.5–1.4)
EST. GFR  (NO RACE VARIABLE): 54.4 ML/MIN/1.73 M^2
ESTIMATED AVG GLUCOSE: 137 MG/DL (ref 68–131)
GLUCOSE SERPL-MCNC: 160 MG/DL (ref 70–110)
HBA1C MFR BLD: 6.4 % (ref 4–5.6)
POTASSIUM SERPL-SCNC: 4.4 MMOL/L (ref 3.5–5.1)
SODIUM SERPL-SCNC: 139 MMOL/L (ref 136–145)
TSH SERPL DL<=0.005 MIU/L-ACNC: 3.9 UIU/ML (ref 0.4–4)

## 2023-09-19 PROCEDURE — 84443 ASSAY THYROID STIM HORMONE: CPT | Performed by: INTERNAL MEDICINE

## 2023-09-19 PROCEDURE — 36415 COLL VENOUS BLD VENIPUNCTURE: CPT | Performed by: INTERNAL MEDICINE

## 2023-09-19 PROCEDURE — 83036 HEMOGLOBIN GLYCOSYLATED A1C: CPT | Performed by: INTERNAL MEDICINE

## 2023-09-19 PROCEDURE — 80048 BASIC METABOLIC PNL TOTAL CA: CPT | Performed by: INTERNAL MEDICINE

## 2023-09-26 DIAGNOSIS — Z94.4 S/P LIVER TRANSPLANT: ICD-10-CM

## 2023-09-26 DIAGNOSIS — Z85.05 PERSONAL HISTORY OF MALIGNANT HEPATOMA: Primary | ICD-10-CM

## 2023-09-26 DIAGNOSIS — Z94.0 S/P KIDNEY TRANSPLANT: ICD-10-CM

## 2023-10-04 ENCOUNTER — PATIENT MESSAGE (OUTPATIENT)
Dept: TRANSPLANT | Facility: CLINIC | Age: 68
End: 2023-10-04
Payer: MEDICARE

## 2023-10-12 ENCOUNTER — PATIENT MESSAGE (OUTPATIENT)
Dept: TRANSPLANT | Facility: CLINIC | Age: 68
End: 2023-10-12
Payer: MEDICARE

## 2023-11-14 ENCOUNTER — PATIENT MESSAGE (OUTPATIENT)
Dept: RADIATION ONCOLOGY | Facility: CLINIC | Age: 68
End: 2023-11-14
Payer: MEDICARE

## 2023-11-15 ENCOUNTER — LAB VISIT (OUTPATIENT)
Dept: LAB | Facility: HOSPITAL | Age: 68
End: 2023-11-15
Attending: RADIOLOGY
Payer: MEDICARE

## 2023-11-15 DIAGNOSIS — C61 PROSTATE CANCER: ICD-10-CM

## 2023-11-15 LAB
COMPLEXED PSA SERPL-MCNC: 0.03 NG/ML (ref 0–4)
TESTOST SERPL-MCNC: 162 NG/DL (ref 304–1227)

## 2023-11-15 PROCEDURE — 36415 COLL VENOUS BLD VENIPUNCTURE: CPT | Performed by: RADIOLOGY

## 2023-11-15 PROCEDURE — 84403 ASSAY OF TOTAL TESTOSTERONE: CPT | Performed by: RADIOLOGY

## 2023-11-15 PROCEDURE — 84153 ASSAY OF PSA TOTAL: CPT | Performed by: RADIOLOGY

## 2023-11-16 ENCOUNTER — OFFICE VISIT (OUTPATIENT)
Dept: RADIATION ONCOLOGY | Facility: CLINIC | Age: 68
End: 2023-11-16
Payer: MEDICARE

## 2023-11-16 VITALS
WEIGHT: 315 LBS | DIASTOLIC BLOOD PRESSURE: 77 MMHG | SYSTOLIC BLOOD PRESSURE: 169 MMHG | BODY MASS INDEX: 42.66 KG/M2 | HEIGHT: 72 IN | HEART RATE: 85 BPM | RESPIRATION RATE: 16 BRPM

## 2023-11-16 DIAGNOSIS — C61 PROSTATE CANCER: Primary | ICD-10-CM

## 2023-11-16 PROCEDURE — 99212 OFFICE O/P EST SF 10 MIN: CPT | Mod: S$GLB,,, | Performed by: RADIOLOGY

## 2023-11-16 PROCEDURE — 1126F PR PAIN SEVERITY QUANTIFIED, NO PAIN PRESENT: ICD-10-PCS | Mod: CPTII,S$GLB,, | Performed by: RADIOLOGY

## 2023-11-16 PROCEDURE — 3066F PR DOCUMENTATION OF TREATMENT FOR NEPHROPATHY: ICD-10-PCS | Mod: CPTII,S$GLB,, | Performed by: RADIOLOGY

## 2023-11-16 PROCEDURE — 1159F MED LIST DOCD IN RCRD: CPT | Mod: CPTII,S$GLB,, | Performed by: RADIOLOGY

## 2023-11-16 PROCEDURE — 99999 PR PBB SHADOW E&M-EST. PATIENT-LVL IV: ICD-10-PCS | Mod: PBBFAC,,, | Performed by: RADIOLOGY

## 2023-11-16 PROCEDURE — 3008F BODY MASS INDEX DOCD: CPT | Mod: CPTII,S$GLB,, | Performed by: RADIOLOGY

## 2023-11-16 PROCEDURE — 3077F PR MOST RECENT SYSTOLIC BLOOD PRESSURE >= 140 MM HG: ICD-10-PCS | Mod: CPTII,S$GLB,, | Performed by: RADIOLOGY

## 2023-11-16 PROCEDURE — 1159F PR MEDICATION LIST DOCUMENTED IN MEDICAL RECORD: ICD-10-PCS | Mod: CPTII,S$GLB,, | Performed by: RADIOLOGY

## 2023-11-16 PROCEDURE — 1160F RVW MEDS BY RX/DR IN RCRD: CPT | Mod: CPTII,S$GLB,, | Performed by: RADIOLOGY

## 2023-11-16 PROCEDURE — 99999 PR PBB SHADOW E&M-EST. PATIENT-LVL IV: CPT | Mod: PBBFAC,,, | Performed by: RADIOLOGY

## 2023-11-16 PROCEDURE — 3060F POS MICROALBUMINURIA REV: CPT | Mod: CPTII,S$GLB,, | Performed by: RADIOLOGY

## 2023-11-16 PROCEDURE — 3066F NEPHROPATHY DOC TX: CPT | Mod: CPTII,S$GLB,, | Performed by: RADIOLOGY

## 2023-11-16 PROCEDURE — 3060F PR POS MICROALBUMINURIA RESULT DOCUMENTED/REVIEW: ICD-10-PCS | Mod: CPTII,S$GLB,, | Performed by: RADIOLOGY

## 2023-11-16 PROCEDURE — 3288F PR FALLS RISK ASSESSMENT DOCUMENTED: ICD-10-PCS | Mod: CPTII,S$GLB,, | Performed by: RADIOLOGY

## 2023-11-16 PROCEDURE — 3078F PR MOST RECENT DIASTOLIC BLOOD PRESSURE < 80 MM HG: ICD-10-PCS | Mod: CPTII,S$GLB,, | Performed by: RADIOLOGY

## 2023-11-16 PROCEDURE — 3288F FALL RISK ASSESSMENT DOCD: CPT | Mod: CPTII,S$GLB,, | Performed by: RADIOLOGY

## 2023-11-16 PROCEDURE — 3077F SYST BP >= 140 MM HG: CPT | Mod: CPTII,S$GLB,, | Performed by: RADIOLOGY

## 2023-11-16 PROCEDURE — 1160F PR REVIEW ALL MEDS BY PRESCRIBER/CLIN PHARMACIST DOCUMENTED: ICD-10-PCS | Mod: CPTII,S$GLB,, | Performed by: RADIOLOGY

## 2023-11-16 PROCEDURE — 3008F PR BODY MASS INDEX (BMI) DOCUMENTED: ICD-10-PCS | Mod: CPTII,S$GLB,, | Performed by: RADIOLOGY

## 2023-11-16 PROCEDURE — 3044F HG A1C LEVEL LT 7.0%: CPT | Mod: CPTII,S$GLB,, | Performed by: RADIOLOGY

## 2023-11-16 PROCEDURE — 3044F PR MOST RECENT HEMOGLOBIN A1C LEVEL <7.0%: ICD-10-PCS | Mod: CPTII,S$GLB,, | Performed by: RADIOLOGY

## 2023-11-16 PROCEDURE — 1126F AMNT PAIN NOTED NONE PRSNT: CPT | Mod: CPTII,S$GLB,, | Performed by: RADIOLOGY

## 2023-11-16 PROCEDURE — 99212 PR OFFICE/OUTPT VISIT, EST, LEVL II, 10-19 MIN: ICD-10-PCS | Mod: S$GLB,,, | Performed by: RADIOLOGY

## 2023-11-16 PROCEDURE — 4010F PR ACE/ARB THEARPY RXD/TAKEN: ICD-10-PCS | Mod: CPTII,S$GLB,, | Performed by: RADIOLOGY

## 2023-11-16 PROCEDURE — 3078F DIAST BP <80 MM HG: CPT | Mod: CPTII,S$GLB,, | Performed by: RADIOLOGY

## 2023-11-16 PROCEDURE — 1101F PR PT FALLS ASSESS DOC 0-1 FALLS W/OUT INJ PAST YR: ICD-10-PCS | Mod: CPTII,S$GLB,, | Performed by: RADIOLOGY

## 2023-11-16 PROCEDURE — 4010F ACE/ARB THERAPY RXD/TAKEN: CPT | Mod: CPTII,S$GLB,, | Performed by: RADIOLOGY

## 2023-11-16 PROCEDURE — 1101F PT FALLS ASSESS-DOCD LE1/YR: CPT | Mod: CPTII,S$GLB,, | Performed by: RADIOLOGY

## 2023-11-16 RX ORDER — TIRZEPATIDE 7.5 MG/.5ML
INJECTION, SOLUTION SUBCUTANEOUS
COMMUNITY
Start: 2023-09-22

## 2023-11-16 NOTE — PROGRESS NOTES
[place] [facility] - Radiation Oncology    Patient ID: Jordan Lopez Jr. is a 68 y.o. male.    Chief Complaint: Prostate Cancer (6 mo f/u;psa)    Mr. Lopez has a history of Stage IIIB, pT3a, N0, M0, P>=10<20, G2 adenocarcinoma of the prostate.  He presented in 2021 with elevated PSA on 14.8 ng/ml. Biopsy revealed Clinton 7 (3+4) adenocarcinoma involving 2 of 8 cores, unclear of location.   One core was 100% involved.  The patient presented to [facility] for further evaluation. MRI revealed a 36 cc gland with a 1.9 cm PI-RADS 5 lesion in the Rt. apex, with extraprostatic extension.  The neurovascular bundles and seminal vesicles were unremarkable.  There was no lymphadenopathy. We elected to offer radiotherapy combined with hormonal deprivation therapy.  He completed 70 Gy to the prostate, seminal vesicles and lower pelvic nodes on 8/23/21.  He completed 18 months of hormonal therapy with his last 6 month Lupron injection in July of 2022.  Today the patient states he feels well.  No complaints      Review of Systems   Constitutional:  Negative for activity change, appetite change, chills and fatigue.   Gastrointestinal:  Negative for constipation, diarrhea and fecal incontinence.   Genitourinary:  Positive for erectile dysfunction and urgency (occasional). Negative for bladder incontinence, difficulty urinating, dysuria, frequency and hematuria.     Physical Exam  Constitutional:       General: He is not in acute distress.     Appearance: Normal appearance.   Pulmonary:      Effort: Pulmonary effort is normal. No respiratory distress.   Neurological:      Mental Status: He is alert and oriented to person, place, and time.   Psychiatric:         Mood and Affect: Mood normal.         Judgment: Judgment normal.        Latest Reference Range & Units 11/15/23 08:25   PSA Diagnostic 0.00 - 4.00 ng/mL 0.03   Testosterone, Total 304 - 1227 ng/dL 162 (L)   (L): Data is abnormally low       Assessment  and Plan     1. Prostate cancer  Doing well.  no evidence of tumor recurrence or progression.  Plan follow up PSA and testosterone in 6 months with phone review.  RTC in one year with PSA and testosterone.

## 2023-11-16 NOTE — Clinical Note
Plan follow up PSA and testosterone in 6 months with phone review.  RTC in one year with PSA and testosterone.

## 2023-12-04 ENCOUNTER — TELEPHONE (OUTPATIENT)
Dept: TRANSPLANT | Facility: CLINIC | Age: 68
End: 2023-12-04
Payer: MEDICARE

## 2023-12-04 ENCOUNTER — LAB VISIT (OUTPATIENT)
Dept: LAB | Facility: HOSPITAL | Age: 68
End: 2023-12-04
Attending: INTERNAL MEDICINE
Payer: MEDICARE

## 2023-12-04 ENCOUNTER — PATIENT MESSAGE (OUTPATIENT)
Dept: TRANSPLANT | Facility: CLINIC | Age: 68
End: 2023-12-04
Payer: MEDICARE

## 2023-12-04 DIAGNOSIS — Z94.0 S/P KIDNEY TRANSPLANT: ICD-10-CM

## 2023-12-04 DIAGNOSIS — Z94.4 LIVER REPLACED BY TRANSPLANT: ICD-10-CM

## 2023-12-04 DIAGNOSIS — Z94.4 S/P LIVER TRANSPLANT: ICD-10-CM

## 2023-12-04 DIAGNOSIS — Z85.05 PERSONAL HISTORY OF MALIGNANT HEPATOMA: ICD-10-CM

## 2023-12-04 LAB
AFP SERPL-MCNC: <2 NG/ML (ref 0–8.4)
ALBUMIN SERPL BCP-MCNC: 3.3 G/DL (ref 3.5–5.2)
ALP SERPL-CCNC: 77 U/L (ref 55–135)
ALT SERPL W/O P-5'-P-CCNC: 22 U/L (ref 10–44)
ANION GAP SERPL CALC-SCNC: 5 MMOL/L (ref 3–11)
AST SERPL-CCNC: 11 U/L (ref 10–40)
BASOPHILS # BLD AUTO: 0.06 K/UL (ref 0–0.2)
BASOPHILS NFR BLD: 1.1 % (ref 0–1.9)
BILIRUB SERPL-MCNC: 0.5 MG/DL (ref 0.1–1)
BUN SERPL-MCNC: 22 MG/DL (ref 8–23)
CALCIUM SERPL-MCNC: 9 MG/DL (ref 8.7–10.5)
CHLORIDE SERPL-SCNC: 111 MMOL/L (ref 95–110)
CO2 SERPL-SCNC: 27 MMOL/L (ref 23–29)
CREAT SERPL-MCNC: 1.4 MG/DL (ref 0.5–1.4)
DIFFERENTIAL METHOD: ABNORMAL
EOSINOPHIL # BLD AUTO: 0.2 K/UL (ref 0–0.5)
EOSINOPHIL NFR BLD: 3.3 % (ref 0–8)
ERYTHROCYTE [DISTWIDTH] IN BLOOD BY AUTOMATED COUNT: 14.5 % (ref 11.5–14.5)
EST. GFR  (NO RACE VARIABLE): 54.1 ML/MIN/1.73 M^2
GLUCOSE SERPL-MCNC: 44 MG/DL (ref 70–110)
HCT VFR BLD AUTO: 41.9 % (ref 40–54)
HGB BLD-MCNC: 13.6 G/DL (ref 14–18)
IMM GRANULOCYTES # BLD AUTO: 0.01 K/UL (ref 0–0.04)
IMM GRANULOCYTES NFR BLD AUTO: 0.2 % (ref 0–0.5)
LYMPHOCYTES # BLD AUTO: 1.7 K/UL (ref 1–4.8)
LYMPHOCYTES NFR BLD: 31 % (ref 18–48)
MCH RBC QN AUTO: 28 PG (ref 27–31)
MCHC RBC AUTO-ENTMCNC: 32.5 G/DL (ref 32–36)
MCV RBC AUTO: 86 FL (ref 82–98)
MONOCYTES # BLD AUTO: 0.5 K/UL (ref 0.3–1)
MONOCYTES NFR BLD: 8.3 % (ref 4–15)
NEUTROPHILS # BLD AUTO: 3 K/UL (ref 1.8–7.7)
NEUTROPHILS NFR BLD: 56.1 % (ref 38–73)
NRBC BLD-RTO: 0 /100 WBC
PLATELET # BLD AUTO: 235 K/UL (ref 150–450)
PMV BLD AUTO: 9.8 FL (ref 9.2–12.9)
POTASSIUM SERPL-SCNC: 4.3 MMOL/L (ref 3.5–5.1)
PROT SERPL-MCNC: 7 G/DL (ref 6–8.4)
RBC # BLD AUTO: 4.86 M/UL (ref 4.6–6.2)
SODIUM SERPL-SCNC: 143 MMOL/L (ref 136–145)
WBC # BLD AUTO: 5.42 K/UL (ref 3.9–12.7)

## 2023-12-04 PROCEDURE — 80197 ASSAY OF TACROLIMUS: CPT | Performed by: INTERNAL MEDICINE

## 2023-12-04 PROCEDURE — 80053 COMPREHEN METABOLIC PANEL: CPT | Performed by: INTERNAL MEDICINE

## 2023-12-04 PROCEDURE — 85025 COMPLETE CBC W/AUTO DIFF WBC: CPT | Performed by: INTERNAL MEDICINE

## 2023-12-04 PROCEDURE — 82105 ALPHA-FETOPROTEIN SERUM: CPT | Performed by: INTERNAL MEDICINE

## 2023-12-04 PROCEDURE — 36415 COLL VENOUS BLD VENIPUNCTURE: CPT | Performed by: INTERNAL MEDICINE

## 2023-12-04 NOTE — TELEPHONE ENCOUNTER
Labs reviewed via portal.  Spoke with patient this morning regarding critical BS.  Reviewed with patient importance of fasting labs and to not take insulin until after labs and to be sure to eat adter taking insulin.  Pt encouraged to reach out to provider who manages his diabetes for any adjustments to insulin needed.  Pt verbalized understanding and states will comply.      ----- Message from Shaniqua Matias MD sent at 12/4/2023  4:06 PM CST -----  The Labs are stable  except BS which is low- I see Gilbert took the call. I am assuming it has improved- please let patient know.

## 2023-12-04 NOTE — TELEPHONE ENCOUNTER
Received critical glucose of 44.1.  Spoke with patient who states he took his insulin prior to labs.  Pt states he has made it home and had breakfast.  Advised patient to check blood glucose in the morning before labs and to speak with his provider who manages his diabletes for possible need of adjustment.  Pt verbalized understanding and states will comply.

## 2023-12-05 LAB — TACROLIMUS BLD-MCNC: 2.8 NG/ML (ref 5–15)

## 2023-12-14 DIAGNOSIS — Z94.4 LIVER REPLACED BY TRANSPLANT: Chronic | ICD-10-CM

## 2023-12-18 RX ORDER — TACROLIMUS 1 MG/1
CAPSULE ORAL
Qty: 90 CAPSULE | Refills: 11 | Status: SHIPPED | OUTPATIENT
Start: 2023-12-18

## 2023-12-29 ENCOUNTER — PATIENT MESSAGE (OUTPATIENT)
Dept: TRANSPLANT | Facility: CLINIC | Age: 68
End: 2023-12-29
Payer: MEDICARE

## 2023-12-29 ENCOUNTER — PATIENT MESSAGE (OUTPATIENT)
Dept: INFECTIOUS DISEASES | Facility: CLINIC | Age: 68
End: 2023-12-29
Payer: MEDICARE

## 2023-12-29 DIAGNOSIS — L03.115 CELLULITIS OF RIGHT LOWER EXTREMITY: Primary | ICD-10-CM

## 2024-01-05 ENCOUNTER — OFFICE VISIT (OUTPATIENT)
Dept: INFECTIOUS DISEASES | Facility: CLINIC | Age: 69
End: 2024-01-05
Payer: MEDICARE

## 2024-01-05 VITALS
HEART RATE: 92 BPM | TEMPERATURE: 98 F | SYSTOLIC BLOOD PRESSURE: 161 MMHG | HEIGHT: 72 IN | WEIGHT: 315 LBS | BODY MASS INDEX: 42.66 KG/M2 | DIASTOLIC BLOOD PRESSURE: 92 MMHG

## 2024-01-05 DIAGNOSIS — R22.41 LOCALIZED SWELLING OF RIGHT LOWER EXTREMITY: Primary | ICD-10-CM

## 2024-01-05 PROCEDURE — 99204 OFFICE O/P NEW MOD 45 MIN: CPT | Mod: S$GLB,,, | Performed by: INTERNAL MEDICINE

## 2024-01-05 PROCEDURE — 1126F AMNT PAIN NOTED NONE PRSNT: CPT | Mod: CPTII,S$GLB,, | Performed by: INTERNAL MEDICINE

## 2024-01-05 PROCEDURE — 3008F BODY MASS INDEX DOCD: CPT | Mod: CPTII,S$GLB,, | Performed by: INTERNAL MEDICINE

## 2024-01-05 PROCEDURE — 3077F SYST BP >= 140 MM HG: CPT | Mod: CPTII,S$GLB,, | Performed by: INTERNAL MEDICINE

## 2024-01-05 PROCEDURE — 3080F DIAST BP >= 90 MM HG: CPT | Mod: CPTII,S$GLB,, | Performed by: INTERNAL MEDICINE

## 2024-01-05 PROCEDURE — 1159F MED LIST DOCD IN RCRD: CPT | Mod: CPTII,S$GLB,, | Performed by: INTERNAL MEDICINE

## 2024-01-05 PROCEDURE — 99999 PR PBB SHADOW E&M-EST. PATIENT-LVL IV: CPT | Mod: PBBFAC,,, | Performed by: INTERNAL MEDICINE

## 2024-01-05 RX ORDER — CLINDAMYCIN HYDROCHLORIDE 300 MG/1
300 CAPSULE ORAL 3 TIMES DAILY
COMMUNITY
Start: 2023-12-28

## 2024-01-05 RX ORDER — CLOTRIMAZOLE AND BETAMETHASONE DIPROPIONATE 10; .64 MG/G; MG/G
CREAM TOPICAL 2 TIMES DAILY
Qty: 45 G | Refills: 2 | Status: SHIPPED | OUTPATIENT
Start: 2024-01-05

## 2024-01-05 RX ORDER — TIRZEPATIDE 10 MG/.5ML
INJECTION, SOLUTION SUBCUTANEOUS
COMMUNITY
Start: 2023-12-28

## 2024-01-05 RX ORDER — CEPHALEXIN 500 MG/1
500 CAPSULE ORAL 4 TIMES DAILY
COMMUNITY
Start: 2023-12-28

## 2024-01-05 RX ORDER — MULTIVIT-MIN/FOLIC ACID/LUTEIN 400-250MCG
TABLET,CHEWABLE ORAL
COMMUNITY

## 2024-01-05 NOTE — PROGRESS NOTES
Subjective:     Patient ID: Jordan Lopez Jr. is a 68 y.o. male    Chief Complaint: cellulitis    HPI: 68M with a history of liver/kidney transplant in 2012 seen today for evaluation of cellulitis. Patient notes that several weeks ago, he developed erythema and tenderness of his RLE. He was seen by PCP, and started on clindamycin. Pain improved, but redness persisted. He was then started on keflex with no improvement in redness. He notes some itching in this leg, with dry skin on back of calf. He denies any open wounds and follows regularly with a podiatrist. He has otherwise been feeling well and is without systemic symptoms of infection. He notes that the redness of his RLE appears to progress throughout the day, and improves after leg has been elevated for a while. He has not had venous US yet to ensure no DVT. He spends a significant amount of time in the car, at times up to 6 hours per day. No other recent travel or risk factors for DVT, and no past history of blood clots.       Immunization History   Administered Date(s) Administered    COVID-19, MRNA, LN-S, PF (Pfizer) (Gray Cap) 03/11/2022    COVID-19, MRNA, LN-S, PF (Pfizer) (Purple Cap) 01/20/2021, 02/10/2021, 08/27/2021    COVID-19, mRNA, LNP-S, PF, holly-sucrose, 30 mcg/0.3 mL (Pfizer 2023 Ages 12+) 12/17/2023    COVID-19, mRNA, LNP-S, bivalent booster, PF (PFIZER OMICRON) 10/07/2022        Review of Systems   All other systems reviewed and are negative.       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 Followed by [facility]     Type 2 diabetes mellitus 2007    diet  controlled      Past Surgical History:   Procedure Laterality Date    CYSTOSCOPY      KIDNEY TRANSPLANT  6/29/2012    Combined liver-kidney transplant    LIVER TRANSPLANT  6/29/2012    Combined liver-kidney transplant    NEPHRECTOMY  2010    For stage 1 RCC right kidney (done at [facility])     Family History   Problem Relation Age of Onset    Kidney disease Mother         born with one kidney    Heart disease Father      Social History     Tobacco Use    Smoking status: Never    Smokeless tobacco: Never   Substance Use Topics    Alcohol use: No     Comment: Perhaps 2-3 drinks per year, never a drinker    Drug use: No       Objective:     Physical Exam  Constitutional:       General: He is not in acute distress.     Appearance: Normal appearance. He is well-developed. He is not ill-appearing or diaphoretic.   HENT:      Head: Normocephalic and atraumatic.      Right Ear: External ear normal.      Left Ear: External ear normal.      Nose: Nose normal.   Eyes:      General: No scleral icterus.        Right eye: No discharge.         Left eye: No discharge.      Extraocular Movements: Extraocular movements intact.      Conjunctiva/sclera: Conjunctivae normal.   Pulmonary:      Effort: Pulmonary effort is normal. No respiratory distress.      Breath sounds: No stridor.   Musculoskeletal:      Comments: RLE redness, swelling  Dermatitis most pronounced around ankle and posterior calf  No tenderness   Skin:     General: Skin is dry.      Coloration: Skin is not jaundiced or pale.      Findings: No erythema.   Neurological:      General: No focal deficit present.      Mental Status: He is alert and oriented to person, place, and time. Mental status is at baseline.   Psychiatric:         Mood and Affect: Mood normal.         Behavior: Behavior normal.         Thought Content: Thought content normal.         Judgment: Judgment normal.         Data:    All data, including recent labs, radiology, and pathology, has been  independently reviewed.    Labs:    Recent Labs   Lab Result Units 12/04/23  0718   WBC K/uL 5.42   Hemoglobin g/dL 13.6*   Hematocrit % 41.9   Sodium mmol/L 143   Potassium mmol/L 4.3   Chloride mmol/L 111*   BUN mg/dL 22   Creatinine mg/dL 1.4   AST U/L 11   ALT U/L 22   Alkaline Phosphatase U/L 77   Total Bilirubin mg/dL 0.5        Radiology:    No results found in the last 24 hours.     Assessment:    1. Localized swelling of right lower extremity  Patient's RLE does not appear to be acutely infected at this time. Instructed patient to discontinue antibiotics.   Will given lotrisone cream for dermatitis, and possible superimposed fungal infection given symptoms of pruritus  Advised patient to obtain OTC aquaphor and apply daily to area for adequate skin hydration and healing  Will obtain venous US to ensure no DVT     clotrimazole-betamethasone 1-0.05% (LOTRISONE) cream    US Lower Extremity Veins Bilateral    Follow up as needed           Follow up PRN    The total time for evaluation and management services performed on 1/5/24 was greater than 45 minutes.     Bee Lehman DO  Transplant Infectious Disease

## 2024-01-08 ENCOUNTER — LAB VISIT (OUTPATIENT)
Dept: LAB | Facility: HOSPITAL | Age: 69
End: 2024-01-08
Attending: INTERNAL MEDICINE
Payer: MEDICARE

## 2024-01-08 DIAGNOSIS — E78.2 MIXED HYPERLIPIDEMIA: Primary | ICD-10-CM

## 2024-01-08 DIAGNOSIS — E11.8 DIABETIC COMPLICATION: ICD-10-CM

## 2024-01-08 DIAGNOSIS — I10 ESSENTIAL HYPERTENSION, MALIGNANT: ICD-10-CM

## 2024-01-08 LAB
ALBUMIN SERPL BCP-MCNC: 3.4 G/DL (ref 3.5–5.2)
ALP SERPL-CCNC: 69 U/L (ref 55–135)
ALT SERPL W/O P-5'-P-CCNC: 25 U/L (ref 10–44)
ANION GAP SERPL CALC-SCNC: 1 MMOL/L (ref 3–11)
AST SERPL-CCNC: 15 U/L (ref 10–40)
BACTERIA #/AREA URNS HPF: NEGATIVE /HPF
BASOPHILS # BLD AUTO: 0.04 K/UL (ref 0–0.2)
BASOPHILS NFR BLD: 0.9 % (ref 0–1.9)
BILIRUB SERPL-MCNC: 0.5 MG/DL (ref 0.1–1)
BILIRUB UR QL STRIP: NEGATIVE
BUN SERPL-MCNC: 21 MG/DL (ref 8–23)
CALCIUM SERPL-MCNC: 8.7 MG/DL (ref 8.7–10.5)
CHLORIDE SERPL-SCNC: 108 MMOL/L (ref 95–110)
CHOLEST SERPL-MCNC: 126 MG/DL (ref 120–199)
CHOLEST/HDLC SERPL: 2.9 {RATIO} (ref 2–5)
CLARITY UR: CLEAR
CO2 SERPL-SCNC: 29 MMOL/L (ref 23–29)
COLOR UR: YELLOW
CREAT SERPL-MCNC: 1.2 MG/DL (ref 0.5–1.4)
DIFFERENTIAL METHOD BLD: ABNORMAL
EOSINOPHIL # BLD AUTO: 0.2 K/UL (ref 0–0.5)
EOSINOPHIL NFR BLD: 5.3 % (ref 0–8)
ERYTHROCYTE [DISTWIDTH] IN BLOOD BY AUTOMATED COUNT: 14.5 % (ref 11.5–14.5)
EST. GFR  (NO RACE VARIABLE): >60 ML/MIN/1.73 M^2
ESTIMATED AVG GLUCOSE: 140 MG/DL (ref 68–131)
GLUCOSE SERPL-MCNC: 161 MG/DL (ref 70–110)
GLUCOSE UR QL STRIP: ABNORMAL
HBA1C MFR BLD: 6.5 % (ref 4–5.6)
HCT VFR BLD AUTO: 41.8 % (ref 40–54)
HDLC SERPL-MCNC: 44 MG/DL (ref 40–75)
HDLC SERPL: 34.9 % (ref 20–50)
HGB BLD-MCNC: 13.7 G/DL (ref 14–18)
HGB UR QL STRIP: NEGATIVE
HYALINE CASTS #/AREA URNS LPF: 0.7 /LPF
IMM GRANULOCYTES # BLD AUTO: 0.01 K/UL (ref 0–0.04)
IMM GRANULOCYTES NFR BLD AUTO: 0.2 % (ref 0–0.5)
KETONES UR QL STRIP: NEGATIVE
LDLC SERPL CALC-MCNC: 40.6 MG/DL (ref 63–159)
LEUKOCYTE ESTERASE UR QL STRIP: NEGATIVE
LYMPHOCYTES # BLD AUTO: 1.3 K/UL (ref 1–4.8)
LYMPHOCYTES NFR BLD: 29.7 % (ref 18–48)
MAGNESIUM SERPL-MCNC: 2 MG/DL (ref 1.6–2.6)
MCH RBC QN AUTO: 27.8 PG (ref 27–31)
MCHC RBC AUTO-ENTMCNC: 32.8 G/DL (ref 32–36)
MCV RBC AUTO: 85 FL (ref 82–98)
MICROSCOPIC COMMENT: ABNORMAL
MONOCYTES # BLD AUTO: 0.4 K/UL (ref 0.3–1)
MONOCYTES NFR BLD: 8.9 % (ref 4–15)
NEUTROPHILS # BLD AUTO: 2.4 K/UL (ref 1.8–7.7)
NEUTROPHILS NFR BLD: 55 % (ref 38–73)
NITRITE UR QL STRIP: NEGATIVE
NONHDLC SERPL-MCNC: 82 MG/DL
NRBC BLD-RTO: 0 /100 WBC
PH UR STRIP: 6 [PH] (ref 5–8)
PLATELET # BLD AUTO: 162 K/UL (ref 150–450)
PMV BLD AUTO: 9.6 FL (ref 9.2–12.9)
POTASSIUM SERPL-SCNC: 4.4 MMOL/L (ref 3.5–5.1)
PROT SERPL-MCNC: 7.1 G/DL (ref 6–8.4)
PROT UR QL STRIP: ABNORMAL
RBC # BLD AUTO: 4.93 M/UL (ref 4.6–6.2)
RBC #/AREA URNS HPF: 0 /HPF (ref 0–4)
SODIUM SERPL-SCNC: 138 MMOL/L (ref 136–145)
SP GR UR STRIP: 1.02 (ref 1–1.03)
SQUAMOUS #/AREA URNS HPF: 0 /HPF
TRIGL SERPL-MCNC: 207 MG/DL (ref 30–150)
TSH SERPL DL<=0.005 MIU/L-ACNC: 3.13 UIU/ML (ref 0.4–4)
URN SPEC COLLECT METH UR: ABNORMAL
UROBILINOGEN UR STRIP-ACNC: NEGATIVE EU/DL
WBC # BLD AUTO: 4.37 K/UL (ref 3.9–12.7)
WBC #/AREA URNS HPF: 0 /HPF (ref 0–5)
YEAST URNS QL MICRO: ABNORMAL

## 2024-01-08 PROCEDURE — 83735 ASSAY OF MAGNESIUM: CPT | Performed by: INTERNAL MEDICINE

## 2024-01-08 PROCEDURE — 81000 URINALYSIS NONAUTO W/SCOPE: CPT | Performed by: INTERNAL MEDICINE

## 2024-01-08 PROCEDURE — 36415 COLL VENOUS BLD VENIPUNCTURE: CPT | Performed by: INTERNAL MEDICINE

## 2024-01-08 PROCEDURE — 85025 COMPLETE CBC W/AUTO DIFF WBC: CPT | Performed by: INTERNAL MEDICINE

## 2024-01-08 PROCEDURE — 80061 LIPID PANEL: CPT | Performed by: INTERNAL MEDICINE

## 2024-01-08 PROCEDURE — 83036 HEMOGLOBIN GLYCOSYLATED A1C: CPT | Performed by: INTERNAL MEDICINE

## 2024-01-08 PROCEDURE — 84443 ASSAY THYROID STIM HORMONE: CPT | Performed by: INTERNAL MEDICINE

## 2024-01-08 PROCEDURE — 80053 COMPREHEN METABOLIC PANEL: CPT | Performed by: INTERNAL MEDICINE

## 2024-01-10 ENCOUNTER — HOSPITAL ENCOUNTER (OUTPATIENT)
Dept: RADIOLOGY | Facility: HOSPITAL | Age: 69
Discharge: HOME | End: 2024-01-10
Attending: INTERNAL MEDICINE
Payer: MEDICARE

## 2024-01-10 ENCOUNTER — PATIENT MESSAGE (OUTPATIENT)
Dept: INFECTIOUS DISEASES | Facility: CLINIC | Age: 69
End: 2024-01-10
Payer: MEDICARE

## 2024-01-10 DIAGNOSIS — R22.41 LOCALIZED SWELLING OF RIGHT LOWER EXTREMITY: ICD-10-CM

## 2024-01-10 PROCEDURE — 93970 EXTREMITY STUDY: CPT | Mod: TC

## 2024-02-28 ENCOUNTER — LAB VISIT (OUTPATIENT)
Dept: LAB | Facility: HOSPITAL | Age: 69
End: 2024-02-28
Attending: INTERNAL MEDICINE
Payer: MEDICARE

## 2024-02-28 DIAGNOSIS — Z94.4 LIVER REPLACED BY TRANSPLANT: ICD-10-CM

## 2024-02-28 DIAGNOSIS — Z94.0 S/P KIDNEY TRANSPLANT: ICD-10-CM

## 2024-03-06 ENCOUNTER — PATIENT MESSAGE (OUTPATIENT)
Dept: TRANSPLANT | Facility: CLINIC | Age: 69
End: 2024-03-06
Payer: MEDICARE

## 2024-03-06 ENCOUNTER — TELEPHONE (OUTPATIENT)
Dept: TRANSPLANT | Facility: CLINIC | Age: 69
End: 2024-03-06
Payer: MEDICARE

## 2024-03-06 NOTE — TELEPHONE ENCOUNTER
----- Message from Shaniqua Matias MD sent at 3/5/2024 12:56 PM CST -----  The Labs are stable - please let patient know.

## 2024-03-06 NOTE — TELEPHONE ENCOUNTER
Labs reviewed via portal and are stable.  Patient will repeat labs on 6/3/24 per protocol.      ----- Message from Shaniqua Matias MD sent at 3/5/2024 12:56 PM CST -----  The Labs are stable - please let patient know.

## 2024-03-22 ENCOUNTER — LAB VISIT (OUTPATIENT)
Dept: LAB | Facility: HOSPITAL | Age: 69
End: 2024-03-22
Attending: INTERNAL MEDICINE
Payer: MEDICARE

## 2024-03-22 DIAGNOSIS — E78.5 HYPERLIPEMIA: ICD-10-CM

## 2024-03-22 DIAGNOSIS — E11.8 DIABETIC COMPLICATION: ICD-10-CM

## 2024-03-22 DIAGNOSIS — I10 ESSENTIAL HYPERTENSION, MALIGNANT: ICD-10-CM

## 2024-03-22 DIAGNOSIS — E11.29 TYPE II DIABETES MELLITUS WITH RENAL MANIFESTATIONS: ICD-10-CM

## 2024-03-22 DIAGNOSIS — E11.9 DIABETES MELLITUS WITHOUT COMPLICATION: Primary | ICD-10-CM

## 2024-03-22 LAB
ALBUMIN/CREAT UR: 164.9 UG/MG (ref 0–30)
ANION GAP SERPL CALC-SCNC: 4 MMOL/L (ref 3–11)
BUN SERPL-MCNC: 20 MG/DL (ref 8–23)
CALCIUM SERPL-MCNC: 9 MG/DL (ref 8.7–10.5)
CHLORIDE SERPL-SCNC: 108 MMOL/L (ref 95–110)
CO2 SERPL-SCNC: 28 MMOL/L (ref 23–29)
CREAT SERPL-MCNC: 1.2 MG/DL (ref 0.5–1.4)
CREAT UR-MCNC: 75.8 MG/DL (ref 23–375)
EST. GFR  (NO RACE VARIABLE): >60 ML/MIN/1.73 M^2
ESTIMATED AVG GLUCOSE: 134 MG/DL (ref 68–131)
GLUCOSE SERPL-MCNC: 182 MG/DL (ref 70–110)
HBA1C MFR BLD: 6.3 % (ref 4–5.6)
MICROALBUMIN UR DL<=1MG/L-MCNC: 125 MG/L
POTASSIUM SERPL-SCNC: 4.3 MMOL/L (ref 3.5–5.1)
SODIUM SERPL-SCNC: 140 MMOL/L (ref 136–145)

## 2024-03-22 PROCEDURE — 80048 BASIC METABOLIC PNL TOTAL CA: CPT | Performed by: INTERNAL MEDICINE

## 2024-03-22 PROCEDURE — 82043 UR ALBUMIN QUANTITATIVE: CPT | Performed by: INTERNAL MEDICINE

## 2024-03-22 PROCEDURE — 36415 COLL VENOUS BLD VENIPUNCTURE: CPT | Performed by: INTERNAL MEDICINE

## 2024-03-22 PROCEDURE — 83036 HEMOGLOBIN GLYCOSYLATED A1C: CPT | Performed by: INTERNAL MEDICINE

## 2024-04-04 ENCOUNTER — PATIENT MESSAGE (OUTPATIENT)
Dept: TRANSPLANT | Facility: CLINIC | Age: 69
End: 2024-04-04
Payer: MEDICARE

## 2024-04-15 ENCOUNTER — LAB VISIT (OUTPATIENT)
Dept: LAB | Facility: HOSPITAL | Age: 69
End: 2024-04-15
Attending: INTERNAL MEDICINE
Payer: MEDICARE

## 2024-04-15 DIAGNOSIS — E78.2 MIXED HYPERLIPIDEMIA: ICD-10-CM

## 2024-04-15 DIAGNOSIS — E11.8 DIABETIC COMPLICATION: Primary | ICD-10-CM

## 2024-04-15 LAB
25(OH)D3+25(OH)D2 SERPL-MCNC: 43 NG/ML (ref 30–96)
ALBUMIN SERPL BCP-MCNC: 3.4 G/DL (ref 3.5–5.2)
ALBUMIN/CREAT UR: 115.6 UG/MG (ref 0–30)
ALP SERPL-CCNC: 73 U/L (ref 55–135)
ALT SERPL W/O P-5'-P-CCNC: 22 U/L (ref 10–44)
ANION GAP SERPL CALC-SCNC: 4 MMOL/L (ref 3–11)
AST SERPL-CCNC: 13 U/L (ref 10–40)
BILIRUB SERPL-MCNC: 0.6 MG/DL (ref 0.1–1)
BUN SERPL-MCNC: 20 MG/DL (ref 8–23)
CALCIUM SERPL-MCNC: 9 MG/DL (ref 8.7–10.5)
CHLORIDE SERPL-SCNC: 106 MMOL/L (ref 95–110)
CHOLEST SERPL-MCNC: 115 MG/DL (ref 120–199)
CHOLEST/HDLC SERPL: 3.2 {RATIO} (ref 2–5)
CO2 SERPL-SCNC: 28 MMOL/L (ref 23–29)
CREAT SERPL-MCNC: 1.4 MG/DL (ref 0.5–1.4)
CREAT UR-MCNC: 91.7 MG/DL (ref 23–375)
EST. GFR  (NO RACE VARIABLE): 54.1 ML/MIN/1.73 M^2
ESTIMATED AVG GLUCOSE: 146 MG/DL (ref 68–131)
GLUCOSE SERPL-MCNC: 159 MG/DL (ref 70–110)
HBA1C MFR BLD: 6.7 % (ref 4–5.6)
HDLC SERPL-MCNC: 36 MG/DL (ref 40–75)
HDLC SERPL: 31.3 % (ref 20–50)
LDLC SERPL CALC-MCNC: 51.4 MG/DL (ref 63–159)
MICROALBUMIN UR DL<=1MG/L-MCNC: 106 MG/L
NONHDLC SERPL-MCNC: 79 MG/DL
POTASSIUM SERPL-SCNC: 3.8 MMOL/L (ref 3.5–5.1)
PROT SERPL-MCNC: 6.8 G/DL (ref 6–8.4)
SODIUM SERPL-SCNC: 138 MMOL/L (ref 136–145)
TRIGL SERPL-MCNC: 138 MG/DL (ref 30–150)
TSH SERPL DL<=0.005 MIU/L-ACNC: 2.77 UIU/ML (ref 0.4–4)

## 2024-04-15 PROCEDURE — 80061 LIPID PANEL: CPT | Performed by: INTERNAL MEDICINE

## 2024-04-15 PROCEDURE — 82306 VITAMIN D 25 HYDROXY: CPT | Performed by: INTERNAL MEDICINE

## 2024-04-15 PROCEDURE — 80053 COMPREHEN METABOLIC PANEL: CPT | Performed by: INTERNAL MEDICINE

## 2024-04-15 PROCEDURE — 82043 UR ALBUMIN QUANTITATIVE: CPT | Performed by: INTERNAL MEDICINE

## 2024-04-15 PROCEDURE — 84443 ASSAY THYROID STIM HORMONE: CPT | Performed by: INTERNAL MEDICINE

## 2024-04-15 PROCEDURE — 83036 HEMOGLOBIN GLYCOSYLATED A1C: CPT | Performed by: INTERNAL MEDICINE

## 2024-04-15 PROCEDURE — 36415 COLL VENOUS BLD VENIPUNCTURE: CPT | Performed by: INTERNAL MEDICINE

## 2024-05-15 ENCOUNTER — OFFICE VISIT (OUTPATIENT)
Dept: TRANSPLANT | Facility: CLINIC | Age: 69
End: 2024-05-15
Payer: MEDICARE

## 2024-05-15 ENCOUNTER — PATIENT MESSAGE (OUTPATIENT)
Dept: TRANSPLANT | Facility: CLINIC | Age: 69
End: 2024-05-15

## 2024-05-15 VITALS
HEIGHT: 72 IN | OXYGEN SATURATION: 98 % | DIASTOLIC BLOOD PRESSURE: 74 MMHG | HEART RATE: 67 BPM | SYSTOLIC BLOOD PRESSURE: 136 MMHG | WEIGHT: 315 LBS | BODY MASS INDEX: 42.66 KG/M2

## 2024-05-15 DIAGNOSIS — Z79.60 LONG-TERM USE OF IMMUNOSUPPRESSANT MEDICATION: ICD-10-CM

## 2024-05-15 DIAGNOSIS — Z94.4 LIVER REPLACED BY TRANSPLANT: Chronic | ICD-10-CM

## 2024-05-15 DIAGNOSIS — Z79.818 LONG TERM (CURRENT) USE OF OTHER AGENTS AFFECTING ESTROGEN RECEPTORS AND ESTROGEN LEVELS: ICD-10-CM

## 2024-05-15 DIAGNOSIS — M85.80 OSTEOPENIA, UNSPECIFIED LOCATION: Primary | ICD-10-CM

## 2024-05-15 PROBLEM — C61 PROSTATE CANCER: Status: RESOLVED | Noted: 2021-05-17 | Resolved: 2024-05-15

## 2024-05-15 PROCEDURE — 3288F FALL RISK ASSESSMENT DOCD: CPT | Mod: CPTII,S$GLB,, | Performed by: INTERNAL MEDICINE

## 2024-05-15 PROCEDURE — 3060F POS MICROALBUMINURIA REV: CPT | Mod: CPTII,S$GLB,, | Performed by: INTERNAL MEDICINE

## 2024-05-15 PROCEDURE — 1160F RVW MEDS BY RX/DR IN RCRD: CPT | Mod: CPTII,S$GLB,, | Performed by: INTERNAL MEDICINE

## 2024-05-15 PROCEDURE — 3066F NEPHROPATHY DOC TX: CPT | Mod: CPTII,S$GLB,, | Performed by: INTERNAL MEDICINE

## 2024-05-15 PROCEDURE — 3044F HG A1C LEVEL LT 7.0%: CPT | Mod: CPTII,S$GLB,, | Performed by: INTERNAL MEDICINE

## 2024-05-15 PROCEDURE — 3008F BODY MASS INDEX DOCD: CPT | Mod: CPTII,S$GLB,, | Performed by: INTERNAL MEDICINE

## 2024-05-15 PROCEDURE — 1126F AMNT PAIN NOTED NONE PRSNT: CPT | Mod: CPTII,S$GLB,, | Performed by: INTERNAL MEDICINE

## 2024-05-15 PROCEDURE — 3075F SYST BP GE 130 - 139MM HG: CPT | Mod: CPTII,S$GLB,, | Performed by: INTERNAL MEDICINE

## 2024-05-15 PROCEDURE — 3078F DIAST BP <80 MM HG: CPT | Mod: CPTII,S$GLB,, | Performed by: INTERNAL MEDICINE

## 2024-05-15 PROCEDURE — 99999 PR PBB SHADOW E&M-EST. PATIENT-LVL V: CPT | Mod: PBBFAC,,, | Performed by: INTERNAL MEDICINE

## 2024-05-15 PROCEDURE — 1101F PT FALLS ASSESS-DOCD LE1/YR: CPT | Mod: CPTII,S$GLB,, | Performed by: INTERNAL MEDICINE

## 2024-05-15 PROCEDURE — 4010F ACE/ARB THERAPY RXD/TAKEN: CPT | Mod: CPTII,S$GLB,, | Performed by: INTERNAL MEDICINE

## 2024-05-15 PROCEDURE — 1159F MED LIST DOCD IN RCRD: CPT | Mod: CPTII,S$GLB,, | Performed by: INTERNAL MEDICINE

## 2024-05-15 PROCEDURE — 99214 OFFICE O/P EST MOD 30 MIN: CPT | Mod: S$GLB,,, | Performed by: INTERNAL MEDICINE

## 2024-05-15 NOTE — PROGRESS NOTES
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     He has had the following complications since transplant:  chronic renal insufficiency .     Subjective:   Interval History: Jordan is now 11 years and 10 months post combined liver-kidney transplant. He is overall feeling well. He is c/o lower extremity and has been diagnosed with venous insufficiency-he is due to have a procedure to help with this     History of Stage IIIB, pT3a, N0, M0, P>=10<20, G2 adenocarcinoma of the prostate. His PMHx is significant for a renal cell carcinoma treated with nephrectomy  in 2010.  He was referred for evaluation of an elevated PSA on 14.8 ng/ml drawn in March of 2021. The patient underwent TRUS and biopsy at [facility] on 4/20/21.  Pathology revealed Christian 7 (3+4) adenocarcinoma involving 2 of 8 cores, unclear of location.   One core was 100% involved.  The patient presented to [facility] for further evaluation.  Bone scan on 5/7/21 was negative for evidence of metastatic disease.  MRI of the prostate on 5/11/21 revealed a 4.9 x 3.8 x 4 cm prostate corresponding to a 36 cc gland.  There was a 1.9 cm T2 hypointense lesion in the Rt. apex, PI-RADS 5.  There was extraprostatic extension.  The neurovascular bundles and seminal vesicles were unremarkable.  There was no lymphadenopathy. We elected to offer radiotherapy combined with hormonal deprivation therapy.  He completed 70 Gy to the prostate, seminal vesicles and lower pelvic nodes on 8/23/21. He was last seen by radiation oncology 11/23- no evidence of recurrence    Allograft function 4/15/24: Tbil 0.6, ALT 22, AST 13, ALKP 73, creat 1.4; prograf pending  IS:  Prograf and cellcept 250 mg bid.  HCC screening for recurrence:  Ct scan 22: Postoperative changes from liver transplant.  Unchanged 9 mm arterial enhancing lesion in segment 4B, without washout, pseudocapsule, or threshold growth.  There are other ill-defined areas of arterial enhancement scattered throughout the liver, many of which are peripheral in some of wedge-shaped, for example on images 38, 50, and 62 of series 2.  These become isodense on subsequent phases without washout or pseudo capsule.     Health maintenance  Bone density 22:  Osteopenia; Ca with Vit D; repeat bone density now ()  Colorectal ca screenin-repeat now ()  Dermatology annual evaluation: no skin cancers; f/u needed now      Review of Systems   Constitutional: Negative.    HENT: Negative.     Eyes: Negative.    Respiratory: Negative.     Cardiovascular: Negative.    Gastrointestinal: Negative.    Genitourinary: Negative.    Musculoskeletal: Negative.    Skin: Negative.    Neurological: Negative.    Psychiatric/Behavioral: Negative.         Objective:     Vitals:    05/15/24 1545   BP: 136/74   Pulse: 67             Physical Exam  Vitals reviewed.   Constitutional:       Appearance: He is well-developed.   HENT:      Head: Normocephalic and atraumatic.   Eyes:      General: No scleral icterus.     Conjunctiva/sclera: Conjunctivae normal.      Pupils: Pupils are equal, round, and reactive to light.   Neck:      Thyroid: No thyromegaly.   Cardiovascular:      Rate and Rhythm: Normal rate and regular rhythm.      Heart sounds: Normal heart sounds.   Pulmonary:      Effort: Pulmonary effort is normal.      Breath sounds: Normal breath sounds. No rales.   Abdominal:      General: Bowel sounds are normal. There is no distension.      Palpations: Abdomen is soft. There is no mass.      Tenderness: There is no abdominal tenderness.   Musculoskeletal:         General: Normal range of motion.      Cervical back: Normal range of motion and neck supple.   Skin:     General: Skin is warm and  dry.      Findings: No rash.   Neurological:      Mental Status: He is alert and oriented to person, place, and time.       Lab Results   Component Value Date    BILITOT 0.6 04/15/2024    AST 13 04/15/2024    ALT 22 04/15/2024    ALKPHOS 73 04/15/2024    CREATININE 1.4 04/15/2024    ALBUMIN 3.4 (L) 04/15/2024     Lab Results   Component Value Date    WBC 4.48 02/28/2024    HGB 14.3 02/28/2024    HCT 43.7 02/28/2024     02/28/2024     Lab Results   Component Value Date    TACROLIMUS 2.8 (L) 02/28/2024       Assessment/Plan:   Pt is now 11 yrs and 10 months post liver transplant. Current recommendations:  1. Complications of OLTx: mild intermittent renal insufficiency. Continue to monitor closely; pt has deferred nephrology referral. Labs every 3 months indefinitely  2. HTN: continue current medications; monitor since BP is elevated today  3. HCC history: surveillance CT today 4/18/22- indeterminate lesion- thought to represent perfusion abn; no further surveillance needed   4. Prophylactic immunotherapy: continue current immunosuppression (prograf and cellcept). Continue cellcept to 250 mg bid; prograf target 3-4  5. Health maintenance: the patient should see a dermatologist annually to screen for skin cancer, perform regular colonoscopies (due 2024)  6. R/O osteoporosis. I am recommending a repeat bone density in 2024  7. Hx of renal cell ca: s/p nephrectomy    Return 1 year.  A total of 35 minutes was spent reviewing the patient's chart, examining the patient, reviewing labs and imaging and coordinating care with the patient's care team.        Shaniqua Matias MD           [facility] Patient Status  Functional Status: 100% - Normal, no complaints, no evidence of disease  Physical Capacity: No Limitations    .

## 2024-05-15 NOTE — LETTER
May 15, 2024        Stephen Schumacher  [address]  [place] [address]  Phone: [phone]  Fax: [phone]             [facility]  [address] [address]  Phone: [phone]  Fax: [phone]   Patient: Jordan Lopez Jr.   MR Number: [id number]   YOB: 1955   Date of Visit: 5/15/2024       Dear Dr. Stephen Schumacher    Thank you for referring Jordan Lopez to me for evaluation. Attached you will find relevant portions of my assessment and plan of care.    If you have questions, please do not hesitate to call me. I look forward to following Jordan Lopez along with you.    Sincerely,    Shaniqua Matias MD    Enclosure    If you would like to receive this communication electronically, please contact [email or web address] or [phone] to request [employer] access.    [employer] is a tool which provides read-only access to select patient information with whom you have a relationship. Its easy to use and provides real time access to review your patients record including encounter summaries, notes, results, and demographic information.    If you feel you have received this communication in error or would no longer like to receive these types of communications, please e-mail [email or web address]

## 2024-05-15 NOTE — PATIENT INSTRUCTIONS
Bone density now  Dermatology f/u due now  Colonoscopy 2024  Labs every 3 months indefinitely  Return 1 year

## 2024-05-21 ENCOUNTER — LAB VISIT (OUTPATIENT)
Dept: LAB | Facility: HOSPITAL | Age: 69
End: 2024-05-21
Attending: RADIOLOGY
Payer: MEDICARE

## 2024-05-21 DIAGNOSIS — C61 PROSTATE CANCER: ICD-10-CM

## 2024-05-21 LAB
COMPLEXED PSA SERPL-MCNC: 0.04 NG/ML (ref 0–4)
TESTOST SERPL-MCNC: 154 NG/DL (ref 304–1227)

## 2024-05-21 PROCEDURE — 36415 COLL VENOUS BLD VENIPUNCTURE: CPT | Performed by: RADIOLOGY

## 2024-05-21 PROCEDURE — 84403 ASSAY OF TOTAL TESTOSTERONE: CPT | Performed by: RADIOLOGY

## 2024-05-21 PROCEDURE — 84153 ASSAY OF PSA TOTAL: CPT | Performed by: RADIOLOGY

## 2024-05-22 ENCOUNTER — PATIENT MESSAGE (OUTPATIENT)
Dept: TRANSPLANT | Facility: CLINIC | Age: 69
End: 2024-05-22
Payer: MEDICARE

## 2024-06-03 ENCOUNTER — PATIENT MESSAGE (OUTPATIENT)
Dept: TRANSPLANT | Facility: CLINIC | Age: 69
End: 2024-06-03
Payer: MEDICARE

## 2024-06-03 ENCOUNTER — LAB VISIT (OUTPATIENT)
Dept: LAB | Facility: HOSPITAL | Age: 69
End: 2024-06-03
Attending: INTERNAL MEDICINE
Payer: MEDICARE

## 2024-06-03 DIAGNOSIS — Z94.0 S/P KIDNEY TRANSPLANT: ICD-10-CM

## 2024-06-03 DIAGNOSIS — Z94.4 LIVER REPLACED BY TRANSPLANT: ICD-10-CM

## 2024-06-03 LAB
ALBUMIN SERPL BCP-MCNC: 3.4 G/DL (ref 3.5–5.2)
ALP SERPL-CCNC: 92 U/L (ref 55–135)
ALT SERPL W/O P-5'-P-CCNC: 51 U/L (ref 10–44)
ANION GAP SERPL CALC-SCNC: 6 MMOL/L (ref 3–11)
AST SERPL-CCNC: 44 U/L (ref 10–40)
BASOPHILS # BLD AUTO: 0.02 K/UL (ref 0–0.2)
BASOPHILS NFR BLD: 0.7 % (ref 0–1.9)
BILIRUB SERPL-MCNC: 1.6 MG/DL (ref 0.1–1)
BUN SERPL-MCNC: 17 MG/DL (ref 8–23)
CALCIUM SERPL-MCNC: 9.2 MG/DL (ref 8.7–10.5)
CHLORIDE SERPL-SCNC: 104 MMOL/L (ref 95–110)
CO2 SERPL-SCNC: 26 MMOL/L (ref 23–29)
CREAT SERPL-MCNC: 1.5 MG/DL (ref 0.5–1.4)
DIFFERENTIAL METHOD BLD: ABNORMAL
EOSINOPHIL # BLD AUTO: 0.1 K/UL (ref 0–0.5)
EOSINOPHIL NFR BLD: 2.1 % (ref 0–8)
ERYTHROCYTE [DISTWIDTH] IN BLOOD BY AUTOMATED COUNT: 14.8 % (ref 11.5–14.5)
EST. GFR  (NO RACE VARIABLE): 49.8 ML/MIN/1.73 M^2
GLUCOSE SERPL-MCNC: 125 MG/DL (ref 70–110)
HCT VFR BLD AUTO: 39.9 % (ref 40–54)
HGB BLD-MCNC: 13.5 G/DL (ref 14–18)
IMM GRANULOCYTES # BLD AUTO: 0.01 K/UL (ref 0–0.04)
IMM GRANULOCYTES NFR BLD AUTO: 0.3 % (ref 0–0.5)
LYMPHOCYTES # BLD AUTO: 0.8 K/UL (ref 1–4.8)
LYMPHOCYTES NFR BLD: 29 % (ref 18–48)
MCH RBC QN AUTO: 27.7 PG (ref 27–31)
MCHC RBC AUTO-ENTMCNC: 33.8 G/DL (ref 32–36)
MCV RBC AUTO: 82 FL (ref 82–98)
MONOCYTES # BLD AUTO: 0.3 K/UL (ref 0.3–1)
MONOCYTES NFR BLD: 10.5 % (ref 4–15)
NEUTROPHILS # BLD AUTO: 1.6 K/UL (ref 1.8–7.7)
NEUTROPHILS NFR BLD: 57.4 % (ref 38–73)
NRBC BLD-RTO: 0 /100 WBC
PLATELET # BLD AUTO: 111 K/UL (ref 150–450)
PMV BLD AUTO: 9.5 FL (ref 9.2–12.9)
POTASSIUM SERPL-SCNC: 4 MMOL/L (ref 3.5–5.1)
PROT SERPL-MCNC: 6.8 G/DL (ref 6–8.4)
RBC # BLD AUTO: 4.87 M/UL (ref 4.6–6.2)
SODIUM SERPL-SCNC: 136 MMOL/L (ref 136–145)
WBC # BLD AUTO: 2.86 K/UL (ref 3.9–12.7)

## 2024-06-03 PROCEDURE — 80053 COMPREHEN METABOLIC PANEL: CPT | Performed by: INTERNAL MEDICINE

## 2024-06-03 PROCEDURE — 36415 COLL VENOUS BLD VENIPUNCTURE: CPT | Performed by: INTERNAL MEDICINE

## 2024-06-03 PROCEDURE — 80197 ASSAY OF TACROLIMUS: CPT | Performed by: INTERNAL MEDICINE

## 2024-06-03 PROCEDURE — 85025 COMPLETE CBC W/AUTO DIFF WBC: CPT | Performed by: INTERNAL MEDICINE

## 2024-06-04 ENCOUNTER — TELEPHONE (OUTPATIENT)
Dept: TRANSPLANT | Facility: CLINIC | Age: 69
End: 2024-06-04
Payer: MEDICARE

## 2024-06-04 LAB — TACROLIMUS BLD-MCNC: 4.1 NG/ML (ref 5–15)

## 2024-06-04 NOTE — TELEPHONE ENCOUNTER
Labs reviewed via telephone.  Pt will repeat labs 6/10/24.    ----- Message from Shaniqua Matias MD sent at 6/4/2024  2:27 PM CDT -----  Repeat labs on Monday. Will not lower prograf even though creat is up a bit since liver tests are up; repeat labs on monday- please let patient know.

## 2024-06-07 ENCOUNTER — HOSPITAL ENCOUNTER (OUTPATIENT)
Dept: RADIOLOGY | Facility: HOSPITAL | Age: 69
Discharge: HOME | End: 2024-06-07
Attending: INTERNAL MEDICINE
Payer: MEDICARE

## 2024-06-07 DIAGNOSIS — Z79.818 LONG TERM (CURRENT) USE OF OTHER AGENTS AFFECTING ESTROGEN RECEPTORS AND ESTROGEN LEVELS: ICD-10-CM

## 2024-06-07 DIAGNOSIS — M85.80 OSTEOPENIA, UNSPECIFIED LOCATION: ICD-10-CM

## 2024-06-07 PROCEDURE — 77080 DXA BONE DENSITY AXIAL: CPT | Mod: TC

## 2024-06-10 ENCOUNTER — LAB VISIT (OUTPATIENT)
Dept: LAB | Facility: HOSPITAL | Age: 69
End: 2024-06-10
Attending: INTERNAL MEDICINE
Payer: MEDICARE

## 2024-06-10 DIAGNOSIS — Z94.0 S/P KIDNEY TRANSPLANT: ICD-10-CM

## 2024-06-10 DIAGNOSIS — Z94.4 LIVER REPLACED BY TRANSPLANT: ICD-10-CM

## 2024-06-10 LAB
ALBUMIN SERPL BCP-MCNC: 3.5 G/DL (ref 3.5–5.2)
ALP SERPL-CCNC: 95 U/L (ref 55–135)
ALT SERPL W/O P-5'-P-CCNC: 76 U/L (ref 10–44)
ANION GAP SERPL CALC-SCNC: 4 MMOL/L (ref 3–11)
AST SERPL-CCNC: 49 U/L (ref 10–40)
BASOPHILS # BLD AUTO: 0.05 K/UL (ref 0–0.2)
BASOPHILS NFR BLD: 1.1 % (ref 0–1.9)
BILIRUB SERPL-MCNC: 0.9 MG/DL (ref 0.1–1)
BUN SERPL-MCNC: 25 MG/DL (ref 8–23)
CALCIUM SERPL-MCNC: 9.2 MG/DL (ref 8.7–10.5)
CHLORIDE SERPL-SCNC: 108 MMOL/L (ref 95–110)
CO2 SERPL-SCNC: 26 MMOL/L (ref 23–29)
CREAT SERPL-MCNC: 1.5 MG/DL (ref 0.5–1.4)
DIFFERENTIAL METHOD BLD: ABNORMAL
EOSINOPHIL # BLD AUTO: 0.2 K/UL (ref 0–0.5)
EOSINOPHIL NFR BLD: 5.1 % (ref 0–8)
ERYTHROCYTE [DISTWIDTH] IN BLOOD BY AUTOMATED COUNT: 15.2 % (ref 11.5–14.5)
EST. GFR  (NO RACE VARIABLE): 49.8 ML/MIN/1.73 M^2
GLUCOSE SERPL-MCNC: 133 MG/DL (ref 70–110)
HCT VFR BLD AUTO: 42.9 % (ref 40–54)
HGB BLD-MCNC: 14.4 G/DL (ref 14–18)
IMM GRANULOCYTES # BLD AUTO: 0 K/UL (ref 0–0.04)
IMM GRANULOCYTES NFR BLD AUTO: 0 % (ref 0–0.5)
LYMPHOCYTES # BLD AUTO: 1.8 K/UL (ref 1–4.8)
LYMPHOCYTES NFR BLD: 38.9 % (ref 18–48)
MCH RBC QN AUTO: 27.9 PG (ref 27–31)
MCHC RBC AUTO-ENTMCNC: 33.6 G/DL (ref 32–36)
MCV RBC AUTO: 83 FL (ref 82–98)
MONOCYTES # BLD AUTO: 0.4 K/UL (ref 0.3–1)
MONOCYTES NFR BLD: 9.5 % (ref 4–15)
NEUTROPHILS # BLD AUTO: 2.1 K/UL (ref 1.8–7.7)
NEUTROPHILS NFR BLD: 45.4 % (ref 38–73)
NRBC BLD-RTO: 0 /100 WBC
PLATELET # BLD AUTO: 150 K/UL (ref 150–450)
PMV BLD AUTO: 9.8 FL (ref 9.2–12.9)
POTASSIUM SERPL-SCNC: 4.3 MMOL/L (ref 3.5–5.1)
PROT SERPL-MCNC: 7.2 G/DL (ref 6–8.4)
RBC # BLD AUTO: 5.17 M/UL (ref 4.6–6.2)
SODIUM SERPL-SCNC: 138 MMOL/L (ref 136–145)
WBC # BLD AUTO: 4.53 K/UL (ref 3.9–12.7)

## 2024-06-10 PROCEDURE — 85025 COMPLETE CBC W/AUTO DIFF WBC: CPT | Performed by: INTERNAL MEDICINE

## 2024-06-10 PROCEDURE — 80053 COMPREHEN METABOLIC PANEL: CPT | Performed by: INTERNAL MEDICINE

## 2024-06-10 PROCEDURE — 80197 ASSAY OF TACROLIMUS: CPT | Performed by: INTERNAL MEDICINE

## 2024-06-10 PROCEDURE — 36415 COLL VENOUS BLD VENIPUNCTURE: CPT | Performed by: INTERNAL MEDICINE

## 2024-06-11 ENCOUNTER — TELEPHONE (OUTPATIENT)
Dept: TRANSPLANT | Facility: CLINIC | Age: 69
End: 2024-06-11
Payer: MEDICARE

## 2024-06-11 DIAGNOSIS — R79.89 ELEVATED LFTS: ICD-10-CM

## 2024-06-11 DIAGNOSIS — Z94.4 LIVER REPLACED BY TRANSPLANT: Primary | ICD-10-CM

## 2024-06-11 LAB — TACROLIMUS BLD-MCNC: 5.5 NG/ML (ref 5–15)

## 2024-06-11 NOTE — TELEPHONE ENCOUNTER
[place] via portal by Dr. Matias      ----- Message from Shaniqua Matias MD sent at 6/10/2024  7:21 PM CDT -----  The Labs are up. Set up for a liver biopsy. Do weekly labs. Get abdo US with doppler - please let patient know.

## 2024-06-12 ENCOUNTER — HOSPITAL ENCOUNTER (OUTPATIENT)
Dept: RADIOLOGY | Facility: HOSPITAL | Age: 69
Discharge: HOME | End: 2024-06-12
Attending: INTERNAL MEDICINE
Payer: MEDICARE

## 2024-06-12 ENCOUNTER — TELEPHONE (OUTPATIENT)
Dept: TRANSPLANT | Facility: CLINIC | Age: 69
End: 2024-06-12
Payer: MEDICARE

## 2024-06-12 ENCOUNTER — TELEPHONE (OUTPATIENT)
Dept: INTERVENTIONAL RADIOLOGY/VASCULAR | Facility: CLINIC | Age: 69
End: 2024-06-12
Payer: MEDICARE

## 2024-06-12 DIAGNOSIS — Z94.4 LIVER REPLACED BY TRANSPLANT: Primary | Chronic | ICD-10-CM

## 2024-06-12 DIAGNOSIS — Z94.4 LIVER REPLACED BY TRANSPLANT: ICD-10-CM

## 2024-06-12 DIAGNOSIS — R79.89 ELEVATED LFTS: ICD-10-CM

## 2024-06-12 PROCEDURE — 93976 VASCULAR STUDY: CPT | Mod: TC

## 2024-06-12 PROCEDURE — 76705 ECHO EXAM OF ABDOMEN: CPT | Mod: 59,TC

## 2024-06-12 NOTE — TELEPHONE ENCOUNTER
Spoke to pt on phone,  Pt is scheduled on 6/26/2024 with arrival time of 9am for IR procedure at [facility] location.  Preop instructions given (NPO for 12hrs pt is taking Mounjaro), MUST have a ride home, Nurse will call 1-2  days before to go over instructions and medications),. pt verbally understood. Pt aware and confirmed, Thanks

## 2024-06-13 NOTE — TELEPHONE ENCOUNTER
US results reviewed via portal.  Will await next labs and biopsy results.  Labs 6/17/24 and biopsy 6/26/24.      ----- Message from Shaniqua Matias MD sent at 6/12/2024  8:45 PM CDT -----  Blood flow ok - please let patient know.

## 2024-06-17 ENCOUNTER — LAB VISIT (OUTPATIENT)
Dept: LAB | Facility: HOSPITAL | Age: 69
End: 2024-06-17
Attending: INTERNAL MEDICINE
Payer: MEDICARE

## 2024-06-17 ENCOUNTER — TELEPHONE (OUTPATIENT)
Dept: INTERVENTIONAL RADIOLOGY/VASCULAR | Facility: CLINIC | Age: 69
End: 2024-06-17
Payer: MEDICARE

## 2024-06-17 DIAGNOSIS — Z94.4 LIVER REPLACED BY TRANSPLANT: ICD-10-CM

## 2024-06-17 DIAGNOSIS — Z94.0 S/P KIDNEY TRANSPLANT: ICD-10-CM

## 2024-06-17 LAB
ALBUMIN SERPL BCP-MCNC: 3.5 G/DL (ref 3.5–5.2)
ALP SERPL-CCNC: 80 U/L (ref 55–135)
ALT SERPL W/O P-5'-P-CCNC: 36 U/L (ref 10–44)
ANION GAP SERPL CALC-SCNC: 9 MMOL/L (ref 3–11)
AST SERPL-CCNC: 17 U/L (ref 10–40)
BASOPHILS # BLD AUTO: 0.03 K/UL (ref 0–0.2)
BASOPHILS NFR BLD: 0.8 % (ref 0–1.9)
BILIRUB SERPL-MCNC: 0.6 MG/DL (ref 0.1–1)
BUN SERPL-MCNC: 25 MG/DL (ref 8–23)
CALCIUM SERPL-MCNC: 9 MG/DL (ref 8.7–10.5)
CHLORIDE SERPL-SCNC: 104 MMOL/L (ref 95–110)
CO2 SERPL-SCNC: 26 MMOL/L (ref 23–29)
CREAT SERPL-MCNC: 1.4 MG/DL (ref 0.5–1.4)
DIFFERENTIAL METHOD BLD: ABNORMAL
EOSINOPHIL # BLD AUTO: 0.2 K/UL (ref 0–0.5)
EOSINOPHIL NFR BLD: 5.4 % (ref 0–8)
ERYTHROCYTE [DISTWIDTH] IN BLOOD BY AUTOMATED COUNT: 15.2 % (ref 11.5–14.5)
EST. GFR  (NO RACE VARIABLE): 54.1 ML/MIN/1.73 M^2
GLUCOSE SERPL-MCNC: 133 MG/DL (ref 70–110)
HCT VFR BLD AUTO: 42.8 % (ref 40–54)
HGB BLD-MCNC: 14 G/DL (ref 14–18)
IMM GRANULOCYTES # BLD AUTO: 0 K/UL (ref 0–0.04)
IMM GRANULOCYTES NFR BLD AUTO: 0 % (ref 0–0.5)
INR PPP: 1 (ref 0.8–1.2)
LYMPHOCYTES # BLD AUTO: 1.2 K/UL (ref 1–4.8)
LYMPHOCYTES NFR BLD: 29.8 % (ref 18–48)
MCH RBC QN AUTO: 27.9 PG (ref 27–31)
MCHC RBC AUTO-ENTMCNC: 32.7 G/DL (ref 32–36)
MCV RBC AUTO: 85 FL (ref 82–98)
MONOCYTES # BLD AUTO: 0.3 K/UL (ref 0.3–1)
MONOCYTES NFR BLD: 8.3 % (ref 4–15)
NEUTROPHILS # BLD AUTO: 2.2 K/UL (ref 1.8–7.7)
NEUTROPHILS NFR BLD: 55.7 % (ref 38–73)
NRBC BLD-RTO: 0 /100 WBC
PLATELET # BLD AUTO: 146 K/UL (ref 150–450)
PMV BLD AUTO: 10.1 FL (ref 9.2–12.9)
POTASSIUM SERPL-SCNC: 4.5 MMOL/L (ref 3.5–5.1)
PROT SERPL-MCNC: 6.9 G/DL (ref 6–8.4)
PROTHROMBIN TIME: 11.5 SEC (ref 9–12.5)
RBC # BLD AUTO: 5.02 M/UL (ref 4.6–6.2)
SODIUM SERPL-SCNC: 139 MMOL/L (ref 136–145)
WBC # BLD AUTO: 3.86 K/UL (ref 3.9–12.7)

## 2024-06-17 PROCEDURE — 80197 ASSAY OF TACROLIMUS: CPT | Performed by: INTERNAL MEDICINE

## 2024-06-17 PROCEDURE — 80053 COMPREHEN METABOLIC PANEL: CPT | Performed by: INTERNAL MEDICINE

## 2024-06-17 PROCEDURE — 36415 COLL VENOUS BLD VENIPUNCTURE: CPT | Performed by: NURSE PRACTITIONER

## 2024-06-17 PROCEDURE — 85025 COMPLETE CBC W/AUTO DIFF WBC: CPT | Performed by: INTERNAL MEDICINE

## 2024-06-17 PROCEDURE — 85610 PROTHROMBIN TIME: CPT | Performed by: NURSE PRACTITIONER

## 2024-06-17 NOTE — TELEPHONE ENCOUNTER
"Received message from Susan in Liver transplant "Please cancel biopsy for Jordan Lopez.  Labs improved. . Procedure is canceled, thanks  "

## 2024-06-18 ENCOUNTER — TELEPHONE (OUTPATIENT)
Dept: TRANSPLANT | Facility: CLINIC | Age: 69
End: 2024-06-18
Payer: MEDICARE

## 2024-06-18 LAB — TACROLIMUS BLD-MCNC: 4.4 NG/ML (ref 5–15)

## 2024-06-18 NOTE — TELEPHONE ENCOUNTER
----- Message from Shaniqua Matias MD sent at 6/17/2024 10:16 PM CDT -----  The Labs are improved. Cancel liver biopsy. Labs again in one week - please let patient know.

## 2024-06-19 NOTE — TELEPHONE ENCOUNTER
Lab review as per below.  Will repeat labs 6/24/24.      ----- Message from Shaniqua Matias MD sent at 6/17/2024 10:16 PM CDT -----  The Labs are improved. Cancel liver biopsy. Labs again in one week - please let patient know.

## 2024-06-24 ENCOUNTER — LAB VISIT (OUTPATIENT)
Dept: LAB | Facility: HOSPITAL | Age: 69
End: 2024-06-24
Attending: INTERNAL MEDICINE
Payer: MEDICARE

## 2024-06-24 DIAGNOSIS — Z94.0 S/P KIDNEY TRANSPLANT: ICD-10-CM

## 2024-06-24 DIAGNOSIS — Z94.4 LIVER REPLACED BY TRANSPLANT: ICD-10-CM

## 2024-06-24 LAB
ALBUMIN SERPL BCP-MCNC: 3.6 G/DL (ref 3.5–5.2)
ALP SERPL-CCNC: 79 U/L (ref 55–135)
ALT SERPL W/O P-5'-P-CCNC: 28 U/L (ref 10–44)
ANION GAP SERPL CALC-SCNC: 9 MMOL/L (ref 3–11)
AST SERPL-CCNC: 18 U/L (ref 10–40)
BASOPHILS # BLD AUTO: 0.04 K/UL (ref 0–0.2)
BASOPHILS NFR BLD: 1 % (ref 0–1.9)
BILIRUB SERPL-MCNC: 0.5 MG/DL (ref 0.1–1)
BUN SERPL-MCNC: 24 MG/DL (ref 8–23)
CALCIUM SERPL-MCNC: 9.1 MG/DL (ref 8.7–10.5)
CHLORIDE SERPL-SCNC: 103 MMOL/L (ref 95–110)
CO2 SERPL-SCNC: 27 MMOL/L (ref 23–29)
CREAT SERPL-MCNC: 1.6 MG/DL (ref 0.5–1.4)
DIFFERENTIAL METHOD BLD: ABNORMAL
EOSINOPHIL # BLD AUTO: 0.2 K/UL (ref 0–0.5)
EOSINOPHIL NFR BLD: 6 % (ref 0–8)
ERYTHROCYTE [DISTWIDTH] IN BLOOD BY AUTOMATED COUNT: 14.7 % (ref 11.5–14.5)
EST. GFR  (NO RACE VARIABLE): 46.1 ML/MIN/1.73 M^2
GLUCOSE SERPL-MCNC: 126 MG/DL (ref 70–110)
HCT VFR BLD AUTO: 43.3 % (ref 40–54)
HGB BLD-MCNC: 14.3 G/DL (ref 14–18)
IMM GRANULOCYTES # BLD AUTO: 0.01 K/UL (ref 0–0.04)
IMM GRANULOCYTES NFR BLD AUTO: 0.3 % (ref 0–0.5)
LYMPHOCYTES # BLD AUTO: 1.3 K/UL (ref 1–4.8)
LYMPHOCYTES NFR BLD: 33 % (ref 18–48)
MCH RBC QN AUTO: 27.8 PG (ref 27–31)
MCHC RBC AUTO-ENTMCNC: 33 G/DL (ref 32–36)
MCV RBC AUTO: 84 FL (ref 82–98)
MONOCYTES # BLD AUTO: 0.4 K/UL (ref 0.3–1)
MONOCYTES NFR BLD: 9.3 % (ref 4–15)
NEUTROPHILS # BLD AUTO: 2 K/UL (ref 1.8–7.7)
NEUTROPHILS NFR BLD: 50.4 % (ref 38–73)
NRBC BLD-RTO: 0 /100 WBC
PLATELET # BLD AUTO: 136 K/UL (ref 150–450)
PMV BLD AUTO: 10.2 FL (ref 9.2–12.9)
POTASSIUM SERPL-SCNC: 4.2 MMOL/L (ref 3.5–5.1)
PROT SERPL-MCNC: 7.1 G/DL (ref 6–8.4)
RBC # BLD AUTO: 5.15 M/UL (ref 4.6–6.2)
SODIUM SERPL-SCNC: 139 MMOL/L (ref 136–145)
WBC # BLD AUTO: 4 K/UL (ref 3.9–12.7)

## 2024-06-24 PROCEDURE — 85025 COMPLETE CBC W/AUTO DIFF WBC: CPT | Performed by: INTERNAL MEDICINE

## 2024-06-24 PROCEDURE — 80197 ASSAY OF TACROLIMUS: CPT | Performed by: INTERNAL MEDICINE

## 2024-06-24 PROCEDURE — 80053 COMPREHEN METABOLIC PANEL: CPT | Performed by: INTERNAL MEDICINE

## 2024-06-24 PROCEDURE — 36415 COLL VENOUS BLD VENIPUNCTURE: CPT | Performed by: INTERNAL MEDICINE

## 2024-06-25 LAB — TACROLIMUS BLD-MCNC: 3.1 NG/ML (ref 5–15)

## 2024-06-26 ENCOUNTER — PATIENT MESSAGE (OUTPATIENT)
Dept: TRANSPLANT | Facility: CLINIC | Age: 69
End: 2024-06-26
Payer: MEDICARE

## 2024-06-26 ENCOUNTER — PATIENT MESSAGE (OUTPATIENT)
Dept: RADIATION ONCOLOGY | Facility: CLINIC | Age: 69
End: 2024-06-26
Payer: MEDICARE

## 2024-06-27 ENCOUNTER — TELEPHONE (OUTPATIENT)
Dept: TRANSPLANT | Facility: CLINIC | Age: 69
End: 2024-06-27
Payer: MEDICARE

## 2024-06-27 NOTE — TELEPHONE ENCOUNTER
Labs reviewed via portal and are stable.  Patient will repeat labs on 7/1/24        ----- Message from Shaniqua Matias MD sent at 6/25/2024  5:04 AM CDT -----  The Labs are much improved - please let patient know.

## 2024-07-01 ENCOUNTER — LAB VISIT (OUTPATIENT)
Dept: LAB | Facility: HOSPITAL | Age: 69
End: 2024-07-01
Attending: INTERNAL MEDICINE
Payer: MEDICARE

## 2024-07-01 DIAGNOSIS — Z94.4 LIVER REPLACED BY TRANSPLANT: ICD-10-CM

## 2024-07-01 DIAGNOSIS — Z94.0 S/P KIDNEY TRANSPLANT: ICD-10-CM

## 2024-07-01 LAB
ALBUMIN SERPL BCP-MCNC: 3.6 G/DL (ref 3.5–5.2)
ALP SERPL-CCNC: 84 U/L (ref 55–135)
ALT SERPL W/O P-5'-P-CCNC: 26 U/L (ref 10–44)
ANION GAP SERPL CALC-SCNC: 5 MMOL/L (ref 3–11)
AST SERPL-CCNC: 17 U/L (ref 10–40)
BASOPHILS # BLD AUTO: 0.04 K/UL (ref 0–0.2)
BASOPHILS NFR BLD: 0.9 % (ref 0–1.9)
BILIRUB SERPL-MCNC: 0.6 MG/DL (ref 0.1–1)
BUN SERPL-MCNC: 22 MG/DL (ref 8–23)
CALCIUM SERPL-MCNC: 9 MG/DL (ref 8.7–10.5)
CHLORIDE SERPL-SCNC: 105 MMOL/L (ref 95–110)
CO2 SERPL-SCNC: 29 MMOL/L (ref 23–29)
CREAT SERPL-MCNC: 1.6 MG/DL (ref 0.5–1.4)
DIFFERENTIAL METHOD BLD: ABNORMAL
EOSINOPHIL # BLD AUTO: 0.4 K/UL (ref 0–0.5)
EOSINOPHIL NFR BLD: 7.7 % (ref 0–8)
ERYTHROCYTE [DISTWIDTH] IN BLOOD BY AUTOMATED COUNT: 14.6 % (ref 11.5–14.5)
EST. GFR  (NO RACE VARIABLE): 46.1 ML/MIN/1.73 M^2
GLUCOSE SERPL-MCNC: 125 MG/DL (ref 70–110)
HCT VFR BLD AUTO: 43.6 % (ref 40–54)
HGB BLD-MCNC: 14.4 G/DL (ref 14–18)
IMM GRANULOCYTES # BLD AUTO: 0.01 K/UL (ref 0–0.04)
IMM GRANULOCYTES NFR BLD AUTO: 0.2 % (ref 0–0.5)
LYMPHOCYTES # BLD AUTO: 1.3 K/UL (ref 1–4.8)
LYMPHOCYTES NFR BLD: 26.8 % (ref 18–48)
MCH RBC QN AUTO: 28.1 PG (ref 27–31)
MCHC RBC AUTO-ENTMCNC: 33 G/DL (ref 32–36)
MCV RBC AUTO: 85 FL (ref 82–98)
MONOCYTES # BLD AUTO: 0.3 K/UL (ref 0.3–1)
MONOCYTES NFR BLD: 6.9 % (ref 4–15)
NEUTROPHILS # BLD AUTO: 2.7 K/UL (ref 1.8–7.7)
NEUTROPHILS NFR BLD: 57.5 % (ref 38–73)
NRBC BLD-RTO: 0 /100 WBC
PLATELET # BLD AUTO: 168 K/UL (ref 150–450)
PMV BLD AUTO: 10.9 FL (ref 9.2–12.9)
POTASSIUM SERPL-SCNC: 3.8 MMOL/L (ref 3.5–5.1)
PROT SERPL-MCNC: 7.2 G/DL (ref 6–8.4)
RBC # BLD AUTO: 5.13 M/UL (ref 4.6–6.2)
SODIUM SERPL-SCNC: 139 MMOL/L (ref 136–145)
WBC # BLD AUTO: 4.66 K/UL (ref 3.9–12.7)

## 2024-07-01 PROCEDURE — 36415 COLL VENOUS BLD VENIPUNCTURE: CPT | Performed by: INTERNAL MEDICINE

## 2024-07-01 PROCEDURE — 80197 ASSAY OF TACROLIMUS: CPT | Performed by: INTERNAL MEDICINE

## 2024-07-01 PROCEDURE — 85025 COMPLETE CBC W/AUTO DIFF WBC: CPT | Performed by: INTERNAL MEDICINE

## 2024-07-01 PROCEDURE — 80053 COMPREHEN METABOLIC PANEL: CPT | Performed by: INTERNAL MEDICINE

## 2024-07-02 LAB — TACROLIMUS BLD-MCNC: 4.3 NG/ML (ref 5–15)

## 2024-07-05 ENCOUNTER — TELEPHONE (OUTPATIENT)
Dept: TRANSPLANT | Facility: CLINIC | Age: 69
End: 2024-07-05
Payer: MEDICARE

## 2024-07-05 DIAGNOSIS — Z94.4 LIVER REPLACED BY TRANSPLANT: Chronic | ICD-10-CM

## 2024-07-05 NOTE — TELEPHONE ENCOUNTER
Labs reviewed via portal as per below.  Will repeat labs 7/15/24.      ----- Message from Shaniqua Matias MD sent at 7/3/2024 12:27 PM CDT -----  The Labs are stable; lower prograf to 1/1 from 2/1 and repeat labs in 2 weeks - please let patient know.

## 2024-07-06 RX ORDER — TACROLIMUS 1 MG/1
1 CAPSULE ORAL EVERY 12 HOURS
Qty: 60 CAPSULE | Refills: 11 | Status: SHIPPED | OUTPATIENT
Start: 2024-07-06

## 2024-07-09 DIAGNOSIS — Z85.05 PERSONAL HISTORY OF MALIGNANT HEPATOMA: Primary | ICD-10-CM

## 2024-07-17 ENCOUNTER — TELEPHONE (OUTPATIENT)
Dept: TRANSPLANT | Facility: CLINIC | Age: 69
End: 2024-07-17
Payer: MEDICARE

## 2024-07-17 ENCOUNTER — HOSPITAL ENCOUNTER (INPATIENT)
Facility: HOSPITAL | Age: 69
LOS: 1 days | Discharge: HOME HEALTH | DRG: 300 | End: 2024-07-18
Attending: STUDENT IN AN ORGANIZED HEALTH CARE EDUCATION/TRAINING PROGRAM | Admitting: STUDENT IN AN ORGANIZED HEALTH CARE EDUCATION/TRAINING PROGRAM
Payer: MEDICARE

## 2024-07-17 DIAGNOSIS — R78.81 STAPHYLOCOCCUS AUREUS BACTEREMIA: Primary | ICD-10-CM

## 2024-07-17 DIAGNOSIS — B95.61 STAPHYLOCOCCUS AUREUS BACTEREMIA: Primary | ICD-10-CM

## 2024-07-17 LAB
ALBUMIN SERPL BCP-MCNC: 2.7 G/DL (ref 3.5–5.2)
ALP SERPL-CCNC: 112 U/L (ref 55–135)
ALT SERPL W/O P-5'-P-CCNC: 46 U/L (ref 10–44)
ANION GAP SERPL CALC-SCNC: 8 MMOL/L (ref 3–11)
AST SERPL-CCNC: 28 U/L (ref 10–40)
BASOPHILS # BLD AUTO: 0.01 K/UL (ref 0–0.2)
BASOPHILS NFR BLD: 0.1 % (ref 0–1.9)
BILIRUB SERPL-MCNC: 0.8 MG/DL (ref 0.1–1)
BUN SERPL-MCNC: 30 MG/DL (ref 8–23)
CALCIUM SERPL-MCNC: 8.2 MG/DL (ref 8.7–10.5)
CHLORIDE SERPL-SCNC: 101 MMOL/L (ref 95–110)
CO2 SERPL-SCNC: 26 MMOL/L (ref 23–29)
CREAT SERPL-MCNC: 1.7 MG/DL (ref 0.5–1.4)
DIFFERENTIAL METHOD BLD: ABNORMAL
EOSINOPHIL # BLD AUTO: 0 K/UL (ref 0–0.5)
EOSINOPHIL NFR BLD: 0.3 % (ref 0–8)
ERYTHROCYTE [DISTWIDTH] IN BLOOD BY AUTOMATED COUNT: 15.7 % (ref 11.5–14.5)
EST. GFR  (NO RACE VARIABLE): 42.8 ML/MIN/1.73 M^2
GLUCOSE SERPL-MCNC: 115 MG/DL (ref 70–110)
HCT VFR BLD AUTO: 36 % (ref 40–54)
HGB BLD-MCNC: 12 G/DL (ref 14–18)
IMM GRANULOCYTES # BLD AUTO: 0.02 K/UL (ref 0–0.04)
IMM GRANULOCYTES NFR BLD AUTO: 0.3 % (ref 0–0.5)
LACTATE SERPL-SCNC: 0.7 MMOL/L (ref 0.5–2.2)
LYMPHOCYTES # BLD AUTO: 0.7 K/UL (ref 1–4.8)
LYMPHOCYTES NFR BLD: 9.7 % (ref 18–48)
MCH RBC QN AUTO: 27.6 PG (ref 27–31)
MCHC RBC AUTO-ENTMCNC: 33.3 G/DL (ref 32–36)
MCV RBC AUTO: 83 FL (ref 82–98)
MONOCYTES # BLD AUTO: 0.4 K/UL (ref 0.3–1)
MONOCYTES NFR BLD: 5.4 % (ref 4–15)
NEUTROPHILS # BLD AUTO: 6.1 K/UL (ref 1.8–7.7)
NEUTROPHILS NFR BLD: 84.2 % (ref 38–73)
NRBC BLD-RTO: 0 /100 WBC
PLATELET # BLD AUTO: 136 K/UL (ref 150–450)
PMV BLD AUTO: 10.5 FL (ref 9.2–12.9)
POTASSIUM SERPL-SCNC: 3.8 MMOL/L (ref 3.5–5.1)
PROT SERPL-MCNC: 6.4 G/DL (ref 6–8.4)
RBC # BLD AUTO: 4.34 M/UL (ref 4.6–6.2)
SODIUM SERPL-SCNC: 135 MMOL/L (ref 136–145)
VANCOMYCIN SERPL-MCNC: 6.3 UG/ML
WBC # BLD AUTO: 7.21 K/UL (ref 3.9–12.7)

## 2024-07-17 PROCEDURE — 80053 COMPREHEN METABOLIC PANEL: CPT | Performed by: STUDENT IN AN ORGANIZED HEALTH CARE EDUCATION/TRAINING PROGRAM

## 2024-07-17 PROCEDURE — 83735 ASSAY OF MAGNESIUM: CPT | Performed by: STUDENT IN AN ORGANIZED HEALTH CARE EDUCATION/TRAINING PROGRAM

## 2024-07-17 PROCEDURE — 87040 BLOOD CULTURE FOR BACTERIA: CPT | Performed by: STUDENT IN AN ORGANIZED HEALTH CARE EDUCATION/TRAINING PROGRAM

## 2024-07-17 PROCEDURE — 80202 ASSAY OF VANCOMYCIN: CPT | Performed by: STUDENT IN AN ORGANIZED HEALTH CARE EDUCATION/TRAINING PROGRAM

## 2024-07-17 PROCEDURE — G0378 HOSPITAL OBSERVATION PER HR: HCPCS

## 2024-07-17 PROCEDURE — 85025 COMPLETE CBC W/AUTO DIFF WBC: CPT | Performed by: STUDENT IN AN ORGANIZED HEALTH CARE EDUCATION/TRAINING PROGRAM

## 2024-07-17 PROCEDURE — 25000003 PHARM REV CODE 250: Performed by: STUDENT IN AN ORGANIZED HEALTH CARE EDUCATION/TRAINING PROGRAM

## 2024-07-17 PROCEDURE — 63600175 PHARM REV CODE 636 W HCPCS: Performed by: STUDENT IN AN ORGANIZED HEALTH CARE EDUCATION/TRAINING PROGRAM

## 2024-07-17 PROCEDURE — 83605 ASSAY OF LACTIC ACID: CPT | Performed by: STUDENT IN AN ORGANIZED HEALTH CARE EDUCATION/TRAINING PROGRAM

## 2024-07-17 RX ORDER — SODIUM CHLORIDE 9 MG/ML
INJECTION, SOLUTION INTRAVENOUS
Status: DISCONTINUED | OUTPATIENT
Start: 2024-07-17 | End: 2024-07-18 | Stop reason: HOSPADM

## 2024-07-17 RX ORDER — VANCOMYCIN 2 GRAM/500 ML IN 0.9 % SODIUM CHLORIDE INTRAVENOUS
15 ONCE
Status: COMPLETED | OUTPATIENT
Start: 2024-07-17 | End: 2024-07-18

## 2024-07-17 RX ORDER — PROCHLORPERAZINE EDISYLATE 5 MG/ML
5 INJECTION INTRAMUSCULAR; INTRAVENOUS EVERY 6 HOURS PRN
Status: DISCONTINUED | OUTPATIENT
Start: 2024-07-18 | End: 2024-07-18 | Stop reason: HOSPADM

## 2024-07-17 RX ORDER — ONDANSETRON HYDROCHLORIDE 2 MG/ML
4 INJECTION, SOLUTION INTRAVENOUS EVERY 8 HOURS PRN
Status: DISCONTINUED | OUTPATIENT
Start: 2024-07-18 | End: 2024-07-18 | Stop reason: HOSPADM

## 2024-07-17 RX ORDER — MORPHINE SULFATE 2 MG/ML
2 INJECTION, SOLUTION INTRAMUSCULAR; INTRAVENOUS EVERY 4 HOURS PRN
Status: DISCONTINUED | OUTPATIENT
Start: 2024-07-18 | End: 2024-07-18 | Stop reason: HOSPADM

## 2024-07-17 RX ORDER — MORPHINE SULFATE 4 MG/ML
4 INJECTION, SOLUTION INTRAMUSCULAR; INTRAVENOUS EVERY 4 HOURS PRN
Status: DISCONTINUED | OUTPATIENT
Start: 2024-07-18 | End: 2024-07-18

## 2024-07-17 RX ORDER — METHYLPREDNISOLONE 4 MG/1
TABLET ORAL
COMMUNITY
Start: 2024-07-13 | End: 2024-07-18

## 2024-07-17 RX ORDER — ACETAMINOPHEN 325 MG/1
650 TABLET ORAL EVERY 8 HOURS PRN
Status: DISCONTINUED | OUTPATIENT
Start: 2024-07-18 | End: 2024-07-18 | Stop reason: HOSPADM

## 2024-07-17 RX ORDER — SODIUM CHLORIDE 0.9 % (FLUSH) 0.9 %
10 SYRINGE (ML) INJECTION
Status: DISCONTINUED | OUTPATIENT
Start: 2024-07-18 | End: 2024-07-18 | Stop reason: HOSPADM

## 2024-07-17 RX ORDER — TALC
6 POWDER (GRAM) TOPICAL NIGHTLY PRN
Status: DISCONTINUED | OUTPATIENT
Start: 2024-07-18 | End: 2024-07-18 | Stop reason: HOSPADM

## 2024-07-17 RX ADMIN — PIPERACILLIN SODIUM AND TAZOBACTAM SODIUM 3.38 G: 3; .375 INJECTION, POWDER, FOR SOLUTION INTRAVENOUS at 10:07

## 2024-07-17 RX ADMIN — SODIUM CHLORIDE: 9 INJECTION, SOLUTION INTRAVENOUS at 10:07

## 2024-07-17 RX ADMIN — Medication 2000 MG: at 11:07

## 2024-07-17 NOTE — Clinical Note
Diagnosis: Staphylococcus aureus bacteremia [057775]   Future Attending Provider: YUSUF ORTEGA [[id number]]

## 2024-07-17 NOTE — TELEPHONE ENCOUNTER
Labs reviewed via portal and are stable.  Patient will repeat labs on 10/7/24 plan of care.        ----- Message from Shaniqua Matias MD sent at 7/16/2024 10:53 PM CDT -----  The Labs are stable - please let patient know.

## 2024-07-18 VITALS
TEMPERATURE: 99 F | RESPIRATION RATE: 20 BRPM | WEIGHT: 314 LBS | HEART RATE: 76 BPM | OXYGEN SATURATION: 95 % | BODY MASS INDEX: 42.53 KG/M2 | HEIGHT: 72 IN | DIASTOLIC BLOOD PRESSURE: 73 MMHG | SYSTOLIC BLOOD PRESSURE: 141 MMHG

## 2024-07-18 LAB
ALBUMIN SERPL BCP-MCNC: 2.7 G/DL (ref 3.5–5.2)
ALP SERPL-CCNC: 99 U/L (ref 55–135)
ALT SERPL W/O P-5'-P-CCNC: 37 U/L (ref 10–44)
ANION GAP SERPL CALC-SCNC: 8 MMOL/L (ref 3–11)
AST SERPL-CCNC: 16 U/L (ref 10–40)
BASOPHILS # BLD AUTO: 0.02 K/UL (ref 0–0.2)
BASOPHILS NFR BLD: 0.3 % (ref 0–1.9)
BILIRUB SERPL-MCNC: 0.6 MG/DL (ref 0.1–1)
BUN SERPL-MCNC: 24 MG/DL (ref 8–23)
CALCIUM SERPL-MCNC: 8.7 MG/DL (ref 8.7–10.5)
CHLORIDE SERPL-SCNC: 102 MMOL/L (ref 95–110)
CO2 SERPL-SCNC: 27 MMOL/L (ref 23–29)
CREAT SERPL-MCNC: 1.6 MG/DL (ref 0.5–1.4)
DIFFERENTIAL METHOD BLD: ABNORMAL
EOSINOPHIL # BLD AUTO: 0.2 K/UL (ref 0–0.5)
EOSINOPHIL NFR BLD: 2.4 % (ref 0–8)
ERYTHROCYTE [DISTWIDTH] IN BLOOD BY AUTOMATED COUNT: 15.7 % (ref 11.5–14.5)
EST. GFR  (NO RACE VARIABLE): 46.1 ML/MIN/1.73 M^2
GLUCOSE SERPL-MCNC: 117 MG/DL (ref 70–110)
HCT VFR BLD AUTO: 37.4 % (ref 40–54)
HGB BLD-MCNC: 12.3 G/DL (ref 14–18)
IMM GRANULOCYTES # BLD AUTO: 0.02 K/UL (ref 0–0.04)
IMM GRANULOCYTES NFR BLD AUTO: 0.3 % (ref 0–0.5)
LYMPHOCYTES # BLD AUTO: 0.9 K/UL (ref 1–4.8)
LYMPHOCYTES NFR BLD: 13.3 % (ref 18–48)
MAGNESIUM SERPL-MCNC: 2.2 MG/DL (ref 1.6–2.6)
MCH RBC QN AUTO: 27.8 PG (ref 27–31)
MCHC RBC AUTO-ENTMCNC: 32.9 G/DL (ref 32–36)
MCV RBC AUTO: 84 FL (ref 82–98)
MONOCYTES # BLD AUTO: 0.5 K/UL (ref 0.3–1)
MONOCYTES NFR BLD: 6.5 % (ref 4–15)
NEUTROPHILS # BLD AUTO: 5.5 K/UL (ref 1.8–7.7)
NEUTROPHILS NFR BLD: 77.2 % (ref 38–73)
NRBC BLD-RTO: 0 /100 WBC
PLATELET # BLD AUTO: 128 K/UL (ref 150–450)
PMV BLD AUTO: 10.2 FL (ref 9.2–12.9)
POCT GLUCOSE: 100 MG/DL (ref 70–110)
POTASSIUM SERPL-SCNC: 4 MMOL/L (ref 3.5–5.1)
PROT SERPL-MCNC: 6.5 G/DL (ref 6–8.4)
RBC # BLD AUTO: 4.43 M/UL (ref 4.6–6.2)
SODIUM SERPL-SCNC: 137 MMOL/L (ref 136–145)
WBC # BLD AUTO: 7.09 K/UL (ref 3.9–12.7)

## 2024-07-18 PROCEDURE — 63600175 PHARM REV CODE 636 W HCPCS: Performed by: STUDENT IN AN ORGANIZED HEALTH CARE EDUCATION/TRAINING PROGRAM

## 2024-07-18 PROCEDURE — 25000003 PHARM REV CODE 250: Performed by: STUDENT IN AN ORGANIZED HEALTH CARE EDUCATION/TRAINING PROGRAM

## 2024-07-18 PROCEDURE — 80053 COMPREHEN METABOLIC PANEL: CPT | Performed by: STUDENT IN AN ORGANIZED HEALTH CARE EDUCATION/TRAINING PROGRAM

## 2024-07-18 PROCEDURE — 02HV33Z INSERTION OF INFUSION DEVICE INTO SUPERIOR VENA CAVA, PERCUTANEOUS APPROACH: ICD-10-PCS | Performed by: STUDENT IN AN ORGANIZED HEALTH CARE EDUCATION/TRAINING PROGRAM

## 2024-07-18 PROCEDURE — 85025 COMPLETE CBC W/AUTO DIFF WBC: CPT | Performed by: STUDENT IN AN ORGANIZED HEALTH CARE EDUCATION/TRAINING PROGRAM

## 2024-07-18 PROCEDURE — 36415 COLL VENOUS BLD VENIPUNCTURE: CPT | Performed by: STUDENT IN AN ORGANIZED HEALTH CARE EDUCATION/TRAINING PROGRAM

## 2024-07-18 PROCEDURE — 99223 1ST HOSP IP/OBS HIGH 75: CPT | Mod: ,,, | Performed by: STUDENT IN AN ORGANIZED HEALTH CARE EDUCATION/TRAINING PROGRAM

## 2024-07-18 PROCEDURE — A4216 STERILE WATER/SALINE, 10 ML: HCPCS | Performed by: STUDENT IN AN ORGANIZED HEALTH CARE EDUCATION/TRAINING PROGRAM

## 2024-07-18 PROCEDURE — 36569 INSJ PICC 5 YR+ W/O IMAGING: CPT

## 2024-07-18 PROCEDURE — C1751 CATH, INF, PER/CENT/MIDLINE: HCPCS

## 2024-07-18 PROCEDURE — 11000001 HC ACUTE MED/SURG PRIVATE ROOM

## 2024-07-18 RX ORDER — SODIUM CHLORIDE 0.9 % (FLUSH) 0.9 %
10 SYRINGE (ML) INJECTION
Status: DISCONTINUED | OUTPATIENT
Start: 2024-07-18 | End: 2024-07-18 | Stop reason: HOSPADM

## 2024-07-18 RX ORDER — SODIUM CHLORIDE 0.9 % (FLUSH) 0.9 %
10 SYRINGE (ML) INJECTION EVERY 6 HOURS
Status: DISCONTINUED | OUTPATIENT
Start: 2024-07-18 | End: 2024-07-18 | Stop reason: HOSPADM

## 2024-07-18 RX ORDER — INSULIN ASPART 100 [IU]/ML
0-10 INJECTION, SOLUTION INTRAVENOUS; SUBCUTANEOUS
Status: DISCONTINUED | OUTPATIENT
Start: 2024-07-18 | End: 2024-07-18 | Stop reason: HOSPADM

## 2024-07-18 RX ORDER — SODIUM CHLORIDE 9 MG/ML
INJECTION, SOLUTION INTRAVENOUS CONTINUOUS
Status: DISCONTINUED | OUTPATIENT
Start: 2024-07-18 | End: 2024-07-18 | Stop reason: HOSPADM

## 2024-07-18 RX ORDER — LOSARTAN POTASSIUM 50 MG/1
100 TABLET ORAL DAILY
Status: DISCONTINUED | OUTPATIENT
Start: 2024-07-18 | End: 2024-07-18 | Stop reason: HOSPADM

## 2024-07-18 RX ORDER — LANOLIN ALCOHOL/MO/W.PET/CERES
400 CREAM (GRAM) TOPICAL DAILY
Status: DISCONTINUED | OUTPATIENT
Start: 2024-07-18 | End: 2024-07-18 | Stop reason: HOSPADM

## 2024-07-18 RX ORDER — IBUPROFEN 200 MG
24 TABLET ORAL
Status: DISCONTINUED | OUTPATIENT
Start: 2024-07-18 | End: 2024-07-18 | Stop reason: HOSPADM

## 2024-07-18 RX ORDER — FERROUS SULFATE, DRIED 160(50) MG
2 TABLET, EXTENDED RELEASE ORAL 2 TIMES DAILY
Status: DISCONTINUED | OUTPATIENT
Start: 2024-07-18 | End: 2024-07-18 | Stop reason: HOSPADM

## 2024-07-18 RX ORDER — TAMSULOSIN HYDROCHLORIDE 0.4 MG/1
0.4 CAPSULE ORAL DAILY
Status: DISCONTINUED | OUTPATIENT
Start: 2024-07-18 | End: 2024-07-18 | Stop reason: HOSPADM

## 2024-07-18 RX ORDER — FUROSEMIDE 20 MG/1
20 TABLET ORAL DAILY
COMMUNITY

## 2024-07-18 RX ORDER — AMLODIPINE BESYLATE 5 MG/1
5 TABLET ORAL DAILY
Status: DISCONTINUED | OUTPATIENT
Start: 2024-07-18 | End: 2024-07-18 | Stop reason: HOSPADM

## 2024-07-18 RX ORDER — TACROLIMUS 1 MG/1
1 CAPSULE ORAL 2 TIMES DAILY
Status: DISCONTINUED | OUTPATIENT
Start: 2024-07-18 | End: 2024-07-18 | Stop reason: HOSPADM

## 2024-07-18 RX ORDER — GLUCAGON 1 MG
1 KIT INJECTION
Status: DISCONTINUED | OUTPATIENT
Start: 2024-07-18 | End: 2024-07-18 | Stop reason: HOSPADM

## 2024-07-18 RX ORDER — IBUPROFEN 200 MG
16 TABLET ORAL
Status: DISCONTINUED | OUTPATIENT
Start: 2024-07-18 | End: 2024-07-18 | Stop reason: HOSPADM

## 2024-07-18 RX ADMIN — CEFAZOLIN 2 G: 2 INJECTION, POWDER, FOR SOLUTION INTRAMUSCULAR; INTRAVENOUS at 12:07

## 2024-07-18 RX ADMIN — Medication 400 MG: at 09:07

## 2024-07-18 RX ADMIN — AMLODIPINE BESYLATE 5 MG: 5 TABLET ORAL at 09:07

## 2024-07-18 RX ADMIN — SODIUM CHLORIDE: 9 INJECTION, SOLUTION INTRAVENOUS at 09:07

## 2024-07-18 RX ADMIN — LOSARTAN POTASSIUM 100 MG: 50 TABLET, FILM COATED ORAL at 09:07

## 2024-07-18 RX ADMIN — PIPERACILLIN SODIUM AND TAZOBACTAM SODIUM 3.38 G: 3; .375 INJECTION, POWDER, FOR SOLUTION INTRAVENOUS at 05:07

## 2024-07-18 RX ADMIN — Medication 10 ML: at 03:07

## 2024-07-18 RX ADMIN — TAMSULOSIN HYDROCHLORIDE 0.4 MG: 0.4 CAPSULE ORAL at 09:07

## 2024-07-18 RX ADMIN — Medication 2 TABLET: at 10:07

## 2024-07-18 RX ADMIN — TACROLIMUS 1 MG: 1 CAPSULE ORAL at 09:07

## 2024-07-18 NOTE — PROGRESS NOTES
VANCOMYCIN DOSING BY PHARMACY DISCONTINUATION NOTE    Jordan Lopez Jr. is a 68 y.o. male who had been consulted for vancomycin dosing.    The pharmacy consult for vancomycin dosing has been discontinued.     Vancomycin Dosing by Pharmacy Consult will sign-off. Please reconsult if necessary. Thank you for allowing us to participate in this patient's care.     Thank you for the consult,   Nereyda Naik, Pharm.D.  Inpatient Pharmacist  [phone]

## 2024-07-18 NOTE — ED PROVIDER NOTES
History  Chief Complaint   Patient presents with    Infection     Pt to the ER for eval after testing positive for staph in ER yesterday, states he was instructed to return to ER for ABX.      68-year-old male with history of combined liver and kidney transplant presents for evaluation of positive blood cultures.  Blood cultures grew out staph aureus.  Patient advised to come back for further treatment.  Patient notes no fevers since discharge.  Patient also notes leg redness has improved.        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 Followed by [facility]     Type 2 diabetes mellitus 2007    diet controlled        Past Surgical History:   Procedure Laterality Date    CYSTOSCOPY      KIDNEY TRANSPLANT  6/29/2012    Combined liver-kidney transplant    LIVER TRANSPLANT  6/29/2012    Combined liver-kidney transplant    NEPHRECTOMY  2010    For stage 1 RCC right kidney (done at [facility])       Family History   Problem Relation Name Age of Onset    Kidney disease Mother          born with one kidney    Heart disease Father         Social History     Tobacco Use    Smoking status: Never    Smokeless tobacco: Never   Substance Use Topics    Alcohol use: No     Comment: Perhaps 2-3 drinks per year, never a drinker    Drug use: No       ROS  Review of Systems   Constitutional:  Negative for fever.   HENT:  Negative for congestion.    Eyes:  Negative for visual disturbance.   Respiratory:  Negative for cough and shortness of breath.    Cardiovascular:  Negative for chest pain.   Gastrointestinal:  Negative for abdominal pain,  nausea and vomiting.   Genitourinary:  Negative for dysuria.   Musculoskeletal:  Negative for arthralgias.   Skin:  Negative for color change.   Allergic/Immunologic: Negative for immunocompromised state.   Neurological:  Negative for headaches.   Hematological:  Negative for adenopathy. Does not bruise/bleed easily.       Physical Exam  BP (!) 150/76   Pulse 87   Temp 98.7 °F (37.1 °C)   Resp 18   Ht 6' (1.829 m)   Wt (!) 142.4 kg (314 lb)   SpO2 (!) 94%   BMI 42.59 kg/m²   Physical Exam    Constitutional: He appears well-developed and well-nourished. He is cooperative.   HENT:   Head: Normocephalic and atraumatic.   Eyes: Conjunctivae, EOM and lids are normal. Pupils are equal, round, and reactive to light.   Neck: Phonation normal.   Normal range of motion.  Cardiovascular:  Normal rate, regular rhythm and intact distal pulses.           Pulmonary/Chest: Breath sounds normal. No respiratory distress.   Abdominal: Abdomen is soft. There is no abdominal tenderness.   Musculoskeletal:      Cervical back: Normal range of motion.     Neurological: He is alert and oriented to person, place, and time.   Skin: Skin is warm and dry. There is erythema (Mild redness/swelling to the right lower extremity).   Psychiatric: He has a normal mood and affect. His speech is normal and behavior is normal.               Labs Reviewed   CULTURE, BLOOD   CULTURE, BLOOD   CBC W/ AUTO DIFFERENTIAL   COMPREHENSIVE METABOLIC PANEL   LACTIC ACID, PLASMA           Imaging Results    None                     Procedures             Medical Decision Making  Will admit for staph aureus bacteremia.  Will consult patient's liver/kidney transplant physician    Amount and/or Complexity of Data Reviewed  Labs: ordered.    Risk  Decision regarding hospitalization.                    DISCHARGE NOTE:       Jordan Lopez Jr.'s  results have been reviewed with him.  He has been counseled regarding his diagnosis, treatment, and plan.  He  "verbally conveys understanding and agreement of the signs, symptoms, diagnosis, treatment and prognosis and additionally agrees to follow up as discussed.  He also agrees with the care-plan and conveys that all of his questions have been answered.  I have also provided discharge instructions for him that include: educational information regarding their diagnosis and treatment, and list of reasons why they would want to return to the ED prior to their follow-up appointment, should his condition change. He has been provided with education for proper emergency department utilization.      CLINICAL IMPRESSION:         1. Staphylococcus aureus bacteremia              PLAN:   1. {DCLocations:93570::"Discharge Home"}  2.   New Prescriptions    No medications on file     3. No follow-up provider specified.     "

## 2024-07-18 NOTE — ED NOTES
Patients transplant doctor wants to hold off on his immunosuppressant Cellcept until further notice

## 2024-07-18 NOTE — H&P
[facility] Emergency Department  [facility]  History & Physical    Patient Name: Jordan Lopez Jr.  MRN: [id number]  Patient Class: IP- Inpatient  Admission Date: 7/17/2024  Attending Physician: Jasen Mccann DO   Primary Care Provider: Jose Rafael Trujillo MD         Patient information was obtained from patient, spouse/SO, and ER records.     Subjective:     Principal Problem:Staphylococcus aureus bacteremia    Chief Complaint:   Chief Complaint   Patient presents with    Infection     Pt to the ER for eval after testing positive for staph in ER yesterday, states he was instructed to return to ER for ABX.         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 Patient had an outpatient venous sclerosing procedure for venous insufficiency last Thursday (7/11/24) at [facility]. He was started on PO steroids for post op phlebitis with low grade fever of 99F and right leg, ankle swelling surrounding access site noted over medial right leg.    ED course:  Vital signs on arrival, /76 mmHg, HR 87, SpO2 94% on room air, temp 98.7F.  Right leg x ray findings of diffuse soft tissue swelling.  No acute osseous finding.  WBC 7.21, Hg 12, HCT 36, ,   Na 128, K 3.8, Cl 95, CO2 27, Glu 130, BUN 29, Cr 1.7, Ca 8.2, Alb 2.7, Tbili 0.8, AST18, ALT 46, Alk phos 112.   ER attending consulted transplant services with instructions to hold Cellcept and continue Prograf during antibiotics therapy for bacteremia.   Patient received sepsis bolus IVF NS 30/kg and started on vancomycin and zosyn.     Patient to be admitted to medicine services for continued IV antibiotics and monitor response.   Patient examined in the ER. Patient endorses pain and swelling,  redness have improved with start of antibiotics.    to touch over medial lower right leg. Denies chill, headaches, nausea, vomiting, chest pain, palpitations, shortness of breath.   Continue with current IV antibiotics and gentle IVF.                 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 Followed by [facility]     Type 2 diabetes mellitus 2007    diet controlled      Past Surgical History:   Procedure Laterality Date    CYSTOSCOPY      KIDNEY TRANSPLANT  6/29/2012    Combined liver-kidney transplant    LIVER TRANSPLANT  6/29/2012    Combined liver-kidney transplant    NEPHRECTOMY  2010    For stage 1 RCC right kidney (done at [facility])     Review of patient's allergies indicates:   Allergen Reactions    Levaquin [levofloxacin] Rash       No current facility-administered medications on file prior to encounter.     Current Outpatient Medications on File Prior to Encounter   Medication Sig    amLODIPine (NORVASC) 5 MG tablet Take 5 mg by mouth once daily.    CALCIUM CARBONATE/VITAMIN D3 (CALTRATE 600 + D ORAL) Take 1 tablet by mouth 2 (two) times daily.    empagliflozin (JARDIANCE) 25 mg tablet Take 25 mg by mouth once daily.    famotidine (PEPCID) 20 MG tablet Take 40 mg by mouth once daily.    furosemide (LASIX) 20 MG tablet Take 20 mg by mouth once daily.    losartan (COZAAR) 100 MG tablet Take 100 mg by mouth once daily.    magnesium oxide (MAG-OX) 250 mg magnesium Tab Take 500 mg by mouth once daily.    MOUNJARO 7.5 mg/0.5 mL PnIj Inject into the skin.    MULTIVITAMIN W-MINERALS/LUTEIN (CENTRUM SILVER ORAL)  "Take 1 tablet by mouth Daily.    mycophenolate (CELLCEPT) 250 mg Cap TAKE 1 CAPSULE BY MOUTH 2 TIMES DAILY    rosuvastatin (CRESTOR) 5 MG tablet Take 5 mg by mouth nightly.    tacrolimus (PROGRAF) 1 MG Cap Take 1 capsule (1 mg total) by mouth every 12 (twelve) hours.    tamsulosin (FLOMAX) 0.4 mg Cap Take 1 capsule (0.4 mg total) by mouth once daily.    TOUJEO SOLOSTAR U-300 INSULIN 300 unit/mL (1.5 mL) InPn pen     [DISCONTINUED] methylPREDNISolone (MEDROL DOSEPACK) 4 mg tablet TAKE 6 TABLETS ON DAY 1 AS DIRECTED ON PACKAGE AND DECREASE BY 1 TAB EACH DAY FOR A TOTAL OF 6 DAYS    clotrimazole-betamethasone 1-0.05% (LOTRISONE) cream Apply topically 2 (two) times daily.    FARXIGA 10 mg tablet Take 10 mg by mouth.    MOUNJARO 10 mg/0.5 mL PnIj SMARTSIG:10 Milliliter(s) SUB-Q Once a Week    mv-min-folic acid-lutein (CENTRUM SILVER) 400-250 mcg Chew Take by mouth.    propylene glycol (SYSTANE BALANCE OPHT) Apply 1-2 drops to eye daily as needed (dry eyes).    TRUEPLUS PEN NEEDLE 32 gauge x 5/32" Ndle     [DISCONTINUED] cephALEXin (KEFLEX) 500 MG capsule Take 1 capsule (500 mg total) by mouth every 8 (eight) hours. for 7 days    [DISCONTINUED] clindamycin (CLEOCIN) 300 MG capsule Take 300 mg by mouth 3 (three) times daily.     Family History       Problem Relation (Age of Onset)    Heart disease Father    Kidney disease Mother          Tobacco Use    Smoking status: Never    Smokeless tobacco: Never   Substance and Sexual Activity    Alcohol use: No     Comment: Perhaps 2-3 drinks per year, never a drinker    Drug use: No    Sexual activity: Not on file     Review of Systems   Constitutional:  Negative for activity change, appetite change, chills, diaphoresis, fatigue and fever.   HENT:  Negative for sinus pain and sore throat.    Eyes:  Negative for pain and visual disturbance.   Respiratory:  Negative for cough, choking, chest tightness, shortness of breath and wheezing.    Cardiovascular:  Negative for chest pain, " palpitations and leg swelling.   Gastrointestinal:  Negative for abdominal distention, abdominal pain, blood in stool, constipation, diarrhea, nausea and vomiting.   Genitourinary:  Negative for decreased urine volume.   Musculoskeletal:  Positive for gait problem and joint swelling. Negative for neck pain and neck stiffness.   Skin:  Positive for color change.   Neurological:  Negative for dizziness, tremors, seizures, speech difficulty, light-headedness and numbness.   Psychiatric/Behavioral:  Negative for agitation, behavioral problems, confusion, dysphoric mood, hallucinations and suicidal ideas. The patient is not nervous/anxious.      Objective:     Vital Signs (Most Recent):  Temp: 97.6 °F (36.4 °C) (07/18/24 0759)  Pulse: 84 (07/18/24 0759)  Resp: 18 (07/18/24 0759)  BP: 135/77 (07/18/24 0759)  SpO2: (!) 94 % (07/18/24 0759) Vital Signs (24h Range):  Temp:  [97.6 °F (36.4 °C)-98.7 °F (37.1 °C)] 97.6 °F (36.4 °C)  Pulse:  [75-87] 84  Resp:  [18] 18  SpO2:  [94 %] 94 %  BP: (119-150)/(69-77) 135/77     Weight: (!) 142.4 kg (314 lb)  Body mass index is 42.59 kg/m².     Physical Exam  Vitals and nursing note reviewed.   Constitutional:       General: He is not in acute distress.     Appearance: Normal appearance. He is obese. He is not ill-appearing, toxic-appearing or diaphoretic.   HENT:      Head: Normocephalic and atraumatic.      Right Ear: External ear normal.      Left Ear: External ear normal.      Nose: Nose normal.      Mouth/Throat:      Mouth: Mucous membranes are moist.      Pharynx: Oropharynx is clear.   Eyes:      Extraocular Movements: Extraocular movements intact.      Conjunctiva/sclera: Conjunctivae normal.   Cardiovascular:      Rate and Rhythm: Normal rate and regular rhythm.      Pulses: Normal pulses.      Heart sounds: Normal heart sounds. No murmur heard.  Pulmonary:      Effort: Pulmonary effort is normal. No respiratory distress.      Breath sounds: Normal breath sounds. No wheezing  or rales.   Abdominal:      General: Abdomen is flat. There is no distension.      Palpations: Abdomen is soft. There is no mass.      Tenderness: There is no abdominal tenderness. There is no left CVA tenderness or guarding.   Musculoskeletal:         General: Swelling and tenderness present. Normal range of motion.      Cervical back: Normal range of motion and neck supple. No rigidity.      Right lower leg: Edema present.   Skin:     General: Skin is warm and dry.      Capillary Refill: Capillary refill takes less than 2 seconds.      Findings: Erythema present. No rash.   Neurological:      General: No focal deficit present.      Mental Status: He is alert and oriented to person, place, and time. Mental status is at baseline.      Motor: No weakness.      Gait: Gait normal.   Psychiatric:         Mood and Affect: Mood normal.         Behavior: Behavior normal.         Thought Content: Thought content normal.         Judgment: Judgment normal.                Significant Labs: All pertinent labs within the past 24 hours have been reviewed.  A1C:   Recent Labs   Lab 03/22/24  0759 04/15/24  0734 07/15/24  0724   HGBA1C 6.3* 6.7* 6.0*     Recent Labs   Lab 06/24/24  0724 07/01/24  0719 07/15/24  0724 07/16/24  2009 07/17/24  2139   BILITOT 0.5 0.6 0.7 1.0 0.8     Blood Culture:   Recent Labs   Lab 07/16/24 2016 07/17/24 2138 07/17/24 2139   LABBLOO Gram stain becky bottle: Gram positive cocci in clusters resembling Staph  Results called to and read back by:Jazmin Chowdhury RN 07/17/2024  09:31  Gram stain aer bottle: Gram positive cocci in clusters resembling Staph  Positive results previously called 07/17/2024  13:07 No Growth to date No Growth to date     BMP:   Recent Labs   Lab 07/17/24 2139   *   *   K 3.8      CO2 26   BUN 30*   CREATININE 1.7*   CALCIUM 8.2*     CBC:   Recent Labs   Lab 07/16/24 2009 07/17/24 2139   WBC 8.77 7.21   HGB 13.2* 12.0*   HCT 39.9* 36.0*   * 136*  "    CMP:   Recent Labs   Lab 07/16/24 2009 07/17/24  2139   * 135*   K 3.8 3.8   CL 95 101   CO2 27 26   * 115*   BUN 29* 30*   CREATININE 1.6* 1.7*   CALCIUM 8.5* 8.2*   PROT 7.0 6.4   ALBUMIN 3.1* 2.7*   BILITOT 1.0 0.8   ALKPHOS 116 112   AST 32 28   ALT 45* 46*   ANIONGAP 6 8     Recent Labs   Lab 07/16/24 2009 07/16/24  2351 07/17/24  2139   LACTATE 0.9 0.8 0.7     Significant Imaging: I have reviewed all pertinent imaging results/findings within the past 24 hours.  Assessment/Plan:     * Staphylococcus aureus bacteremia  Blood cx x2 (7/16/24) positive for MSSA.   Given loading dose vancomycin and zosyn.   - continue IV abx  - f/u recommendation Pharm ID  - f/u blood cx x2 (7/17/24)  - continue IVF 50/hr      Renal hypertension  Estimated Creatinine Clearance: 59.2 mL/min (A) (based on SCr of 1.7 mg/dL (H)).  Continue home medications. Monitor renal function.         Type 2 diabetes mellitus  Patient's FSGs are controlled on current medication regimen.  Last A1c reviewed-   Lab Results   Component Value Date    HGBA1C 6.0 (H) 07/15/2024     Most recent fingerstick glucose reviewed- No results for input(s): "POCTGLUCOSE" in the last 24 hours.  Current correctional scale  Medium  Maintain anti-hyperglycemic dose as follows-   Antihyperglycemics (From admission, onward)      Start     Stop Route Frequency Ordered    07/18/24 0944  insulin aspart U-100 pen 0-10 Units         -- SubQ Before meals & nightly PRN 07/18/24 0845          Hold Oral hypoglycemics while patient is in the hospital.    Long-term use of immunosuppressant medication  On prograf, cellcept.  Recently started on PO steroids per vascular surgery.    Hold cellcept until bacteremia treatment completed.       Hypertension  Chronic, controlled. Latest blood pressure and vitals reviewed-     Temp:  [97.6 °F (36.4 °C)-98.7 °F (37.1 °C)]   Pulse:  [75-87]   Resp:  [18]   BP: (119-150)/(69-77)   SpO2:  [94 %] .   Home meds for hypertension " were reviewed and noted below.   Hypertension Medications               amLODIPine (NORVASC) 5 MG tablet Take 5 mg by mouth once daily.    furosemide (LASIX) 20 MG tablet Take 20 mg by mouth once daily.    losartan (COZAAR) 100 MG tablet Take 100 mg by mouth once daily.            While in the hospital, will manage blood pressure as follows; Continue home antihypertensive regimen    Will utilize p.r.n. blood pressure medication only if patient's blood pressure greater than 180/110 and he develops symptoms such as worsening chest pain or shortness of breath.    Obesity  Body mass index is 42.59 kg/m². Morbid obesity complicates all aspects of disease management from diagnostic modalities to treatment. Weight loss encouraged and health benefits explained to patient.         Liver replaced by transplant  History of liver and left renal transplant.   Currently on immunosuppressive therapy with Cellcept and prograf.   Per transplant continue prograf BID, hold Cellcept until bacteremia therapy completed.   - avoid hepatotoxic meds      VTE Risk Mitigation (From admission, onward)           Ordered     Place sequential compression device  Until discontinued         07/18/24 0840     IP VTE HIGH RISK PATIENT  Once         07/17/24 2343                               Pharmacokinetic Initial Assessment: IV Vancomycin    Assessment/Plan:  Patient received 2gm dose on 7/16 in ED then  Initiate intravenous vancomycin with loading dose of 2000 mg once with subsequent doses when random concentrations are less than 20 mcg/mL  Desired empiric serum trough concentration is 15 to 20 mcg/mL  Draw vancomycin random level on 7/18 at 11:30.  Pharmacy will continue to follow and monitor vancomycin.      Please contact pharmacy with any questions regarding this assessment.     Thank you for the consult,   Vinicius Brito       Patient brief summary:  Jordan Lopez Jr. is a 68 y.o. male initiated on antimicrobial therapy with IV  Vancomycin for treatment of suspected bacteremia    Drug Allergies:   Review of patient's allergies indicates:   Allergen Reactions    Levaquin [levofloxacin] Rash       Actual Body Weight:   142.4kg    Renal Function:   Estimated Creatinine Clearance: 59.2 mL/min (A) (based on SCr of 1.7 mg/dL (H)).,     Dialysis Method (if applicable):  N/A    CBC (last 72 hours):  Recent Labs   Lab Result Units 07/15/24  0724 07/16/24 2009 07/17/24  2139   WBC K/uL 7.38 8.77 7.21   Hemoglobin g/dL 14.0 13.2* 12.0*   Hemoglobin A1C % 6.0*  --   --    Hematocrit % 41.6 39.9* 36.0*   Platelets K/uL 166 147* 136*   Gran % % 76.2* 91.7* 84.2*   Lymph % % 13.7* 3.0* 9.7*   Mono % % 9.1 4.7 5.4   Eosinophil % % 0.4 0.0 0.3   Basophil % % 0.3 0.1 0.1   Differential Method  Automated Automated Automated       Metabolic Panel (last 72 hours):  Recent Labs   Lab Result Units 07/15/24  0724 07/15/24  0725 07/16/24 2009 07/16/24 2144 07/17/24 2139   Sodium mmol/L 134*  --  128*  --  135*   Potassium mmol/L 3.8  --  3.8  --  3.8   Chloride mmol/L 98  --  95  --  101   CO2 mmol/L 27  --  27  --  26   Glucose mg/dL 114*  --  130*  --  115*   Glucose, UA   --  4+*  --  4+*  --    BUN mg/dL 29*  --  29*  --  30*   Creatinine mg/dL 1.5*  --  1.6*  --  1.7*   Creatinine, Urine mg/dL  --  154.0  --   --   --    Albumin g/dL 3.3*  --  3.1*  --  2.7*   Total Bilirubin mg/dL 0.7  --  1.0  --  0.8   Alkaline Phosphatase U/L 81  --  116  --  112   AST U/L 15  --  32  --  28   ALT U/L 31  --  45*  --  46*   Magnesium mg/dL 2.4  --   --   --   --        Drug levels (last 3 results):  Recent Labs   Lab Result Units 07/17/24 2138   Vancomycin, Random ug/mL 6.3       Microbiologic Results:  Microbiology Results (last 7 days)       Procedure Component Value Units Date/Time    Blood culture #2 **CANNOT BE ORDERED STAT** [[id number]] Collected: 07/17/24 2139    Order Status: Sent Specimen: Blood     Blood culture #1 **CANNOT BE ORDERED STAT** [[id number]]  Collected: 07/17/24 2138    Order Status: Sent Specimen: Blood               Jasen Mccann DO  Department of Hospital Medicine  [facility] - Emergency Department

## 2024-07-18 NOTE — ASSESSMENT & PLAN NOTE
Patient's FSGs are controlled on current medication regimen.  Last A1c reviewed-   Lab Results   Component Value Date    HGBA1C 6.0 (H) 07/15/2024     Most recent fingerstick glucose reviewed-   Recent Labs   Lab 07/18/24  0906   POCTGLUCOSE 100     Current correctional scale  Medium  Maintain anti-hyperglycemic dose as follows-   Antihyperglycemics (From admission, onward)      Start     Stop Route Frequency Ordered    07/18/24 0944  insulin aspart U-100 pen 0-10 Units         -- SubQ Before meals & nightly PRN 07/18/24 0845          Hold Oral hypoglycemics while patient is in the hospital.

## 2024-07-18 NOTE — ASSESSMENT & PLAN NOTE
Estimated Creatinine Clearance: 59.2 mL/min (A) (based on SCr of 1.7 mg/dL (H)).  Continue home medications. Monitor renal function.

## 2024-07-18 NOTE — ASSESSMENT & PLAN NOTE
Blood cx x2 (7/16/24) positive for MSSA. Per patient, obtained latest echocardiogram within the past 2 months.   Given loading dose vancomycin and zosyn.   - continue cefazolin 2 g Q8H for total of 14 days therapy   - f/u blood cx x2 (7/17/24)  - f/u primary cardiology

## 2024-07-18 NOTE — PROGRESS NOTES
Pharmacokinetic Initial Assessment: IV Vancomycin    Assessment/Plan:  Patient received 2gm dose on 7/16 in ED then  Initiate intravenous vancomycin with loading dose of 2000 mg once with subsequent doses when random concentrations are less than 20 mcg/mL  Desired empiric serum trough concentration is 15 to 20 mcg/mL  Draw vancomycin random level on 7/18 at 11:30.  Pharmacy will continue to follow and monitor vancomycin.      Please contact pharmacy with any questions regarding this assessment.     Thank you for the consult,   Vinicius Brito       Patient brief summary:  Jordan Lopez Jr. is a 68 y.o. male initiated on antimicrobial therapy with IV Vancomycin for treatment of suspected bacteremia    Drug Allergies:   Review of patient's allergies indicates:   Allergen Reactions    Levaquin [levofloxacin] Rash       Actual Body Weight:   142.4kg    Renal Function:   Estimated Creatinine Clearance: 59.2 mL/min (A) (based on SCr of 1.7 mg/dL (H)).,     Dialysis Method (if applicable):  N/A    CBC (last 72 hours):  Recent Labs   Lab Result Units 07/15/24  0724 07/16/24 2009 07/17/24  2139   WBC K/uL 7.38 8.77 7.21   Hemoglobin g/dL 14.0 13.2* 12.0*   Hemoglobin A1C % 6.0*  --   --    Hematocrit % 41.6 39.9* 36.0*   Platelets K/uL 166 147* 136*   Gran % % 76.2* 91.7* 84.2*   Lymph % % 13.7* 3.0* 9.7*   Mono % % 9.1 4.7 5.4   Eosinophil % % 0.4 0.0 0.3   Basophil % % 0.3 0.1 0.1   Differential Method  Automated Automated Automated       Metabolic Panel (last 72 hours):  Recent Labs   Lab Result Units 07/15/24  0724 07/15/24  0725 07/16/24 2009 07/16/24 2144 07/17/24  2139   Sodium mmol/L 134*  --  128*  --  135*   Potassium mmol/L 3.8  --  3.8  --  3.8   Chloride mmol/L 98  --  95  --  101   CO2 mmol/L 27  --  27  --  26   Glucose mg/dL 114*  --  130*  --  115*   Glucose, UA   --  4+*  --  4+*  --    BUN mg/dL 29*  --  29*  --  30*   Creatinine mg/dL 1.5*  --  1.6*  --  1.7*   Creatinine, Urine mg/dL  --  154.0   --   --   --    Albumin g/dL 3.3*  --  3.1*  --  2.7*   Total Bilirubin mg/dL 0.7  --  1.0  --  0.8   Alkaline Phosphatase U/L 81  --  116  --  112   AST U/L 15  --  32  --  28   ALT U/L 31  --  45*  --  46*   Magnesium mg/dL 2.4  --   --   --   --        Drug levels (last 3 results):  Recent Labs   Lab Result Units 07/17/24 2138   Vancomycin, Random ug/mL 6.3       Microbiologic Results:  Microbiology Results (last 7 days)       Procedure Component Value Units Date/Time    Blood culture #2 **CANNOT BE ORDERED STAT** [[id number]] Collected: 07/17/24 2139    Order Status: Sent Specimen: Blood     Blood culture #1 **CANNOT BE ORDERED STAT** [[id number]] Collected: 07/17/24 2138    Order Status: Sent Specimen: Blood

## 2024-07-18 NOTE — PLAN OF CARE
[facility] - Emergency Department  Initial Discharge Assessment       Primary Care Provider: Jose Rafael Trujillo MD    Admission Diagnosis: Staphylococcus aureus bacteremia [R78.81, B95.61]    Admission Date: 7/17/2024  Expected Discharge Date:     Transition of Care Barriers: None    Payor: BCBS [place] MEDICARE / Plan: BCBS [place] / Product Type: Medicare Advantage /     Extended Emergency Contact Information  Primary Emergency Contact: darrell cisneros  Address: [address] [place] [address] [id number] [place] [address]  Home Phone: [phone]  Mobile Phone: [phone]  Relation: Spouse   needed? No    Discharge Plan A: Home, Home with family  Discharge Plan B: Home with family      [facility] [place], [place] - [address] [facility].  [address].  [facility] [address]  Phone: [phone] Fax: [phone]    [facility] - [place], [place] - [address] [place] [address]  Phone: [phone] Fax: [phone]    [facility] - [place], [place] - [address]  [address] [facility] [address] [facility] [address]  Phone: [phone] Fax: [phone]    [facility] - [place], [place] - [address]  [address] [facility] [address]  Phone: [phone] Fax: [phone]    [id number] - [place], [place] - [address]  [address]  [facility] [address]  Phone: [phone] Fax: [phone]      Initial Assessment (most recent)       Adult Discharge Assessment - 07/18/24 0816          Discharge Assessment    Assessment Type Discharge Planning Assessment     Confirmed/corrected address, phone number and insurance Yes     Confirmed Demographics Correct on Facesheet     Source of Information patient     Reason For Admission Bacteremia     People in Home spouse     Facility Arrived From: home     Do you expect to return to your current living situation?  Yes     Do you have help at home or someone to help you manage your care at home? Yes     Who are your caregiver(s) and their phone number(s)? Kristyn Hawthorne  [phone]     Prior to hospitilization cognitive status: Alert/Oriented     Current cognitive status: Alert/Oriented     Walking or Climbing Stairs Difficulty no     Dressing/Bathing Difficulty no     Equipment Currently Used at Home none     Patient currently being followed by outpatient case management? Unable to determine (comments)     Do you currently have service(s) that help you manage your care at home? No     Do you take prescription medications? Yes     Do you have any problems affording any of your prescribed medications? No     Is the patient taking medications as prescribed? yes     Who is going to help you get home at discharge? wife     How do you get to doctors appointments? car, drives self;family or friend will provide     Are you on dialysis? No     Do you take coumadin? No     Discharge Plan A Home;Home with family     Discharge Plan B Home with family     DME Needed Upon Discharge  none     Discharge Plan discussed with: Patient     Transition of Care Barriers None                   Patient awake, alert, wife at bedside.  Lives with wife, locally.  Plans to return home upon discharge.  Independent with all ADLs. No DME or DC needs identified.

## 2024-07-18 NOTE — PLAN OF CARE
07/18/24 1305   Post-Acute Status   Post-Acute Authorization Home Health   Home Health Status Set-up Complete/Auth obtained   IV Infusion Status Set-up Complete/Auth obtained     Spoke with patient and wife, they would like to use [facility].  Reached out to [facility] care [facility].  They state in network with [facility],  Alison confirms with Silvana, patient is accepted as patient.  Will fax DC orders when they are entered.

## 2024-07-18 NOTE — ED NOTES
Aleta Irvin [facility] [phone] wants pt's spouse to call her after pt has his PICC line in.

## 2024-07-18 NOTE — HOSPITAL COURSE
7/18/24 From time of first set of blood cx x2 (7/16/24) obtained, patient received vancomycin and zosyn x1 following discharge took dose of keflex and return to ER later that evening with preliminary culture identification of staphylococcus bacteremia. On 7/17/24 in the evening, another set of blood cx x2 collected, patient received vancomycin and zosyn x1.  De-escalation of IV antibiotics to cefazolin 2 g Q8H. Home IV infusion and home health nursing services have been arranged. PICC line? [facility] infusion and wife have been in contact with home medications arranged for evening dose.   Throughout stay, vital signs have been normotensive, afebrile. Patient with minimal pain complaint and ambulating in and out of hospital bed.   Both patient and his wife feel comfortable with continuing IV antibiotics therapy at home and close follow up with PCP and specialists outpatient. Cellcept to he held until follow up appointment with transplant team.   Referral to infectious disease placed to assess need for prolonged IV antibiotics. Current plan for completing 14 days of therapy with start date 7/17/24.   Will arrange for home health nursing to obtain weekly CBC and CMP labs on discharge.

## 2024-07-18 NOTE — ASSESSMENT & PLAN NOTE
On prograf, cellcept.  Recently started on PO steroids per vascular surgery.    Hold cellcept until bacteremia treatment completed.

## 2024-07-18 NOTE — ASSESSMENT & PLAN NOTE
History of liver and left renal transplant.   Currently on immunosuppressive therapy with Cellcept and prograf.   Per transplant continue prograf BID, hold Cellcept until bacteremia therapy completed.   - avoid hepatotoxic meds

## 2024-07-18 NOTE — SUBJECTIVE & OBJECTIVE
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 Followed by [facility]     Type 2 diabetes mellitus 2007    diet controlled      Past Surgical History:   Procedure Laterality Date    CYSTOSCOPY      KIDNEY TRANSPLANT  6/29/2012    Combined liver-kidney transplant    LIVER TRANSPLANT  6/29/2012    Combined liver-kidney transplant    NEPHRECTOMY  2010    For stage 1 RCC right kidney (done at [facility])     Review of patient's allergies indicates:   Allergen Reactions    Levaquin [levofloxacin] Rash       No current facility-administered medications on file prior to encounter.     Current Outpatient Medications on File Prior to Encounter   Medication Sig    amLODIPine (NORVASC) 5 MG tablet Take 5 mg by mouth once daily.    CALCIUM CARBONATE/VITAMIN D3 (CALTRATE 600 + D ORAL) Take 1 tablet by mouth 2 (two) times daily.    empagliflozin (JARDIANCE) 25 mg tablet Take 25 mg by mouth once daily.    famotidine (PEPCID) 20 MG tablet Take 40 mg by mouth once daily.    furosemide (LASIX) 20 MG tablet Take 20 mg by mouth once daily.    losartan (COZAAR) 100 MG tablet Take 100 mg by mouth once daily.    magnesium oxide (MAG-OX) 250 mg magnesium Tab Take 500 mg by mouth once daily.    MOUNJARO 7.5 mg/0.5 mL PnIj Inject into the skin.    MULTIVITAMIN W-MINERALS/LUTEIN (CENTRUM SILVER ORAL) Take 1 tablet by mouth Daily.    mycophenolate (CELLCEPT) 250 mg Cap TAKE 1 CAPSULE BY MOUTH 2 TIMES DAILY    rosuvastatin (CRESTOR) 5 MG tablet Take 5 mg by mouth nightly.    tacrolimus (PROGRAF) 1 MG Cap Take 1 capsule (1 mg total) by mouth every 12 (twelve)  "hours.    tamsulosin (FLOMAX) 0.4 mg Cap Take 1 capsule (0.4 mg total) by mouth once daily.    TOUJEO SOLOSTAR U-300 INSULIN 300 unit/mL (1.5 mL) InPn pen     [DISCONTINUED] methylPREDNISolone (MEDROL DOSEPACK) 4 mg tablet TAKE 6 TABLETS ON DAY 1 AS DIRECTED ON PACKAGE AND DECREASE BY 1 TAB EACH DAY FOR A TOTAL OF 6 DAYS    clotrimazole-betamethasone 1-0.05% (LOTRISONE) cream Apply topically 2 (two) times daily.    FARXIGA 10 mg tablet Take 10 mg by mouth.    MOUNJARO 10 mg/0.5 mL PnIj SMARTSIG:10 Milliliter(s) SUB-Q Once a Week    mv-min-folic acid-lutein (CENTRUM SILVER) 400-250 mcg Chew Take by mouth.    propylene glycol (SYSTANE BALANCE OPHT) Apply 1-2 drops to eye daily as needed (dry eyes).    TRUEPLUS PEN NEEDLE 32 gauge x 5/32" Ndle     [DISCONTINUED] cephALEXin (KEFLEX) 500 MG capsule Take 1 capsule (500 mg total) by mouth every 8 (eight) hours. for 7 days    [DISCONTINUED] clindamycin (CLEOCIN) 300 MG capsule Take 300 mg by mouth 3 (three) times daily.     Family History       Problem Relation (Age of Onset)    Heart disease Father    Kidney disease Mother          Tobacco Use    Smoking status: Never    Smokeless tobacco: Never   Substance and Sexual Activity    Alcohol use: No     Comment: Perhaps 2-3 drinks per year, never a drinker    Drug use: No    Sexual activity: Not on file     Review of Systems   Constitutional:  Negative for activity change, appetite change, chills, diaphoresis, fatigue and fever.   HENT:  Negative for sinus pain and sore throat.    Eyes:  Negative for pain and visual disturbance.   Respiratory:  Negative for cough, choking, chest tightness, shortness of breath and wheezing.    Cardiovascular:  Negative for chest pain, palpitations and leg swelling.   Gastrointestinal:  Negative for abdominal distention, abdominal pain, blood in stool, constipation, diarrhea, nausea and vomiting.   Genitourinary:  Negative for decreased urine volume.   Musculoskeletal:  Positive for gait problem " and joint swelling. Negative for neck pain and neck stiffness.   Skin:  Positive for color change.   Neurological:  Negative for dizziness, tremors, seizures, speech difficulty, light-headedness and numbness.   Psychiatric/Behavioral:  Negative for agitation, behavioral problems, confusion, dysphoric mood, hallucinations and suicidal ideas. The patient is not nervous/anxious.      Objective:     Vital Signs (Most Recent):  Temp: 97.6 °F (36.4 °C) (07/18/24 0759)  Pulse: 84 (07/18/24 0759)  Resp: 18 (07/18/24 0759)  BP: 135/77 (07/18/24 0759)  SpO2: (!) 94 % (07/18/24 0759) Vital Signs (24h Range):  Temp:  [97.6 °F (36.4 °C)-98.7 °F (37.1 °C)] 97.6 °F (36.4 °C)  Pulse:  [75-87] 84  Resp:  [18] 18  SpO2:  [94 %] 94 %  BP: (119-150)/(69-77) 135/77     Weight: (!) 142.4 kg (314 lb)  Body mass index is 42.59 kg/m².     Physical Exam  Vitals and nursing note reviewed.   Constitutional:       General: He is not in acute distress.     Appearance: Normal appearance. He is obese. He is not ill-appearing, toxic-appearing or diaphoretic.   HENT:      Head: Normocephalic and atraumatic.      Right Ear: External ear normal.      Left Ear: External ear normal.      Nose: Nose normal.      Mouth/Throat:      Mouth: Mucous membranes are moist.      Pharynx: Oropharynx is clear.   Eyes:      Extraocular Movements: Extraocular movements intact.      Conjunctiva/sclera: Conjunctivae normal.   Cardiovascular:      Rate and Rhythm: Normal rate and regular rhythm.      Pulses: Normal pulses.      Heart sounds: Normal heart sounds. No murmur heard.  Pulmonary:      Effort: Pulmonary effort is normal. No respiratory distress.      Breath sounds: Normal breath sounds. No wheezing or rales.   Abdominal:      General: Abdomen is flat. There is no distension.      Palpations: Abdomen is soft. There is no mass.      Tenderness: There is no abdominal tenderness. There is no left CVA tenderness or guarding.   Musculoskeletal:         General:  Swelling and tenderness present. Normal range of motion.      Cervical back: Normal range of motion and neck supple. No rigidity.      Right lower leg: Edema present.   Skin:     General: Skin is warm and dry.      Capillary Refill: Capillary refill takes less than 2 seconds.      Findings: Erythema present. No rash.   Neurological:      General: No focal deficit present.      Mental Status: He is alert and oriented to person, place, and time. Mental status is at baseline.      Motor: No weakness.      Gait: Gait normal.   Psychiatric:         Mood and Affect: Mood normal.         Behavior: Behavior normal.         Thought Content: Thought content normal.         Judgment: Judgment normal.                Significant Labs: All pertinent labs within the past 24 hours have been reviewed.  A1C:   Recent Labs   Lab 03/22/24  0759 04/15/24  0734 07/15/24  0724   HGBA1C 6.3* 6.7* 6.0*     Recent Labs   Lab 06/24/24  0724 07/01/24  0719 07/15/24  0724 07/16/24  2009 07/17/24  2139   BILITOT 0.5 0.6 0.7 1.0 0.8     Blood Culture:   Recent Labs   Lab 07/16/24 2016 07/17/24 2138 07/17/24 2139   LABBLOO Gram stain becky bottle: Gram positive cocci in clusters resembling Staph  Results called to and read back by:Jazmin Chowdhury RN 07/17/2024  09:31  Gram stain aer bottle: Gram positive cocci in clusters resembling Staph  Positive results previously called 07/17/2024  13:07 No Growth to date No Growth to date     BMP:   Recent Labs   Lab 07/17/24 2139   *   *   K 3.8      CO2 26   BUN 30*   CREATININE 1.7*   CALCIUM 8.2*     CBC:   Recent Labs   Lab 07/16/24 2009 07/17/24 2139   WBC 8.77 7.21   HGB 13.2* 12.0*   HCT 39.9* 36.0*   * 136*     CMP:   Recent Labs   Lab 07/16/24 2009 07/17/24 2139   * 135*   K 3.8 3.8   CL 95 101   CO2 27 26   * 115*   BUN 29* 30*   CREATININE 1.6* 1.7*   CALCIUM 8.5* 8.2*   PROT 7.0 6.4   ALBUMIN 3.1* 2.7*   BILITOT 1.0 0.8   ALKPHOS 116 112   AST 32 28    ALT 45* 46*   ANIONGAP 6 8     Recent Labs   Lab 07/16/24 2009 07/16/24  2351 07/17/24  2139   LACTATE 0.9 0.8 0.7     Significant Imaging: I have reviewed all pertinent imaging results/findings within the past 24 hours.

## 2024-07-18 NOTE — PROCEDURES
Jordan Lopez Jr. is a 68 y.o. male patient.    Temp: 98.6 °F (37 °C) (07/18/24 1610)  Pulse: 76 (07/18/24 1610)  Resp: 20 (07/18/24 1610)  BP: (!) 141/73 (07/18/24 1610)  SpO2: 95 % (07/18/24 1610)  Weight: (!) 142.4 kg (314 lb) (07/17/24 2102)  Height: 6' (182.9 cm) (07/17/24 2102)    PICC  Date/Time: 7/18/2024 4:48 PM  Performed by: Sofy Millard, LEO  Consent Done: Yes  Time out: Immediately prior to procedure a time out was called to verify the correct patient, procedure, equipment, support staff and site/side marked as required  Indications: med administration and vascular access  Anesthesia: local infiltration  Local anesthetic: lidocaine 1% without epinephrine  Anesthetic Total (mL): 5  Preparation: skin prepped with ChloraPrep  Skin prep agent dried: skin prep agent completely dried prior to procedure  Sterile barriers: all five maximum sterile barriers used - cap, mask, sterile gown, sterile gloves, and large sterile sheet  Hand hygiene: hand hygiene performed prior to central venous catheter insertion  Location details: right basilic  Catheter type: double lumen  Catheter size: 5 Fr  Catheter Length: 42cm    Ultrasound guidance: yes  Vessel Caliber: medium and patent, compressibility normal  Needle advanced into vessel with real time Ultrasound guidance.  Guidewire confirmed in vessel.  Sterile sheath used.  Number of attempts: 1  Post-procedure: blood return through all ports, sterile dressing applied and chlorhexidine patch    Assessment: placement verified by x-ray  Complications: none          Sofy Millard RN  7/18/2024

## 2024-07-18 NOTE — PLAN OF CARE
07/18/24 1309   Medicare Message   Important Message from Medicare regarding Discharge Appeal Rights Given to patient/caregiver;Explained to patient/caregiver;Signed/date by patient/caregiver;Other (comments)  (Verbal consent from patient's wife, Kristyn.)   Date IMM was signed 07/18/24   Time IMM was signed [id number]

## 2024-07-18 NOTE — ASSESSMENT & PLAN NOTE
Chronic, controlled. Latest blood pressure and vitals reviewed-     Temp:  [97.6 °F (36.4 °C)-98.7 °F (37.1 °C)]   Pulse:  [75-87]   Resp:  [18]   BP: (119-150)/(69-77)   SpO2:  [94 %] .   Home meds for hypertension were reviewed and noted below.   Hypertension Medications               amLODIPine (NORVASC) 5 MG tablet Take 5 mg by mouth once daily.    furosemide (LASIX) 20 MG tablet Take 20 mg by mouth once daily.    losartan (COZAAR) 100 MG tablet Take 100 mg by mouth once daily.            While in the hospital, will manage blood pressure as follows; Continue home antihypertensive regimen    Will utilize p.r.n. blood pressure medication only if patient's blood pressure greater than 180/110 and he develops symptoms such as worsening chest pain or shortness of breath.

## 2024-07-18 NOTE — ASSESSMENT & PLAN NOTE
Body mass index is 42.59 kg/m². Morbid obesity complicates all aspects of disease management from diagnostic modalities to treatment. Weight loss encouraged and health benefits explained to patient.

## 2024-07-18 NOTE — ASSESSMENT & PLAN NOTE
Estimated Creatinine Clearance: 62.9 mL/min (A) (based on SCr of 1.6 mg/dL (H)).  Continue home medications. Monitor renal function.

## 2024-07-18 NOTE — ASSESSMENT & PLAN NOTE
Blood cx x2 (7/16/24) positive for MSSA.   Given loading dose vancomycin and zosyn.   - continue IV abx  - f/u recommendation Pharm ID  - f/u blood cx x2 (7/17/24)  - continue IVF 50/hr

## 2024-07-18 NOTE — ASSESSMENT & PLAN NOTE
Chronic, controlled. Latest blood pressure and vitals reviewed-     Temp:  [97.6 °F (36.4 °C)-98.7 °F (37.1 °C)]   Pulse:  [75-87]   Resp:  [18-20]   BP: (119-164)/(69-87)   SpO2:  [91 %-95 %] .   Home meds for hypertension were reviewed and noted below.   Hypertension Medications               amLODIPine (NORVASC) 5 MG tablet Take 5 mg by mouth once daily.    furosemide (LASIX) 20 MG tablet Take 20 mg by mouth once daily.    losartan (COZAAR) 100 MG tablet Take 100 mg by mouth once daily.            While in the hospital, will manage blood pressure as follows; Continue home antihypertensive regimen    Will utilize p.r.n. blood pressure medication only if patient's blood pressure greater than 180/110 and he develops symptoms such as worsening chest pain or shortness of breath.

## 2024-07-18 NOTE — DISCHARGE SUMMARY
[facility] Medicine  Discharge Summary      Patient Name: Jordan Lopez Jr.  MRN: [id number]  FIGUEROA: [id number]  Patient Class: IP- Inpatient  Admission Date: 7/17/2024  Hospital Length of Stay: 1 days  Discharge Date and Time: 7/18/2024  6:34 PM  Attending Physician: No att. 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 Patient had an outpatient venous sclerosing procedure for venous insufficiency last Thursday (7/11/24) at [facility]. He was started on PO steroids for post op phlebitis with low grade fever of 99F and right leg, ankle swelling surrounding access site noted over medial right leg.    ED course:  Vital signs on arrival, /76 mmHg, HR 87, SpO2 94% on room air, temp 98.7F.  Right leg x ray findings of diffuse soft tissue swelling.  No acute osseous finding.  WBC 7.21, Hg 12, HCT 36, ,   Na 128, K 3.8, Cl 95, CO2 27, Glu 130, BUN 29, Cr 1.7, Ca 8.2, Alb 2.7, Tbili 0.8, AST18, ALT 46, Alk phos 112.   ER attending consulted transplant services with instructions to hold Cellcept and continue Prograf during antibiotics therapy for bacteremia.   Patient received sepsis bolus IVF NS 30/kg and started on vancomycin and zosyn.     Patient to be admitted to medicine services for continued IV antibiotics and monitor response.   Patient examined in the ER. Patient endorses pain and swelling, redness have improved with start of antibiotics.    to touch over medial lower right leg. Denies chill, headaches, nausea, vomiting, chest pain, palpitations,  shortness of breath.   Continue with current IV antibiotics and gentle IVF.                 * No surgery found *      Hospital Course:   7/18/24 From time of first set of blood cx x2 (7/16/24) obtained, patient received vancomycin and zosyn x1 following discharge took dose of keflex and return to ER later that evening with preliminary culture identification of staphylococcus bacteremia. On 7/17/24 in the evening, another set of blood cx x2 collected, patient received vancomycin and zosyn x1.  De-escalation of IV antibiotics to cefazolin 2 g Q8H. Home IV infusion and home health nursing services have been arranged. PICC line? [facility] infusion and wife have been in contact with home medications arranged for evening dose.   Throughout stay, vital signs have been normotensive, afebrile. Patient with minimal pain complaint and ambulating in and out of hospital bed.   Both patient and his wife feel comfortable with continuing IV antibiotics therapy at home and close follow up with PCP and specialists outpatient. Cellcept to he held until follow up appointment with transplant team.   Referral to infectious disease placed to assess need for prolonged IV antibiotics. Current plan for completing 14 days of therapy with start date 7/17/24.   Will arrange for home health nursing to obtain weekly CBC and CMP labs on discharge.        Goals of Care Treatment Preferences:  Code Status: Full Code      Consults:   Consults (From admission, onward)          Status Ordering Provider     Inpatient consult to Social Work/Case Management  Once        Provider:  (Not yet assigned)    Acknowledged YUSUF ORTEGA            Cardiac/Vascular  Renal hypertension  Estimated Creatinine Clearance: 62.9 mL/min (A) (based on SCr of 1.6 mg/dL (H)).  Continue home medications. Monitor renal function.         Hypertension  Chronic, controlled. Latest blood pressure and vitals reviewed-     Temp:  [97.6 °F (36.4 °C)-98.7 °F (37.1 °C)]   Pulse:   [75-87]   Resp:  [18-20]   BP: (119-164)/(69-87)   SpO2:  [91 %-95 %] .   Home meds for hypertension were reviewed and noted below.   Hypertension Medications               amLODIPine (NORVASC) 5 MG tablet Take 5 mg by mouth once daily.    furosemide (LASIX) 20 MG tablet Take 20 mg by mouth once daily.    losartan (COZAAR) 100 MG tablet Take 100 mg by mouth once daily.            While in the hospital, will manage blood pressure as follows; Continue home antihypertensive regimen    Will utilize p.r.n. blood pressure medication only if patient's blood pressure greater than 180/110 and he develops symptoms such as worsening chest pain or shortness of breath.    ID  * Staphylococcus aureus bacteremia  Blood cx x2 (7/16/24) positive for MSSA. Per patient, obtained latest echocardiogram within the past 2 months.   Given loading dose vancomycin and zosyn.   - continue cefazolin 2 g Q8H for total of 14 days therapy   - f/u blood cx x2 (7/17/24)  - f/u primary cardiology       Endocrine  Type 2 diabetes mellitus  Patient's FSGs are controlled on current medication regimen.  Last A1c reviewed-   Lab Results   Component Value Date    HGBA1C 6.0 (H) 07/15/2024     Most recent fingerstick glucose reviewed-   Recent Labs   Lab 07/18/24  0906   POCTGLUCOSE 100     Current correctional scale  Medium  Maintain anti-hyperglycemic dose as follows-   Antihyperglycemics (From admission, onward)      Start     Stop Route Frequency Ordered    07/18/24 0944  insulin aspart U-100 pen 0-10 Units         -- SubQ Before meals & nightly PRN 07/18/24 0845          Hold Oral hypoglycemics while patient is in the hospital.    Obesity  Body mass index is 42.59 kg/m². Morbid obesity complicates all aspects of disease management from diagnostic modalities to treatment. Weight loss encouraged and health benefits explained to patient.         GI  Liver replaced by transplant  History of liver and left renal transplant.   Currently on immunosuppressive  therapy with Cellcept and prograf.   Per transplant continue prograf BID, hold Cellcept until bacteremia therapy completed.   - avoid hepatotoxic meds    Palliative Care  Long-term use of immunosuppressant medication  On prograf, cellcept.  Recently started on PO steroids per vascular surgery.    Hold cellcept until bacteremia treatment completed.         Final Active Diagnoses:    Diagnosis Date Noted POA    PRINCIPAL PROBLEM:  Staphylococcus aureus bacteremia [R78.81, B95.61] 07/17/2024 Yes    Renal hypertension [I12.9]  Yes    Long-term use of immunosuppressant medication [Z79.60] 10/02/2012 Not Applicable    Type 2 diabetes mellitus [E11.9]  Yes    Obesity [E66.9]  Yes    Hypertension [I10]  Yes    Liver replaced by transplant [Z94.4] 06/29/2012 Not Applicable     Chronic      Problems Resolved During this Admission:    Diagnosis Date Noted Date Resolved POA    Tricuspid valve disorders, specified as nonrheumatic [I36.9] 12/28/2011 07/18/2024 Yes       Discharged Condition: stable    Disposition: Home-Health Care c    Follow Up:   Contact information for follow-up providers       Jose Rafael Trujillo MD Follow up in 3 day(s).    Specialty: Internal Medicine  Contact information:  [address]  [facility] [address]  [phone]               Juan Carlos Mosquera MD Follow up in 1 week(s).    Specialty: Cardiology  Contact information:  [address] [address]  [phone]                       Contact information for after-discharge care       Dialysis/Infusion       [facility] .    Service: Infusion and IV Therapy  Contact information:  [address]  [facility] [id number] [phone]                     Home Medical Care       NURSING CARE [facility] .    Service: Home Nursing  Contact information:  [address]  [facility] [address]  [phone]                                 Patient Instructions:      GUERO W/ AUTO  DIFFERENTIAL   Standing Status: Future Standing Exp. Date: 10/16/25     Comprehensive Metabolic Panel   Standing Status: Future Standing Exp. Date: 09/18/25     Ambulatory referral/consult to Home Health   Standing Status: Future   Referral Priority: Urgent Referral Type: Home Health   Referral Reason: Specialty Services Required   Requested Specialty: Home Health Services   Number of Visits Requested: 1     Ambulatory referral/consult to Infectious Disease   Standing Status: Future   Referral Priority: Routine Referral Type: Consultation   Referral Reason: Specialty Services Required   Referred to Provider: INDIRA NAVA Requested Specialty: Infectious Diseases   Number of Visits Requested: 1     PFC Facilitated Request     Order Specific Question Answer Comments   Is this a behavorial health placement that has been medically screened and is ready for transfer? (Labs should be resulted at this point) No    Request Type Physician Order [[id number]]    Diagnosis Staphylococcus aureus bacteremia [[id number]]    Reason for transfer Higher Level of Care [2]    Please explain request for higher level of care Was hoping to speak with Dr. Matias.  The pt doesnt not need to necessarily be transferred unless she indicates otherwise    Requested Patient Class IP- Inpatient [101]    Is a specialist or procedure needed? Yes    Requested specialty Transplant Liver [823]        Significant Diagnostic Studies: Labs: BMP:   Recent Labs   Lab 07/16/24 2009 07/17/24 2139 07/18/24  1818   * 115* 117*   * 135* 137   K 3.8 3.8 4.0   CL 95 101 102   CO2 27 26 27   BUN 29* 30* 24*   CREATININE 1.6* 1.7* 1.6*   CALCIUM 8.5* 8.2* 8.7   MG  --  2.2  --    , CMP   Recent Labs   Lab 07/16/24 2009 07/17/24 2139 07/18/24  1818   * 135* 137   K 3.8 3.8 4.0   CL 95 101 102   CO2 27 26 27   * 115* 117*   BUN 29* 30* 24*   CREATININE 1.6* 1.7* 1.6*   CALCIUM 8.5* 8.2* 8.7   PROT 7.0 6.4 6.5   ALBUMIN 3.1* 2.7* 2.7*   BILITOT  1.0 0.8 0.6   ALKPHOS 116 112 99   AST 32 28 16   ALT 45* 46* 37   ANIONGAP 6 8 8   , CBC   Recent Labs   Lab 07/16/24 2009 07/17/24 2139 07/18/24  1818   WBC 8.77 7.21 7.09   HGB 13.2* 12.0* 12.3*   HCT 39.9* 36.0* 37.4*   * 136* 128*   , and INR   Lab Results   Component Value Date    INR 1.0 06/17/2024    INR 1.1 08/06/2022    INR 1.2 08/06/2012     Microbiology: Blood Culture   Lab Results   Component Value Date    LABBLOO No Growth to date 07/17/2024       Pending Diagnostic Studies:       Procedure Component Value Units Date/Time    X-Ray Chest 1 View S/P PICC Line by Nursing [[id number]] Resulted: 07/18/24 1649    Order Status: Sent Lab Status: In process Updated: 07/18/24 1710           Medications:  Reconciled Home Medications:      Medication List        CONTINUE taking these medications      amLODIPine 5 MG tablet  Commonly known as: NORVASC  Take 5 mg by mouth once daily.     CALTRATE 600 + D ORAL  Take 1 tablet by mouth 2 (two) times daily.     CENTRUM SILVER 400-250 mcg Chew  Generic drug: mv-min-folic acid-lutein  Take by mouth.     CENTRUM SILVER ORAL  Take 1 tablet by mouth Daily.     famotidine 20 MG tablet  Commonly known as: PEPCID  Take 40 mg by mouth once daily.     FARXIGA 10 mg tablet  Generic drug: dapagliflozin propanediol  Take 10 mg by mouth.     furosemide 20 MG tablet  Commonly known as: LASIX  Take 20 mg by mouth once daily.     JARDIANCE 25 mg tablet  Generic drug: empagliflozin  Take 25 mg by mouth once daily.     losartan 100 MG tablet  Commonly known as: COZAAR  Take 100 mg by mouth once daily.     magnesium oxide 250 mg magnesium Tab  Commonly known as: MAG-OX  Take 500 mg by mouth once daily.     * MOUNJARO 7.5 mg/0.5 mL Pnij  Generic drug: tirzepatide  Inject into the skin.     * MOUNJARO 10 mg/0.5 mL Pnij  Generic drug: tirzepatide  SMARTSIG:10 Milliliter(s) SUB-Q Once a Week     mycophenolate 250 mg Cap  Commonly known as: CELLCEPT  TAKE 1 CAPSULE BY MOUTH 2 TIMES  "DAILY     rosuvastatin 5 MG tablet  Commonly known as: CRESTOR  Take 5 mg by mouth nightly.     tacrolimus 1 MG Cap  Commonly known as: PROGRAF  Take 1 capsule (1 mg total) by mouth every 12 (twelve) hours.     tamsulosin 0.4 mg Cap  Commonly known as: FLOMAX  Take 1 capsule (0.4 mg total) by mouth once daily.     TOUJEO SOLOSTAR U-300 INSULIN 300 unit/mL (1.5 mL) Inpn pen  Generic drug: insulin glargine (TOUJEO)     TRUEPLUS PEN NEEDLE 32 gauge x 5/32" Ndle  Generic drug: pen needle, diabetic           * This list has 2 medication(s) that are the same as other medications prescribed for you. Read the directions carefully, and ask your doctor or other care provider to review them with you.                ASK your doctor about these medications      clotrimazole-betamethasone 1-0.05% cream  Commonly known as: LOTRISONE  Apply topically 2 (two) times daily.     SYSTANE BALANCE OPHT  Apply 1-2 drops to eye daily as needed (dry eyes).              Indwelling Lines/Drains at time of discharge:   Lines/Drains/Airways       Peripherally Inserted Central Catheter Line  Duration             PICC Double Lumen 07/18/24 1649 right basilic <1 day                    Time spent on the discharge of patient: 30 minutes    Jasen Mccann DO  Department of Hospital Medicine  [facility]  "

## 2024-07-18 NOTE — PLAN OF CARE
07/18/24 1051   Post-Acute Status   Post-Acute Authorization IV Infusion   IV Infusion Status Referral(s) sent         New referral for home IV antibiotics sent to [facility] via fax. Notified Aleta with [facility] of new referral.     Addendum: 1301  Spoke to  Aleta with [facility]. Home IV antibiotics approved and she will be on the way to the hospital to do teaching with the patient and family. Dr. Mccann notified.

## 2024-07-18 NOTE — ASSESSMENT & PLAN NOTE
"Patient's FSGs are controlled on current medication regimen.  Last A1c reviewed-   Lab Results   Component Value Date    HGBA1C 6.0 (H) 07/15/2024     Most recent fingerstick glucose reviewed- No results for input(s): "POCTGLUCOSE" in the last 24 hours.  Current correctional scale  Medium  Maintain anti-hyperglycemic dose as follows-   Antihyperglycemics (From admission, onward)      Start     Stop Route Frequency Ordered    07/18/24 0944  insulin aspart U-100 pen 0-10 Units         -- SubQ Before meals & nightly PRN 07/18/24 0845          Hold Oral hypoglycemics while patient is in the hospital.  "

## 2024-07-18 NOTE — HPI
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 Patient had an outpatient venous sclerosing procedure for venous insufficiency last Thursday (7/11/24) at [facility]. He was started on PO steroids for post op phlebitis with low grade fever of 99F and right leg, ankle swelling surrounding access site noted over medial right leg.    ED course:  Vital signs on arrival, /76 mmHg, HR 87, SpO2 94% on room air, temp 98.7F.  Right leg x ray findings of diffuse soft tissue swelling.  No acute osseous finding.  WBC 7.21, Hg 12, HCT 36, ,   Na 128, K 3.8, Cl 95, CO2 27, Glu 130, BUN 29, Cr 1.7, Ca 8.2, Alb 2.7, Tbili 0.8, AST18, ALT 46, Alk phos 112.   ER attending consulted transplant services with instructions to hold Cellcept and continue Prograf during antibiotics therapy for bacteremia.   Patient received sepsis bolus IVF NS 30/kg and started on vancomycin and zosyn.     Patient to be admitted to medicine services for continued IV antibiotics and monitor response.   Patient examined in the ER. Patient endorses pain and swelling, redness have improved with start of antibiotics.    to touch over medial lower right leg. Denies chill, headaches, nausea, vomiting, chest pain, palpitations, shortness of breath.   Continue with current IV antibiotics and gentle IVF.

## 2024-07-18 NOTE — SUBJECTIVE & OBJECTIVE
Interval History: Patient seen and examined.     Review of Systems   Constitutional:  Negative for activity change, appetite change, chills, diaphoresis, fatigue and fever.   HENT:  Negative for sinus pain and sore throat.    Eyes:  Negative for pain and visual disturbance.   Respiratory:  Negative for cough, choking, chest tightness, shortness of breath and wheezing.    Cardiovascular:  Negative for chest pain, palpitations and leg swelling.   Gastrointestinal:  Negative for abdominal distention, abdominal pain, blood in stool, constipation, diarrhea, nausea and vomiting.   Genitourinary:  Negative for decreased urine volume.   Musculoskeletal:  Negative for gait problem, joint swelling, neck pain and neck stiffness.   Skin:  Positive for color change.   Neurological:  Negative for dizziness, tremors, seizures, speech difficulty, light-headedness and numbness.   Psychiatric/Behavioral:  Negative for agitation, behavioral problems, confusion, dysphoric mood, hallucinations and suicidal ideas. The patient is not nervous/anxious.      Objective:     Vital Signs (Most Recent):  Temp: 98.6 °F (37 °C) (07/18/24 1610)  Pulse: 76 (07/18/24 1610)  Resp: 20 (07/18/24 1610)  BP: (!) 141/73 (07/18/24 1610)  SpO2: 95 % (07/18/24 1610) Vital Signs (24h Range):  Temp:  [97.6 °F (36.4 °C)-98.7 °F (37.1 °C)] 98.6 °F (37 °C)  Pulse:  [75-87] 76  Resp:  [18-20] 20  SpO2:  [91 %-95 %] 95 %  BP: (119-164)/(69-87) 141/73     Weight: (!) 142.4 kg (314 lb)  Body mass index is 42.59 kg/m².    Intake/Output Summary (Last 24 hours) at 7/18/2024 1841  Last data filed at 7/18/2024 1302  Gross per 24 hour   Intake 910.57 ml   Output --   Net 910.57 ml         Physical Exam  Vitals and nursing note reviewed.   Constitutional:       General: He is not in acute distress.     Appearance: Normal appearance. He is obese. He is not ill-appearing, toxic-appearing or diaphoretic.   HENT:      Head: Normocephalic and atraumatic.      Right Ear: External  "ear normal.      Left Ear: External ear normal.      Nose: Nose normal.      Mouth/Throat:      Mouth: Mucous membranes are moist.      Pharynx: Oropharynx is clear.   Eyes:      Extraocular Movements: Extraocular movements intact.      Conjunctiva/sclera: Conjunctivae normal.   Cardiovascular:      Rate and Rhythm: Normal rate and regular rhythm.      Pulses: Normal pulses.      Heart sounds: Normal heart sounds. No murmur heard.  Pulmonary:      Effort: Pulmonary effort is normal. No respiratory distress.      Breath sounds: Normal breath sounds. No wheezing or rales.   Abdominal:      General: Abdomen is flat. There is no distension.      Palpations: Abdomen is soft. There is no mass.      Tenderness: There is no abdominal tenderness. There is no left CVA tenderness or guarding.   Musculoskeletal:         General: Swelling and tenderness present. Normal range of motion.      Cervical back: Normal range of motion and neck supple. No rigidity.      Right lower leg: Edema present.   Skin:     General: Skin is warm and dry.      Capillary Refill: Capillary refill takes less than 2 seconds.      Findings: Erythema (mild) present. No rash.   Neurological:      General: No focal deficit present.      Mental Status: He is alert and oriented to person, place, and time. Mental status is at baseline.      Motor: No weakness.      Gait: Gait normal.   Psychiatric:         Mood and Affect: Mood normal.         Behavior: Behavior normal.         Thought Content: Thought content normal.         Judgment: Judgment normal.             Significant Labs: All pertinent labs within the past 24 hours have been reviewed.  A1C:   Recent Labs   Lab 03/22/24  0759 04/15/24  0734 07/15/24  0724   HGBA1C 6.3* 6.7* 6.0*     ABGs: No results for input(s): "PH", "PCO2", "HCO3", "POCSATURATED", "BE", "TOTALHB", "COHB", "METHB", "O2HB", "POCFIO2", "PO2" in the last 48 hours.  Bilirubin:   Recent Labs   Lab 07/01/24  0719 07/15/24  0724 " 07/16/24 2009 07/17/24 2139 07/18/24  1818   BILITOT 0.6 0.7 1.0 0.8 0.6     Blood Culture:   Recent Labs   Lab 07/16/24 2016 07/17/24 2138 07/17/24  2139   LABBLOO Gram stain becky bottle: Gram positive cocci in clusters resembling Staph  Results called to and read back by:Jazmin Chowdhury RN 07/17/2024  09:31  Gram stain aer bottle: Gram positive cocci in clusters resembling Staph  Positive results previously called 07/17/2024  13:07  STAPHYLOCOCCUS AUREUS  ID consult required at [facility] [place] and [place] locations.  For susceptibility see order #[id number]  * No Growth to date No Growth to date     BMP:   Recent Labs   Lab 07/17/24 2139 07/18/24  1818   * 117*   * 137   K 3.8 4.0    102   CO2 26 27   BUN 30* 24*   CREATININE 1.7* 1.6*   CALCIUM 8.2* 8.7   MG 2.2  --      CBC:   Recent Labs   Lab 07/16/24 2009 07/17/24 2139 07/18/24  1818   WBC 8.77 7.21 7.09   HGB 13.2* 12.0* 12.3*   HCT 39.9* 36.0* 37.4*   * 136* 128*     CMP:   Recent Labs   Lab 07/16/24 2009 07/17/24 2139 07/18/24  1818   * 135* 137   K 3.8 3.8 4.0   CL 95 101 102   CO2 27 26 27   * 115* 117*   BUN 29* 30* 24*   CREATININE 1.6* 1.7* 1.6*   CALCIUM 8.5* 8.2* 8.7   PROT 7.0 6.4 6.5   ALBUMIN 3.1* 2.7* 2.7*   BILITOT 1.0 0.8 0.6   ALKPHOS 116 112 99   AST 32 28 16   ALT 45* 46* 37   ANIONGAP 6 8 8     Lactic Acid:   Recent Labs   Lab 07/16/24 2009 07/16/24 2351 07/17/24 2139   LACTATE 0.9 0.8 0.7     Recent Labs   Lab 07/17/24 2139   MG 2.2     POCT Glucose:   Recent Labs   Lab 07/18/24  0906   POCTGLUCOSE 100     X-Ray Tibia Fibula 2 View Right  Narrative: EXAMINATION:  XR TIBIA FIBULA 2 VIEW RIGHT    CLINICAL HISTORY:  Cellulitis, unspecified    FINDINGS:  No fracture or dislocation.   Diffuse soft tissue swelling.  Scattered soft tissue calcifications.  Impression: Diffuse soft tissue swelling.  No acute osseous finding.    Electronically signed by: Priyank Villanueva  MD  Date:    07/17/2024  Time:    08:50  X-Ray Chest AP Portable  Narrative: EXAMINATION:  XR CHEST AP PORTABLE    CLINICAL HISTORY:  Sepsis;    COMPARISON:  07/14/2023    FINDINGS:  Cardiac silhouette appears mildly enlarged, unchanged.  There are low lung volumes.  Hazy opacity at the right lung base could represent mild atelectasis or a developing infiltrate.  Small right pleural effusion questioned.  No obvious left lung infiltrate.  Impression: As above.    Electronically signed by: Gualberto Trejo MD  Date:    07/17/2024  Time:    01:09     Significant Imaging: I have reviewed all pertinent imaging results/findings within the past 24 hours.

## 2024-07-19 ENCOUNTER — PATIENT MESSAGE (OUTPATIENT)
Dept: TRANSPLANT | Facility: CLINIC | Age: 69
End: 2024-07-19
Payer: MEDICARE

## 2024-07-22 ENCOUNTER — LAB VISIT (OUTPATIENT)
Dept: LAB | Facility: HOSPITAL | Age: 69
End: 2024-07-22
Attending: INTERNAL MEDICINE
Payer: MEDICARE

## 2024-07-22 DIAGNOSIS — Z94.0 S/P KIDNEY TRANSPLANT: ICD-10-CM

## 2024-07-22 DIAGNOSIS — Z94.4 LIVER REPLACED BY TRANSPLANT: ICD-10-CM

## 2024-07-22 LAB
ALBUMIN SERPL BCP-MCNC: 2.8 G/DL (ref 3.5–5.2)
ALP SERPL-CCNC: 108 U/L (ref 55–135)
ALT SERPL W/O P-5'-P-CCNC: 16 U/L (ref 10–44)
ANION GAP SERPL CALC-SCNC: 8 MMOL/L (ref 3–11)
AST SERPL-CCNC: 11 U/L (ref 10–40)
BASOPHILS # BLD AUTO: 0.04 K/UL (ref 0–0.2)
BASOPHILS NFR BLD: 0.5 % (ref 0–1.9)
BILIRUB SERPL-MCNC: 0.7 MG/DL (ref 0.1–1)
BUN SERPL-MCNC: 19 MG/DL (ref 8–23)
CALCIUM SERPL-MCNC: 9 MG/DL (ref 8.7–10.5)
CHLORIDE SERPL-SCNC: 102 MMOL/L (ref 95–110)
CO2 SERPL-SCNC: 27 MMOL/L (ref 23–29)
CREAT SERPL-MCNC: 1.4 MG/DL (ref 0.5–1.4)
DIFFERENTIAL METHOD BLD: ABNORMAL
EOSINOPHIL # BLD AUTO: 0.3 K/UL (ref 0–0.5)
EOSINOPHIL NFR BLD: 3.9 % (ref 0–8)
ERYTHROCYTE [DISTWIDTH] IN BLOOD BY AUTOMATED COUNT: 15.8 % (ref 11.5–14.5)
EST. GFR  (NO RACE VARIABLE): 54.1 ML/MIN/1.73 M^2
GLUCOSE SERPL-MCNC: 109 MG/DL (ref 70–110)
HCT VFR BLD AUTO: 39.8 % (ref 40–54)
HGB BLD-MCNC: 12.8 G/DL (ref 14–18)
IMM GRANULOCYTES # BLD AUTO: 0.05 K/UL (ref 0–0.04)
IMM GRANULOCYTES NFR BLD AUTO: 0.7 % (ref 0–0.5)
LYMPHOCYTES # BLD AUTO: 1.1 K/UL (ref 1–4.8)
LYMPHOCYTES NFR BLD: 14 % (ref 18–48)
MCH RBC QN AUTO: 27.5 PG (ref 27–31)
MCHC RBC AUTO-ENTMCNC: 32.2 G/DL (ref 32–36)
MCV RBC AUTO: 86 FL (ref 82–98)
MONOCYTES # BLD AUTO: 0.5 K/UL (ref 0.3–1)
MONOCYTES NFR BLD: 6.8 % (ref 4–15)
NEUTROPHILS # BLD AUTO: 5.7 K/UL (ref 1.8–7.7)
NEUTROPHILS NFR BLD: 74.1 % (ref 38–73)
NRBC BLD-RTO: 0 /100 WBC
PLATELET # BLD AUTO: 205 K/UL (ref 150–450)
PMV BLD AUTO: 10.4 FL (ref 9.2–12.9)
POTASSIUM SERPL-SCNC: 4 MMOL/L (ref 3.5–5.1)
PROT SERPL-MCNC: 7.3 G/DL (ref 6–8.4)
RBC # BLD AUTO: 4.65 M/UL (ref 4.6–6.2)
SODIUM SERPL-SCNC: 137 MMOL/L (ref 136–145)
WBC # BLD AUTO: 7.65 K/UL (ref 3.9–12.7)

## 2024-07-22 PROCEDURE — 36415 COLL VENOUS BLD VENIPUNCTURE: CPT | Performed by: INTERNAL MEDICINE

## 2024-07-22 PROCEDURE — 85025 COMPLETE CBC W/AUTO DIFF WBC: CPT | Performed by: INTERNAL MEDICINE

## 2024-07-22 PROCEDURE — 80197 ASSAY OF TACROLIMUS: CPT | Performed by: INTERNAL MEDICINE

## 2024-07-22 PROCEDURE — 80053 COMPREHEN METABOLIC PANEL: CPT | Performed by: INTERNAL MEDICINE

## 2024-07-23 LAB
BACTERIA BLD CULT: NORMAL
BACTERIA BLD CULT: NORMAL
TACROLIMUS BLD-MCNC: 2.4 NG/ML (ref 5–15)

## 2024-07-25 ENCOUNTER — TELEPHONE (OUTPATIENT)
Dept: TRANSPLANT | Facility: CLINIC | Age: 69
End: 2024-07-25
Payer: MEDICARE

## 2024-07-25 DIAGNOSIS — Z94.0 S/P KIDNEY TRANSPLANT: Primary | ICD-10-CM

## 2024-07-25 NOTE — TELEPHONE ENCOUNTER
Labs reviewed via portal and are stable.  Patient will repeat labs on 10/7/24 per plan of care.      ----- Message from Jessica Chicas MD sent at 7/23/2024  2:37 PM CDT -----  Labs reviewed. No changes at this time.

## 2024-07-26 ENCOUNTER — PATIENT MESSAGE (OUTPATIENT)
Dept: TRANSPLANT | Facility: CLINIC | Age: 69
End: 2024-07-26
Payer: MEDICARE

## 2024-07-26 DIAGNOSIS — Z94.4 LIVER REPLACED BY TRANSPLANT: ICD-10-CM

## 2024-07-26 DIAGNOSIS — Z94.0 S/P KIDNEY TRANSPLANT: Primary | ICD-10-CM

## 2024-07-26 DIAGNOSIS — Z94.4 S/P LIVER TRANSPLANT: ICD-10-CM

## 2024-07-29 ENCOUNTER — LAB VISIT (OUTPATIENT)
Dept: LAB | Facility: HOSPITAL | Age: 69
End: 2024-07-29
Attending: INTERNAL MEDICINE
Payer: MEDICARE

## 2024-07-29 DIAGNOSIS — Z94.4 S/P LIVER TRANSPLANT: ICD-10-CM

## 2024-07-29 DIAGNOSIS — Z94.0 S/P KIDNEY TRANSPLANT: ICD-10-CM

## 2024-07-29 LAB
ALBUMIN SERPL BCP-MCNC: 3.1 G/DL (ref 3.5–5.2)
ALP SERPL-CCNC: 76 U/L (ref 55–135)
ALT SERPL W/O P-5'-P-CCNC: 11 U/L (ref 10–44)
ANION GAP SERPL CALC-SCNC: 9 MMOL/L (ref 3–11)
AST SERPL-CCNC: 10 U/L (ref 10–40)
BASOPHILS # BLD AUTO: 0.05 K/UL (ref 0–0.2)
BASOPHILS NFR BLD: 0.7 % (ref 0–1.9)
BILIRUB SERPL-MCNC: 0.4 MG/DL (ref 0.1–1)
BUN SERPL-MCNC: 19 MG/DL (ref 8–23)
CALCIUM SERPL-MCNC: 8.7 MG/DL (ref 8.7–10.5)
CHLORIDE SERPL-SCNC: 104 MMOL/L (ref 95–110)
CO2 SERPL-SCNC: 29 MMOL/L (ref 23–29)
CREAT SERPL-MCNC: 1.1 MG/DL (ref 0.5–1.4)
DIFFERENTIAL METHOD BLD: ABNORMAL
EOSINOPHIL # BLD AUTO: 0.1 K/UL (ref 0–0.5)
EOSINOPHIL NFR BLD: 1.9 % (ref 0–8)
ERYTHROCYTE [DISTWIDTH] IN BLOOD BY AUTOMATED COUNT: 14.6 % (ref 11.5–14.5)
EST. GFR  (NO RACE VARIABLE): >60 ML/MIN/1.73 M^2
GLUCOSE SERPL-MCNC: 94 MG/DL (ref 70–110)
HCT VFR BLD AUTO: 41.4 % (ref 40–54)
HGB BLD-MCNC: 13.2 G/DL (ref 14–18)
IMM GRANULOCYTES # BLD AUTO: 0.01 K/UL (ref 0–0.04)
IMM GRANULOCYTES NFR BLD AUTO: 0.1 % (ref 0–0.5)
LYMPHOCYTES # BLD AUTO: 1.5 K/UL (ref 1–4.8)
LYMPHOCYTES NFR BLD: 19.9 % (ref 18–48)
MCH RBC QN AUTO: 27.2 PG (ref 27–31)
MCHC RBC AUTO-ENTMCNC: 31.9 G/DL (ref 32–36)
MCV RBC AUTO: 85 FL (ref 82–98)
MONOCYTES # BLD AUTO: 0.5 K/UL (ref 0.3–1)
MONOCYTES NFR BLD: 7.2 % (ref 4–15)
NEUTROPHILS # BLD AUTO: 5.3 K/UL (ref 1.8–7.7)
NEUTROPHILS NFR BLD: 70.2 % (ref 38–73)
NRBC BLD-RTO: 0 /100 WBC
PLATELET # BLD AUTO: 238 K/UL (ref 150–450)
PMV BLD AUTO: 9.5 FL (ref 9.2–12.9)
POTASSIUM SERPL-SCNC: 4.1 MMOL/L (ref 3.5–5.1)
PROT SERPL-MCNC: 7.1 G/DL (ref 6–8.4)
RBC # BLD AUTO: 4.85 M/UL (ref 4.6–6.2)
SODIUM SERPL-SCNC: 142 MMOL/L (ref 136–145)
WBC # BLD AUTO: 7.49 K/UL (ref 3.9–12.7)

## 2024-07-29 PROCEDURE — 80197 ASSAY OF TACROLIMUS: CPT | Performed by: INTERNAL MEDICINE

## 2024-07-29 PROCEDURE — 80053 COMPREHEN METABOLIC PANEL: CPT | Performed by: INTERNAL MEDICINE

## 2024-07-29 PROCEDURE — 36415 COLL VENOUS BLD VENIPUNCTURE: CPT | Performed by: INTERNAL MEDICINE

## 2024-07-29 PROCEDURE — 85025 COMPLETE CBC W/AUTO DIFF WBC: CPT | Performed by: INTERNAL MEDICINE

## 2024-07-30 ENCOUNTER — LAB VISIT (OUTPATIENT)
Dept: LAB | Facility: HOSPITAL | Age: 69
End: 2024-07-30
Attending: STUDENT IN AN ORGANIZED HEALTH CARE EDUCATION/TRAINING PROGRAM
Payer: MEDICARE

## 2024-07-30 DIAGNOSIS — A49.01 BACTERIAL INFECTION DUE TO STAPHYLOCOCCUS AUREUS: ICD-10-CM

## 2024-07-30 DIAGNOSIS — A40.9 SEPTICEMIA, STREPTOCOCCAL: Primary | ICD-10-CM

## 2024-07-30 DIAGNOSIS — I12.9 PARENCHYMAL RENAL HYPERTENSION: ICD-10-CM

## 2024-07-30 LAB
ALBUMIN SERPL BCP-MCNC: 3.2 G/DL (ref 3.5–5.2)
ALP SERPL-CCNC: 81 U/L (ref 55–135)
ALT SERPL W/O P-5'-P-CCNC: 13 U/L (ref 10–44)
ANION GAP SERPL CALC-SCNC: 10 MMOL/L (ref 3–11)
AST SERPL-CCNC: 11 U/L (ref 10–40)
BASOPHILS # BLD AUTO: 0.06 K/UL (ref 0–0.2)
BASOPHILS NFR BLD: 1 % (ref 0–1.9)
BILIRUB SERPL-MCNC: 0.5 MG/DL (ref 0.1–1)
BUN SERPL-MCNC: 19 MG/DL (ref 8–23)
CALCIUM SERPL-MCNC: 8.5 MG/DL (ref 8.7–10.5)
CHLORIDE SERPL-SCNC: 102 MMOL/L (ref 95–110)
CO2 SERPL-SCNC: 28 MMOL/L (ref 23–29)
CREAT SERPL-MCNC: 1.1 MG/DL (ref 0.5–1.4)
DIFFERENTIAL METHOD BLD: ABNORMAL
EOSINOPHIL # BLD AUTO: 0.2 K/UL (ref 0–0.5)
EOSINOPHIL NFR BLD: 2.8 % (ref 0–8)
ERYTHROCYTE [DISTWIDTH] IN BLOOD BY AUTOMATED COUNT: 14.7 % (ref 11.5–14.5)
EST. GFR  (NO RACE VARIABLE): >60 ML/MIN/1.73 M^2
GLUCOSE SERPL-MCNC: 127 MG/DL (ref 70–110)
HCT VFR BLD AUTO: 40.5 % (ref 40–54)
HGB BLD-MCNC: 13 G/DL (ref 14–18)
IMM GRANULOCYTES # BLD AUTO: 0.02 K/UL (ref 0–0.04)
IMM GRANULOCYTES NFR BLD AUTO: 0.3 % (ref 0–0.5)
LYMPHOCYTES # BLD AUTO: 1.1 K/UL (ref 1–4.8)
LYMPHOCYTES NFR BLD: 17.5 % (ref 18–48)
MCH RBC QN AUTO: 27.5 PG (ref 27–31)
MCHC RBC AUTO-ENTMCNC: 32.1 G/DL (ref 32–36)
MCV RBC AUTO: 86 FL (ref 82–98)
MONOCYTES # BLD AUTO: 0.4 K/UL (ref 0.3–1)
MONOCYTES NFR BLD: 6.4 % (ref 4–15)
NEUTROPHILS # BLD AUTO: 4.4 K/UL (ref 1.8–7.7)
NEUTROPHILS NFR BLD: 72 % (ref 38–73)
NRBC BLD-RTO: 0 /100 WBC
PLATELET # BLD AUTO: 213 K/UL (ref 150–450)
PMV BLD AUTO: 10.3 FL (ref 9.2–12.9)
POTASSIUM SERPL-SCNC: 4.1 MMOL/L (ref 3.5–5.1)
PROT SERPL-MCNC: 6.7 G/DL (ref 6–8.4)
RBC # BLD AUTO: 4.72 M/UL (ref 4.6–6.2)
SODIUM SERPL-SCNC: 140 MMOL/L (ref 136–145)
TACROLIMUS BLD-MCNC: <2 NG/ML (ref 5–15)
WBC # BLD AUTO: 6.06 K/UL (ref 3.9–12.7)

## 2024-07-30 PROCEDURE — 36415 COLL VENOUS BLD VENIPUNCTURE: CPT | Performed by: STUDENT IN AN ORGANIZED HEALTH CARE EDUCATION/TRAINING PROGRAM

## 2024-07-30 PROCEDURE — 85025 COMPLETE CBC W/AUTO DIFF WBC: CPT | Performed by: STUDENT IN AN ORGANIZED HEALTH CARE EDUCATION/TRAINING PROGRAM

## 2024-07-30 PROCEDURE — 80053 COMPREHEN METABOLIC PANEL: CPT | Performed by: STUDENT IN AN ORGANIZED HEALTH CARE EDUCATION/TRAINING PROGRAM

## 2024-07-31 ENCOUNTER — OFFICE VISIT (OUTPATIENT)
Dept: INFECTIOUS DISEASES | Facility: CLINIC | Age: 69
End: 2024-07-31
Payer: MEDICARE

## 2024-07-31 VITALS
BODY MASS INDEX: 40.13 KG/M2 | DIASTOLIC BLOOD PRESSURE: 79 MMHG | HEIGHT: 72 IN | SYSTOLIC BLOOD PRESSURE: 167 MMHG | WEIGHT: 296.31 LBS | HEART RATE: 85 BPM | TEMPERATURE: 98 F

## 2024-07-31 DIAGNOSIS — B95.61 MSSA BACTEREMIA: Primary | ICD-10-CM

## 2024-07-31 DIAGNOSIS — Z94.4 LIVER REPLACED BY TRANSPLANT: Chronic | ICD-10-CM

## 2024-07-31 DIAGNOSIS — R78.81 MSSA BACTEREMIA: Primary | ICD-10-CM

## 2024-07-31 PROCEDURE — 3044F HG A1C LEVEL LT 7.0%: CPT | Mod: CPTII,S$GLB,, | Performed by: INTERNAL MEDICINE

## 2024-07-31 PROCEDURE — 3008F BODY MASS INDEX DOCD: CPT | Mod: CPTII,S$GLB,, | Performed by: INTERNAL MEDICINE

## 2024-07-31 PROCEDURE — 4010F ACE/ARB THERAPY RXD/TAKEN: CPT | Mod: CPTII,S$GLB,, | Performed by: INTERNAL MEDICINE

## 2024-07-31 PROCEDURE — 99999 PR PBB SHADOW E&M-EST. PATIENT-LVL IV: CPT | Mod: PBBFAC,,, | Performed by: INTERNAL MEDICINE

## 2024-07-31 PROCEDURE — 3066F NEPHROPATHY DOC TX: CPT | Mod: CPTII,S$GLB,, | Performed by: INTERNAL MEDICINE

## 2024-07-31 PROCEDURE — 1101F PT FALLS ASSESS-DOCD LE1/YR: CPT | Mod: CPTII,S$GLB,, | Performed by: INTERNAL MEDICINE

## 2024-07-31 PROCEDURE — 1126F AMNT PAIN NOTED NONE PRSNT: CPT | Mod: CPTII,S$GLB,, | Performed by: INTERNAL MEDICINE

## 2024-07-31 PROCEDURE — 3060F POS MICROALBUMINURIA REV: CPT | Mod: CPTII,S$GLB,, | Performed by: INTERNAL MEDICINE

## 2024-07-31 PROCEDURE — 99215 OFFICE O/P EST HI 40 MIN: CPT | Mod: S$GLB,,, | Performed by: INTERNAL MEDICINE

## 2024-07-31 PROCEDURE — 3077F SYST BP >= 140 MM HG: CPT | Mod: CPTII,S$GLB,, | Performed by: INTERNAL MEDICINE

## 2024-07-31 PROCEDURE — 1111F DSCHRG MED/CURRENT MED MERGE: CPT | Mod: CPTII,S$GLB,, | Performed by: INTERNAL MEDICINE

## 2024-07-31 PROCEDURE — 3078F DIAST BP <80 MM HG: CPT | Mod: CPTII,S$GLB,, | Performed by: INTERNAL MEDICINE

## 2024-07-31 PROCEDURE — 1159F MED LIST DOCD IN RCRD: CPT | Mod: CPTII,S$GLB,, | Performed by: INTERNAL MEDICINE

## 2024-07-31 PROCEDURE — 3288F FALL RISK ASSESSMENT DOCD: CPT | Mod: CPTII,S$GLB,, | Performed by: INTERNAL MEDICINE

## 2024-07-31 RX ORDER — TACROLIMUS 1 MG/1
2 CAPSULE ORAL EVERY 12 HOURS
Qty: 120 CAPSULE | Refills: 11 | Status: SHIPPED | OUTPATIENT
Start: 2024-07-31

## 2024-07-31 NOTE — TELEPHONE ENCOUNTER
Telephone call to patient for lab review.  Labs stable, will increase prograf to 2/2.  Pt denies missing doses of tacro.  Will repeat labs 8/5/24l.  Pt repeated back instructions with verbalized understanding.        ----- Message from Jessica Chicas MD sent at 7/30/2024  8:38 AM CDT -----  If true trough, then would recommend increasing Tac to 2/2 with repeat labs in 1 week. Thanks.

## 2024-07-31 NOTE — PROGRESS NOTES
Subjective:     Patient ID: Jordan Lopez Jr. is a 68 y.o. male    Chief Complaint: cellulitis, bacteremia    HPI: 68M with a history of liver/kidney transplant in 2012 who was seen by me about 6 months ago for evaluation of cellulitis, presents today for follow up. He notes that he recently underwent a venous sclerosing procedure on  7/11/24. He started developing fevers and redness of the R ankle and calf about 5 days after the procedure. A steroid pack was ordered. He was seen in the ER 7/16, he was discharged on PO antibiotics for cellulitis but was called to return on 7/17 because blood cultures resulted positive for MSSA. Blood cultures cleared on 7/17, and he was discharged home w/ a 14d course of IV cefazolin. He is seen for follow up today. Still has an open wound of R leg, surrounding erythema and tenderness continues to resolve. He has been afebrile and feeling well. No issues with his PICC line. He took his last dose of cefazolin last night. Only xray of lower extremity is available for review, no venous US or CT. Pt states he had TTE that showed normal heart valves, but I am not able to see this in the system.       Immunization History   Administered Date(s) Administered    COVID-19, MRNA, LN-S, PF (Pfizer) (Gray Cap) 03/11/2022    COVID-19, MRNA, LN-S, PF (Pfizer) (Purple Cap) 01/20/2021, 02/10/2021, 08/27/2021    COVID-19, mRNA, LNP-S, bivalent booster, PF (PFIZER OMICRON) 10/07/2022        Review of Systems   All other systems reviewed and are negative.       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 [facility]     Type 2 diabetes mellitus 2007    diet controlled      Past Surgical History:   Procedure Laterality Date    CYSTOSCOPY      KIDNEY TRANSPLANT  6/29/2012    Combined liver-kidney transplant    LIVER TRANSPLANT  6/29/2012    Combined liver-kidney transplant    NEPHRECTOMY  2010    For stage 1 RCC right kidney (done at [facility])     Family History   Problem Relation Name Age of Onset    Kidney disease Mother          born with one kidney    Heart disease Father       Social History     Tobacco Use    Smoking status: Never    Smokeless tobacco: Never   Substance Use Topics    Alcohol use: No     Comment: Perhaps 2-3 drinks per year, never a drinker    Drug use: No       Objective:     Physical Exam  Constitutional:       General: He is not in acute distress.     Appearance: Normal appearance. He is well-developed. He is not ill-appearing or diaphoretic.   HENT:      Head: Normocephalic and atraumatic.      Right Ear: External ear normal.      Left Ear: External ear normal.      Nose: Nose normal.   Eyes:      General: No scleral icterus.        Right eye: No discharge.         Left eye: No discharge.      Extraocular Movements: Extraocular movements intact.      Conjunctiva/sclera: Conjunctivae normal.   Pulmonary:      Effort: Pulmonary effort is normal. No respiratory distress.      Breath sounds: No stridor.   Skin:     General: Skin is dry.      Coloration: Skin is not jaundiced or pale.      Findings: No erythema.      Comments: R leg w/ improved erythema now only involving medial calf, 2 small scabs on skin where venous procedure was done, slightly larger open wound on medial calf, possibly an area of abscess that has opened spontaneously   Neurological:      General: No focal deficit present.      Mental Status: He is alert and oriented to person, place, and time. Mental status is at baseline.   Psychiatric:         Mood and Affect: Mood normal.         Behavior: Behavior normal.          Thought Content: Thought content normal.         Judgment: Judgment normal.         Data:    All data, including recent labs, radiology, and pathology, has been independently reviewed.    Labs:    Recent Labs   Lab Result Units 06/17/24  0716 06/24/24  0724 07/22/24  0755 07/29/24  0732 07/30/24  1035   WBC K/uL 3.86*   < > 7.65 7.49 6.06   Hemoglobin g/dL 14.0   < > 12.8* 13.2* 13.0*   Hematocrit % 42.8   < > 39.8* 41.4 40.5   Sodium mmol/L 139   < > 137 142 140   Potassium mmol/L 4.5   < > 4.0 4.1 4.1   Chloride mmol/L 104   < > 102 104 102   BUN mg/dL 25*   < > 19 19 19   Creatinine mg/dL 1.4   < > 1.4 1.1 1.1   AST U/L 17   < > 11 10 11   ALT U/L 36   < > 16 11 13   Alkaline Phosphatase U/L 80   < > 108 76 81   Total Bilirubin mg/dL 0.6   < > 0.7 0.4 0.5   INR  1.0  --   --   --   --     < > = values in this interval not displayed.        Radiology:    No results found in the last 24 hours.     Assessment:    1. MSSA bacteremia  Discussed w/ patient that because this bloodstream infection was associated w/ recent vascular procedure w/ likely underlying phlebitis and no venous or advanced soft tissue imaging available, I would like to treat this as an endovascular infection. This will require 6 weeks of IV cefazolin. He agrees with this plan.     If he has any issues w/ wound healing in this area, will obtain CT and venous US for further evaluation    New orders sent to [facility], continue cefazolin 2g Q8H with new end date of 8/28/24  Weekly CBC and CMP to be sent to me  Will see patient in 1 month for follow up, he will message if he develops any issues            Follow up in 1 month    The total time for evaluation and management services performed on 7/31/24 was greater than 40 minutes.     Visit today included increased complexity associated with the care of the episodic problem MSSA bacteremia addressed and managing the longitudinal care of the patient due to the serious and/or complex managed  problem(s) liver/kidney transplant patient.    Beemateo Armijoo DO  Transplant Infectious Disease

## 2024-08-05 ENCOUNTER — LAB VISIT (OUTPATIENT)
Dept: LAB | Facility: HOSPITAL | Age: 69
End: 2024-08-05
Attending: INTERNAL MEDICINE
Payer: MEDICARE

## 2024-08-05 DIAGNOSIS — Z94.4 LIVER REPLACED BY TRANSPLANT: ICD-10-CM

## 2024-08-05 DIAGNOSIS — Z94.0 S/P KIDNEY TRANSPLANT: ICD-10-CM

## 2024-08-05 LAB
ALBUMIN SERPL BCP-MCNC: 3.3 G/DL (ref 3.5–5.2)
ALP SERPL-CCNC: 71 U/L (ref 55–135)
ALT SERPL W/O P-5'-P-CCNC: 10 U/L (ref 10–44)
ANION GAP SERPL CALC-SCNC: 6 MMOL/L (ref 3–11)
AST SERPL-CCNC: 13 U/L (ref 10–40)
BASOPHILS # BLD AUTO: 0.05 K/UL (ref 0–0.2)
BASOPHILS NFR BLD: 1 % (ref 0–1.9)
BILIRUB SERPL-MCNC: 0.5 MG/DL (ref 0.1–1)
BUN SERPL-MCNC: 27 MG/DL (ref 8–23)
CALCIUM SERPL-MCNC: 8.9 MG/DL (ref 8.7–10.5)
CHLORIDE SERPL-SCNC: 107 MMOL/L (ref 95–110)
CO2 SERPL-SCNC: 27 MMOL/L (ref 23–29)
CREAT SERPL-MCNC: 1.2 MG/DL (ref 0.5–1.4)
DIFFERENTIAL METHOD BLD: ABNORMAL
EOSINOPHIL # BLD AUTO: 0.2 K/UL (ref 0–0.5)
EOSINOPHIL NFR BLD: 4.2 % (ref 0–8)
ERYTHROCYTE [DISTWIDTH] IN BLOOD BY AUTOMATED COUNT: 14.6 % (ref 11.5–14.5)
EST. GFR  (NO RACE VARIABLE): >60 ML/MIN/1.73 M^2
GLUCOSE SERPL-MCNC: 93 MG/DL (ref 70–110)
HCT VFR BLD AUTO: 41.9 % (ref 40–54)
HGB BLD-MCNC: 13.5 G/DL (ref 14–18)
IMM GRANULOCYTES # BLD AUTO: 0.01 K/UL (ref 0–0.04)
IMM GRANULOCYTES NFR BLD AUTO: 0.2 % (ref 0–0.5)
LYMPHOCYTES # BLD AUTO: 1.2 K/UL (ref 1–4.8)
LYMPHOCYTES NFR BLD: 22.2 % (ref 18–48)
MCH RBC QN AUTO: 27.6 PG (ref 27–31)
MCHC RBC AUTO-ENTMCNC: 32.2 G/DL (ref 32–36)
MCV RBC AUTO: 86 FL (ref 82–98)
MONOCYTES # BLD AUTO: 0.4 K/UL (ref 0.3–1)
MONOCYTES NFR BLD: 6.9 % (ref 4–15)
NEUTROPHILS # BLD AUTO: 3.4 K/UL (ref 1.8–7.7)
NEUTROPHILS NFR BLD: 65.5 % (ref 38–73)
NRBC BLD-RTO: 0 /100 WBC
PLATELET # BLD AUTO: 171 K/UL (ref 150–450)
PMV BLD AUTO: 10.3 FL (ref 9.2–12.9)
POTASSIUM SERPL-SCNC: 4.5 MMOL/L (ref 3.5–5.1)
PROT SERPL-MCNC: 7 G/DL (ref 6–8.4)
RBC # BLD AUTO: 4.9 M/UL (ref 4.6–6.2)
SODIUM SERPL-SCNC: 140 MMOL/L (ref 136–145)
WBC # BLD AUTO: 5.19 K/UL (ref 3.9–12.7)

## 2024-08-05 PROCEDURE — 85025 COMPLETE CBC W/AUTO DIFF WBC: CPT | Performed by: INTERNAL MEDICINE

## 2024-08-05 PROCEDURE — 80197 ASSAY OF TACROLIMUS: CPT | Performed by: INTERNAL MEDICINE

## 2024-08-05 PROCEDURE — 36415 COLL VENOUS BLD VENIPUNCTURE: CPT | Performed by: INTERNAL MEDICINE

## 2024-08-05 PROCEDURE — 80053 COMPREHEN METABOLIC PANEL: CPT | Performed by: INTERNAL MEDICINE

## 2024-08-06 LAB — TACROLIMUS BLD-MCNC: 3.6 NG/ML (ref 5–15)

## 2024-08-08 ENCOUNTER — PATIENT MESSAGE (OUTPATIENT)
Dept: TRANSPLANT | Facility: CLINIC | Age: 69
End: 2024-08-08
Payer: MEDICARE

## 2024-08-09 ENCOUNTER — TELEPHONE (OUTPATIENT)
Dept: TRANSPLANT | Facility: CLINIC | Age: 69
End: 2024-08-09
Payer: MEDICARE

## 2024-08-12 ENCOUNTER — LAB VISIT (OUTPATIENT)
Dept: LAB | Facility: HOSPITAL | Age: 69
End: 2024-08-12
Attending: INTERNAL MEDICINE
Payer: MEDICARE

## 2024-08-12 DIAGNOSIS — Z94.4 S/P LIVER TRANSPLANT: ICD-10-CM

## 2024-08-12 DIAGNOSIS — Z94.0 S/P KIDNEY TRANSPLANT: ICD-10-CM

## 2024-08-12 LAB
ALBUMIN SERPL BCP-MCNC: 3.2 G/DL (ref 3.5–5.2)
ALP SERPL-CCNC: 73 U/L (ref 55–135)
ALT SERPL W/O P-5'-P-CCNC: 14 U/L (ref 10–44)
ANION GAP SERPL CALC-SCNC: 6 MMOL/L (ref 3–11)
AST SERPL-CCNC: 15 U/L (ref 10–40)
BASOPHILS # BLD AUTO: 0.05 K/UL (ref 0–0.2)
BASOPHILS NFR BLD: 1 % (ref 0–1.9)
BILIRUB SERPL-MCNC: 0.4 MG/DL (ref 0.1–1)
BUN SERPL-MCNC: 17 MG/DL (ref 8–23)
CALCIUM SERPL-MCNC: 9.1 MG/DL (ref 8.7–10.5)
CHLORIDE SERPL-SCNC: 102 MMOL/L (ref 95–110)
CO2 SERPL-SCNC: 30 MMOL/L (ref 23–29)
CREAT SERPL-MCNC: 1.3 MG/DL (ref 0.5–1.4)
DIFFERENTIAL METHOD BLD: ABNORMAL
EOSINOPHIL # BLD AUTO: 0.3 K/UL (ref 0–0.5)
EOSINOPHIL NFR BLD: 6.8 % (ref 0–8)
ERYTHROCYTE [DISTWIDTH] IN BLOOD BY AUTOMATED COUNT: 14.8 % (ref 11.5–14.5)
EST. GFR  (NO RACE VARIABLE): 59.1 ML/MIN/1.73 M^2
GLUCOSE SERPL-MCNC: 99 MG/DL (ref 70–110)
HCT VFR BLD AUTO: 42.4 % (ref 40–54)
HGB BLD-MCNC: 13.7 G/DL (ref 14–18)
IMM GRANULOCYTES # BLD AUTO: 0.01 K/UL (ref 0–0.04)
IMM GRANULOCYTES NFR BLD AUTO: 0.2 % (ref 0–0.5)
LYMPHOCYTES # BLD AUTO: 1 K/UL (ref 1–4.8)
LYMPHOCYTES NFR BLD: 20.9 % (ref 18–48)
MCH RBC QN AUTO: 27.6 PG (ref 27–31)
MCHC RBC AUTO-ENTMCNC: 32.3 G/DL (ref 32–36)
MCV RBC AUTO: 86 FL (ref 82–98)
MONOCYTES # BLD AUTO: 0.3 K/UL (ref 0.3–1)
MONOCYTES NFR BLD: 7 % (ref 4–15)
NEUTROPHILS # BLD AUTO: 3.1 K/UL (ref 1.8–7.7)
NEUTROPHILS NFR BLD: 64.1 % (ref 38–73)
NRBC BLD-RTO: 0 /100 WBC
PLATELET # BLD AUTO: 141 K/UL (ref 150–450)
PMV BLD AUTO: 10.5 FL (ref 9.2–12.9)
POTASSIUM SERPL-SCNC: 4 MMOL/L (ref 3.5–5.1)
PROT SERPL-MCNC: 7 G/DL (ref 6–8.4)
RBC # BLD AUTO: 4.96 M/UL (ref 4.6–6.2)
SODIUM SERPL-SCNC: 138 MMOL/L (ref 136–145)
WBC # BLD AUTO: 4.87 K/UL (ref 3.9–12.7)

## 2024-08-12 PROCEDURE — 85025 COMPLETE CBC W/AUTO DIFF WBC: CPT | Performed by: INTERNAL MEDICINE

## 2024-08-12 PROCEDURE — 80053 COMPREHEN METABOLIC PANEL: CPT | Performed by: INTERNAL MEDICINE

## 2024-08-12 PROCEDURE — 80197 ASSAY OF TACROLIMUS: CPT | Performed by: INTERNAL MEDICINE

## 2024-08-12 PROCEDURE — 36415 COLL VENOUS BLD VENIPUNCTURE: CPT | Performed by: INTERNAL MEDICINE

## 2024-08-13 ENCOUNTER — LAB VISIT (OUTPATIENT)
Dept: LAB | Facility: HOSPITAL | Age: 69
End: 2024-08-13
Attending: STUDENT IN AN ORGANIZED HEALTH CARE EDUCATION/TRAINING PROGRAM
Payer: MEDICARE

## 2024-08-13 DIAGNOSIS — A40.9 SEPTICEMIA, STREPTOCOCCAL: Primary | ICD-10-CM

## 2024-08-13 DIAGNOSIS — E11.22 TYPE 2 DIABETES MELLITUS WITH END-STAGE RENAL DISEASE: ICD-10-CM

## 2024-08-13 DIAGNOSIS — N18.6 TYPE 2 DIABETES MELLITUS WITH END-STAGE RENAL DISEASE: ICD-10-CM

## 2024-08-13 LAB
ALBUMIN SERPL BCP-MCNC: 3.3 G/DL (ref 3.5–5.2)
ALP SERPL-CCNC: 72 U/L (ref 55–135)
ALT SERPL W/O P-5'-P-CCNC: 11 U/L (ref 10–44)
ANION GAP SERPL CALC-SCNC: 11 MMOL/L (ref 3–11)
AST SERPL-CCNC: 12 U/L (ref 10–40)
BASOPHILS # BLD AUTO: 0.04 K/UL (ref 0–0.2)
BASOPHILS NFR BLD: 0.8 % (ref 0–1.9)
BILIRUB SERPL-MCNC: 0.5 MG/DL (ref 0.1–1)
BUN SERPL-MCNC: 21 MG/DL (ref 8–23)
CALCIUM SERPL-MCNC: 9 MG/DL (ref 8.7–10.5)
CHLORIDE SERPL-SCNC: 102 MMOL/L (ref 95–110)
CO2 SERPL-SCNC: 27 MMOL/L (ref 23–29)
CREAT SERPL-MCNC: 1.1 MG/DL (ref 0.5–1.4)
DIFFERENTIAL METHOD BLD: ABNORMAL
EOSINOPHIL # BLD AUTO: 0.3 K/UL (ref 0–0.5)
EOSINOPHIL NFR BLD: 5.7 % (ref 0–8)
ERYTHROCYTE [DISTWIDTH] IN BLOOD BY AUTOMATED COUNT: 14.9 % (ref 11.5–14.5)
EST. GFR  (NO RACE VARIABLE): >60 ML/MIN/1.73 M^2
GLUCOSE SERPL-MCNC: 97 MG/DL (ref 70–110)
HCT VFR BLD AUTO: 41.6 % (ref 40–54)
HGB BLD-MCNC: 13.5 G/DL (ref 14–18)
IMM GRANULOCYTES # BLD AUTO: 0.01 K/UL (ref 0–0.04)
IMM GRANULOCYTES NFR BLD AUTO: 0.2 % (ref 0–0.5)
LYMPHOCYTES # BLD AUTO: 0.9 K/UL (ref 1–4.8)
LYMPHOCYTES NFR BLD: 17.2 % (ref 18–48)
MCH RBC QN AUTO: 27.5 PG (ref 27–31)
MCHC RBC AUTO-ENTMCNC: 32.5 G/DL (ref 32–36)
MCV RBC AUTO: 85 FL (ref 82–98)
MONOCYTES # BLD AUTO: 0.3 K/UL (ref 0.3–1)
MONOCYTES NFR BLD: 6.1 % (ref 4–15)
NEUTROPHILS # BLD AUTO: 3.7 K/UL (ref 1.8–7.7)
NEUTROPHILS NFR BLD: 70 % (ref 38–73)
NRBC BLD-RTO: 0 /100 WBC
PLATELET # BLD AUTO: 142 K/UL (ref 150–450)
PMV BLD AUTO: 10.9 FL (ref 9.2–12.9)
POTASSIUM SERPL-SCNC: 3.7 MMOL/L (ref 3.5–5.1)
PROT SERPL-MCNC: 6.6 G/DL (ref 6–8.4)
RBC # BLD AUTO: 4.91 M/UL (ref 4.6–6.2)
SODIUM SERPL-SCNC: 140 MMOL/L (ref 136–145)
TACROLIMUS BLD-MCNC: 5.4 NG/ML (ref 5–15)
WBC # BLD AUTO: 5.23 K/UL (ref 3.9–12.7)

## 2024-08-13 PROCEDURE — 36415 COLL VENOUS BLD VENIPUNCTURE: CPT | Performed by: STUDENT IN AN ORGANIZED HEALTH CARE EDUCATION/TRAINING PROGRAM

## 2024-08-13 PROCEDURE — 80053 COMPREHEN METABOLIC PANEL: CPT | Performed by: STUDENT IN AN ORGANIZED HEALTH CARE EDUCATION/TRAINING PROGRAM

## 2024-08-13 PROCEDURE — 85025 COMPLETE CBC W/AUTO DIFF WBC: CPT | Performed by: STUDENT IN AN ORGANIZED HEALTH CARE EDUCATION/TRAINING PROGRAM

## 2024-08-15 ENCOUNTER — PATIENT MESSAGE (OUTPATIENT)
Dept: TRANSPLANT | Facility: CLINIC | Age: 69
End: 2024-08-15
Payer: MEDICARE

## 2024-08-18 ENCOUNTER — PATIENT MESSAGE (OUTPATIENT)
Dept: INFECTIOUS DISEASES | Facility: CLINIC | Age: 69
End: 2024-08-18
Payer: MEDICARE

## 2024-08-19 ENCOUNTER — LAB VISIT (OUTPATIENT)
Dept: LAB | Facility: HOSPITAL | Age: 69
End: 2024-08-19
Attending: INTERNAL MEDICINE
Payer: MEDICARE

## 2024-08-19 DIAGNOSIS — Z94.0 S/P KIDNEY TRANSPLANT: ICD-10-CM

## 2024-08-19 DIAGNOSIS — Z94.4 S/P LIVER TRANSPLANT: ICD-10-CM

## 2024-08-19 LAB
ALBUMIN SERPL BCP-MCNC: 3.2 G/DL (ref 3.5–5.2)
ALP SERPL-CCNC: 68 U/L (ref 55–135)
ALT SERPL W/O P-5'-P-CCNC: 14 U/L (ref 10–44)
ANION GAP SERPL CALC-SCNC: 4 MMOL/L (ref 3–11)
AST SERPL-CCNC: 16 U/L (ref 10–40)
BASOPHILS # BLD AUTO: 0.04 K/UL (ref 0–0.2)
BASOPHILS NFR BLD: 0.9 % (ref 0–1.9)
BILIRUB SERPL-MCNC: 0.4 MG/DL (ref 0.1–1)
BUN SERPL-MCNC: 16 MG/DL (ref 8–23)
CALCIUM SERPL-MCNC: 8.9 MG/DL (ref 8.7–10.5)
CHLORIDE SERPL-SCNC: 102 MMOL/L (ref 95–110)
CO2 SERPL-SCNC: 33 MMOL/L (ref 23–29)
CREAT SERPL-MCNC: 1.3 MG/DL (ref 0.5–1.4)
DIFFERENTIAL METHOD BLD: ABNORMAL
EOSINOPHIL # BLD AUTO: 0.4 K/UL (ref 0–0.5)
EOSINOPHIL NFR BLD: 8.3 % (ref 0–8)
ERYTHROCYTE [DISTWIDTH] IN BLOOD BY AUTOMATED COUNT: 14.9 % (ref 11.5–14.5)
EST. GFR  (NO RACE VARIABLE): 59.1 ML/MIN/1.73 M^2
GLUCOSE SERPL-MCNC: 90 MG/DL (ref 70–110)
HCT VFR BLD AUTO: 41 % (ref 40–54)
HGB BLD-MCNC: 13.2 G/DL (ref 14–18)
IMM GRANULOCYTES # BLD AUTO: 0.01 K/UL (ref 0–0.04)
IMM GRANULOCYTES NFR BLD AUTO: 0.2 % (ref 0–0.5)
LYMPHOCYTES # BLD AUTO: 0.9 K/UL (ref 1–4.8)
LYMPHOCYTES NFR BLD: 22.2 % (ref 18–48)
MCH RBC QN AUTO: 27.6 PG (ref 27–31)
MCHC RBC AUTO-ENTMCNC: 32.2 G/DL (ref 32–36)
MCV RBC AUTO: 86 FL (ref 82–98)
MONOCYTES # BLD AUTO: 0.3 K/UL (ref 0.3–1)
MONOCYTES NFR BLD: 8 % (ref 4–15)
NEUTROPHILS # BLD AUTO: 2.6 K/UL (ref 1.8–7.7)
NEUTROPHILS NFR BLD: 60.4 % (ref 38–73)
NRBC BLD-RTO: 0 /100 WBC
PLATELET # BLD AUTO: 128 K/UL (ref 150–450)
PMV BLD AUTO: 10.6 FL (ref 9.2–12.9)
POTASSIUM SERPL-SCNC: 4.1 MMOL/L (ref 3.5–5.1)
PROT SERPL-MCNC: 7 G/DL (ref 6–8.4)
RBC # BLD AUTO: 4.78 M/UL (ref 4.6–6.2)
SODIUM SERPL-SCNC: 139 MMOL/L (ref 136–145)
WBC # BLD AUTO: 4.23 K/UL (ref 3.9–12.7)

## 2024-08-19 PROCEDURE — 85025 COMPLETE CBC W/AUTO DIFF WBC: CPT | Performed by: INTERNAL MEDICINE

## 2024-08-19 PROCEDURE — 80053 COMPREHEN METABOLIC PANEL: CPT | Performed by: INTERNAL MEDICINE

## 2024-08-19 PROCEDURE — 80197 ASSAY OF TACROLIMUS: CPT | Performed by: INTERNAL MEDICINE

## 2024-08-19 PROCEDURE — 36415 COLL VENOUS BLD VENIPUNCTURE: CPT | Performed by: INTERNAL MEDICINE

## 2024-08-20 ENCOUNTER — LAB VISIT (OUTPATIENT)
Dept: LAB | Facility: HOSPITAL | Age: 69
End: 2024-08-20
Attending: STUDENT IN AN ORGANIZED HEALTH CARE EDUCATION/TRAINING PROGRAM
Payer: MEDICARE

## 2024-08-20 ENCOUNTER — TELEPHONE (OUTPATIENT)
Dept: TRANSPLANT | Facility: CLINIC | Age: 69
End: 2024-08-20
Payer: MEDICARE

## 2024-08-20 DIAGNOSIS — A40.9 SEPTICEMIA, STREPTOCOCCAL: Primary | ICD-10-CM

## 2024-08-20 LAB
ALBUMIN SERPL BCP-MCNC: 3.3 G/DL (ref 3.5–5.2)
ALP SERPL-CCNC: 72 U/L (ref 55–135)
ALT SERPL W/O P-5'-P-CCNC: 20 U/L (ref 10–44)
ANION GAP SERPL CALC-SCNC: 7 MMOL/L (ref 3–11)
AST SERPL-CCNC: 14 U/L (ref 10–40)
BASOPHILS # BLD AUTO: 0.03 K/UL (ref 0–0.2)
BASOPHILS NFR BLD: 0.7 % (ref 0–1.9)
BILIRUB SERPL-MCNC: 0.5 MG/DL (ref 0.1–1)
BUN SERPL-MCNC: 17 MG/DL (ref 8–23)
CALCIUM SERPL-MCNC: 8.6 MG/DL (ref 8.7–10.5)
CHLORIDE SERPL-SCNC: 102 MMOL/L (ref 95–110)
CO2 SERPL-SCNC: 29 MMOL/L (ref 23–29)
CREAT SERPL-MCNC: 1.1 MG/DL (ref 0.5–1.4)
DIFFERENTIAL METHOD BLD: ABNORMAL
EOSINOPHIL # BLD AUTO: 0.3 K/UL (ref 0–0.5)
EOSINOPHIL NFR BLD: 7.2 % (ref 0–8)
ERYTHROCYTE [DISTWIDTH] IN BLOOD BY AUTOMATED COUNT: 14.8 % (ref 11.5–14.5)
EST. GFR  (NO RACE VARIABLE): >60 ML/MIN/1.73 M^2
GLUCOSE SERPL-MCNC: 85 MG/DL (ref 70–110)
HCT VFR BLD AUTO: 40.6 % (ref 40–54)
HGB BLD-MCNC: 13.2 G/DL (ref 14–18)
IMM GRANULOCYTES # BLD AUTO: 0.02 K/UL (ref 0–0.04)
IMM GRANULOCYTES NFR BLD AUTO: 0.5 % (ref 0–0.5)
LYMPHOCYTES # BLD AUTO: 0.8 K/UL (ref 1–4.8)
LYMPHOCYTES NFR BLD: 17.9 % (ref 18–48)
MCH RBC QN AUTO: 27.7 PG (ref 27–31)
MCHC RBC AUTO-ENTMCNC: 32.5 G/DL (ref 32–36)
MCV RBC AUTO: 85 FL (ref 82–98)
MONOCYTES # BLD AUTO: 0.3 K/UL (ref 0.3–1)
MONOCYTES NFR BLD: 7.2 % (ref 4–15)
NEUTROPHILS # BLD AUTO: 2.9 K/UL (ref 1.8–7.7)
NEUTROPHILS NFR BLD: 66.5 % (ref 38–73)
NRBC BLD-RTO: 0 /100 WBC
PLATELET # BLD AUTO: 143 K/UL (ref 150–450)
PMV BLD AUTO: 11.1 FL (ref 9.2–12.9)
POTASSIUM SERPL-SCNC: 3.9 MMOL/L (ref 3.5–5.1)
PROT SERPL-MCNC: 6.6 G/DL (ref 6–8.4)
RBC # BLD AUTO: 4.76 M/UL (ref 4.6–6.2)
SODIUM SERPL-SCNC: 138 MMOL/L (ref 136–145)
TACROLIMUS BLD-MCNC: 4.6 NG/ML (ref 5–15)
WBC # BLD AUTO: 4.29 K/UL (ref 3.9–12.7)

## 2024-08-20 PROCEDURE — 36415 COLL VENOUS BLD VENIPUNCTURE: CPT | Performed by: STUDENT IN AN ORGANIZED HEALTH CARE EDUCATION/TRAINING PROGRAM

## 2024-08-20 PROCEDURE — 80053 COMPREHEN METABOLIC PANEL: CPT | Performed by: STUDENT IN AN ORGANIZED HEALTH CARE EDUCATION/TRAINING PROGRAM

## 2024-08-20 PROCEDURE — 85025 COMPLETE CBC W/AUTO DIFF WBC: CPT | Performed by: STUDENT IN AN ORGANIZED HEALTH CARE EDUCATION/TRAINING PROGRAM

## 2024-08-20 NOTE — TELEPHONE ENCOUNTER
Labs reviewed via telephone as per below.  Pt will continue weekly labs due to antibiotic therapy until 8/28/24.  Next lab 8/19/24.    ----- Message from Shaniqua Matias MD sent at 8/16/2024  1:38 PM CDT -----  The Labs are stable; prograf a bit high but numbers normal. Repeat in 4 weeks. May adjust after that - please let patient know.

## 2024-08-26 ENCOUNTER — TELEPHONE (OUTPATIENT)
Dept: TRANSPLANT | Facility: CLINIC | Age: 69
End: 2024-08-26
Payer: MEDICARE

## 2024-08-26 NOTE — TELEPHONE ENCOUNTER
Labs reviewed via portal and are stable.  Patient will repeat labs on 9/13/24.        ----- Message from Shaniqua Matias MD sent at 8/20/2024 12:15 PM CDT -----  The Labs are stable; repeat labs in one month. Will decide on whether to lower prograf at that time- please let patient know.  
Patient

## 2024-08-27 ENCOUNTER — LAB VISIT (OUTPATIENT)
Dept: LAB | Facility: HOSPITAL | Age: 69
End: 2024-08-27
Attending: INTERNAL MEDICINE
Payer: MEDICARE

## 2024-08-27 DIAGNOSIS — A40.9 SEPTICEMIA, STREPTOCOCCAL: ICD-10-CM

## 2024-08-27 DIAGNOSIS — N18.6 TYPE 2 DIABETES MELLITUS WITH END-STAGE RENAL DISEASE: ICD-10-CM

## 2024-08-27 DIAGNOSIS — Z94.0 S/P KIDNEY TRANSPLANT: ICD-10-CM

## 2024-08-27 DIAGNOSIS — E11.22 TYPE 2 DIABETES MELLITUS WITH END-STAGE RENAL DISEASE: ICD-10-CM

## 2024-08-27 DIAGNOSIS — I12.9 PARENCHYMAL RENAL HYPERTENSION: ICD-10-CM

## 2024-08-27 DIAGNOSIS — Z94.0 S/P KIDNEY TRANSPLANT: Primary | ICD-10-CM

## 2024-08-27 LAB
ALBUMIN SERPL BCP-MCNC: 3.4 G/DL (ref 3.5–5.2)
ALP SERPL-CCNC: 71 U/L (ref 55–135)
ALT SERPL W/O P-5'-P-CCNC: 16 U/L (ref 10–44)
ANION GAP SERPL CALC-SCNC: 6 MMOL/L (ref 3–11)
AST SERPL-CCNC: 15 U/L (ref 10–40)
BASOPHILS # BLD AUTO: 0.04 K/UL (ref 0–0.2)
BASOPHILS NFR BLD: 1 % (ref 0–1.9)
BILIRUB SERPL-MCNC: 0.7 MG/DL (ref 0.1–1)
BUN SERPL-MCNC: 18 MG/DL (ref 8–23)
CALCIUM SERPL-MCNC: 8.9 MG/DL (ref 8.7–10.5)
CHLORIDE SERPL-SCNC: 102 MMOL/L (ref 95–110)
CO2 SERPL-SCNC: 30 MMOL/L (ref 23–29)
CREAT SERPL-MCNC: 1.3 MG/DL (ref 0.5–1.4)
DIFFERENTIAL METHOD BLD: ABNORMAL
EOSINOPHIL # BLD AUTO: 0.3 K/UL (ref 0–0.5)
EOSINOPHIL NFR BLD: 6.4 % (ref 0–8)
ERYTHROCYTE [DISTWIDTH] IN BLOOD BY AUTOMATED COUNT: 15 % (ref 11.5–14.5)
EST. GFR  (NO RACE VARIABLE): 59.1 ML/MIN/1.73 M^2
GLUCOSE SERPL-MCNC: 87 MG/DL (ref 70–110)
HCT VFR BLD AUTO: 40.3 % (ref 40–54)
HGB BLD-MCNC: 13.1 G/DL (ref 14–18)
IMM GRANULOCYTES # BLD AUTO: 0.01 K/UL (ref 0–0.04)
IMM GRANULOCYTES NFR BLD AUTO: 0.3 % (ref 0–0.5)
LYMPHOCYTES # BLD AUTO: 0.8 K/UL (ref 1–4.8)
LYMPHOCYTES NFR BLD: 21.4 % (ref 18–48)
MCH RBC QN AUTO: 27.6 PG (ref 27–31)
MCHC RBC AUTO-ENTMCNC: 32.5 G/DL (ref 32–36)
MCV RBC AUTO: 85 FL (ref 82–98)
MONOCYTES # BLD AUTO: 0.3 K/UL (ref 0.3–1)
MONOCYTES NFR BLD: 7.7 % (ref 4–15)
NEUTROPHILS # BLD AUTO: 2.5 K/UL (ref 1.8–7.7)
NEUTROPHILS NFR BLD: 63.2 % (ref 38–73)
NRBC BLD-RTO: 0 /100 WBC
PLATELET # BLD AUTO: 154 K/UL (ref 150–450)
PMV BLD AUTO: 11.5 FL (ref 9.2–12.9)
POTASSIUM SERPL-SCNC: 4.2 MMOL/L (ref 3.5–5.1)
PROT SERPL-MCNC: 6.6 G/DL (ref 6–8.4)
RBC # BLD AUTO: 4.75 M/UL (ref 4.6–6.2)
SODIUM SERPL-SCNC: 138 MMOL/L (ref 136–145)
WBC # BLD AUTO: 3.88 K/UL (ref 3.9–12.7)

## 2024-08-27 PROCEDURE — 85025 COMPLETE CBC W/AUTO DIFF WBC: CPT | Performed by: INTERNAL MEDICINE

## 2024-08-27 PROCEDURE — 80053 COMPREHEN METABOLIC PANEL: CPT | Performed by: INTERNAL MEDICINE

## 2024-08-27 PROCEDURE — 36415 COLL VENOUS BLD VENIPUNCTURE: CPT | Performed by: INTERNAL MEDICINE

## 2024-08-28 ENCOUNTER — PATIENT MESSAGE (OUTPATIENT)
Dept: TRANSPLANT | Facility: CLINIC | Age: 69
End: 2024-08-28
Payer: MEDICARE

## 2024-08-28 ENCOUNTER — PATIENT MESSAGE (OUTPATIENT)
Dept: INFECTIOUS DISEASES | Facility: CLINIC | Age: 69
End: 2024-08-28

## 2024-08-28 ENCOUNTER — OFFICE VISIT (OUTPATIENT)
Dept: INFECTIOUS DISEASES | Facility: CLINIC | Age: 69
End: 2024-08-28
Payer: MEDICARE

## 2024-08-28 ENCOUNTER — INFUSION (OUTPATIENT)
Dept: INFECTIOUS DISEASES | Facility: HOSPITAL | Age: 69
End: 2024-08-28
Attending: INTERNAL MEDICINE
Payer: MEDICARE

## 2024-08-28 ENCOUNTER — TELEPHONE (OUTPATIENT)
Dept: INFECTIOUS DISEASES | Facility: CLINIC | Age: 69
End: 2024-08-28

## 2024-08-28 VITALS
BODY MASS INDEX: 40.23 KG/M2 | HEIGHT: 72 IN | DIASTOLIC BLOOD PRESSURE: 65 MMHG | WEIGHT: 297 LBS | OXYGEN SATURATION: 95 % | TEMPERATURE: 99 F | RESPIRATION RATE: 18 BRPM | HEART RATE: 77 BPM | SYSTOLIC BLOOD PRESSURE: 129 MMHG

## 2024-08-28 VITALS
SYSTOLIC BLOOD PRESSURE: 133 MMHG | BODY MASS INDEX: 40.25 KG/M2 | WEIGHT: 297.19 LBS | HEIGHT: 72 IN | TEMPERATURE: 99 F | DIASTOLIC BLOOD PRESSURE: 71 MMHG | HEART RATE: 79 BPM

## 2024-08-28 DIAGNOSIS — B95.61 MSSA BACTEREMIA: Primary | ICD-10-CM

## 2024-08-28 DIAGNOSIS — R78.81 MSSA BACTEREMIA: Primary | ICD-10-CM

## 2024-08-28 PROCEDURE — 3060F POS MICROALBUMINURIA REV: CPT | Mod: CPTII,S$GLB,, | Performed by: INTERNAL MEDICINE

## 2024-08-28 PROCEDURE — 3066F NEPHROPATHY DOC TX: CPT | Mod: CPTII,S$GLB,, | Performed by: INTERNAL MEDICINE

## 2024-08-28 PROCEDURE — 99214 OFFICE O/P EST MOD 30 MIN: CPT | Mod: S$GLB,,, | Performed by: INTERNAL MEDICINE

## 2024-08-28 PROCEDURE — 3044F HG A1C LEVEL LT 7.0%: CPT | Mod: CPTII,S$GLB,, | Performed by: INTERNAL MEDICINE

## 2024-08-28 PROCEDURE — 99999 PR PBB SHADOW E&M-EST. PATIENT-LVL III: CPT | Mod: PBBFAC,,, | Performed by: INTERNAL MEDICINE

## 2024-08-28 PROCEDURE — 1159F MED LIST DOCD IN RCRD: CPT | Mod: CPTII,S$GLB,, | Performed by: INTERNAL MEDICINE

## 2024-08-28 PROCEDURE — 3078F DIAST BP <80 MM HG: CPT | Mod: CPTII,S$GLB,, | Performed by: INTERNAL MEDICINE

## 2024-08-28 PROCEDURE — 3008F BODY MASS INDEX DOCD: CPT | Mod: CPTII,S$GLB,, | Performed by: INTERNAL MEDICINE

## 2024-08-28 PROCEDURE — 1126F AMNT PAIN NOTED NONE PRSNT: CPT | Mod: CPTII,S$GLB,, | Performed by: INTERNAL MEDICINE

## 2024-08-28 PROCEDURE — 3075F SYST BP GE 130 - 139MM HG: CPT | Mod: CPTII,S$GLB,, | Performed by: INTERNAL MEDICINE

## 2024-08-28 PROCEDURE — 3288F FALL RISK ASSESSMENT DOCD: CPT | Mod: CPTII,S$GLB,, | Performed by: INTERNAL MEDICINE

## 2024-08-28 PROCEDURE — 4010F ACE/ARB THERAPY RXD/TAKEN: CPT | Mod: CPTII,S$GLB,, | Performed by: INTERNAL MEDICINE

## 2024-08-28 PROCEDURE — 1101F PT FALLS ASSESS-DOCD LE1/YR: CPT | Mod: CPTII,S$GLB,, | Performed by: INTERNAL MEDICINE

## 2024-08-28 NOTE — PROGRESS NOTES
Subjective:     Patient ID: Jordan Lopez Jr. is a 68 y.o. male    Chief Complaint: cellulitis, MSSA bacteremia    HPI: 68M with a history of liver/kidney transplant in 2012 who was seen by me about 6 months ago for evaluation of cellulitis, presents today for follow up. He notes that he recently underwent a venous sclerosing procedure on  7/11/24. He started developing fevers and redness of the R ankle and calf about 5 days after the procedure. A steroid pack was ordered. He was seen in the ER 7/16, he was discharged on PO antibiotics for cellulitis but was called to return on 7/17 because blood cultures resulted positive for MSSA. Blood cultures cleared on 7/17, and he was discharged home w/ a 14d course of IV cefazolin. He is seen for follow up today. Still has an open wound of R leg, surrounding erythema and tenderness continues to resolve. He has been afebrile and feeling well. No issues with his PICC line. He took his last dose of cefazolin last night. Only xray of lower extremity is available for review, no venous US or CT. Pt states he had TTE that showed normal heart valves, but I am not able to see this in the system.     Antibiotics were extended to complete 6 weeks of therapy for endovascular infection given recent venous procedure. Seen today for end of care.    Patient overall doing well, wound on RLE is slowly healing.       Immunization History   Administered Date(s) Administered    COVID-19, MRNA, LN-S, PF (Pfizer) (Gray Cap) 03/11/2022    COVID-19, MRNA, LN-S, PF (Pfizer) (Purple Cap) 01/20/2021, 02/10/2021, 08/27/2021    COVID-19, mRNA, LNP-S, bivalent booster, PF (PFIZER OMICRON) 10/07/2022        Review of Systems   All other systems reviewed and are negative.       Past Medical History:   Diagnosis Date    Hypertension age 13    Kidney replaced by transplant 6/29/2012    Combined liver-kidney transplant    Liver cirrhosis secondary to STEVENS     Liver cirrhosis secondary to STEVENS (nonalcoholic  steatohepatitis)     Liver replaced by transplant 6/29/2012    Combined liver-kidney transplant    Obesity     Personal history of renal cancer 1/28/2013    S/p nephrectomy over a yr ago. Followed by [facility]     Type 2 diabetes mellitus 2007    diet controlled      Past Surgical History:   Procedure Laterality Date    CYSTOSCOPY      KIDNEY TRANSPLANT  6/29/2012    Combined liver-kidney transplant    LIVER TRANSPLANT  6/29/2012    Combined liver-kidney transplant    NEPHRECTOMY  2010    For stage 1 RCC right kidney (done at [facility])     Family History   Problem Relation Name Age of Onset    Kidney disease Mother          born with one kidney    Heart disease Father       Social History     Tobacco Use    Smoking status: Never    Smokeless tobacco: Never   Substance Use Topics    Alcohol use: No     Comment: Perhaps 2-3 drinks per year, never a drinker    Drug use: No       Objective:     Physical Exam  Constitutional:       General: He is not in acute distress.     Appearance: Normal appearance. He is well-developed. He is not ill-appearing or diaphoretic.   HENT:      Head: Normocephalic and atraumatic.      Right Ear: External ear normal.      Left Ear: External ear normal.      Nose: Nose normal.   Eyes:      General: No scleral icterus.        Right eye: No discharge.         Left eye: No discharge.      Extraocular Movements: Extraocular movements intact.      Conjunctiva/sclera: Conjunctivae normal.   Pulmonary:      Effort: Pulmonary effort is normal. No respiratory distress.      Breath sounds: No stridor.   Skin:     General: Skin is dry.      Coloration: Skin is not jaundiced or pale.      Findings: No erythema.      Comments: Small RLE open wound, no drainage, nontender  RLE w/ post-inflammatory hyperpigmentation   Neurological:      General: No focal deficit present.      Mental Status: He is alert and oriented to person, place, and time. Mental status is at baseline.    Psychiatric:         Mood and Affect: Mood normal.         Behavior: Behavior normal.         Thought Content: Thought content normal.         Judgment: Judgment normal.         Data:    All data, including recent labs, radiology, and pathology, has been independently reviewed.    Labs:    Recent Labs   Lab Result Units 06/17/24  0716 06/24/24  0724 08/19/24  0726 08/20/24  0900 08/27/24  0930   WBC K/uL 3.86*   < > 4.23 4.29 3.88*   Hemoglobin g/dL 14.0   < > 13.2* 13.2* 13.1*   Hematocrit % 42.8   < > 41.0 40.6 40.3   Sodium mmol/L 139   < > 139 138 138   Potassium mmol/L 4.5   < > 4.1 3.9 4.2   Chloride mmol/L 104   < > 102 102 102   BUN mg/dL 25*   < > 16 17 18   Creatinine mg/dL 1.4   < > 1.3 1.1 1.3   AST U/L 17   < > 16 14 15   ALT U/L 36   < > 14 20 16   Alkaline Phosphatase U/L 80   < > 68 72 71   Total Bilirubin mg/dL 0.6   < > 0.4 0.5 0.7   INR  1.0  --   --   --   --     < > = values in this interval not displayed.        Radiology:    No results found in the last 24 hours.     Assessment:    1. MSSA bacteremia  Remove PICC line today  Will send orders to home health for wound care  Stop antibiotics today  Follow up as needed           Follow up as needed    The total time for evaluation and management services performed on 8/28/24 was greater than 30 minutes.     Bee Lehman DO  Transplant Infectious Disease

## 2024-08-28 NOTE — PROGRESS NOTES
Patient arrives ambulatory for PICC removal as ordered - PICC removed without difficulty from right upper medial aspect of arm - measurement marked at 42 cm with entire catheter intact without compromise noted - sterile vaseline gauze placed and large co-flex wrap with instructions to leave dry and in place x 24 hours . No signs of infection noted to site - no swelling or drainage - Will monitor on unit for 30 minutes as per protocol.    Limited head-to-toe assessment due to privacy issues and visit reason though the opportunity was given for patient to express any concerns.

## 2024-08-28 NOTE — TELEPHONE ENCOUNTER
Tried calling Amando back from the Pharmacist to let him know that  said that they stopped pt antibiotics and line was removed in clinic.    Amando Call Back Number : [phone].

## 2024-08-29 ENCOUNTER — TELEPHONE (OUTPATIENT)
Dept: INFECTIOUS DISEASES | Facility: CLINIC | Age: 69
End: 2024-08-29
Payer: MEDICARE

## 2024-08-29 ENCOUNTER — TELEPHONE (OUTPATIENT)
Dept: TRANSPLANT | Facility: CLINIC | Age: 69
End: 2024-08-29
Payer: MEDICARE

## 2024-08-29 NOTE — TELEPHONE ENCOUNTER
Called pt, he was asking if there were any restrictions after the 24 hours after line removal.    I let him know after the 24 hours there are no restrictions per provider

## 2024-08-29 NOTE — TELEPHONE ENCOUNTER
----- Message from Emmanuelle Hughes sent at 8/29/2024  4:03 PM CDT -----  Regarding: missed call  Contact: Pt @ [phone]  Pt is returning a missed call from someone in the office and is asking for a return call back soon. Thanks.

## 2024-08-29 NOTE — TELEPHONE ENCOUNTER
Dr. Matias has informed via portal to restart Cellcept at 250 mg bid.  Pt will repeat labs 9/12/24.

## 2024-08-30 DIAGNOSIS — Z94.4 S/P LIVER TRANSPLANT: ICD-10-CM

## 2024-08-30 RX ORDER — MYCOPHENOLATE MOFETIL 250 MG/1
250 CAPSULE ORAL 2 TIMES DAILY
Qty: 60 CAPSULE | Refills: 11 | Status: SHIPPED | OUTPATIENT
Start: 2024-08-30

## 2024-09-12 ENCOUNTER — LAB VISIT (OUTPATIENT)
Dept: LAB | Facility: HOSPITAL | Age: 69
End: 2024-09-12
Attending: INTERNAL MEDICINE
Payer: MEDICARE

## 2024-09-12 DIAGNOSIS — Z94.4 S/P LIVER TRANSPLANT: ICD-10-CM

## 2024-09-12 DIAGNOSIS — Z94.0 S/P KIDNEY TRANSPLANT: ICD-10-CM

## 2024-09-12 LAB
ALBUMIN SERPL BCP-MCNC: 3.6 G/DL (ref 3.5–5.2)
ALP SERPL-CCNC: 58 U/L (ref 55–135)
ALT SERPL W/O P-5'-P-CCNC: 16 U/L (ref 10–44)
ANION GAP SERPL CALC-SCNC: 6 MMOL/L (ref 3–11)
AST SERPL-CCNC: 12 U/L (ref 10–40)
BASOPHILS # BLD AUTO: 0.03 K/UL (ref 0–0.2)
BASOPHILS NFR BLD: 0.6 % (ref 0–1.9)
BILIRUB SERPL-MCNC: 0.5 MG/DL (ref 0.1–1)
BUN SERPL-MCNC: 27 MG/DL (ref 8–23)
CALCIUM SERPL-MCNC: 9.4 MG/DL (ref 8.7–10.5)
CHLORIDE SERPL-SCNC: 108 MMOL/L (ref 95–110)
CO2 SERPL-SCNC: 26 MMOL/L (ref 23–29)
CREAT SERPL-MCNC: 1.2 MG/DL (ref 0.5–1.4)
CREAT UR-MCNC: 142 MG/DL (ref 23–375)
DIFFERENTIAL METHOD BLD: ABNORMAL
EOSINOPHIL # BLD AUTO: 0.1 K/UL (ref 0–0.5)
EOSINOPHIL NFR BLD: 2.3 % (ref 0–8)
ERYTHROCYTE [DISTWIDTH] IN BLOOD BY AUTOMATED COUNT: 15.9 % (ref 11.5–14.5)
EST. GFR  (NO RACE VARIABLE): >60 ML/MIN/1.73 M^2
GLUCOSE SERPL-MCNC: 102 MG/DL (ref 70–110)
HCT VFR BLD AUTO: 38.7 % (ref 40–54)
HGB BLD-MCNC: 12.9 G/DL (ref 14–18)
IMM GRANULOCYTES # BLD AUTO: 0.01 K/UL (ref 0–0.04)
IMM GRANULOCYTES NFR BLD AUTO: 0.2 % (ref 0–0.5)
LYMPHOCYTES # BLD AUTO: 1.4 K/UL (ref 1–4.8)
LYMPHOCYTES NFR BLD: 27.5 % (ref 18–48)
MCH RBC QN AUTO: 27.5 PG (ref 27–31)
MCHC RBC AUTO-ENTMCNC: 33.3 G/DL (ref 32–36)
MCV RBC AUTO: 83 FL (ref 82–98)
MONOCYTES # BLD AUTO: 0.4 K/UL (ref 0.3–1)
MONOCYTES NFR BLD: 8.6 % (ref 4–15)
NEUTROPHILS # BLD AUTO: 3.1 K/UL (ref 1.8–7.7)
NEUTROPHILS NFR BLD: 60.8 % (ref 38–73)
NRBC BLD-RTO: 0 /100 WBC
PLATELET # BLD AUTO: 167 K/UL (ref 150–450)
PMV BLD AUTO: 10 FL (ref 9.2–12.9)
POTASSIUM SERPL-SCNC: 3.6 MMOL/L (ref 3.5–5.1)
PROT SERPL-MCNC: 6.9 G/DL (ref 6–8.4)
PROT UR-MCNC: 37.2 MG/DL (ref 0–15)
PROT/CREAT UR: 0.26 MG/G{CREAT} (ref 0–0.2)
RBC # BLD AUTO: 4.69 M/UL (ref 4.6–6.2)
SODIUM SERPL-SCNC: 140 MMOL/L (ref 136–145)
WBC # BLD AUTO: 5.12 K/UL (ref 3.9–12.7)

## 2024-09-12 PROCEDURE — 87799 DETECT AGENT NOS DNA QUANT: CPT | Performed by: INTERNAL MEDICINE

## 2024-09-12 PROCEDURE — 80053 COMPREHEN METABOLIC PANEL: CPT | Performed by: INTERNAL MEDICINE

## 2024-09-12 PROCEDURE — 82570 ASSAY OF URINE CREATININE: CPT | Performed by: INTERNAL MEDICINE

## 2024-09-12 PROCEDURE — 80197 ASSAY OF TACROLIMUS: CPT | Performed by: INTERNAL MEDICINE

## 2024-09-12 PROCEDURE — 85025 COMPLETE CBC W/AUTO DIFF WBC: CPT | Performed by: INTERNAL MEDICINE

## 2024-09-12 PROCEDURE — 84156 ASSAY OF PROTEIN URINE: CPT | Performed by: INTERNAL MEDICINE

## 2024-09-12 PROCEDURE — 36415 COLL VENOUS BLD VENIPUNCTURE: CPT | Performed by: INTERNAL MEDICINE

## 2024-09-13 ENCOUNTER — TELEPHONE (OUTPATIENT)
Dept: TRANSPLANT | Facility: CLINIC | Age: 69
End: 2024-09-13
Payer: MEDICARE

## 2024-09-13 DIAGNOSIS — Z94.4 LIVER REPLACED BY TRANSPLANT: Chronic | ICD-10-CM

## 2024-09-13 LAB — TACROLIMUS BLD-MCNC: <2 NG/ML (ref 5–15)

## 2024-09-13 NOTE — TELEPHONE ENCOUNTER
----- Message from Luis Crump MD sent at 8/25/2022  9:54 AM CDT -----  Has low testosterone, refer to Dr. Weber , rest of labs are normal, thanks   Writer called patient and patient made aware txp labs reviewed by physician and nto make tac dose increase as per below. Patient states he has actually only been taking prograf 1/1. Confirmed with patient his tacrolimus aka prograf he has only been taking 1mg in the am and only 1 in the pm, patient confirmed. Therefore patient made aware to increase tacrolimus to 2/2 (as MAR) already currently reflects with follow up labs 9/30/24.            ----- Message from Shaniqua Matias MD sent at 9/13/2024  8:50 AM CDT -----  Increase prograf to 3/2 from 2/2 and repeat labs in 2 weeks - please let patient know.

## 2024-09-16 DIAGNOSIS — Z94.4 LIVER REPLACED BY TRANSPLANT: Chronic | ICD-10-CM

## 2024-09-16 RX ORDER — TACROLIMUS 1 MG/1
CAPSULE ORAL
Qty: 270 CAPSULE | Refills: 3 | Status: SHIPPED | OUTPATIENT
Start: 2024-09-16 | End: 2025-09-16

## 2024-09-16 NOTE — TELEPHONE ENCOUNTER
Patient contacted, tacro dosage clarified, patient made aware to alter tac to 2mg in the am and only 1mg in the pm, patient repeated dosage correctly with follow up labs 9/30/24.          RE: Cosign-Required Note  Received: Yesterday  Shaniqua Matias MD Dworak, Maria, RN  If only taking 1/1 then increase to 2/1

## 2024-09-19 ENCOUNTER — TELEPHONE (OUTPATIENT)
Dept: TRANSPLANT | Facility: CLINIC | Age: 69
End: 2024-09-19
Payer: MEDICARE

## 2024-09-30 ENCOUNTER — LAB VISIT (OUTPATIENT)
Dept: LAB | Facility: HOSPITAL | Age: 69
End: 2024-09-30
Attending: INTERNAL MEDICINE
Payer: MEDICARE

## 2024-09-30 DIAGNOSIS — Z94.4 S/P LIVER TRANSPLANT: ICD-10-CM

## 2024-09-30 DIAGNOSIS — Z94.0 S/P KIDNEY TRANSPLANT: ICD-10-CM

## 2024-09-30 LAB
ALBUMIN SERPL BCP-MCNC: 3.5 G/DL (ref 3.5–5.2)
ALP SERPL-CCNC: 66 U/L (ref 55–135)
ALT SERPL W/O P-5'-P-CCNC: 17 U/L (ref 10–44)
ANION GAP SERPL CALC-SCNC: 8 MMOL/L (ref 3–11)
AST SERPL-CCNC: 11 U/L (ref 10–40)
BASOPHILS # BLD AUTO: 0.04 K/UL (ref 0–0.2)
BASOPHILS NFR BLD: 0.9 % (ref 0–1.9)
BILIRUB SERPL-MCNC: 0.7 MG/DL (ref 0.1–1)
BUN SERPL-MCNC: 18 MG/DL (ref 8–23)
CALCIUM SERPL-MCNC: 9.1 MG/DL (ref 8.7–10.5)
CHLORIDE SERPL-SCNC: 103 MMOL/L (ref 95–110)
CO2 SERPL-SCNC: 28 MMOL/L (ref 23–29)
CREAT SERPL-MCNC: 1.5 MG/DL (ref 0.5–1.4)
DIFFERENTIAL METHOD BLD: ABNORMAL
EOSINOPHIL # BLD AUTO: 0.3 K/UL (ref 0–0.5)
EOSINOPHIL NFR BLD: 5.4 % (ref 0–8)
ERYTHROCYTE [DISTWIDTH] IN BLOOD BY AUTOMATED COUNT: 15.8 % (ref 11.5–14.5)
EST. GFR  (NO RACE VARIABLE): 49.8 ML/MIN/1.73 M^2
GLUCOSE SERPL-MCNC: 101 MG/DL (ref 70–110)
HCT VFR BLD AUTO: 41.1 % (ref 40–54)
HGB BLD-MCNC: 13.4 G/DL (ref 14–18)
IMM GRANULOCYTES # BLD AUTO: 0.01 K/UL (ref 0–0.04)
IMM GRANULOCYTES NFR BLD AUTO: 0.2 % (ref 0–0.5)
LYMPHOCYTES # BLD AUTO: 1.4 K/UL (ref 1–4.8)
LYMPHOCYTES NFR BLD: 30 % (ref 18–48)
MCH RBC QN AUTO: 27.6 PG (ref 27–31)
MCHC RBC AUTO-ENTMCNC: 32.6 G/DL (ref 32–36)
MCV RBC AUTO: 85 FL (ref 82–98)
MONOCYTES # BLD AUTO: 0.3 K/UL (ref 0.3–1)
MONOCYTES NFR BLD: 6.9 % (ref 4–15)
NEUTROPHILS # BLD AUTO: 2.6 K/UL (ref 1.8–7.7)
NEUTROPHILS NFR BLD: 56.6 % (ref 38–73)
NRBC BLD-RTO: 0 /100 WBC
PLATELET # BLD AUTO: 169 K/UL (ref 150–450)
PMV BLD AUTO: 10.7 FL (ref 9.2–12.9)
POTASSIUM SERPL-SCNC: 4.1 MMOL/L (ref 3.5–5.1)
PROT SERPL-MCNC: 7.1 G/DL (ref 6–8.4)
RBC # BLD AUTO: 4.85 M/UL (ref 4.6–6.2)
SODIUM SERPL-SCNC: 139 MMOL/L (ref 136–145)
WBC # BLD AUTO: 4.63 K/UL (ref 3.9–12.7)

## 2024-09-30 PROCEDURE — 80053 COMPREHEN METABOLIC PANEL: CPT | Mod: 91 | Performed by: INTERNAL MEDICINE

## 2024-09-30 PROCEDURE — 85025 COMPLETE CBC W/AUTO DIFF WBC: CPT | Performed by: INTERNAL MEDICINE

## 2024-09-30 PROCEDURE — 80197 ASSAY OF TACROLIMUS: CPT | Performed by: INTERNAL MEDICINE

## 2024-10-01 LAB — TACROLIMUS BLD-MCNC: 3.1 NG/ML (ref 5–15)

## 2024-10-02 ENCOUNTER — TELEPHONE (OUTPATIENT)
Dept: TRANSPLANT | Facility: CLINIC | Age: 69
End: 2024-10-02
Payer: MEDICARE

## 2024-10-02 NOTE — TELEPHONE ENCOUNTER
Labs reviewed via portal and are stable.  Patient will repeat labs on 10/7/24.        ----- Message from Shaniqua Matias MD sent at 10/1/2024  6:49 PM CDT -----  The Labs are stable - please let patient know.   Problem: Pressure Injury - Risk of  Goal: *Prevention of pressure injury  Description: Document Daniel Scale and appropriate interventions in the flowsheet.   12/4/2020 0747 by Becky Keller  Outcome: Progressing Towards Goal  Note: Pressure Injury Interventions:  Sensory Interventions: Assess need for specialty bed, Avoid rigorous massage over bony prominences, Float heels, Keep linens dry and wrinkle-free, Maintain/enhance activity level, Minimize linen layers, Monitor skin under medical devices    Moisture Interventions: Apply protective barrier, creams and emollients, Maintain skin hydration (lotion/cream), Minimize layers, Moisture barrier    Activity Interventions: Increase time out of bed, Pressure redistribution bed/mattress(bed type), PT/OT evaluation, Assess need for specialty bed    Mobility Interventions: Assess need for specialty bed, Pressure redistribution bed/mattress (bed type), PT/OT evaluation, Float heels, HOB 30 degrees or less    Nutrition Interventions: Document food/fluid/supplement intake, Offer support with meals,snacks and hydration    Friction and Shear Interventions: HOB 30 degrees or less, Apply protective barrier, creams and emollients, Minimize layers, Lift team/patient mobility team             12/4/2020 0741 by Becky Keller  Outcome: Progressing Towards Goal  Note: Pressure Injury Interventions:  Sensory Interventions: Assess need for specialty bed, Avoid rigorous massage over bony prominences, Float heels, Keep linens dry and wrinkle-free, Maintain/enhance activity level, Minimize linen layers, Monitor skin under medical devices    Moisture Interventions: Apply protective barrier, creams and emollients, Maintain skin hydration (lotion/cream), Minimize layers, Moisture barrier    Activity Interventions: Increase time out of bed, Pressure redistribution bed/mattress(bed type), PT/OT evaluation, Assess need for specialty bed    Mobility Interventions: Assess need for specialty bed, Pressure redistribution bed/mattress (bed type), PT/OT evaluation, Float heels, HOB 30 degrees or less    Nutrition Interventions: Document food/fluid/supplement intake, Offer support with meals,snacks and hydration    Friction and Shear Interventions: HOB 30 degrees or less, Apply protective barrier, creams and emollients, Minimize layers, Lift team/patient mobility team                Problem: Breathing Pattern - Ineffective  Goal: *Absence of hypoxia  12/4/2020 0747 by Yeison Vogel  Outcome: Progressing Towards Goal  12/4/2020 0741 by Yeison Vogel  Outcome: Progressing Towards Goal  Goal: *Use of effective breathing techniques  12/4/2020 0747 by Yeison Vogel  Outcome: Progressing Towards Goal  12/4/2020 0741 by Yeison Vogel  Outcome: Progressing Towards Goal

## 2024-10-07 ENCOUNTER — LAB VISIT (OUTPATIENT)
Dept: LAB | Facility: HOSPITAL | Age: 69
End: 2024-10-07
Attending: INTERNAL MEDICINE
Payer: MEDICARE

## 2024-10-07 DIAGNOSIS — Z94.0 S/P KIDNEY TRANSPLANT: ICD-10-CM

## 2024-10-07 DIAGNOSIS — Z94.4 LIVER REPLACED BY TRANSPLANT: ICD-10-CM

## 2024-10-07 LAB
ALBUMIN SERPL BCP-MCNC: 3.6 G/DL (ref 3.5–5.2)
ALP SERPL-CCNC: 72 U/L (ref 55–135)
ALT SERPL W/O P-5'-P-CCNC: 21 U/L (ref 10–44)
ANION GAP SERPL CALC-SCNC: 6 MMOL/L (ref 3–11)
AST SERPL-CCNC: 13 U/L (ref 10–40)
BASOPHILS # BLD AUTO: 0.03 K/UL (ref 0–0.2)
BASOPHILS NFR BLD: 0.7 % (ref 0–1.9)
BILIRUB SERPL-MCNC: 0.6 MG/DL (ref 0.1–1)
BUN SERPL-MCNC: 18 MG/DL (ref 8–23)
CALCIUM SERPL-MCNC: 8.8 MG/DL (ref 8.7–10.5)
CHLORIDE SERPL-SCNC: 103 MMOL/L (ref 95–110)
CO2 SERPL-SCNC: 29 MMOL/L (ref 23–29)
CREAT SERPL-MCNC: 1.3 MG/DL (ref 0.5–1.4)
DIFFERENTIAL METHOD BLD: ABNORMAL
EOSINOPHIL # BLD AUTO: 0.3 K/UL (ref 0–0.5)
EOSINOPHIL NFR BLD: 5.8 % (ref 0–8)
ERYTHROCYTE [DISTWIDTH] IN BLOOD BY AUTOMATED COUNT: 15.3 % (ref 11.5–14.5)
EST. GFR  (NO RACE VARIABLE): 58.7 ML/MIN/1.73 M^2
GLUCOSE SERPL-MCNC: 103 MG/DL (ref 70–110)
HCT VFR BLD AUTO: 41.4 % (ref 40–54)
HGB BLD-MCNC: 13.7 G/DL (ref 14–18)
IMM GRANULOCYTES # BLD AUTO: 0.01 K/UL (ref 0–0.04)
IMM GRANULOCYTES NFR BLD AUTO: 0.2 % (ref 0–0.5)
LYMPHOCYTES # BLD AUTO: 1.3 K/UL (ref 1–4.8)
LYMPHOCYTES NFR BLD: 29.8 % (ref 18–48)
MCH RBC QN AUTO: 27.7 PG (ref 27–31)
MCHC RBC AUTO-ENTMCNC: 33.1 G/DL (ref 32–36)
MCV RBC AUTO: 84 FL (ref 82–98)
MONOCYTES # BLD AUTO: 0.3 K/UL (ref 0.3–1)
MONOCYTES NFR BLD: 7.1 % (ref 4–15)
NEUTROPHILS # BLD AUTO: 2.5 K/UL (ref 1.8–7.7)
NEUTROPHILS NFR BLD: 56.4 % (ref 38–73)
NRBC BLD-RTO: 0 /100 WBC
PLATELET # BLD AUTO: 169 K/UL (ref 150–450)
PMV BLD AUTO: 10.9 FL (ref 9.2–12.9)
POTASSIUM SERPL-SCNC: 3.8 MMOL/L (ref 3.5–5.1)
PROT SERPL-MCNC: 7.1 G/DL (ref 6–8.4)
RBC # BLD AUTO: 4.94 M/UL (ref 4.6–6.2)
SODIUM SERPL-SCNC: 138 MMOL/L (ref 136–145)
WBC # BLD AUTO: 4.5 K/UL (ref 3.9–12.7)

## 2024-10-07 PROCEDURE — 80053 COMPREHEN METABOLIC PANEL: CPT | Performed by: INTERNAL MEDICINE

## 2024-10-07 PROCEDURE — 87799 DETECT AGENT NOS DNA QUANT: CPT | Performed by: INTERNAL MEDICINE

## 2024-10-07 PROCEDURE — 85025 COMPLETE CBC W/AUTO DIFF WBC: CPT | Performed by: INTERNAL MEDICINE

## 2024-10-07 PROCEDURE — 80197 ASSAY OF TACROLIMUS: CPT | Performed by: INTERNAL MEDICINE

## 2024-10-07 PROCEDURE — 36415 COLL VENOUS BLD VENIPUNCTURE: CPT | Performed by: INTERNAL MEDICINE

## 2024-10-08 LAB — TACROLIMUS BLD-MCNC: 3.8 NG/ML (ref 5–15)

## 2024-10-15 ENCOUNTER — LAB VISIT (OUTPATIENT)
Dept: LAB | Facility: HOSPITAL | Age: 69
End: 2024-10-15
Attending: INTERNAL MEDICINE
Payer: MEDICARE

## 2024-10-15 ENCOUNTER — PATIENT MESSAGE (OUTPATIENT)
Dept: RADIATION ONCOLOGY | Facility: CLINIC | Age: 69
End: 2024-10-15
Payer: MEDICARE

## 2024-10-15 DIAGNOSIS — E78.5 HYPERLIPEMIA: ICD-10-CM

## 2024-10-15 DIAGNOSIS — Z94.0 S/P KIDNEY TRANSPLANT: Primary | ICD-10-CM

## 2024-10-15 DIAGNOSIS — Z94.4 LIVER REPLACED BY TRANSPLANT: ICD-10-CM

## 2024-10-15 DIAGNOSIS — E11.29 DM (DIABETES MELLITUS), TYPE 2 WITH RENAL COMPLICATIONS: ICD-10-CM

## 2024-10-15 DIAGNOSIS — C61 PROSTATE CANCER: ICD-10-CM

## 2024-10-15 LAB
ALBUMIN SERPL BCP-MCNC: 3.4 G/DL (ref 3.5–5.2)
ALP SERPL-CCNC: 69 U/L (ref 55–135)
ALT SERPL W/O P-5'-P-CCNC: 22 U/L (ref 10–44)
ANION GAP SERPL CALC-SCNC: 9 MMOL/L (ref 3–11)
AST SERPL-CCNC: 17 U/L (ref 10–40)
BILIRUB SERPL-MCNC: 0.5 MG/DL (ref 0.1–1)
BUN SERPL-MCNC: 17 MG/DL (ref 8–23)
CALCIUM SERPL-MCNC: 8.8 MG/DL (ref 8.7–10.5)
CHLORIDE SERPL-SCNC: 102 MMOL/L (ref 95–110)
CHOLEST SERPL-MCNC: 106 MG/DL (ref 120–199)
CHOLEST/HDLC SERPL: 2.9 {RATIO} (ref 2–5)
CO2 SERPL-SCNC: 29 MMOL/L (ref 23–29)
COMPLEXED PSA SERPL-MCNC: 0.05 NG/ML (ref 0–4)
CREAT SERPL-MCNC: 1.4 MG/DL (ref 0.5–1.4)
EST. GFR  (NO RACE VARIABLE): 53.7 ML/MIN/1.73 M^2
ESTIMATED AVG GLUCOSE: 126 MG/DL (ref 68–131)
GLUCOSE SERPL-MCNC: 109 MG/DL (ref 70–110)
HBA1C MFR BLD: 6 % (ref 4–5.6)
HDLC SERPL-MCNC: 37 MG/DL (ref 40–75)
HDLC SERPL: 34.9 % (ref 20–50)
LDLC SERPL CALC-MCNC: 47 MG/DL (ref 63–159)
NONHDLC SERPL-MCNC: 69 MG/DL
POTASSIUM SERPL-SCNC: 4.1 MMOL/L (ref 3.5–5.1)
PROT SERPL-MCNC: 6.8 G/DL (ref 6–8.4)
SODIUM SERPL-SCNC: 140 MMOL/L (ref 136–145)
TRIGL SERPL-MCNC: 110 MG/DL (ref 30–150)

## 2024-10-15 PROCEDURE — 80053 COMPREHEN METABOLIC PANEL: CPT | Performed by: INTERNAL MEDICINE

## 2024-10-15 PROCEDURE — 83036 HEMOGLOBIN GLYCOSYLATED A1C: CPT | Performed by: INTERNAL MEDICINE

## 2024-10-15 PROCEDURE — 84153 ASSAY OF PSA TOTAL: CPT | Mod: GA | Performed by: INTERNAL MEDICINE

## 2024-10-15 PROCEDURE — 80061 LIPID PANEL: CPT | Performed by: INTERNAL MEDICINE

## 2024-10-15 PROCEDURE — 36415 COLL VENOUS BLD VENIPUNCTURE: CPT | Performed by: INTERNAL MEDICINE

## 2024-10-16 ENCOUNTER — TELEPHONE (OUTPATIENT)
Dept: TRANSPLANT | Facility: CLINIC | Age: 69
End: 2024-10-16
Payer: MEDICARE

## 2024-10-16 DIAGNOSIS — Z94.0 S/P KIDNEY TRANSPLANT: Primary | ICD-10-CM

## 2024-10-16 NOTE — TELEPHONE ENCOUNTER
Labs reviewed via portal and are stable.  Patient will repeat labs on 1216/24.        ----- Message from Shaniqua Matias MD sent at 10/9/2024  1:20 PM CDT -----  The Labs are stable - please let patient know.

## 2024-12-16 ENCOUNTER — LAB VISIT (OUTPATIENT)
Dept: LAB | Facility: HOSPITAL | Age: 69
End: 2024-12-16
Attending: INTERNAL MEDICINE
Payer: MEDICARE

## 2024-12-16 ENCOUNTER — PATIENT MESSAGE (OUTPATIENT)
Dept: TRANSPLANT | Facility: CLINIC | Age: 69
End: 2024-12-16
Payer: MEDICARE

## 2024-12-16 DIAGNOSIS — Z94.4 S/P LIVER TRANSPLANT: ICD-10-CM

## 2024-12-16 DIAGNOSIS — Z85.05 PERSONAL HISTORY OF MALIGNANT HEPATOMA: ICD-10-CM

## 2024-12-16 DIAGNOSIS — Z94.0 S/P KIDNEY TRANSPLANT: ICD-10-CM

## 2024-12-16 LAB
AFP SERPL-MCNC: 2.1 NG/ML (ref 0–8.4)
ALBUMIN SERPL BCP-MCNC: 3.7 G/DL (ref 3.5–5.2)
ALP SERPL-CCNC: 77 U/L (ref 55–135)
ALT SERPL W/O P-5'-P-CCNC: 27 U/L (ref 10–44)
ANION GAP SERPL CALC-SCNC: 4 MMOL/L (ref 3–11)
AST SERPL-CCNC: 17 U/L (ref 10–40)
BASOPHILS # BLD AUTO: 0.04 K/UL (ref 0–0.2)
BASOPHILS NFR BLD: 0.8 % (ref 0–1.9)
BILIRUB SERPL-MCNC: 0.5 MG/DL (ref 0.1–1)
BUN SERPL-MCNC: 20 MG/DL (ref 8–23)
CALCIUM SERPL-MCNC: 9.6 MG/DL (ref 8.7–10.5)
CHLORIDE SERPL-SCNC: 107 MMOL/L (ref 95–110)
CO2 SERPL-SCNC: 31 MMOL/L (ref 23–29)
CREAT SERPL-MCNC: 1.3 MG/DL (ref 0.5–1.4)
CREAT UR-MCNC: 94.5 MG/DL (ref 23–375)
DIFFERENTIAL METHOD BLD: NORMAL
EOSINOPHIL # BLD AUTO: 0.2 K/UL (ref 0–0.5)
EOSINOPHIL NFR BLD: 4.7 % (ref 0–8)
ERYTHROCYTE [DISTWIDTH] IN BLOOD BY AUTOMATED COUNT: 14.3 % (ref 11.5–14.5)
EST. GFR  (NO RACE VARIABLE): 58.7 ML/MIN/1.73 M^2
GLUCOSE SERPL-MCNC: 105 MG/DL (ref 70–110)
HCT VFR BLD AUTO: 44.8 % (ref 40–54)
HGB BLD-MCNC: 14.8 G/DL (ref 14–18)
IMM GRANULOCYTES # BLD AUTO: 0 K/UL (ref 0–0.04)
IMM GRANULOCYTES NFR BLD AUTO: 0 % (ref 0–0.5)
LYMPHOCYTES # BLD AUTO: 1.4 K/UL (ref 1–4.8)
LYMPHOCYTES NFR BLD: 28.6 % (ref 18–48)
MCH RBC QN AUTO: 27.8 PG (ref 27–31)
MCHC RBC AUTO-ENTMCNC: 33 G/DL (ref 32–36)
MCV RBC AUTO: 84 FL (ref 82–98)
MONOCYTES # BLD AUTO: 0.5 K/UL (ref 0.3–1)
MONOCYTES NFR BLD: 9.4 % (ref 4–15)
NEUTROPHILS # BLD AUTO: 2.8 K/UL (ref 1.8–7.7)
NEUTROPHILS NFR BLD: 56.5 % (ref 38–73)
NRBC BLD-RTO: 0 /100 WBC
PLATELET # BLD AUTO: 159 K/UL (ref 150–450)
PMV BLD AUTO: 10.5 FL (ref 9.2–12.9)
POTASSIUM SERPL-SCNC: 4.6 MMOL/L (ref 3.5–5.1)
PROT SERPL-MCNC: 7.1 G/DL (ref 6–8.4)
PROT UR-MCNC: 20.7 MG/DL (ref 0–15)
PROT/CREAT UR: 0.22 MG/G{CREAT} (ref 0–0.2)
RBC # BLD AUTO: 5.32 M/UL (ref 4.6–6.2)
SODIUM SERPL-SCNC: 142 MMOL/L (ref 136–145)
WBC # BLD AUTO: 4.9 K/UL (ref 3.9–12.7)

## 2024-12-16 PROCEDURE — 87799 DETECT AGENT NOS DNA QUANT: CPT | Performed by: INTERNAL MEDICINE

## 2024-12-16 PROCEDURE — 36415 COLL VENOUS BLD VENIPUNCTURE: CPT | Performed by: INTERNAL MEDICINE

## 2024-12-16 PROCEDURE — 85025 COMPLETE CBC W/AUTO DIFF WBC: CPT | Performed by: INTERNAL MEDICINE

## 2024-12-16 PROCEDURE — 82105 ALPHA-FETOPROTEIN SERUM: CPT | Performed by: INTERNAL MEDICINE

## 2024-12-16 PROCEDURE — 82570 ASSAY OF URINE CREATININE: CPT | Performed by: INTERNAL MEDICINE

## 2024-12-16 PROCEDURE — 80197 ASSAY OF TACROLIMUS: CPT | Performed by: INTERNAL MEDICINE

## 2024-12-16 PROCEDURE — 80053 COMPREHEN METABOLIC PANEL: CPT | Performed by: INTERNAL MEDICINE

## 2024-12-17 LAB — TACROLIMUS BLD-MCNC: 4.1 NG/ML (ref 5–15)

## 2024-12-18 ENCOUNTER — OFFICE VISIT (OUTPATIENT)
Dept: RADIATION ONCOLOGY | Facility: CLINIC | Age: 69
End: 2024-12-18
Payer: MEDICARE

## 2024-12-18 DIAGNOSIS — C61 PROSTATE CANCER: Primary | ICD-10-CM

## 2024-12-18 PROCEDURE — 99499 UNLISTED E&M SERVICE: CPT | Mod: 95,,, | Performed by: RADIOLOGY

## 2024-12-18 NOTE — PROGRESS NOTES
The patient location is: home  The chief complaint leading to consultation is: prostate cancer     Visit type: audio only  AV did not function properly     20 minutes of total time spent on the encounter, which includes face to face time and non-face to face time preparing to see the patient (eg, review of tests), Obtaining and/or reviewing separately obtained history, Documenting clinical information in the electronic or other health record, Independently interpreting results (not separately reported) and communicating results to the patient/family/caregiver, or Care coordination (not separately reported).     Each patient to whom he or she provides medical services by telemedicine is:  (1) informed of the relationship between the physician and patient and the respective role of any other health care provider with respect to management of the patient; and (2) notified that he or she may decline to receive medical services by telemedicine and may withdraw from such care at any time.    Notes:    [place] [facility] - Radiation Oncology     Patient ID: Jordan Lopez Jr. is a 69 y.o. male.    Chief Complaint: No chief complaint on file.    Mr. Lopez has a history of Stage IIIB, pT3a, N0, M0, P>=10<20, G2 adenocarcinoma of the prostate.  He presented in 2021 with elevated PSA on 14.8 ng/ml. Biopsy revealed [place] 7 (3+4) adenocarcinoma involving 2 of 8 cores, unclear of location.   One core was 100% involved.  The patient presented to [facility] for further evaluation. MRI revealed a 36 cc gland with a 1.9 cm PI-RADS 5 lesion in the Rt. apex, with extraprostatic extension.  The neurovascular bundles and seminal vesicles were unremarkable.  There was no lymphadenopathy. We elected to offer radiotherapy combined with hormonal deprivation therapy.  He completed 70 Gy to the prostate, seminal vesicles and lower pelvic nodes on 8/23/21.  He completed 18 months of hormonal therapy with his last 6  month Lupron injection in July of 2022.  He has remained GRICEL since that time.       Review of Systems   Constitutional:  Negative for activity change, appetite change, chills, fatigue and fever.   Gastrointestinal:  Negative for constipation, diarrhea and fecal incontinence.   Genitourinary:  Negative for bladder incontinence, difficulty urinating, dysuria, frequency and hematuria.       Physical Exam  Constitutional:       General: He is not in acute distress.  Neurological:      Mental Status: He is alert and oriented to person, place, and time.   Psychiatric:         Mood and Affect: Mood normal.         Judgment: Judgment normal.        Latest Reference Range & Units 10/15/24 07:22   Prostate Specific Antigen 0.00 - 4.00 ng/mL 0.05          Assessment and Plan    Prostate cancer no evidence of tumor progression or recurrence.  Plan follow up psa and testosterone in 6 months with phone review  RTC in one year with PSA and testosterone.

## 2024-12-18 NOTE — Clinical Note
Plan follow up psa and testosterone in 6 months with phone review  RTC in one year with PSA and testosterone.

## 2024-12-19 ENCOUNTER — TELEPHONE (OUTPATIENT)
Dept: TRANSPLANT | Facility: CLINIC | Age: 69
End: 2024-12-19
Payer: MEDICARE

## 2024-12-19 NOTE — TELEPHONE ENCOUNTER
Labs reviewed via portal and are stable.  Patient will repeat labs on 3/17/25.        ----- Message from Shaniqua Matias MD sent at 12/18/2024 11:58 PM CST -----  The Labs are stable - please let patient know.

## 2025-02-28 ENCOUNTER — HOSPITAL ENCOUNTER (OUTPATIENT)
Dept: RADIOLOGY | Facility: HOSPITAL | Age: 70
Discharge: HOME | End: 2025-02-28
Attending: NURSE PRACTITIONER
Payer: MEDICARE

## 2025-02-28 DIAGNOSIS — I87.2 VENOUS INSUFFICIENCY OF BOTH LOWER EXTREMITIES: Primary | ICD-10-CM

## 2025-02-28 DIAGNOSIS — I87.2 VENOUS INSUFFICIENCY OF BOTH LOWER EXTREMITIES: ICD-10-CM

## 2025-02-28 PROCEDURE — 93970 EXTREMITY STUDY: CPT | Mod: TC

## 2025-03-15 ENCOUNTER — PATIENT MESSAGE (OUTPATIENT)
Dept: TRANSPLANT | Facility: CLINIC | Age: 70
End: 2025-03-15
Payer: MEDICARE

## 2025-03-18 ENCOUNTER — RESULTS FOLLOW-UP (OUTPATIENT)
Dept: TRANSPLANT | Facility: HOSPITAL | Age: 70
End: 2025-03-18
Payer: MEDICARE

## 2025-03-18 ENCOUNTER — LAB VISIT (OUTPATIENT)
Dept: LAB | Facility: HOSPITAL | Age: 70
End: 2025-03-18
Attending: INTERNAL MEDICINE
Payer: MEDICARE

## 2025-03-18 DIAGNOSIS — Z94.4 S/P LIVER TRANSPLANT: ICD-10-CM

## 2025-03-18 DIAGNOSIS — Z94.0 S/P KIDNEY TRANSPLANT: ICD-10-CM

## 2025-03-18 LAB
ALBUMIN SERPL BCP-MCNC: 3.8 G/DL (ref 3.5–5.2)
ALP SERPL-CCNC: 68 U/L (ref 55–135)
ALT SERPL W/O P-5'-P-CCNC: 18 U/L (ref 10–44)
ANION GAP SERPL CALC-SCNC: 6 MMOL/L (ref 8–16)
AST SERPL-CCNC: 19 U/L (ref 10–40)
BASOPHILS # BLD AUTO: 0.05 K/UL (ref 0–0.2)
BASOPHILS NFR BLD: 1 % (ref 0–1.9)
BILIRUB SERPL-MCNC: 0.6 MG/DL (ref 0.1–1)
BUN SERPL-MCNC: 21 MG/DL (ref 8–23)
CALCIUM SERPL-MCNC: 8.9 MG/DL (ref 8.7–10.5)
CHLORIDE SERPL-SCNC: 104 MMOL/L (ref 95–110)
CO2 SERPL-SCNC: 27 MMOL/L (ref 23–29)
CREAT SERPL-MCNC: 1.4 MG/DL (ref 0.5–1.4)
CREAT UR-MCNC: 99.4 MG/DL (ref 23–375)
DIFFERENTIAL METHOD BLD: NORMAL
EOSINOPHIL # BLD AUTO: 0.3 K/UL (ref 0–0.5)
EOSINOPHIL NFR BLD: 5.1 % (ref 0–8)
ERYTHROCYTE [DISTWIDTH] IN BLOOD BY AUTOMATED COUNT: 14.2 % (ref 11.5–14.5)
EST. GFR  (NO RACE VARIABLE): 53.7 ML/MIN/1.73 M^2
GLUCOSE SERPL-MCNC: 111 MG/DL (ref 70–110)
HCT VFR BLD AUTO: 44.7 % (ref 40–54)
HGB BLD-MCNC: 14.4 G/DL (ref 14–18)
IMM GRANULOCYTES # BLD AUTO: 0.01 K/UL (ref 0–0.04)
IMM GRANULOCYTES NFR BLD AUTO: 0.2 % (ref 0–0.5)
LYMPHOCYTES # BLD AUTO: 1.2 K/UL (ref 1–4.8)
LYMPHOCYTES NFR BLD: 24.5 % (ref 18–48)
MCH RBC QN AUTO: 27.7 PG (ref 27–31)
MCHC RBC AUTO-ENTMCNC: 32.2 G/DL (ref 32–36)
MCV RBC AUTO: 86 FL (ref 82–98)
MONOCYTES # BLD AUTO: 0.4 K/UL (ref 0.3–1)
MONOCYTES NFR BLD: 8.7 % (ref 4–15)
NEUTROPHILS # BLD AUTO: 3 K/UL (ref 1.8–7.7)
NEUTROPHILS NFR BLD: 60.5 % (ref 38–73)
NRBC BLD-RTO: 0 /100 WBC
PLATELET # BLD AUTO: 169 K/UL (ref 150–450)
PMV BLD AUTO: 10.5 FL (ref 9.2–12.9)
POTASSIUM SERPL-SCNC: 4.4 MMOL/L (ref 3.5–5.1)
PROT SERPL-MCNC: 6.8 G/DL (ref 6–8.4)
PROT UR-MCNC: 13 MG/DL (ref 0–15)
PROT/CREAT UR: 0.13 MG/G{CREAT} (ref 0–0.2)
RBC # BLD AUTO: 5.19 M/UL (ref 4.6–6.2)
SODIUM SERPL-SCNC: 137 MMOL/L (ref 136–145)
WBC # BLD AUTO: 4.93 K/UL (ref 3.9–12.7)

## 2025-03-18 PROCEDURE — 80197 ASSAY OF TACROLIMUS: CPT | Performed by: INTERNAL MEDICINE

## 2025-03-18 PROCEDURE — 36415 COLL VENOUS BLD VENIPUNCTURE: CPT | Performed by: INTERNAL MEDICINE

## 2025-03-18 PROCEDURE — 80053 COMPREHEN METABOLIC PANEL: CPT | Performed by: INTERNAL MEDICINE

## 2025-03-18 PROCEDURE — 87799 DETECT AGENT NOS DNA QUANT: CPT | Performed by: INTERNAL MEDICINE

## 2025-03-18 PROCEDURE — 85025 COMPLETE CBC W/AUTO DIFF WBC: CPT | Performed by: INTERNAL MEDICINE

## 2025-03-18 PROCEDURE — 84156 ASSAY OF PROTEIN URINE: CPT | Performed by: INTERNAL MEDICINE

## 2025-03-18 NOTE — LETTER
March 20, 2025    Jordan Lopez  [address] [facility] [address]          Dear Jordan John:  MRN: [id number]    This is a follow up to your recent labs, your lab results were stable.  There are no medicine changes.  Please have your labs drawn again on 6/16/25.      If you cannot have your labs drawn on the scheduled date, it is your responsibility to call the transplant department to reschedule.  Please call [phone] and ask to speak to [facility]  for all scheduling requests.     Sincerely,      Susan Mosley RN  Your Liver Transplant Coordinator    [facility]  [address] [place] [address] [phone]        pain, lower leg

## 2025-03-19 LAB — TACROLIMUS BLD-MCNC: 3.1 NG/ML (ref 5–15)

## 2025-03-20 NOTE — TELEPHONE ENCOUNTER
Labs reviewed via portal and are stable.  Patient will repeat labs on 6/16/25.        ---- Message from Shaniqua Matias MD sent at 3/19/2025  8:55 PM CDT -----  The Labs are stable - please let patient know.  ----- Message -----  From: Bert, Dizzion Lab Interface  Sent: 3/18/2025   7:26 AM CDT  To: Shaniqua Matias MD

## 2025-04-04 ENCOUNTER — LAB VISIT (OUTPATIENT)
Dept: LAB | Facility: HOSPITAL | Age: 70
End: 2025-04-04
Attending: INTERNAL MEDICINE
Payer: MEDICARE

## 2025-04-04 DIAGNOSIS — R80.9 MICROALBUMINURIA: Primary | ICD-10-CM

## 2025-04-04 DIAGNOSIS — E11.29 TYPE 2 DIABETES MELLITUS, CONTROLLED, WITH RENAL COMPLICATIONS: ICD-10-CM

## 2025-04-04 LAB
ALBUMIN SERPL BCP-MCNC: 3.9 G/DL (ref 3.5–5.2)
ALBUMIN/CREAT UR: 103.4 UG/MG
ALP SERPL-CCNC: 69 UNIT/L (ref 40–150)
ALT SERPL W/O P-5'-P-CCNC: 16 UNIT/L (ref 10–44)
ANION GAP (OHS): 6 MMOL/L (ref 8–16)
AST SERPL-CCNC: 20 UNIT/L (ref 11–45)
BILIRUB SERPL-MCNC: 0.6 MG/DL (ref 0.1–1)
BUN SERPL-MCNC: 18 MG/DL (ref 8–23)
CALCIUM SERPL-MCNC: 9 MG/DL (ref 8.7–10.5)
CHLORIDE SERPL-SCNC: 107 MMOL/L (ref 95–110)
CO2 SERPL-SCNC: 26 MMOL/L (ref 23–29)
CREAT SERPL-MCNC: 1.3 MG/DL (ref 0.5–1.4)
CREAT UR-MCNC: 94.8 MG/DL (ref 23–375)
EAG (OHS): 123 MG/DL (ref 68–131)
GFR SERPLBLD CREATININE-BSD FMLA CKD-EPI: 59 ML/MIN/1.73/M2
GLUCOSE SERPL-MCNC: 111 MG/DL (ref 70–110)
HBA1C MFR BLD: 5.9 % (ref 4–5.6)
MICROALBUMIN UR-MCNC: 98 UG/ML (ref ?–5000)
POTASSIUM SERPL-SCNC: 4.5 MMOL/L (ref 3.5–5.1)
PROT SERPL-MCNC: 6.9 GM/DL (ref 6–8.4)
SODIUM SERPL-SCNC: 139 MMOL/L (ref 136–145)

## 2025-04-04 PROCEDURE — 83036 HEMOGLOBIN GLYCOSYLATED A1C: CPT

## 2025-04-04 PROCEDURE — 82570 ASSAY OF URINE CREATININE: CPT

## 2025-04-04 PROCEDURE — 80053 COMPREHEN METABOLIC PANEL: CPT

## 2025-04-04 PROCEDURE — 36415 COLL VENOUS BLD VENIPUNCTURE: CPT

## 2025-04-08 ENCOUNTER — PATIENT MESSAGE (OUTPATIENT)
Dept: TRANSPLANT | Facility: CLINIC | Age: 70
End: 2025-04-08
Payer: MEDICARE

## 2025-04-15 ENCOUNTER — LAB VISIT (OUTPATIENT)
Dept: LAB | Facility: HOSPITAL | Age: 70
End: 2025-04-15
Attending: INTERNAL MEDICINE
Payer: MEDICARE

## 2025-04-15 DIAGNOSIS — E11.9 TYPE 2 DIABETES MELLITUS: Primary | ICD-10-CM

## 2025-04-15 LAB
ABSOLUTE EOSINOPHIL (OHS): 0.26 K/UL
ABSOLUTE MONOCYTE (OHS): 0.37 K/UL (ref 0.3–1)
ABSOLUTE NEUTROPHIL COUNT (OHS): 2.78 K/UL (ref 1.8–7.7)
ALBUMIN/CREAT UR: 51.9 UG/MG
BASOPHILS # BLD AUTO: 0.04 K/UL
BASOPHILS NFR BLD AUTO: 0.9 %
CHOLEST SERPL-MCNC: 116 MG/DL (ref 120–199)
CHOLEST/HDLC SERPL: 3.6 {RATIO} (ref 2–5)
CREAT UR-MCNC: 109.8 MG/DL (ref 23–375)
ERYTHROCYTE [DISTWIDTH] IN BLOOD BY AUTOMATED COUNT: 14.2 % (ref 11.5–14.5)
HCT VFR BLD AUTO: 45.9 % (ref 40–54)
HDLC SERPL-MCNC: 32 MG/DL (ref 40–75)
HDLC SERPL: 27.6 % (ref 20–50)
HGB BLD-MCNC: 14.8 GM/DL (ref 14–18)
IMM GRANULOCYTES # BLD AUTO: 0.01 K/UL (ref 0–0.04)
IMM GRANULOCYTES NFR BLD AUTO: 0.2 % (ref 0–0.5)
LDLC SERPL CALC-MCNC: 50.6 MG/DL (ref 63–159)
LYMPHOCYTES # BLD AUTO: 1.12 K/UL (ref 1–4.8)
MCH RBC QN AUTO: 28 PG (ref 27–31)
MCHC RBC AUTO-ENTMCNC: 32.2 G/DL (ref 32–36)
MCV RBC AUTO: 87 FL (ref 82–98)
MICROALBUMIN UR-MCNC: 57 UG/ML (ref ?–5000)
NONHDLC SERPL-MCNC: 84 MG/DL
NUCLEATED RBC (/100WBC) (OHS): 0 /100 WBC
PLATELET # BLD AUTO: 153 K/UL (ref 150–450)
PMV BLD AUTO: 10.5 FL (ref 9.2–12.9)
RBC # BLD AUTO: 5.28 M/UL (ref 4.6–6.2)
RELATIVE EOSINOPHIL (OHS): 5.7 %
RELATIVE LYMPHOCYTE (OHS): 24.5 % (ref 18–48)
RELATIVE MONOCYTE (OHS): 8.1 % (ref 4–15)
RELATIVE NEUTROPHIL (OHS): 60.6 % (ref 38–73)
T4 FREE SERPL-MCNC: 0.85 NG/DL (ref 0.71–1.51)
TRIGL SERPL-MCNC: 167 MG/DL (ref 30–150)
TSH SERPL-ACNC: 5.37 UIU/ML (ref 0.4–4)
WBC # BLD AUTO: 4.58 K/UL (ref 3.9–12.7)

## 2025-04-15 PROCEDURE — 36415 COLL VENOUS BLD VENIPUNCTURE: CPT

## 2025-04-15 PROCEDURE — 82570 ASSAY OF URINE CREATININE: CPT

## 2025-04-15 PROCEDURE — 83718 ASSAY OF LIPOPROTEIN: CPT

## 2025-04-15 PROCEDURE — 84439 ASSAY OF FREE THYROXINE: CPT

## 2025-04-15 PROCEDURE — 85025 COMPLETE CBC W/AUTO DIFF WBC: CPT

## 2025-06-12 ENCOUNTER — PATIENT MESSAGE (OUTPATIENT)
Dept: RADIATION ONCOLOGY | Facility: CLINIC | Age: 70
End: 2025-06-12
Payer: MEDICARE

## 2025-06-14 ENCOUNTER — LAB VISIT (OUTPATIENT)
Dept: LAB | Facility: HOSPITAL | Age: 70
End: 2025-06-14
Attending: INTERNAL MEDICINE
Payer: MEDICARE

## 2025-06-14 ENCOUNTER — RESULTS FOLLOW-UP (OUTPATIENT)
Dept: RADIATION ONCOLOGY | Facility: CLINIC | Age: 70
End: 2025-06-14

## 2025-06-14 ENCOUNTER — LAB VISIT (OUTPATIENT)
Dept: LAB | Facility: HOSPITAL | Age: 70
End: 2025-06-14
Attending: RADIOLOGY
Payer: MEDICARE

## 2025-06-14 DIAGNOSIS — Z94.4 S/P LIVER TRANSPLANT: ICD-10-CM

## 2025-06-14 DIAGNOSIS — E11.29 TYPE II DIABETES MELLITUS WITH RENAL MANIFESTATIONS: Primary | ICD-10-CM

## 2025-06-14 DIAGNOSIS — C61 PROSTATE CANCER: ICD-10-CM

## 2025-06-14 DIAGNOSIS — I10 ESSENTIAL HYPERTENSION, MALIGNANT: ICD-10-CM

## 2025-06-14 DIAGNOSIS — Z85.05 PERSONAL HISTORY OF MALIGNANT HEPATOMA: ICD-10-CM

## 2025-06-14 DIAGNOSIS — Z94.0 S/P KIDNEY TRANSPLANT: ICD-10-CM

## 2025-06-14 LAB
ABSOLUTE EOSINOPHIL (OHS): 0.36 K/UL
ABSOLUTE MONOCYTE (OHS): 0.33 K/UL (ref 0.3–1)
ABSOLUTE NEUTROPHIL COUNT (OHS): 2.96 K/UL (ref 1.8–7.7)
AFP SERPL-MCNC: 2.1 NG/ML
ALBUMIN SERPL BCP-MCNC: 3.9 G/DL (ref 3.5–5.2)
ALP SERPL-CCNC: 71 UNIT/L (ref 40–150)
ALT SERPL W/O P-5'-P-CCNC: 23 UNIT/L (ref 10–44)
ANION GAP (OHS): 7 MMOL/L (ref 8–16)
ANION GAP (OHS): 8 MMOL/L (ref 8–16)
AST SERPL-CCNC: 21 UNIT/L (ref 11–45)
BASOPHILS # BLD AUTO: 0.06 K/UL
BASOPHILS NFR BLD AUTO: 1.3 %
BILIRUB SERPL-MCNC: 0.5 MG/DL (ref 0.1–1)
BUN SERPL-MCNC: 23 MG/DL (ref 8–23)
BUN SERPL-MCNC: 23 MG/DL (ref 8–23)
CALCIUM SERPL-MCNC: 9.1 MG/DL (ref 8.7–10.5)
CALCIUM SERPL-MCNC: 9.1 MG/DL (ref 8.7–10.5)
CHLORIDE SERPL-SCNC: 106 MMOL/L (ref 95–110)
CHLORIDE SERPL-SCNC: 106 MMOL/L (ref 95–110)
CO2 SERPL-SCNC: 22 MMOL/L (ref 23–29)
CO2 SERPL-SCNC: 23 MMOL/L (ref 23–29)
CREAT SERPL-MCNC: 1.3 MG/DL (ref 0.5–1.4)
CREAT SERPL-MCNC: 1.4 MG/DL (ref 0.5–1.4)
EAG (OHS): 128 MG/DL (ref 68–131)
ERYTHROCYTE [DISTWIDTH] IN BLOOD BY AUTOMATED COUNT: 14.1 % (ref 11.5–14.5)
GFR SERPLBLD CREATININE-BSD FMLA CKD-EPI: 54 ML/MIN/1.73/M2
GFR SERPLBLD CREATININE-BSD FMLA CKD-EPI: 59 ML/MIN/1.73/M2
GLUCOSE SERPL-MCNC: 131 MG/DL (ref 70–110)
GLUCOSE SERPL-MCNC: 131 MG/DL (ref 70–110)
HBA1C MFR BLD: 6.1 % (ref 4–5.6)
HCT VFR BLD AUTO: 43.8 % (ref 40–54)
HGB BLD-MCNC: 14.4 GM/DL (ref 14–18)
IMM GRANULOCYTES # BLD AUTO: 0.02 K/UL (ref 0–0.04)
IMM GRANULOCYTES NFR BLD AUTO: 0.4 % (ref 0–0.5)
LYMPHOCYTES # BLD AUTO: 1.01 K/UL (ref 1–4.8)
MCH RBC QN AUTO: 28.3 PG (ref 27–31)
MCHC RBC AUTO-ENTMCNC: 32.9 G/DL (ref 32–36)
MCV RBC AUTO: 86 FL (ref 82–98)
NUCLEATED RBC (/100WBC) (OHS): 0 /100 WBC
PLATELET # BLD AUTO: 146 K/UL (ref 150–450)
PMV BLD AUTO: 11 FL (ref 9.2–12.9)
POTASSIUM SERPL-SCNC: 4.2 MMOL/L (ref 3.5–5.1)
POTASSIUM SERPL-SCNC: 4.3 MMOL/L (ref 3.5–5.1)
PROT SERPL-MCNC: 7 GM/DL (ref 6–8.4)
PSA SERPL-MCNC: <0.1 NG/ML
RBC # BLD AUTO: 5.08 M/UL (ref 4.6–6.2)
RELATIVE EOSINOPHIL (OHS): 7.6 %
RELATIVE LYMPHOCYTE (OHS): 21.3 % (ref 18–48)
RELATIVE MONOCYTE (OHS): 7 % (ref 4–15)
RELATIVE NEUTROPHIL (OHS): 62.4 % (ref 38–73)
SODIUM SERPL-SCNC: 136 MMOL/L (ref 136–145)
SODIUM SERPL-SCNC: 136 MMOL/L (ref 136–145)
TESTOST SERPL-MCNC: 189 NG/DL (ref 304–1227)
TSH SERPL-ACNC: 3.48 UIU/ML (ref 0.4–4)
WBC # BLD AUTO: 4.74 K/UL (ref 3.9–12.7)

## 2025-06-14 PROCEDURE — 84443 ASSAY THYROID STIM HORMONE: CPT

## 2025-06-14 PROCEDURE — 83036 HEMOGLOBIN GLYCOSYLATED A1C: CPT

## 2025-06-14 PROCEDURE — 85025 COMPLETE CBC W/AUTO DIFF WBC: CPT

## 2025-06-14 PROCEDURE — 36415 COLL VENOUS BLD VENIPUNCTURE: CPT

## 2025-06-14 PROCEDURE — 84403 ASSAY OF TOTAL TESTOSTERONE: CPT

## 2025-06-14 PROCEDURE — 80197 ASSAY OF TACROLIMUS: CPT

## 2025-06-14 PROCEDURE — 80048 BASIC METABOLIC PNL TOTAL CA: CPT

## 2025-06-14 PROCEDURE — 82105 ALPHA-FETOPROTEIN SERUM: CPT

## 2025-06-14 PROCEDURE — 84153 ASSAY OF PSA TOTAL: CPT

## 2025-06-15 ENCOUNTER — RESULTS FOLLOW-UP (OUTPATIENT)
Dept: TRANSPLANT | Facility: HOSPITAL | Age: 70
End: 2025-06-15
Payer: MEDICARE

## 2025-06-15 LAB — TACROLIMUS BLD-MCNC: 4.1 NG/ML (ref 5–15)

## 2025-06-18 NOTE — TELEPHONE ENCOUNTER
Labs reviewed via portal and are stable.  Patient will repeat labs on 9/15/25 per plan of care.      ----- Message from Shaniqua Matias MD sent at 6/15/2025  8:46 PM CDT -----  The Labs are stable - please let patient know.  ----- Message -----  From: Lab, Background User  Sent: 6/14/2025  10:13 AM CDT  To: Shaniqua Matias MD

## 2025-06-25 ENCOUNTER — OFFICE VISIT (OUTPATIENT)
Dept: TRANSPLANT | Facility: CLINIC | Age: 70
End: 2025-06-25
Payer: MEDICARE

## 2025-06-25 VITALS
SYSTOLIC BLOOD PRESSURE: 143 MMHG | TEMPERATURE: 97 F | DIASTOLIC BLOOD PRESSURE: 71 MMHG | HEART RATE: 84 BPM | HEIGHT: 72 IN | OXYGEN SATURATION: 95 % | BODY MASS INDEX: 41.29 KG/M2 | RESPIRATION RATE: 16 BRPM | WEIGHT: 304.88 LBS

## 2025-06-25 DIAGNOSIS — Z94.4 LIVER REPLACED BY TRANSPLANT: Chronic | ICD-10-CM

## 2025-06-25 DIAGNOSIS — Z79.60 LONG-TERM USE OF IMMUNOSUPPRESSANT MEDICATION: Primary | ICD-10-CM

## 2025-06-25 PROCEDURE — 99214 OFFICE O/P EST MOD 30 MIN: CPT | Mod: S$GLB,,, | Performed by: INTERNAL MEDICINE

## 2025-06-25 PROCEDURE — 3008F BODY MASS INDEX DOCD: CPT | Mod: CPTII,S$GLB,, | Performed by: INTERNAL MEDICINE

## 2025-06-25 PROCEDURE — 1126F AMNT PAIN NOTED NONE PRSNT: CPT | Mod: CPTII,S$GLB,, | Performed by: INTERNAL MEDICINE

## 2025-06-25 PROCEDURE — 3078F DIAST BP <80 MM HG: CPT | Mod: CPTII,S$GLB,, | Performed by: INTERNAL MEDICINE

## 2025-06-25 PROCEDURE — 4010F ACE/ARB THERAPY RXD/TAKEN: CPT | Mod: CPTII,S$GLB,, | Performed by: INTERNAL MEDICINE

## 2025-06-25 PROCEDURE — 3077F SYST BP >= 140 MM HG: CPT | Mod: CPTII,S$GLB,, | Performed by: INTERNAL MEDICINE

## 2025-06-25 PROCEDURE — 99999 PR PBB SHADOW E&M-EST. PATIENT-LVL V: CPT | Mod: PBBFAC,,, | Performed by: INTERNAL MEDICINE

## 2025-06-25 PROCEDURE — 1160F RVW MEDS BY RX/DR IN RCRD: CPT | Mod: CPTII,S$GLB,, | Performed by: INTERNAL MEDICINE

## 2025-06-25 PROCEDURE — 1159F MED LIST DOCD IN RCRD: CPT | Mod: CPTII,S$GLB,, | Performed by: INTERNAL MEDICINE

## 2025-06-25 PROCEDURE — 3044F HG A1C LEVEL LT 7.0%: CPT | Mod: CPTII,S$GLB,, | Performed by: INTERNAL MEDICINE

## 2025-06-25 PROCEDURE — 3060F POS MICROALBUMINURIA REV: CPT | Mod: CPTII,S$GLB,, | Performed by: INTERNAL MEDICINE

## 2025-06-25 PROCEDURE — 3066F NEPHROPATHY DOC TX: CPT | Mod: CPTII,S$GLB,, | Performed by: INTERNAL MEDICINE

## 2025-06-25 RX ORDER — TAMSULOSIN HYDROCHLORIDE 0.4 MG/1
1 CAPSULE ORAL
COMMUNITY
Start: 2025-04-29

## 2025-06-25 RX ORDER — FINERENONE 20 MG/1
TABLET, FILM COATED ORAL
COMMUNITY
Start: 2025-04-10

## 2025-06-25 RX ORDER — CLOPIDOGREL BISULFATE 75 MG/1
75 TABLET ORAL
COMMUNITY
Start: 2025-05-21

## 2025-06-25 NOTE — PROGRESS NOTES
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     He has had the following complications since transplant: chronic renal insufficiency.     Subjective:   Interval History: Jordan is now 12 years and 11 months post combined liver-kidney transplant. He is overall feeling well.  --diagnosed with venous insufficiency-had a a procedure in 2024 to help with this-complicated by infection that required -6 weeks abx; subsequently had iliac stent placed  --25 lb weight loss with mounjaro    History of Stage IIIB, pT3a, N0, M0, P>=10<20, G2 adenocarcinoma of the prostate. His PMHx is significant for a renal cell carcinoma treated with nephrectomy  in 2010.  He was referred for evaluation of an elevated PSA on 14.8 ng/ml drawn in March of 2021. The patient underwent TRUS and biopsy at [facility] on 4/20/21.  Pathology revealed Christian 7 (3+4) adenocarcinoma involving 2 of 8 cores, unclear of location.   One core was 100% involved.  The patient presented to [facility] for further evaluation.  Bone scan on 5/7/21 was negative for evidence of metastatic disease.  MRI of the prostate on 5/11/21 revealed a 4.9 x 3.8 x 4 cm prostate corresponding to a 36 cc gland.  There was a 1.9 cm T2 hypointense lesion in the Rt. apex, PI-RADS 5.  There was extraprostatic extension.  The neurovascular bundles and seminal vesicles were unremarkable.  There was no lymphadenopathy. We elected to offer radiotherapy combined with hormonal deprivation therapy.  He completed 70 Gy to the prostate, seminal vesicles and lower pelvic nodes on 8/23/21. He was last seen by radiation oncology 12/24- Prostate cancer no evidence of tumor progression or recurrence. Plan follow up psa and testosterone in 6  months with phone review RTC in one year with PSA and testosterone.     Allograft function 25: Tbil 0.5, ALT 23, AST 21, ALKP 71, creat 1.3; prograf 4.1  IS:  Prograf and cellcept 250 mg bid.  HCC screening for recurrence: completed surveillance    Health maintenance  Bone density 22:  Osteopenia; Ca with Vit D; repeat bone density  also osteopenia-recommended calcium 1000 mg daily with vitamin D and weight bearing exercises; repeat   Colorectal ca screenin-repeat - ?polyps; f/u depending on polyps on last colonoscopy  Dermatology every 6 months: no skin cancers; f/u needed now      Review of Systems   Constitutional: Negative.    HENT: Negative.     Eyes: Negative.    Respiratory: Negative.     Cardiovascular: Negative.    Gastrointestinal: Negative.    Genitourinary: Negative.    Musculoskeletal: Negative.    Skin: Negative.    Neurological: Negative.    Psychiatric/Behavioral: Negative.         Objective:     Vitals:    25 1059   BP: (!) 143/71   Pulse: 84   Resp: 16   Temp: 97.3 °F (36.3 °C)             Physical Exam  Vitals reviewed.   Constitutional:       Appearance: He is well-developed.   HENT:      Head: Normocephalic and atraumatic.   Eyes:      General: No scleral icterus.     Conjunctiva/sclera: Conjunctivae normal.      Pupils: Pupils are equal, round, and reactive to light.   Neck:      Thyroid: No thyromegaly.   Cardiovascular:      Rate and Rhythm: Normal rate and regular rhythm.      Heart sounds: Normal heart sounds.   Pulmonary:      Effort: Pulmonary effort is normal.      Breath sounds: Normal breath sounds. No rales.   Abdominal:      General: Bowel sounds are normal. There is no distension.      Palpations: Abdomen is soft. There is no mass.      Tenderness: There is no abdominal tenderness.   Musculoskeletal:         General: Normal range of motion.      Cervical back: Normal range of motion and neck supple.   Skin:     General: Skin is warm and dry.       Findings: No rash.   Neurological:      Mental Status: He is alert and oriented to person, place, and time.       Lab Results   Component Value Date    BILITOT 0.5 06/14/2025    BILITOT 0.6 03/18/2025    AST 21 06/14/2025    AST 19 03/18/2025    ALT 23 06/14/2025    ALT 18 03/18/2025    ALKPHOS 71 06/14/2025    ALKPHOS 68 03/18/2025    CREATININE 1.4 06/14/2025    ALBUMIN 3.9 06/14/2025    ALBUMIN 3.8 03/18/2025     Lab Results   Component Value Date    WBC 4.74 06/14/2025    HGB 14.4 06/14/2025    HGB 14.4 03/18/2025    HCT 43.8 06/14/2025    HCT 44.7 03/18/2025     (L) 06/14/2025     03/18/2025     Lab Results   Component Value Date    TACROLIMUS 4.1 (L) 06/14/2025    TACROLIMUS 3.1 (L) 03/18/2025       Assessment/Plan:   Pt is now 12 yrs and 11 months post liver transplant. Current recommendations:  1. Complications of OLTx: mild intermittent renal insufficiency. Continue to monitor closely; pt has deferred nephrology referral. Labs every 3 months indefinitely  2. HTN: continue current medications; monitor   3. HCC history: no further surveillance needed   4. Prophylactic immunotherapy: continue current immunosuppression (prograf and cellcept). Continue cellcept to 250 mg bid; prograf target 3-4  5. Health maintenance: continue to see a dermatologist every 6 months to screen for skin cancer, perform regular colonoscopies (due 2024)  6. R/O osteoporosis. I am recommending a repeat bone density in 2026  7. Hx of renal cell ca: s/p nephrectomy  8. Hx prostate cancer: f/u radiation oncology/urology  9. Obesity: continue mounjaro    Return 1 year.  A total of 35 minutes was spent reviewing the patient's chart, examining the patient, reviewing labs and imaging and coordinating care with the patient's care team.        MD MANNY PattersonOS Patient Status  Functional Status: 100% - Normal, no complaints, no evidence of disease  Physical Capacity: No Limitations    .

## 2025-06-25 NOTE — PATIENT INSTRUCTIONS
No change in prograf or cellcept  Labs every 3 months indefinitely due to renal insufficiency  Dermatologist every 6 months  Clarify when need next colonoscopy  Bone density 2026  Monitor BP  F/u radiation oncology/urology  Continue mounjaro  Return 1 year

## 2025-06-25 NOTE — Clinical Note
1. No change in prograf or cellcept 2. Labs every 3 months indefinitely due to renal insufficiency 3. Dermatologist every 6 months 4. Clarify when need next colonoscopy 5. Bone density 2026 6. Monitor BP 7. F/u radiation oncology/urology 8. Continue mounjaro 9. Return 1 year

## 2025-06-25 NOTE — LETTER
June 25, 2025        Stephen Schumacher  [address]  [place] [address]  Phone: [phone]  Fax: [phone]             [facility] [address]  [address]  [facility] [address]  Phone: [phone]   Patient: Jordan Lopez Jr.   MR Number: [id number]   YOB: 1955   Date of Visit: 6/25/2025       Dear Dr. Stephen Schumacher    Thank you for referring Jordan Lopez to me for evaluation. Attached you will find relevant portions of my assessment and plan of care.    If you have questions, please do not hesitate to call me. I look forward to following Jordan Lopez along with you.    Sincerely,    Shaniqua Matias MD    Enclosure    If you would like to receive this communication electronically, please contact [email or web address] or [phone] to request [employer] access.    [employer] is a tool which provides read-only access to select patient information with whom you have a relationship. Its easy to use and provides real time access to review your patients record including encounter summaries, notes, results, and demographic information.    If you feel you have received this communication in error or would no longer like to receive these types of communications, please e-mail [email or web address]

## 2025-06-28 ENCOUNTER — LAB VISIT (OUTPATIENT)
Dept: LAB | Facility: HOSPITAL | Age: 70
End: 2025-06-28
Attending: INTERNAL MEDICINE
Payer: MEDICARE

## 2025-06-28 DIAGNOSIS — I65.23 ATHEROSCLEROSIS OF BOTH CAROTID ARTERIES: Primary | ICD-10-CM

## 2025-06-28 LAB
ALBUMIN SERPL BCP-MCNC: 3.9 G/DL (ref 3.5–5.2)
ALP SERPL-CCNC: 70 UNIT/L (ref 40–150)
ALT SERPL W/O P-5'-P-CCNC: 22 UNIT/L (ref 10–44)
ANION GAP (OHS): 11 MMOL/L (ref 8–16)
AST SERPL-CCNC: 21 UNIT/L (ref 11–45)
BILIRUB SERPL-MCNC: 0.7 MG/DL (ref 0.1–1)
BUN SERPL-MCNC: 24 MG/DL (ref 8–23)
CALCIUM SERPL-MCNC: 9.2 MG/DL (ref 8.7–10.5)
CHLORIDE SERPL-SCNC: 105 MMOL/L (ref 95–110)
CHOLEST SERPL-MCNC: 124 MG/DL (ref 120–199)
CHOLEST/HDLC SERPL: 3.5 {RATIO} (ref 2–5)
CO2 SERPL-SCNC: 24 MMOL/L (ref 23–29)
CREAT SERPL-MCNC: 1.4 MG/DL (ref 0.5–1.4)
GFR SERPLBLD CREATININE-BSD FMLA CKD-EPI: 54 ML/MIN/1.73/M2
GLUCOSE SERPL-MCNC: 124 MG/DL (ref 70–110)
HDLC SERPL-MCNC: 35 MG/DL (ref 40–75)
HDLC SERPL: 28.2 % (ref 20–50)
LDLC SERPL CALC-MCNC: 53.4 MG/DL (ref 63–159)
NONHDLC SERPL-MCNC: 89 MG/DL
POTASSIUM SERPL-SCNC: 4.4 MMOL/L (ref 3.5–5.1)
PROT SERPL-MCNC: 6.9 GM/DL (ref 6–8.4)
SODIUM SERPL-SCNC: 140 MMOL/L (ref 136–145)
TRIGL SERPL-MCNC: 178 MG/DL (ref 30–150)

## 2025-06-28 PROCEDURE — 80061 LIPID PANEL: CPT

## 2025-06-28 PROCEDURE — 36415 COLL VENOUS BLD VENIPUNCTURE: CPT

## 2025-06-28 PROCEDURE — 84075 ASSAY ALKALINE PHOSPHATASE: CPT
